# Patient Record
Sex: FEMALE | Race: WHITE | Employment: OTHER | URBAN - NONMETROPOLITAN AREA
[De-identification: names, ages, dates, MRNs, and addresses within clinical notes are randomized per-mention and may not be internally consistent; named-entity substitution may affect disease eponyms.]

---

## 2017-01-03 ENCOUNTER — OFFICE VISIT (OUTPATIENT)
Dept: PRIMARY CARE CLINIC | Age: 67
End: 2017-01-03
Payer: COMMERCIAL

## 2017-01-03 VITALS
TEMPERATURE: 97 F | WEIGHT: 153 LBS | RESPIRATION RATE: 16 BRPM | SYSTOLIC BLOOD PRESSURE: 162 MMHG | BODY MASS INDEX: 27.11 KG/M2 | HEART RATE: 68 BPM | HEIGHT: 63 IN | DIASTOLIC BLOOD PRESSURE: 88 MMHG

## 2017-01-03 DIAGNOSIS — L30.9 DERMATITIS: Primary | ICD-10-CM

## 2017-01-03 PROCEDURE — 96372 THER/PROPH/DIAG INJ SC/IM: CPT | Performed by: FAMILY MEDICINE

## 2017-01-03 PROCEDURE — 99213 OFFICE O/P EST LOW 20 MIN: CPT | Performed by: FAMILY MEDICINE

## 2017-01-03 RX ORDER — TRIAMCINOLONE ACETONIDE 40 MG/ML
60 INJECTION, SUSPENSION INTRA-ARTICULAR; INTRAMUSCULAR ONCE
Status: COMPLETED | OUTPATIENT
Start: 2017-01-03 | End: 2017-01-03

## 2017-01-03 RX ADMIN — TRIAMCINOLONE ACETONIDE 60 MG: 40 INJECTION, SUSPENSION INTRA-ARTICULAR; INTRAMUSCULAR at 17:53

## 2017-01-04 ASSESSMENT — ENCOUNTER SYMPTOMS
VOMITING: 0
DIARRHEA: 0
NAUSEA: 0
WHEEZING: 0
EYE DISCHARGE: 0
BACK PAIN: 0
COLOR CHANGE: 0
ABDOMINAL PAIN: 0
COUGH: 0

## 2017-01-30 ENCOUNTER — OFFICE VISIT (OUTPATIENT)
Dept: PRIMARY CARE CLINIC | Age: 67
End: 2017-01-30
Payer: COMMERCIAL

## 2017-01-30 VITALS
DIASTOLIC BLOOD PRESSURE: 70 MMHG | HEART RATE: 68 BPM | OXYGEN SATURATION: 96 % | WEIGHT: 151 LBS | TEMPERATURE: 97.7 F | RESPIRATION RATE: 16 BRPM | HEIGHT: 63 IN | SYSTOLIC BLOOD PRESSURE: 123 MMHG | BODY MASS INDEX: 26.75 KG/M2

## 2017-01-30 DIAGNOSIS — Z23 NEED FOR VACCINATION WITH 13-POLYVALENT PNEUMOCOCCAL CONJUGATE VACCINE: ICD-10-CM

## 2017-01-30 DIAGNOSIS — Z79.899 MEDICATION MANAGEMENT: ICD-10-CM

## 2017-01-30 DIAGNOSIS — F32.A DEPRESSION, UNSPECIFIED DEPRESSION TYPE: Primary | ICD-10-CM

## 2017-01-30 DIAGNOSIS — E78.00 HYPERCHOLESTEREMIA: ICD-10-CM

## 2017-01-30 DIAGNOSIS — I10 ESSENTIAL HYPERTENSION: ICD-10-CM

## 2017-01-30 PROCEDURE — 90471 IMMUNIZATION ADMIN: CPT | Performed by: NURSE PRACTITIONER

## 2017-01-30 PROCEDURE — 99213 OFFICE O/P EST LOW 20 MIN: CPT | Performed by: NURSE PRACTITIONER

## 2017-01-30 PROCEDURE — 90670 PCV13 VACCINE IM: CPT | Performed by: NURSE PRACTITIONER

## 2017-01-30 RX ORDER — ESOMEPRAZOLE MAGNESIUM 40 MG/1
40 CAPSULE, DELAYED RELEASE ORAL DAILY
Qty: 30 CAPSULE | Refills: 11 | Status: SHIPPED | OUTPATIENT
Start: 2017-01-30 | End: 2018-02-23

## 2017-01-30 ASSESSMENT — ENCOUNTER SYMPTOMS: RESPIRATORY NEGATIVE: 1

## 2017-02-02 ENCOUNTER — TELEPHONE (OUTPATIENT)
Dept: PRIMARY CARE CLINIC | Age: 67
End: 2017-02-02

## 2017-04-17 RX ORDER — ATENOLOL 50 MG/1
TABLET ORAL
Qty: 180 TABLET | Refills: 0 | Status: SHIPPED | OUTPATIENT
Start: 2017-04-17 | End: 2017-08-02 | Stop reason: SDUPTHER

## 2017-04-18 ENCOUNTER — HOSPITAL ENCOUNTER (OUTPATIENT)
Dept: VASCULAR LAB | Age: 67
Discharge: HOME | End: 2017-04-18
Payer: COMMERCIAL

## 2017-04-18 ENCOUNTER — HOSPITAL ENCOUNTER (OUTPATIENT)
Dept: ULTRASOUND IMAGING | Age: 67
Discharge: HOME | End: 2017-04-18
Payer: COMMERCIAL

## 2017-04-18 ENCOUNTER — OFFICE VISIT (OUTPATIENT)
Dept: VASCULAR SURGERY | Age: 67
End: 2017-04-18
Payer: COMMERCIAL

## 2017-04-18 VITALS
TEMPERATURE: 96 F | DIASTOLIC BLOOD PRESSURE: 71 MMHG | HEART RATE: 44 BPM | RESPIRATION RATE: 18 BRPM | SYSTOLIC BLOOD PRESSURE: 136 MMHG

## 2017-04-18 DIAGNOSIS — I71.40 ABDOMINAL AORTIC ANEURYSM WITHOUT RUPTURE: ICD-10-CM

## 2017-04-18 DIAGNOSIS — I70.1 RENAL ARTERY STENOSIS (HCC): ICD-10-CM

## 2017-04-18 DIAGNOSIS — I77.1 CELIAC ARTERY STENOSIS (HCC): ICD-10-CM

## 2017-04-18 DIAGNOSIS — I65.23 BILATERAL CAROTID ARTERY STENOSIS: ICD-10-CM

## 2017-04-18 DIAGNOSIS — I71.40 AAA (ABDOMINAL AORTIC ANEURYSM) WITHOUT RUPTURE: Primary | ICD-10-CM

## 2017-04-18 PROCEDURE — 99213 OFFICE O/P EST LOW 20 MIN: CPT | Performed by: NURSE PRACTITIONER

## 2017-04-18 PROCEDURE — 93880 EXTRACRANIAL BILAT STUDY: CPT

## 2017-04-18 PROCEDURE — 93978 VASCULAR STUDY: CPT

## 2017-04-27 ENCOUNTER — HOSPITAL ENCOUNTER (OUTPATIENT)
Dept: CT IMAGING | Age: 67
Discharge: HOME | End: 2017-04-27
Payer: COMMERCIAL

## 2017-04-27 DIAGNOSIS — I71.40 AAA (ABDOMINAL AORTIC ANEURYSM) WITHOUT RUPTURE: ICD-10-CM

## 2017-04-27 LAB
GFR NON-AFRICAN AMERICAN: 56
PERFORMED ON: ABNORMAL
POC CREATININE: 1 MG/DL (ref 0.3–1.3)
POC SAMPLE TYPE: ABNORMAL

## 2017-04-27 PROCEDURE — 6360000004 HC RX CONTRAST MEDICATION: Performed by: NURSE PRACTITIONER

## 2017-04-27 PROCEDURE — 74174 CTA ABD&PLVS W/CONTRAST: CPT

## 2017-04-27 PROCEDURE — 82565 ASSAY OF CREATININE: CPT

## 2017-04-27 RX ADMIN — IOVERSOL 90 ML: 741 INJECTION INTRA-ARTERIAL; INTRAVENOUS at 09:18

## 2017-04-28 PROBLEM — I77.4 CELIAC ARTERY STENOSIS (HCC): Status: ACTIVE | Noted: 2017-04-28

## 2017-04-28 PROBLEM — I77.1 CELIAC ARTERY STENOSIS (HCC): Status: ACTIVE | Noted: 2017-04-28

## 2017-04-28 PROBLEM — I70.1 RENAL ARTERY STENOSIS (HCC): Status: ACTIVE | Noted: 2017-04-28

## 2017-05-03 RX ORDER — SIMVASTATIN 40 MG
TABLET ORAL
Qty: 90 TABLET | Refills: 0 | Status: SHIPPED | OUTPATIENT
Start: 2017-05-03 | End: 2017-08-02 | Stop reason: SDUPTHER

## 2017-05-03 RX ORDER — CLOPIDOGREL BISULFATE 75 MG/1
TABLET ORAL
Qty: 90 TABLET | Refills: 0 | Status: SHIPPED | OUTPATIENT
Start: 2017-05-03 | End: 2017-08-02 | Stop reason: SDUPTHER

## 2017-05-03 RX ORDER — CITALOPRAM 20 MG/1
TABLET ORAL
Qty: 90 TABLET | Refills: 0 | Status: SHIPPED | OUTPATIENT
Start: 2017-05-03 | End: 2017-08-02 | Stop reason: SDUPTHER

## 2017-08-02 ENCOUNTER — OFFICE VISIT (OUTPATIENT)
Dept: PRIMARY CARE CLINIC | Age: 67
End: 2017-08-02
Payer: COMMERCIAL

## 2017-08-02 VITALS
BODY MASS INDEX: 26.33 KG/M2 | RESPIRATION RATE: 16 BRPM | WEIGHT: 148.6 LBS | HEIGHT: 63 IN | TEMPERATURE: 97.2 F | HEART RATE: 69 BPM | SYSTOLIC BLOOD PRESSURE: 144 MMHG | DIASTOLIC BLOOD PRESSURE: 98 MMHG

## 2017-08-02 DIAGNOSIS — Z12.31 ENCOUNTER FOR SCREENING MAMMOGRAM FOR BREAST CANCER: ICD-10-CM

## 2017-08-02 DIAGNOSIS — I10 ESSENTIAL HYPERTENSION: ICD-10-CM

## 2017-08-02 DIAGNOSIS — E78.00 HYPERCHOLESTEREMIA: ICD-10-CM

## 2017-08-02 DIAGNOSIS — Z01.419 WELL FEMALE EXAM WITH ROUTINE GYNECOLOGICAL EXAM: Primary | ICD-10-CM

## 2017-08-02 PROCEDURE — 99397 PER PM REEVAL EST PAT 65+ YR: CPT | Performed by: FAMILY MEDICINE

## 2017-08-02 RX ORDER — CLOPIDOGREL BISULFATE 75 MG/1
TABLET ORAL
Qty: 90 TABLET | Refills: 3 | Status: SHIPPED | OUTPATIENT
Start: 2017-08-02 | End: 2019-03-07 | Stop reason: SDUPTHER

## 2017-08-02 RX ORDER — ATENOLOL 50 MG/1
TABLET ORAL
Qty: 180 TABLET | Refills: 3 | Status: SHIPPED | OUTPATIENT
Start: 2017-08-02 | End: 2018-09-27 | Stop reason: SDUPTHER

## 2017-08-02 RX ORDER — SIMVASTATIN 40 MG
TABLET ORAL
Qty: 90 TABLET | Refills: 3 | Status: SHIPPED | OUTPATIENT
Start: 2017-08-02 | End: 2019-05-07 | Stop reason: SDUPTHER

## 2017-08-02 RX ORDER — CITALOPRAM 20 MG/1
TABLET ORAL
Qty: 90 TABLET | Refills: 3 | Status: ON HOLD | OUTPATIENT
Start: 2017-08-02 | End: 2018-09-21 | Stop reason: HOSPADM

## 2017-08-02 ASSESSMENT — PATIENT HEALTH QUESTIONNAIRE - PHQ9
2. FEELING DOWN, DEPRESSED OR HOPELESS: 1
SUM OF ALL RESPONSES TO PHQ QUESTIONS 1-9: 1
1. LITTLE INTEREST OR PLEASURE IN DOING THINGS: 0
SUM OF ALL RESPONSES TO PHQ9 QUESTIONS 1 & 2: 1

## 2017-08-09 ENCOUNTER — NURSE ONLY (OUTPATIENT)
Dept: PRIMARY CARE CLINIC | Age: 67
End: 2017-08-09
Payer: COMMERCIAL

## 2017-08-09 VITALS — DIASTOLIC BLOOD PRESSURE: 86 MMHG | SYSTOLIC BLOOD PRESSURE: 134 MMHG

## 2017-08-09 DIAGNOSIS — Z01.419 WELL FEMALE EXAM WITH ROUTINE GYNECOLOGICAL EXAM: ICD-10-CM

## 2017-08-09 LAB
ALBUMIN SERPL-MCNC: 3.9 G/DL (ref 3.5–5.2)
ALP BLD-CCNC: 105 U/L (ref 35–104)
ALT SERPL-CCNC: 11 U/L (ref 5–33)
ANION GAP SERPL CALCULATED.3IONS-SCNC: 14 MMOL/L (ref 7–19)
AST SERPL-CCNC: 19 U/L (ref 5–32)
BILIRUB SERPL-MCNC: 0.3 MG/DL (ref 0.2–1.2)
BUN BLDV-MCNC: 20 MG/DL (ref 8–23)
CALCIUM SERPL-MCNC: 9.7 MG/DL (ref 8.8–10.2)
CHLORIDE BLD-SCNC: 102 MMOL/L (ref 98–111)
CHOLESTEROL, TOTAL: 163 MG/DL (ref 160–199)
CO2: 25 MMOL/L (ref 22–29)
CREAT SERPL-MCNC: 0.8 MG/DL (ref 0.5–0.9)
GFR NON-AFRICAN AMERICAN: >60
GLUCOSE BLD-MCNC: 92 MG/DL (ref 74–109)
HCT VFR BLD CALC: 38.9 % (ref 37–47)
HDLC SERPL-MCNC: 51 MG/DL (ref 65–121)
HEMOGLOBIN: 12.7 G/DL (ref 12–16)
LDL CHOLESTEROL CALCULATED: 98 MG/DL
MCH RBC QN AUTO: 29.7 PG (ref 27–31)
MCHC RBC AUTO-ENTMCNC: 32.6 G/DL (ref 33–37)
MCV RBC AUTO: 90.9 FL (ref 81–99)
PDW BLD-RTO: 14.2 % (ref 11.5–14.5)
PLATELET # BLD: 278 K/UL (ref 130–400)
PMV BLD AUTO: 10.8 FL (ref 9.4–12.3)
POTASSIUM SERPL-SCNC: 4.3 MMOL/L (ref 3.5–5)
RBC # BLD: 4.28 M/UL (ref 4.2–5.4)
SODIUM BLD-SCNC: 141 MMOL/L (ref 136–145)
TOTAL PROTEIN: 6.6 G/DL (ref 6.6–8.7)
TRIGL SERPL-MCNC: 72 MG/DL (ref 150–199)
WBC # BLD: 8.5 K/UL (ref 4.8–10.8)

## 2017-08-09 PROCEDURE — 36415 COLL VENOUS BLD VENIPUNCTURE: CPT | Performed by: FAMILY MEDICINE

## 2017-08-23 ENCOUNTER — NURSE ONLY (OUTPATIENT)
Dept: PRIMARY CARE CLINIC | Age: 67
End: 2017-08-23

## 2017-08-23 VITALS — SYSTOLIC BLOOD PRESSURE: 130 MMHG | DIASTOLIC BLOOD PRESSURE: 80 MMHG

## 2017-08-23 DIAGNOSIS — I10 ESSENTIAL HYPERTENSION: Primary | ICD-10-CM

## 2017-08-29 ENCOUNTER — HOSPITAL ENCOUNTER (OUTPATIENT)
Dept: WOMENS IMAGING | Age: 67
Discharge: HOME | End: 2017-08-29
Payer: COMMERCIAL

## 2017-08-29 DIAGNOSIS — Z12.31 ENCOUNTER FOR SCREENING MAMMOGRAM FOR BREAST CANCER: ICD-10-CM

## 2017-08-29 PROCEDURE — 77063 BREAST TOMOSYNTHESIS BI: CPT

## 2017-11-01 ENCOUNTER — HOSPITAL ENCOUNTER (OUTPATIENT)
Dept: CT IMAGING | Age: 67
Discharge: HOME | End: 2017-11-01
Payer: COMMERCIAL

## 2017-11-01 ENCOUNTER — OFFICE VISIT (OUTPATIENT)
Dept: VASCULAR SURGERY | Age: 67
End: 2017-11-01
Payer: COMMERCIAL

## 2017-11-01 VITALS
TEMPERATURE: 97 F | HEART RATE: 70 BPM | OXYGEN SATURATION: 97 % | SYSTOLIC BLOOD PRESSURE: 126 MMHG | DIASTOLIC BLOOD PRESSURE: 80 MMHG | RESPIRATION RATE: 18 BRPM

## 2017-11-01 DIAGNOSIS — I77.1 CELIAC ARTERY STENOSIS (HCC): ICD-10-CM

## 2017-11-01 DIAGNOSIS — I70.1 RENAL ARTERY STENOSIS (HCC): ICD-10-CM

## 2017-11-01 DIAGNOSIS — I71.40 AAA (ABDOMINAL AORTIC ANEURYSM) WITHOUT RUPTURE: ICD-10-CM

## 2017-11-01 DIAGNOSIS — I71.40 ABDOMINAL AORTIC ANEURYSM (AAA) WITHOUT RUPTURE: Primary | ICD-10-CM

## 2017-11-01 LAB
GFR NON-AFRICAN AMERICAN: 41
PERFORMED ON: ABNORMAL
POC CREATININE: 1.3 MG/DL (ref 0.3–1.3)
POC SAMPLE TYPE: ABNORMAL

## 2017-11-01 PROCEDURE — 74174 CTA ABD&PLVS W/CONTRAST: CPT

## 2017-11-01 PROCEDURE — 99213 OFFICE O/P EST LOW 20 MIN: CPT | Performed by: PHYSICIAN ASSISTANT

## 2017-11-01 PROCEDURE — 82565 ASSAY OF CREATININE: CPT

## 2017-11-01 PROCEDURE — 6360000004 HC RX CONTRAST MEDICATION: Performed by: NURSE PRACTITIONER

## 2017-11-01 RX ADMIN — IOPAMIDOL 90 ML: 755 INJECTION, SOLUTION INTRAVENOUS at 11:08
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
## 2018-02-22 ENCOUNTER — TELEPHONE (OUTPATIENT)
Dept: VASCULAR SURGERY | Age: 68
End: 2018-02-22

## 2018-02-22 NOTE — TELEPHONE ENCOUNTER
The patient called and left a message through our answering services that she is having pain between her shoulder blades and shortness of breath. I returned the patient's call and I spoke with MERRITT [facility]. I instructed the patient to go to the ED, or call 911 if she does not have someone that could transport her to the ED in a timely manor. The patient voiced understanding.

## 2018-02-23 ENCOUNTER — OFFICE VISIT (OUTPATIENT)
Dept: PRIMARY CARE CLINIC | Age: 68
End: 2018-02-23
Payer: COMMERCIAL

## 2018-02-23 VITALS
SYSTOLIC BLOOD PRESSURE: 110 MMHG | DIASTOLIC BLOOD PRESSURE: 63 MMHG | OXYGEN SATURATION: 97 % | WEIGHT: 138 LBS | RESPIRATION RATE: 16 BRPM | BODY MASS INDEX: 24.45 KG/M2 | HEART RATE: 63 BPM | TEMPERATURE: 96.6 F | HEIGHT: 63 IN

## 2018-02-23 DIAGNOSIS — R42 VERTIGO: Primary | ICD-10-CM

## 2018-02-23 PROCEDURE — 99213 OFFICE O/P EST LOW 20 MIN: CPT | Performed by: NURSE PRACTITIONER

## 2018-02-23 RX ORDER — METHYLPREDNISOLONE 4 MG/1
TABLET ORAL
Qty: 1 KIT | Refills: 0 | Status: SHIPPED | OUTPATIENT
Start: 2018-02-23 | End: 2018-03-01

## 2018-02-23 RX ORDER — AZELASTINE 1 MG/ML
1 SPRAY, METERED NASAL 2 TIMES DAILY
Qty: 1 BOTTLE | Refills: 3 | Status: SHIPPED | OUTPATIENT
Start: 2018-02-23 | End: 2018-08-27

## 2018-02-23 RX ORDER — TIZANIDINE 2 MG/1
2 TABLET ORAL EVERY 6 HOURS PRN
Qty: 30 TABLET | Refills: 0 | Status: SHIPPED | OUTPATIENT
Start: 2018-02-23 | End: 2018-09-27

## 2018-02-23 RX ORDER — MECLIZINE HCL 12.5 MG/1
12.5 TABLET ORAL 3 TIMES DAILY PRN
Qty: 30 TABLET | Refills: 0 | Status: SHIPPED | OUTPATIENT
Start: 2018-02-23 | End: 2018-03-05

## 2018-02-23 NOTE — PATIENT INSTRUCTIONS
Look up half summer sault maneuver for vertigo  May use meclizine for dizziness  Use the astelin to open up the sinus and eustation tubes  Steroids will help the back and the vertigo  Muscle relaxer while at home  If not better by Monday appt with vascular or if worse go to ER  Patient Education        Cawthorne Exercises for Vertigo: Care Instructions  Your Care Instructions  Simple exercises can help you regain your balance when you have vertigo. If you have Ménière's disease, benign paroxysmal positional vertigo (BPPV), or another inner ear problem, you may have vertigo off and on. Do these exercises first thing in the morning and before you go to bed. You might get dizzy when you first start them. If this happens, try to do them for at least 5 minutes. Do a group of exercises at a time, starting at the top of the list. It may take several weeks before you can do all the exercises without feeling dizzy. Follow-up care is a key part of your treatment and safety. Be sure to make and go to all appointments, and call your doctor if you are having problems. It's also a good idea to know your test results and keep a list of the medicines you take. How can you care for yourself at home? Exercise 1  While sitting on the side of the bed and holding your head still:  · Look up as far as you can. · Look down as far as you can. · Look from side to side as far as you can. · Stretch your arm straight out in front of you. Focus on your index finger. Continue to focus on your finger while you bring it to your nose. Exercise 2  While sitting on the side of the bed:  · Bring your head as far back as you can. · Bring your head forward to touch your chin to your chest.  · Turn your head from side to side. · Do these exercises first with your eyes open. Then try with your eyes closed. Exercise 3  While sitting on the side of the bed:  · Shrug your shoulders straight upward, then relax them.   · Bend over and try to touch nauseated. You may have trouble standing or walking and may lose your balance. Vertigo is often related to an inner ear problem, but it can have other more serious causes. If vertigo continues, you may need more tests to find its cause. Follow-up care is a key part of your treatment and safety. Be sure to make and go to all appointments, and call your doctor if you are having problems. It's also a good idea to know your test results and keep a list of the medicines you take. How can you care for yourself at home? · Do not lie flat on your back. Prop yourself up slightly. This may reduce the spinning feeling. Keep your eyes open. · Move slowly so that you do not fall. · If your doctor recommends medicine, take it exactly as directed. · Do not drive while you are having vertigo. Certain exercises, called Vu-Daroff exercises, can help decrease vertigo. To do Vu-Daroff exercises:  · Sit on the edge of a bed or sofa and quickly lie down on the side that causes the worst vertigo. Lie on your side with your ear down. · Stay in this position for at least 30 seconds or until the vertigo goes away. · Sit up. If this causes vertigo, wait for it to stop. · Repeat the procedure on the other side. · Repeat this 10 times. Do these exercises 2 times a day until the vertigo is gone. When should you call for help? Call 911 anytime you think you may need emergency care. For example, call if:  ? · You passed out (lost consciousness). ? · You have symptoms of a stroke. These may include:  ¨ Sudden numbness, tingling, weakness, or loss of movement in your face, arm, or leg, especially on only one side of your body. ¨ Sudden vision changes. ¨ Sudden trouble speaking. ¨ Sudden confusion or trouble understanding simple statements. ¨ Sudden problems with walking or balance. ¨ A sudden, severe headache that is different from past headaches. ?Call your doctor now or seek immediate medical care if:  ?  · Vertigo

## 2018-02-23 NOTE — PROGRESS NOTES
Sitting Sitting Sitting Sitting Sitting Sitting   Cuff Size Medium Adult Medium Adult Medium Adult - Medium Adult Medium Adult   Pulse 63 70 70 - - -   Temp 96.6 - 97 - - -   Resp 16 - 18 - - -   Weight 138 lb - - - - -   Height 5' 3\" - - - - -   BMI (wt*703/ht~2) 24.44 kg/m2 - - - - -   Pain Level - - - - - -   Some recent data might be hidden       Family History   Problem Relation Age of Onset    High Blood Pressure Father     Ulcerative Colitis Father     Substance Abuse Father     Cancer Maternal Grandmother      colon    Lung Cancer Brother     Colon Cancer Mother     Other Sister      Aneurysm    Colon Polyps Neg Hx     Esophageal Cancer Neg Hx     Liver Cancer Neg Hx     Liver Disease Neg Hx     Rectal Cancer Neg Hx     Stomach Cancer Neg Hx        Social History   Substance Use Topics    Smoking status: Former Smoker     Packs/day: 0.25     Quit date: 10/18/2014    Smokeless tobacco: Never Used    Alcohol use No      Current Outpatient Prescriptions   Medication Sig Dispense Refill    azelastine (ASTELIN) 0.1 % nasal spray 1 spray by Nasal route 2 times daily Use in each nostril as directed 1 Bottle 3    methylPREDNISolone (MEDROL, CORY,) 4 MG tablet Take by mouth.  1 kit 0    meclizine (ANTIVERT) 12.5 MG tablet Take 1 tablet by mouth 3 times daily as needed for Dizziness 30 tablet 0    tiZANidine (ZANAFLEX) 2 MG tablet Take 1 tablet by mouth every 6 hours as needed (muscle spasms) 30 tablet 0    benazepril-hydrochlorthiazide (LOTENSIN HCT) 10-12.5 MG per tablet Take 1 tablet by mouth daily 90 tablet 3    dicyclomine (BENTYL) 20 MG tablet TAKE ONE TABLET BY MOUTH FOUR TIMES DAILY BEFORE MEALS AND NIGHTLY 120 tablet 1    clopidogrel (PLAVIX) 75 MG tablet TAKE ONE TABLET BY MOUTH DAILY 90 tablet 3    citalopram (CELEXA) 20 MG tablet TAKE ONE TABLET BY MOUTH DAILY 90 tablet 3    simvastatin (ZOCOR) 40 MG tablet TAKE ONE TABLET BY MOUTH EVERY EVENING 90 tablet 3    atenolol (TENORMIN) 50 MG tablet TAKE ONE TABLET BY MOUTH TWICE A  tablet 3    cloNIDine (CATAPRES) 0.1 MG tablet Take 1 tablet by mouth daily as needed (if blood pressure is 160/90 or greater) 30 tablet 3    pantoprazole (PROTONIX) 40 MG tablet Take 1 tablet by mouth daily 90 tablet 3     No current facility-administered medications for this visit. Allergies   Allergen Reactions    Colestipol Shortness Of Breath and Other (See Comments)     Heart races    Codeine      Blocks her kidneys     Prednisone Other (See Comments)     Increased heart rate and insomnia    Aspirin Nausea And Vomiting     Makes her stomach bleed       Health Maintenance   Topic Date Due    Shingles Vaccine (1 of 2 - 2 Dose Series) 12/20/2001    A1C test (Diabetic or Prediabetic)  11/29/2015    DEXA (modify frequency per FRAX score)  08/16/2018 (Originally 12/20/2016)    Hepatitis C screen  01/29/2020 (Originally 1950)    Potassium monitoring  08/09/2018    Creatinine monitoring  08/09/2018    Pneumococcal low/med risk (2 of 2 - PPSV23) 11/15/2018    Colon cancer screen colonoscopy  07/26/2019    Breast cancer screen  08/29/2019    DTaP/Tdap/Td vaccine (2 - Td) 09/28/2021    Lipid screen  08/09/2022    Flu vaccine  Completed       Subjective:      Review of Systems   Neurological: Positive for dizziness. Objective:     Physical Exam   Constitutional: She is oriented to person, place, and time. She appears well-developed and well-nourished. HENT:   Head: Normocephalic. Right Ear: External ear normal. Tympanic membrane is scarred. Left Ear: External ear normal. Tympanic membrane is scarred. Nose: Nose normal.   Mouth/Throat: Uvula is midline and oropharynx is clear and moist.   Eyes: Pupils are equal, round, and reactive to light. Neck: Normal range of motion. Normal carotid pulses present. Carotid bruit is not present. Cardiovascular: Normal rate, regular rhythm, normal heart sounds and intact distal pulses. INSTRUCTIONS:  Patient Instructions     Look up half summer sault maneuver for vertigo  May use meclizine for dizziness  Use the astelin to open up the sinus and eustation tubes  Steroids will help the back and the vertigo  Muscle relaxer while at home  If not better by Monday appt with vascular or if worse go to ER  Patient Education        Cawthorne Exercises for Vertigo: Care Instructions  Your Care Instructions  Simple exercises can help you regain your balance when you have vertigo. If you have Ménière's disease, benign paroxysmal positional vertigo (BPPV), or another inner ear problem, you may have vertigo off and on. Do these exercises first thing in the morning and before you go to bed. You might get dizzy when you first start them. If this happens, try to do them for at least 5 minutes. Do a group of exercises at a time, starting at the top of the list. It may take several weeks before you can do all the exercises without feeling dizzy. Follow-up care is a key part of your treatment and safety. Be sure to make and go to all appointments, and call your doctor if you are having problems. It's also a good idea to know your test results and keep a list of the medicines you take. How can you care for yourself at home? Exercise 1  While sitting on the side of the bed and holding your head still:  · Look up as far as you can. · Look down as far as you can. · Look from side to side as far as you can. · Stretch your arm straight out in front of you. Focus on your index finger. Continue to focus on your finger while you bring it to your nose. Exercise 2  While sitting on the side of the bed:  · Bring your head as far back as you can. · Bring your head forward to touch your chin to your chest.  · Turn your head from side to side. · Do these exercises first with your eyes open. Then try with your eyes closed.   Exercise 3  While sitting on the side of the bed:  · Shrug your shoulders straight upward, then relax them. · Bend over and try to touch the ground with your fingers. Then go back to a sitting position. · Toss a small ball from one hand to the other. Throw the ball higher than your eyes so you have to look up. Exercise 4  While standing (with someone close by if you feel uncomfortable):  · Repeat Exercise 1.  · Repeat Exercise 2.  · Pass a ball between your legs and above your head. · Sit down and then stand up. Repeat. Turn around in a [place] a different way each time you stand. · With someone close by to help you, try the above exercises with your eyes closed. Exercise 5  In a room that is cleared of obstacles:  · Walk to a corner of the room, turn to your right, and walk back to the starting point. Now, repeat and turn left. · Walk up and down a slope. Now try stairs. · While holding on to someone's arm, try these exercises with your eyes closed. When should you call for help? Watch closely for changes in your health, and be sure to contact your doctor if:  ? · You do not get better as expected. Where can you learn more? Go to https://DEXMApepiceweb.[employer]andTR[employer]. org and sign in to your [employer] account. Enter F748 in the [employer] box to learn more about \"Cawthorne Exercises for Vertigo: Care Instructions. \"     If you do not have an account, please click on the \"Sign Up Now\" link. Current as of: May 12, 2017  Content Version: 11.5  © 3673-5969 [employer]. Care instructions adapted under license by [facility] ([facility]). If you have questions about a medical condition or this instruction, always ask your healthcare professional. [place] [address] any warranty or liability for your use of this information. Patient Education        Vertigo: Care Instructions  Your Care Instructions    Vertigo is the feeling that you or your surroundings are moving when there is no actual movement.  It is often described as a feeling of spinning, whirling, falling, or tilting. Vertigo may make you vomit or feel nauseated. You may have trouble standing or walking and may lose your balance. Vertigo is often related to an inner ear problem, but it can have other more serious causes. If vertigo continues, you may need more tests to find its cause. Follow-up care is a key part of your treatment and safety. Be sure to make and go to all appointments, and call your doctor if you are having problems. It's also a good idea to know your test results and keep a list of the medicines you take. How can you care for yourself at home? · Do not lie flat on your back. Prop yourself up slightly. This may reduce the spinning feeling. Keep your eyes open. · Move slowly so that you do not fall. · If your doctor recommends medicine, take it exactly as directed. · Do not drive while you are having vertigo. Certain exercises, called Vu-Daroff exercises, can help decrease vertigo. To do Vu-Daroff exercises:  · Sit on the edge of a bed or sofa and quickly lie down on the side that causes the worst vertigo. Lie on your side with your ear down. · Stay in this position for at least 30 seconds or until the vertigo goes away. · Sit up. If this causes vertigo, wait for it to stop. · Repeat the procedure on the other side. · Repeat this 10 times. Do these exercises 2 times a day until the vertigo is gone. When should you call for help? Call 911 anytime you think you may need emergency care. For example, call if:  ? · You passed out (lost consciousness). ? · You have symptoms of a stroke. These may include:  ¨ Sudden numbness, tingling, weakness, or loss of movement in your face, arm, or leg, especially on only one side of your body. ¨ Sudden vision changes. ¨ Sudden trouble speaking. ¨ Sudden confusion or trouble understanding simple statements. ¨ Sudden problems with walking or balance. ¨ A sudden, severe headache that is different from past headaches.    ?Call your doctor now or

## 2018-08-22 ENCOUNTER — OFFICE VISIT (OUTPATIENT)
Dept: PRIMARY CARE CLINIC | Age: 68
End: 2018-08-22
Payer: MEDICARE

## 2018-08-22 VITALS
WEIGHT: 140 LBS | HEART RATE: 62 BPM | HEIGHT: 63 IN | DIASTOLIC BLOOD PRESSURE: 68 MMHG | SYSTOLIC BLOOD PRESSURE: 112 MMHG | OXYGEN SATURATION: 97 % | BODY MASS INDEX: 24.8 KG/M2 | TEMPERATURE: 96.8 F

## 2018-08-22 DIAGNOSIS — Z00.00 ROUTINE GENERAL MEDICAL EXAMINATION AT A HEALTH CARE FACILITY: Primary | ICD-10-CM

## 2018-08-22 DIAGNOSIS — R10.13 EPIGASTRIC PAIN: ICD-10-CM

## 2018-08-22 DIAGNOSIS — F41.8 DEPRESSION WITH ANXIETY: ICD-10-CM

## 2018-08-22 DIAGNOSIS — Z12.31 SCREENING MAMMOGRAM, ENCOUNTER FOR: ICD-10-CM

## 2018-08-22 DIAGNOSIS — M19.042 PRIMARY OSTEOARTHRITIS OF BOTH HANDS: ICD-10-CM

## 2018-08-22 DIAGNOSIS — I10 ESSENTIAL HYPERTENSION: ICD-10-CM

## 2018-08-22 DIAGNOSIS — M19.041 PRIMARY OSTEOARTHRITIS OF BOTH HANDS: ICD-10-CM

## 2018-08-22 DIAGNOSIS — E78.00 HYPERCHOLESTEREMIA: ICD-10-CM

## 2018-08-22 LAB
ALBUMIN SERPL-MCNC: 4.2 G/DL (ref 3.5–5.2)
ALP BLD-CCNC: 95 U/L (ref 35–104)
ALT SERPL-CCNC: 14 U/L (ref 5–33)
ANION GAP SERPL CALCULATED.3IONS-SCNC: 17 MMOL/L (ref 7–19)
AST SERPL-CCNC: 18 U/L (ref 5–32)
BILIRUB SERPL-MCNC: 0.3 MG/DL (ref 0.2–1.2)
BUN BLDV-MCNC: 21 MG/DL (ref 8–23)
CALCIUM SERPL-MCNC: 9.7 MG/DL (ref 8.8–10.2)
CHLORIDE BLD-SCNC: 100 MMOL/L (ref 98–111)
CHOLESTEROL, TOTAL: 167 MG/DL (ref 160–199)
CO2: 24 MMOL/L (ref 22–29)
CREAT SERPL-MCNC: 1.1 MG/DL (ref 0.5–0.9)
GFR NON-AFRICAN AMERICAN: 50
GLUCOSE BLD-MCNC: 90 MG/DL (ref 74–109)
HCT VFR BLD CALC: 39.8 % (ref 37–47)
HDLC SERPL-MCNC: 51 MG/DL (ref 65–121)
HEMOGLOBIN: 12.9 G/DL (ref 12–16)
LDL CHOLESTEROL CALCULATED: 97 MG/DL
MCH RBC QN AUTO: 29.7 PG (ref 27–31)
MCHC RBC AUTO-ENTMCNC: 32.4 G/DL (ref 33–37)
MCV RBC AUTO: 91.7 FL (ref 81–99)
PDW BLD-RTO: 14.6 % (ref 11.5–14.5)
PLATELET # BLD: 301 K/UL (ref 130–400)
PMV BLD AUTO: 10.8 FL (ref 9.4–12.3)
POTASSIUM SERPL-SCNC: 4.2 MMOL/L (ref 3.5–5)
RBC # BLD: 4.34 M/UL (ref 4.2–5.4)
SODIUM BLD-SCNC: 141 MMOL/L (ref 136–145)
TOTAL PROTEIN: 7 G/DL (ref 6.6–8.7)
TRIGL SERPL-MCNC: 93 MG/DL (ref 0–149)
WBC # BLD: 9 K/UL (ref 4.8–10.8)

## 2018-08-22 PROCEDURE — G8427 DOCREV CUR MEDS BY ELIG CLIN: HCPCS | Performed by: FAMILY MEDICINE

## 2018-08-22 PROCEDURE — G8420 CALC BMI NORM PARAMETERS: HCPCS | Performed by: FAMILY MEDICINE

## 2018-08-22 PROCEDURE — G8598 ASA/ANTIPLAT THER USED: HCPCS | Performed by: FAMILY MEDICINE

## 2018-08-22 PROCEDURE — 1101F PT FALLS ASSESS-DOCD LE1/YR: CPT | Performed by: FAMILY MEDICINE

## 2018-08-22 PROCEDURE — G0402 INITIAL PREVENTIVE EXAM: HCPCS | Performed by: FAMILY MEDICINE

## 2018-08-22 PROCEDURE — 36415 COLL VENOUS BLD VENIPUNCTURE: CPT | Performed by: FAMILY MEDICINE

## 2018-08-22 PROCEDURE — 1036F TOBACCO NON-USER: CPT | Performed by: FAMILY MEDICINE

## 2018-08-22 PROCEDURE — G8400 PT W/DXA NO RESULTS DOC: HCPCS | Performed by: FAMILY MEDICINE

## 2018-08-22 PROCEDURE — 1123F ACP DISCUSS/DSCN MKR DOCD: CPT | Performed by: FAMILY MEDICINE

## 2018-08-22 PROCEDURE — 99214 OFFICE O/P EST MOD 30 MIN: CPT | Performed by: FAMILY MEDICINE

## 2018-08-22 PROCEDURE — 3017F COLORECTAL CA SCREEN DOC REV: CPT | Performed by: FAMILY MEDICINE

## 2018-08-22 PROCEDURE — 1090F PRES/ABSN URINE INCON ASSESS: CPT | Performed by: FAMILY MEDICINE

## 2018-08-22 PROCEDURE — 4040F PNEUMOC VAC/ADMIN/RCVD: CPT | Performed by: FAMILY MEDICINE

## 2018-08-22 ASSESSMENT — PATIENT HEALTH QUESTIONNAIRE - PHQ9
SUM OF ALL RESPONSES TO PHQ QUESTIONS 1-9: 0
SUM OF ALL RESPONSES TO PHQ QUESTIONS 1-9: 0

## 2018-08-22 ASSESSMENT — LIFESTYLE VARIABLES: HOW OFTEN DO YOU HAVE A DRINK CONTAINING ALCOHOL: 0

## 2018-08-22 ASSESSMENT — ANXIETY QUESTIONNAIRES: GAD7 TOTAL SCORE: 0

## 2018-08-22 NOTE — PATIENT INSTRUCTIONS
diet is one that limits sodium , certain types of fat , and cholesterol . This type of diet is recommended for:   People with any form of cardiovascular disease (eg, coronary heart disease , peripheral vascular disease , previous heart attack , previous stroke )   People with risk factors for cardiovascular disease, such as high blood pressure , high cholesterol , or diabetes   Anyone who wants to lower their risk of developing cardiovascular disease   Sodium    Sodium is a mineral found in many foods. In general, most people consume much more sodium than they need. Diets high in sodium can increase blood pressure and lead to edema (water retention). On a heart-healthy diet, you should consume no more than 2,300 mg (milligrams) of sodium per dayabout the amount in one teaspoon of table salt. The foods highest in sodium include table salt (about 50% sodium), processed foods, convenience foods, and preserved foods. Cholesterol    Cholesterol is a fat-like, waxy substance in your blood. Our bodies make some cholesterol. It is also found in animal products, with the highest amounts in fatty meat, egg yolks, whole milk, cheese, shellfish, and organ meats. On a heart-healthy diet, you should limit your cholesterol intake to less than 200 mg per day. It is normal and important to have some cholesterol in your bloodstream. But too much cholesterol can cause plaque to build up within your arteries, which can eventually lead to a heart attack or stroke. The two types of cholesterol that are most commonly referred to are:   Low-density lipoprotein (LDL) cholesterol  Also known as bad cholesterol, this is the cholesterol that tends to build up along your arteries. Bad cholesterol levels are increased by eating fats that are saturated or hydrogenated. Optimal level of this cholesterol is less than 100. Over 130 starts to get risky for heart disease.    High-density lipoprotein (HDL) cholesterol  Also known as good cholesterol, this type of cholesterol actually carries cholesterol away from your arteries and may, therefore, help lower your risk of having a heart attack. You want this level to be high (ideally greater than 60). It is a risk to have a level less than 40. You can raise this good cholesterol by eating olive oil, canola oil, avocados, or nuts. Exercise raises this level, too. Fat    Fat is calorie dense and packs a lot of calories into a small amount of food. Even though fats should be limited due to their high calorie content, not all fats are bad. In fact, some fats are quite healthful. Fat can be broken down into four main types. The good-for-you fats are:   Monounsaturated fat  found in oils such as olive and canola, avocados, and nuts and natural nut butters; can decrease cholesterol levels, while keeping levels of HDL cholesterol high   Polyunsaturated fat  found in oils such as safflower, sunflower, soybean, corn, and sesame; can decrease total cholesterol and LDL cholesterol   Omega-3 fatty acids  particularly those found in fatty fish (such as salmon, trout, tuna, mackerel, herring, and sardines); can decrease risk of arrhythmias, decrease triglyceride levels, and slightly lower blood pressure   The fats that you want to limit are:   Saturated fat  found in animal products, many fast foods, and a few vegetables; increases total blood cholesterol, including LDL levels   Animal fats that are saturated include: butter, lard, whole-milk dairy products, meat fat, and poultry skin   Vegetable fats that are saturated include: hydrogenated shortening, palm oil, coconut oil, cocoa butter   Hydrogenated or trans fat  found in margarine and vegetable shortening, most shelf stable snack foods, and fried foods; increases LDL and decreases HDL     It is generally recommended that you limit your total fat for the day to less than 30% of your total calories.  If you follow an 1800-calorie heart healthy diet, for honestly with your doctor. This is the best way to understand the decisions you will need to make as your health changes. Know that you can always change your mind. Ask your doctor about commonly used life-support measures. These include tube feedings, breathing machines, and fluids given through a vein (IV). Understanding these treatments will help you decide whether you want them. You may choose to have these life-supporting treatments for a limited time. This allows a trial period to see whether they will help you. You may also decide that you want your doctor to take only certain measures to keep you alive. It is important to spell out these conditions so that your doctor and family understand them. Talk to your doctor about how long you are likely to live. He or she may be able to give you an idea of what usually happens with your specific illness. Think about preparing papers that state your wishes. This way there will not be any confusion about what you want. You can change your instructions at any time. Which papers should you prepare? Advance directives are legal papers that tell doctors how you want to be cared for at the end of your life. You do not need a  to write these papers. Ask your doctor or your state [facility] department for information on how to write your advance directives. They may have the forms for each of these types of papers. Make sure your doctor has a copy of these on file, and give a copy to a family member or close friend. Consider a do-not-resuscitate order (DNR). This order asks that no extra treatments be done if your heart stops or you stop breathing. Extra treatments may include cardiopulmonary resuscitation (CPR), electrical shock to restart your heart, or a machine to breathe for you. If you decide to have a DNR order, ask your doctor to explain and write it. Place the order in your home where everyone can easily see it. Consider a living will.  A living will

## 2018-08-22 NOTE — PROGRESS NOTES
SUBJECTIVE:   78 y.o. female for annual routine Pap and checkup. She is actually here for a Medicare annual wellness but she has several problems we follow including hypercholesterolemia and essential hypertension. She also has a new problem. She has a history of extensive vascular disease and is followed by the vascular group at [facility]. She has an appointment with them in October that she's starting to become concerned. She is having an occasional sharp shooting pain in her epigastric area. It is a burning sensation. She may notice it during the day. Not related to food. It is not heartburn. She no longer smokes. She has lost about 10 pounds. Her appetite is not as good as it was. She has no nausea vomiting or diarrhea. Her irritable bowel syndrome is under good control. She does not want to stop her citalopram. She is helping to raise her 25year-old grandson an 15year-old granddaughter and that is very stressful. She is status post SOFÍA with BSO. She denies any symptoms. She is complaining of worsening osteoarthritis in her right hand. That is her dominant hand and she is starting to be unable to open jars or even getting worse. It is painful and swollen.     Patient Active Problem List    Diagnosis Date Noted    Essential hypertension 08/22/2018    Renal artery stenosis (HCC) 04/28/2017    Celiac artery stenosis ([address] 04/28/2017    Colon polyps     Diarrhea 05/18/2016    History of colon polyps 05/18/2016    Chronic heartburn 05/18/2016    Antiplatelet or antithrombotic long-term use 05/18/2016    Fibromuscular dysplasia ([address] 06/24/2014    TIA (transient ischemic attack) 04/25/2014    Hypercholesteremia 03/28/2014    AAA (abdominal aortic aneurysm) ([address] 03/19/2013    Carotid artery stenosis 03/19/2013    Irritable bowel syndrome     Osteoarthritis     Anxiety     Depression     Labyrinthitis      Current Outpatient Prescriptions   Medication Sig Dispense Refill    azelastine  Lung Cancer Brother     Colon Cancer Mother     Other Sister         Aneurysm    Colon Polyps Neg Hx     Esophageal Cancer Neg Hx     Liver Cancer Neg Hx     Liver Disease Neg Hx     Rectal Cancer Neg Hx     Stomach Cancer Neg Hx      Social History   Substance Use Topics    Smoking status: Former Smoker     Packs/day: 0.25     Quit date: 10/18/2014    Smokeless tobacco: Never Used    Alcohol use No       Allergies: Colestipol; Codeine; Prednisone; and Aspirin  No LMP recorded. Patient has had a hysterectomy. ROS:  Feeling well. No dyspnea or chest pain on exertion. No abdominal pain, change in bowel habits, black or bloody stools. No urinary tract symptoms. GYN ROS: none   No neurological complaints. OBJECTIVE:   The patient appears well, alert, oriented x 3, in no distress. /68 (Site: Left Arm, Position: Sitting, Cuff Size: Large Adult)   Pulse 62   Temp 96.8 °F (36 °C) (Temporal)   Ht 5' 3\" (1.6 m)   Wt 140 lb (63.5 kg)   SpO2 97%   BMI 24.80 kg/m²   Skin normal, no suspicious skin lesions. She is very tanned. ENT normal.  Neck supple. No adenopathy or thyromegaly. AVANI. Lungs are clear, good air entry, no wheezes, rhonchi or rales. S1 and S2 normal, no murmurs, regular rate and rhythm. Abdomen soft without tenderness, guarding, mass or organomegaly. No significant tenderness in the epigastric area and I do not hear any bruits at this time. Extremities show no edema, normal peripheral pulses. She does have marked deformities of both hands, right greater than left at the proximal joints. No increased warmth or redness. Neurological is normal, no focal findings. Psychiatric exam, no signs of depression. BREAST EXAM: Breasts symmetrical. No skin lesions. Nipples appear normal without discharge. No masses or axillary lymphadenopathy. No tenderness. PELVIC EXAM: External genitalia appear normal. Vaginal vault is atrophic.  Cervix uterus and ovaries are and/or referrals for you. A list of these orders (if applicable) as well as your Preventive Care list are included within your After Visit Summary for your review. Other Preventive Recommendations:    · A preventive eye exam performed by an eye specialist is recommended every 1-2 years to screen for glaucoma; cataracts, macular degeneration, and other eye disorders. · A preventive dental visit is recommended every 6 months. · Try to get at least 150 minutes of exercise per week or 10,000 steps per day on a pedometer . · Order or download the FREE \"Exercise & Physical Activity: Your Everyday Guide\" from [employer]. Call 6-400.759.7942 or search [employer] online. · You need 6432-2504 mg of calcium and 8484-9483 IU of vitamin D per day. It is possible to meet your calcium requirement with diet alone, but a vitamin D supplement is usually necessary to meet this goal.  · When exposed to the sun, use a sunscreen that protects against both UVA and UVB radiation with an SPF of 30 or greater. Reapply every 2 to 3 hours or after sweating, drying off with a towel, or swimming. · Always wear a seat belt when traveling in a car. Always wear a helmet when riding a bicycle or motorcycle. Heart-Healthy Diet   Sodium, Fat, and Cholesterol Controlled Diet       What Is a Heart Healthy Diet? A heart-healthy diet is one that limits sodium , certain types of fat , and cholesterol . This type of diet is recommended for:   People with any form of cardiovascular disease (eg, coronary heart disease , peripheral vascular disease , previous heart attack , previous stroke )   People with risk factors for cardiovascular disease, such as high blood pressure , high cholesterol , or diabetes   Anyone who wants to lower their risk of developing cardiovascular disease   Sodium    Sodium is a mineral found in many foods. In general, most people consume much more sodium than they need.  Diets high in sodium can increase blood pressure and lead to edema (water retention). On a heart-healthy diet, you should consume no more than 2,300 mg (milligrams) of sodium per dayabout the amount in one teaspoon of table salt. The foods highest in sodium include table salt (about 50% sodium), processed foods, convenience foods, and preserved foods. Cholesterol    Cholesterol is a fat-like, waxy substance in your blood. Our bodies make some cholesterol. It is also found in animal products, with the highest amounts in fatty meat, egg yolks, whole milk, cheese, shellfish, and organ meats. On a heart-healthy diet, you should limit your cholesterol intake to less than 200 mg per day. It is normal and important to have some cholesterol in your bloodstream. But too much cholesterol can cause plaque to build up within your arteries, which can eventually lead to a heart attack or stroke. The two types of cholesterol that are most commonly referred to are:   Low-density lipoprotein (LDL) cholesterol  Also known as bad cholesterol, this is the cholesterol that tends to build up along your arteries. Bad cholesterol levels are increased by eating fats that are saturated or hydrogenated. Optimal level of this cholesterol is less than 100. Over 130 starts to get risky for heart disease. High-density lipoprotein (HDL) cholesterol  Also known as good cholesterol, this type of cholesterol actually carries cholesterol away from your arteries and may, therefore, help lower your risk of having a heart attack. You want this level to be high (ideally greater than 60). It is a risk to have a level less than 40. You can raise this good cholesterol by eating olive oil, canola oil, avocados, or nuts. Exercise raises this level, too. Fat    Fat is calorie dense and packs a lot of calories into a small amount of food. Even though fats should be limited due to their high calorie content, not all fats are bad.  In fact, some fats are quite person understands your wishes. These legal papers are simple to change. Tell your doctor what you want to change, and ask him or her to make a note in your medical file. Give your family updated copies of the papers. Where can you learn more? Go to [email or web address] and sign in to your [employer]t account. Enter P184 in the [employer]SA box to learn more about \"Advance Care Planning: Care Instructions. \"     If you do not have an account, please click on the \"Sign Up Now\" link. Current as of: October 6, 2017  Content Version: 11.7  © 9307-3765 [employer]. Care instructions adapted under license by [facility] ([facility]). If you have questions about a medical condition or this instruction, always ask your healthcare professional. [address] any warranty or liability for your use of this information. ·         EMR Dragon/transcription disclaimer:  Much of this encounter note is electronic transcription/translation of spoken language to printed texts. The electronic translation of spoken language may be erroneous, or at times, nonsensical words or phrases may be inadvertently transcribed.   Although I have reviewed the note for such errors, some may still exist.

## 2018-08-23 ENCOUNTER — TELEPHONE (OUTPATIENT)
Dept: VASCULAR SURGERY | Age: 68
End: 2018-08-23

## 2018-08-23 ENCOUNTER — OFFICE VISIT (OUTPATIENT)
Dept: VASCULAR SURGERY | Age: 68
End: 2018-08-23
Payer: MEDICARE

## 2018-08-23 ENCOUNTER — HOSPITAL ENCOUNTER (OUTPATIENT)
Dept: CT IMAGING | Age: 68
Discharge: HOME | End: 2018-08-23
Payer: MEDICARE

## 2018-08-23 VITALS
RESPIRATION RATE: 18 BRPM | TEMPERATURE: 96 F | DIASTOLIC BLOOD PRESSURE: 68 MMHG | HEART RATE: 68 BPM | SYSTOLIC BLOOD PRESSURE: 119 MMHG

## 2018-08-23 DIAGNOSIS — I71.40 ABDOMINAL AORTIC ANEURYSM (AAA) WITHOUT RUPTURE: Primary | ICD-10-CM

## 2018-08-23 DIAGNOSIS — I71.40 ABDOMINAL AORTIC ANEURYSM (AAA) WITHOUT RUPTURE: ICD-10-CM

## 2018-08-23 LAB
GFR NON-AFRICAN AMERICAN: 38
PERFORMED ON: ABNORMAL
POC CREATININE: 1.4 MG/DL (ref 0.3–1.3)
POC SAMPLE TYPE: ABNORMAL

## 2018-08-23 PROCEDURE — G8420 CALC BMI NORM PARAMETERS: HCPCS | Performed by: NURSE PRACTITIONER

## 2018-08-23 PROCEDURE — 1123F ACP DISCUSS/DSCN MKR DOCD: CPT | Performed by: NURSE PRACTITIONER

## 2018-08-23 PROCEDURE — 1101F PT FALLS ASSESS-DOCD LE1/YR: CPT | Performed by: NURSE PRACTITIONER

## 2018-08-23 PROCEDURE — G8598 ASA/ANTIPLAT THER USED: HCPCS | Performed by: NURSE PRACTITIONER

## 2018-08-23 PROCEDURE — 1090F PRES/ABSN URINE INCON ASSESS: CPT | Performed by: NURSE PRACTITIONER

## 2018-08-23 PROCEDURE — 99213 OFFICE O/P EST LOW 20 MIN: CPT | Performed by: NURSE PRACTITIONER

## 2018-08-23 PROCEDURE — 82565 ASSAY OF CREATININE: CPT

## 2018-08-23 PROCEDURE — G8400 PT W/DXA NO RESULTS DOC: HCPCS | Performed by: NURSE PRACTITIONER

## 2018-08-23 PROCEDURE — 74176 CT ABD & PELVIS W/O CONTRAST: CPT

## 2018-08-23 PROCEDURE — G8427 DOCREV CUR MEDS BY ELIG CLIN: HCPCS | Performed by: NURSE PRACTITIONER

## 2018-08-23 PROCEDURE — 6360000004 HC RX CONTRAST MEDICATION: Performed by: NURSE PRACTITIONER

## 2018-08-23 PROCEDURE — 3017F COLORECTAL CA SCREEN DOC REV: CPT | Performed by: NURSE PRACTITIONER

## 2018-08-23 PROCEDURE — 1036F TOBACCO NON-USER: CPT | Performed by: NURSE PRACTITIONER

## 2018-08-23 PROCEDURE — 4040F PNEUMOC VAC/ADMIN/RCVD: CPT | Performed by: NURSE PRACTITIONER

## 2018-08-23 NOTE — TELEPHONE ENCOUNTER
I left a message on the pt's vm letting her know Milton Mandel would like her to get a CTA abd/pelvis prior to her ov today. The pt needs to arrive at [facility] at 11:10 am and needs to be NPO 4 hours prior to the procedure. Pt aware via vm.

## 2018-08-24 ENCOUNTER — PREP FOR PROCEDURE (OUTPATIENT)
Dept: VASCULAR SURGERY | Age: 68
End: 2018-08-24

## 2018-08-24 NOTE — PROGRESS NOTES
Patient Care Team:  Brayden Macias MD as PCP - General (Family Medicine)  BRAYDON [facility]ace [facility]      Subjective    She has a known history of abdominal aortic aneurysm for 1 - 5 years. She has had some generalized vague abdominal tenderness. She has not had back pain. The tenderness has been present for several weeks. She saw Dr. Franki Brantley and she sent her over to us to have a repeat CT to look at her AAA.     Cosme Valentino is a 78 y.o. female with the following history reviewed and recorded in "[employer]:  Patient Active Problem List    Diagnosis Date Noted    Essential hypertension 08/22/2018    Renal artery stenosis (HCC) 04/28/2017    Celiac artery stenosis (HCC) 04/28/2017    Colon polyps     Diarrhea 05/18/2016    History of colon polyps 05/18/2016    Chronic heartburn 05/18/2016    Antiplatelet or antithrombotic long-term use 05/18/2016    Fibromuscular dysplasia ([address] 06/24/2014    TIA (transient ischemic attack) 04/25/2014    Hypercholesteremia 03/28/2014    AAA (abdominal aortic aneurysm) (HCC) 03/19/2013    Carotid artery stenosis 03/19/2013    Irritable bowel syndrome     Osteoarthritis     Anxiety     Depression     Labyrinthitis      Current Outpatient Prescriptions   Medication Sig Dispense Refill    azelastine (ASTELIN) 0.1 % nasal spray 1 spray by Nasal route 2 times daily Use in each nostril as directed 1 Bottle 3    tiZANidine (ZANAFLEX) 2 MG tablet Take 1 tablet by mouth every 6 hours as needed (muscle spasms) 30 tablet 0    benazepril-hydrochlorthiazide (LOTENSIN HCT) 10-12.5 MG per tablet Take 1 tablet by mouth daily 90 tablet 3    dicyclomine (BENTYL) 20 MG tablet TAKE ONE TABLET BY MOUTH FOUR TIMES DAILY BEFORE MEALS AND NIGHTLY 120 tablet 1    clopidogrel (PLAVIX) 75 MG tablet TAKE ONE TABLET BY MOUTH DAILY 90 tablet 3    citalopram (CELEXA) 20 MG tablet TAKE ONE TABLET BY MOUTH DAILY 90 tablet 3    simvastatin (ZOCOR) 40 MG tablet TAKE ONE Smokeless tobacco: Never Used    Alcohol use No         Review of Systems    Constitutional - no significant activity change, appetite change, or unexpected weight change. No fever or chills. No diaphoresis or significant fatigue. HENT - no significant rhinorrhea or epistaxis. No tinnitus or significant hearing loss. Eyes - no sudden vision change or amaurosis. Respiratory - no significant shortness of breath, wheezing, or stridor. No apnea, cough, or chest tightness associated with shortness of breath. Cardiovascular - no chest pain, syncope, or significant dizziness. No palpitations or significant leg swelling. No claudication. Gastrointestinal - has had abdominal swelling or pain. No blood in stool. No severe constipation, diarrhea, nausea, or vomiting. Genitourinary - No difficulty urinating, dysuria, frequency, or urgency. No flank pain or hematuria. Musculoskeletal - has not had back pain, gait disturbance, or myalgia. Skin - no color change, rash, pallor, or new wound. Neurologic - no dizziness, facial asymmetry, or light headedness. No seizures. No speech difficulty or lateralizing weakness. Hematologic - no easy bruising or excessive bleeding. Psychiatric - no severe anxiety or nervousness. No confusion. All other review of systems are negative. Physical Exam    /68 Comment: left  Pulse 68   Temp 96 °F (35.6 °C)   Resp 18     Constitutional - well developed, well nourished. No diaphoresis or acute distress. HENT - head normocephalic. Right external ear canal appears normal.  Left external ear canal appears normal.  Septum appears midline. Eyes - conjunctiva normal.  EOMS normal.  No exudate. No icterus. Neck- ROM appears normal, no tracheal deviation. Cardiovascular - Regular rate and rhythm. Heart sounds are normal.  No murmur, rub, or gallop. Carotid pulses are 2+ to palpation bilaterally without bruit.     Extremities - Radial and brachial pulses are 2+ to palpation bilaterally. Right femoral pulse: present 2+; Right popliteal pulse: absent Right DP: absent; Right PT absent; Left femoral pulse: present 2+; Left popliteal pulse: absent; Left DP: absent; Left PT: absent    No cyanosis, clubbing, or significant edema. No signs atheroembolic event. Pulmonary - effort appears normal.    No respiratory distress. Lungs - Breath sounds normal. No wheezes or rales. GI - Abdomen - soft, non tender, bowel sounds X 4 quadrants. No guarding or rebound tenderness. No distension or palpable mass. Genitourinary - deferred. Musculoskeletal - ROM appears normal.  No significant edema. Neurologic - alert and oriented X 3. Physiologic. Skin - warm, dry, and intact. No rash, erythema, or pallor. Psychiatric - mood, affect, and behavior appear normal.  Judgment and thought processes appear normal.    Risk factors for aneurysmal disease including tobacco abuse, hyperlipidemia, male gender, age >57, emphysema, obesity, HTN, atherosclerosis, family history, and diabetes mellitus were discussed with the patient. CTA  Radiology results:   Infrarenal abdominal aortic aneurysm. This is now 5 cm from outer   wall to outer wall. This is increased from 43 mm November 1, 2017       This aneurysm has increased since last visit. Individual films reviewed: Yes. These results were reviewed with the patient. Options have been discussed with the patient including continued medical management. Patient has opted to proceed with continued medical management. Assessment    1. Abdominal aortic aneurysm (AAA) without rupture ([address]          Plan    Strongly encouraged start/continue statin therapy  Recommended no smoking  Symptoms of rupture reviewed with the patient including sudden onset severe back pain or abdominal pain. This pain can sometimes radiate into the groin or leg. The patient may experience a feeling of impending doom or death.   If this occurs they have been insturcted to call 911 and get to the emergency room telling them you have an aneurysm. Patient has voiced understanding.   Dr. Lulu Jimenez will review films and we will make recommendations

## 2018-08-27 ENCOUNTER — HOSPITAL ENCOUNTER (OUTPATIENT)
Dept: GENERAL RADIOLOGY | Age: 68
Discharge: HOME | DRG: 268 | End: 2018-08-27
Payer: MEDICARE

## 2018-08-27 ENCOUNTER — HOSPITAL ENCOUNTER (OUTPATIENT)
Dept: VASCULAR LAB | Age: 68
Discharge: HOME | DRG: 268 | End: 2018-08-27
Payer: MEDICARE

## 2018-08-27 ENCOUNTER — OFFICE VISIT (OUTPATIENT)
Dept: VASCULAR SURGERY | Age: 68
End: 2018-08-27
Payer: MEDICARE

## 2018-08-27 ENCOUNTER — HOSPITAL ENCOUNTER (OUTPATIENT)
Dept: PREADMISSION TESTING | Age: 68
Setting detail: SURGERY ADMIT
Discharge: HOME | DRG: 268 | End: 2018-08-31
Payer: MEDICARE

## 2018-08-27 VITALS
RESPIRATION RATE: 18 BRPM | SYSTOLIC BLOOD PRESSURE: 110 MMHG | HEART RATE: 71 BPM | DIASTOLIC BLOOD PRESSURE: 64 MMHG | TEMPERATURE: 97.4 F

## 2018-08-27 VITALS — HEIGHT: 63 IN | WEIGHT: 141 LBS | BODY MASS INDEX: 24.98 KG/M2

## 2018-08-27 DIAGNOSIS — I65.23 BILATERAL CAROTID ARTERY STENOSIS: Primary | ICD-10-CM

## 2018-08-27 DIAGNOSIS — I65.23 BILATERAL CAROTID ARTERY STENOSIS: ICD-10-CM

## 2018-08-27 DIAGNOSIS — Z01.818 PRE-OP TESTING: ICD-10-CM

## 2018-08-27 DIAGNOSIS — I71.40 ABDOMINAL AORTIC ANEURYSM (AAA) WITHOUT RUPTURE: Primary | ICD-10-CM

## 2018-08-27 DIAGNOSIS — I71.40 ABDOMINAL AORTIC ANEURYSM (AAA) WITHOUT RUPTURE: ICD-10-CM

## 2018-08-27 DIAGNOSIS — Z01.818 PRE-OP TESTING: Primary | ICD-10-CM

## 2018-08-27 LAB
ALBUMIN SERPL-MCNC: 4.2 G/DL (ref 3.5–5.2)
ALP BLD-CCNC: 95 U/L (ref 35–104)
ALT SERPL-CCNC: 13 U/L (ref 5–33)
ANION GAP SERPL CALCULATED.3IONS-SCNC: 12 MMOL/L (ref 7–19)
APTT: 30.6 SEC (ref 26–36.2)
AST SERPL-CCNC: 18 U/L (ref 5–32)
BILIRUB SERPL-MCNC: 0.3 MG/DL (ref 0.2–1.2)
BUN BLDV-MCNC: 19 MG/DL (ref 8–23)
CALCIUM SERPL-MCNC: 9.3 MG/DL (ref 8.8–10.2)
CHLORIDE BLD-SCNC: 103 MMOL/L (ref 98–111)
CO2: 26 MMOL/L (ref 22–29)
CREAT SERPL-MCNC: 1.1 MG/DL (ref 0.5–0.9)
GFR NON-AFRICAN AMERICAN: 50
GLUCOSE BLD-MCNC: 87 MG/DL (ref 74–109)
HCT VFR BLD CALC: 38.1 % (ref 37–47)
HEMOGLOBIN: 12.3 G/DL (ref 12–16)
INR BLD: 0.97 (ref 0.88–1.18)
MCH RBC QN AUTO: 29.9 PG (ref 27–31)
MCHC RBC AUTO-ENTMCNC: 32.3 G/DL (ref 33–37)
MCV RBC AUTO: 92.5 FL (ref 81–99)
PDW BLD-RTO: 14.6 % (ref 11.5–14.5)
PLATELET # BLD: 305 K/UL (ref 130–400)
PMV BLD AUTO: 10.4 FL (ref 9.4–12.3)
POTASSIUM SERPL-SCNC: 4.4 MMOL/L (ref 3.5–5)
PROTHROMBIN TIME: 12.8 SEC (ref 12–14.6)
RBC # BLD: 4.12 M/UL (ref 4.2–5.4)
SODIUM BLD-SCNC: 141 MMOL/L (ref 136–145)
TOTAL PROTEIN: 6.8 G/DL (ref 6.6–8.7)
WBC # BLD: 8.6 K/UL (ref 4.8–10.8)

## 2018-08-27 PROCEDURE — G8400 PT W/DXA NO RESULTS DOC: HCPCS | Performed by: NURSE PRACTITIONER

## 2018-08-27 PROCEDURE — 1101F PT FALLS ASSESS-DOCD LE1/YR: CPT | Performed by: NURSE PRACTITIONER

## 2018-08-27 PROCEDURE — 1090F PRES/ABSN URINE INCON ASSESS: CPT | Performed by: NURSE PRACTITIONER

## 2018-08-27 PROCEDURE — G8598 ASA/ANTIPLAT THER USED: HCPCS | Performed by: NURSE PRACTITIONER

## 2018-08-27 PROCEDURE — 80053 COMPREHEN METABOLIC PANEL: CPT

## 2018-08-27 PROCEDURE — G8427 DOCREV CUR MEDS BY ELIG CLIN: HCPCS | Performed by: NURSE PRACTITIONER

## 2018-08-27 PROCEDURE — 85730 THROMBOPLASTIN TIME PARTIAL: CPT

## 2018-08-27 PROCEDURE — 1123F ACP DISCUSS/DSCN MKR DOCD: CPT | Performed by: NURSE PRACTITIONER

## 2018-08-27 PROCEDURE — 85610 PROTHROMBIN TIME: CPT

## 2018-08-27 PROCEDURE — 4040F PNEUMOC VAC/ADMIN/RCVD: CPT | Performed by: NURSE PRACTITIONER

## 2018-08-27 PROCEDURE — 71046 X-RAY EXAM CHEST 2 VIEWS: CPT

## 2018-08-27 PROCEDURE — G8420 CALC BMI NORM PARAMETERS: HCPCS | Performed by: NURSE PRACTITIONER

## 2018-08-27 PROCEDURE — 99214 OFFICE O/P EST MOD 30 MIN: CPT | Performed by: NURSE PRACTITIONER

## 2018-08-27 PROCEDURE — 87081 CULTURE SCREEN ONLY: CPT

## 2018-08-27 PROCEDURE — 85027 COMPLETE CBC AUTOMATED: CPT

## 2018-08-27 PROCEDURE — 3017F COLORECTAL CA SCREEN DOC REV: CPT | Performed by: NURSE PRACTITIONER

## 2018-08-27 PROCEDURE — 93880 EXTRACRANIAL BILAT STUDY: CPT

## 2018-08-27 PROCEDURE — 1036F TOBACCO NON-USER: CPT | Performed by: NURSE PRACTITIONER

## 2018-08-27 RX ORDER — ACETYLCYSTEINE 100 MG/ML
SOLUTION ORAL; RESPIRATORY (INHALATION)
Qty: 1 ML | Refills: 0 | OUTPATIENT
Start: 2018-08-27 | End: 2018-08-27

## 2018-08-27 RX ORDER — ACETYLCYSTEINE 100 MG/ML
SOLUTION ORAL; RESPIRATORY (INHALATION)
Qty: 1 ML | Refills: 0 | Status: ON HOLD | OUTPATIENT
Start: 2018-08-27 | End: 2018-09-21 | Stop reason: HOSPADM

## 2018-08-28 ENCOUNTER — TELEPHONE (OUTPATIENT)
Dept: VASCULAR SURGERY | Age: 68
End: 2018-08-28

## 2018-08-28 LAB — MRSA CULTURE ONLY: NORMAL

## 2018-08-28 RX ORDER — SODIUM CHLORIDE 0.9 % (FLUSH) 0.9 %
10 SYRINGE (ML) INJECTION EVERY 12 HOURS SCHEDULED
Status: CANCELLED | OUTPATIENT
Start: 2018-08-28 | End: 2019-08-28

## 2018-08-28 RX ORDER — SODIUM CHLORIDE 0.9 % (FLUSH) 0.9 %
10 SYRINGE (ML) INJECTION PRN
Status: CANCELLED | OUTPATIENT
Start: 2018-08-28 | End: 2019-08-28
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
## 2018-08-28 NOTE — TELEPHONE ENCOUNTER
----- Message from BRAYDON Lepe sent at 8/27/2018  3:39 PM CDT -----  Please let her know this was less than 50%
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
## 2018-08-29 ENCOUNTER — ANESTHESIA EVENT (OUTPATIENT)
Dept: OPERATING ROOM | Age: 68
DRG: 268 | End: 2018-08-29
Payer: MEDICARE

## 2018-08-29 LAB
EKG P AXIS: 51 DEGREES
EKG P-R INTERVAL: 168 MS
EKG Q-T INTERVAL: 466 MS
EKG QRS DURATION: 84 MS
EKG QTC CALCULATION (BAZETT): 444 MS
EKG T AXIS: 46 DEGREES

## 2018-08-30 ENCOUNTER — HOSPITAL ENCOUNTER (INPATIENT)
Age: 68
LOS: 22 days | Discharge: HOME | DRG: 268 | End: 2018-09-21
Attending: SURGERY | Admitting: SURGERY
Payer: MEDICARE

## 2018-08-30 ENCOUNTER — APPOINTMENT (OUTPATIENT)
Dept: GENERAL RADIOLOGY | Age: 68
DRG: 268 | End: 2018-08-30
Attending: SURGERY
Payer: MEDICARE

## 2018-08-30 ENCOUNTER — ANESTHESIA (OUTPATIENT)
Dept: OPERATING ROOM | Age: 68
DRG: 268 | End: 2018-08-30
Payer: MEDICARE

## 2018-08-30 VITALS — OXYGEN SATURATION: 100 % | TEMPERATURE: 95.8 F | RESPIRATION RATE: 17 BRPM

## 2018-08-30 DIAGNOSIS — I48.91 ATRIAL FIBRILLATION WITH RAPID VENTRICULAR RESPONSE (HCC): ICD-10-CM

## 2018-08-30 DIAGNOSIS — I71.40 ABDOMINAL AORTIC ANEURYSM (AAA) WITHOUT RUPTURE: Primary | ICD-10-CM

## 2018-08-30 LAB
ABO/RH: NORMAL
ANTIBODY SCREEN: NORMAL
BASE EXCESS ARTERIAL: -3 (ref -3–3)
BASE EXCESS ARTERIAL: -7 (ref -3–3)
BASE EXCESS ARTERIAL: -7 (ref -3–3)
BASE EXCESS ARTERIAL: 0 (ref -3–3)
CALCIUM IONIZED: 0.98 MMOL/L (ref 1.1–1.3)
CALCIUM IONIZED: 1.09 MMOL/L (ref 1.1–1.3)
CALCIUM IONIZED: 1.16 MMOL/L (ref 1.1–1.3)
CALCIUM IONIZED: 1.3 MMOL/L (ref 1.1–1.3)
CO2: 19 MEQ/L (ref 21–32)
CO2: 21 MEQ/L (ref 21–32)
CO2: 23 MEQ/L (ref 21–32)
CO2: 26 MEQ/L (ref 21–32)
GFR NON-AFRICAN AMERICAN: 50
GFR NON-AFRICAN AMERICAN: 50
GFR NON-AFRICAN AMERICAN: >60
GLUCOSE BLD-MCNC: 221 MG/DL (ref 70–99)
GLUCOSE BLD-MCNC: 226 MG/DL (ref 70–99)
GLUCOSE BLD-MCNC: 234 MG/DL (ref 70–99)
GLUCOSE BLD-MCNC: 91 MG/DL (ref 70–99)
HCT VFR BLD CALC: 35.4 % (ref 37–47)
HCT VFR BLD CALC: 42.5 % (ref 37–47)
HEMOGLOBIN: 11.2 G/DL (ref 12–16)
HEMOGLOBIN: 13.8 G/DL (ref 12–16)
HEMOGLOBIN: 7 GM/DL (ref 12–18)
HEMOGLOBIN: 7.2 GM/DL (ref 12–18)
HEMOGLOBIN: 8 GM/DL (ref 12–18)
HEMOGLOBIN: 9.4 GM/DL (ref 12–18)
MCH RBC QN AUTO: 30 PG (ref 27–31)
MCHC RBC AUTO-ENTMCNC: 31.6 G/DL (ref 33–37)
MCV RBC AUTO: 94.9 FL (ref 81–99)
O2 SAT, ARTERIAL: 100 % (ref 93–100)
O2 SAT, ARTERIAL: 97 % (ref 93–100)
O2 SAT, ARTERIAL: 99 % (ref 93–100)
O2 SAT, ARTERIAL: 99 % (ref 93–100)
PCO2 ARTERIAL: 43 MM HG (ref 35–48)
PCO2 ARTERIAL: 47 MM HG (ref 35–48)
PCO2 ARTERIAL: 47 MM HG (ref 35–48)
PCO2 ARTERIAL: 60 MM HG (ref 35–48)
PDW BLD-RTO: 14.4 % (ref 11.5–14.5)
PERFORMED ON: ABNORMAL
PH ARTERIAL: 7.15 (ref 7.3–7.5)
PH ARTERIAL: 7.26 (ref 7.3–7.5)
PH ARTERIAL: 7.3 (ref 7.3–7.5)
PH ARTERIAL: 7.34 (ref 7.3–7.5)
PLATELET # BLD: 184 K/UL (ref 130–400)
PMV BLD AUTO: 9.8 FL (ref 9.4–12.3)
PO2 ARTERIAL: 124 MM HG (ref 83–108)
PO2 ARTERIAL: 139 MM HG (ref 83–108)
PO2 ARTERIAL: 163 MM HG (ref 83–108)
PO2 ARTERIAL: 307 MM HG (ref 83–108)
POC ACT LR: 148 SEC
POC ACT LR: 313 SEC
POC ANION GAP: 10
POC ANION GAP: 11
POC ANION GAP: 12
POC ANION GAP: 14
POC CHLORIDE: 105 MEQ/L (ref 99–110)
POC CHLORIDE: 109 MEQ/L (ref 99–110)
POC CHLORIDE: 109 MEQ/L (ref 99–110)
POC CHLORIDE: 110 MEQ/L (ref 99–110)
POC CREATININE: 0.8 MG/DL (ref 0.3–1.3)
POC CREATININE: 1.1 MG/DL (ref 0.3–1.3)
POC CREATININE: 1.1 MG/DL (ref 0.3–1.3)
POC HEMATOCRIT: 20 % (ref 37–52)
POC HEMATOCRIT: 21 % (ref 37–52)
POC HEMATOCRIT: 23 % (ref 37–52)
POC HEMATOCRIT: 28 % (ref 37–52)
POC POTASSIUM: 3.5 MEQ/L (ref 3.5–5.1)
POC POTASSIUM: 4 MEQ/L (ref 3.5–5.1)
POC POTASSIUM: 4.3 MEQ/L (ref 3.5–5.1)
POC POTASSIUM: 4.4 MEQ/L (ref 3.5–5.1)
POC SAMPLE TYPE: ABNORMAL
POC SODIUM: 141 MEQ/L (ref 136–145)
POC SODIUM: 141 MEQ/L (ref 136–145)
POC SODIUM: 143 MEQ/L (ref 136–145)
POC SODIUM: 144 MEQ/L (ref 136–145)
RBC # BLD: 3.73 M/UL (ref 4.2–5.4)
TCO2 ARTERIAL: 21 MMOL/L
TCO2 ARTERIAL: 23 MMOL/L
TCO2 ARTERIAL: 25 MMOL/L
TCO2 ARTERIAL: 27 MMOL/L
WBC # BLD: 23.4 K/UL (ref 4.8–10.8)

## 2018-08-30 PROCEDURE — 04100J8 BYPASS ABDOMINAL AORTA TO BILATERAL COMMON ILIAC ARTERIES WITH SYNTHETIC SUBSTITUTE, OPEN APPROACH: ICD-10-PCS | Performed by: SURGERY

## 2018-08-30 PROCEDURE — 6360000002 HC RX W HCPCS: Performed by: SURGERY

## 2018-08-30 PROCEDURE — 2580000003 HC RX 258: Performed by: NURSE ANESTHETIST, CERTIFIED REGISTERED

## 2018-08-30 PROCEDURE — 82565 ASSAY OF CREATININE: CPT

## 2018-08-30 PROCEDURE — 82374 ASSAY BLOOD CARBON DIOXIDE: CPT

## 2018-08-30 PROCEDURE — 76937 US GUIDE VASCULAR ACCESS: CPT

## 2018-08-30 PROCEDURE — 6370000000 HC RX 637 (ALT 250 FOR IP): Performed by: SURGERY

## 2018-08-30 PROCEDURE — 2709999900 HC NON-CHARGEABLE SUPPLY: Performed by: SURGERY

## 2018-08-30 PROCEDURE — 2780000010 HC IMPLANT OTHER: Performed by: SURGERY

## 2018-08-30 PROCEDURE — 35081 REPAIR DEFECT OF ARTERY: CPT | Performed by: SURGERY

## 2018-08-30 PROCEDURE — 3600000005 HC SURGERY LEVEL 5 BASE: Performed by: SURGERY

## 2018-08-30 PROCEDURE — 84132 ASSAY OF SERUM POTASSIUM: CPT

## 2018-08-30 PROCEDURE — 2580000003 HC RX 258: Performed by: SURGERY

## 2018-08-30 PROCEDURE — 3700000001 HC ADD 15 MINUTES (ANESTHESIA): Performed by: SURGERY

## 2018-08-30 PROCEDURE — 6360000002 HC RX W HCPCS: Performed by: NURSE ANESTHETIST, CERTIFIED REGISTERED

## 2018-08-30 PROCEDURE — 2500000003 HC RX 250 WO HCPCS: Performed by: NURSE ANESTHETIST, CERTIFIED REGISTERED

## 2018-08-30 PROCEDURE — 84295 ASSAY OF SERUM SODIUM: CPT

## 2018-08-30 PROCEDURE — 3700000000 HC ANESTHESIA ATTENDED CARE: Performed by: SURGERY

## 2018-08-30 PROCEDURE — 82810 BLOOD GASES O2 SAT ONLY: CPT

## 2018-08-30 PROCEDURE — 04R00JZ REPLACEMENT OF ABDOMINAL AORTA WITH SYNTHETIC SUBSTITUTE, OPEN APPROACH: ICD-10-PCS | Performed by: SURGERY

## 2018-08-30 PROCEDURE — P9016 RBC LEUKOCYTES REDUCED: HCPCS

## 2018-08-30 PROCEDURE — 2500000003 HC RX 250 WO HCPCS: Performed by: SURGERY

## 2018-08-30 PROCEDURE — 86850 RBC ANTIBODY SCREEN: CPT

## 2018-08-30 PROCEDURE — 35631 BPG AOR-CELIAC-MSN-RENAL: CPT | Performed by: SURGERY

## 2018-08-30 PROCEDURE — 7100000001 HC PACU RECOVERY - ADDTL 15 MIN: Performed by: SURGERY

## 2018-08-30 PROCEDURE — 82435 ASSAY OF BLOOD CHLORIDE: CPT

## 2018-08-30 PROCEDURE — 6360000002 HC RX W HCPCS: Performed by: NURSE PRACTITIONER

## 2018-08-30 PROCEDURE — S0028 INJECTION, FAMOTIDINE, 20 MG: HCPCS | Performed by: SURGERY

## 2018-08-30 PROCEDURE — 85027 COMPLETE CBC AUTOMATED: CPT

## 2018-08-30 PROCEDURE — 6360000002 HC RX W HCPCS: Performed by: ANESTHESIOLOGY

## 2018-08-30 PROCEDURE — 04100J4 BYPASS ABDOMINAL AORTA TO LEFT RENAL ARTERY WITH SYNTHETIC SUBSTITUTE, OPEN APPROACH: ICD-10-PCS | Performed by: SURGERY

## 2018-08-30 PROCEDURE — 82800 BLOOD PH: CPT

## 2018-08-30 PROCEDURE — P9045 ALBUMIN (HUMAN), 5%, 250 ML: HCPCS | Performed by: NURSE ANESTHETIST, CERTIFIED REGISTERED

## 2018-08-30 PROCEDURE — 6370000000 HC RX 637 (ALT 250 FOR IP): Performed by: ANESTHESIOLOGY

## 2018-08-30 PROCEDURE — 82948 REAGENT STRIP/BLOOD GLUCOSE: CPT

## 2018-08-30 PROCEDURE — 85018 HEMOGLOBIN: CPT

## 2018-08-30 PROCEDURE — C1768 GRAFT, VASCULAR: HCPCS | Performed by: SURGERY

## 2018-08-30 PROCEDURE — 7100000000 HC PACU RECOVERY - FIRST 15 MIN: Performed by: SURGERY

## 2018-08-30 PROCEDURE — 85014 HEMATOCRIT: CPT

## 2018-08-30 PROCEDURE — 36415 COLL VENOUS BLD VENIPUNCTURE: CPT

## 2018-08-30 PROCEDURE — 85347 COAGULATION TIME ACTIVATED: CPT

## 2018-08-30 PROCEDURE — 3600000015 HC SURGERY LEVEL 5 ADDTL 15MIN: Performed by: SURGERY

## 2018-08-30 PROCEDURE — 86901 BLOOD TYPING SEROLOGIC RH(D): CPT

## 2018-08-30 PROCEDURE — 2700000000 HC OXYGEN THERAPY PER DAY

## 2018-08-30 PROCEDURE — 2000000000 HC ICU R&B

## 2018-08-30 PROCEDURE — 86900 BLOOD TYPING SEROLOGIC ABO: CPT

## 2018-08-30 PROCEDURE — 71045 X-RAY EXAM CHEST 1 VIEW: CPT

## 2018-08-30 PROCEDURE — 82330 ASSAY OF CALCIUM: CPT

## 2018-08-30 DEVICE — AGENT HEMSTAT 8ML FLX TIP MTRX + DISP SURGIFLO: Type: IMPLANTABLE DEVICE | Site: AORTA | Status: FUNCTIONAL

## 2018-08-30 DEVICE — IMPLANTABLE DEVICE: Type: IMPLANTABLE DEVICE | Site: AORTA | Status: FUNCTIONAL

## 2018-08-30 RX ORDER — HYDROMORPHONE HCL IN 0.9% NACL 0.5 MG/ML
0.25 SYRINGE (ML) INTRAVENOUS
Status: DISCONTINUED | OUTPATIENT
Start: 2018-08-30 | End: 2018-09-13

## 2018-08-30 RX ORDER — SCOLOPAMINE TRANSDERMAL SYSTEM 1 MG/1
1 PATCH, EXTENDED RELEASE TRANSDERMAL ONCE
Status: DISCONTINUED | OUTPATIENT
Start: 2018-08-30 | End: 2018-09-02

## 2018-08-30 RX ORDER — 0.9 % SODIUM CHLORIDE 0.9 %
250 INTRAVENOUS SOLUTION INTRAVENOUS ONCE
Status: COMPLETED | OUTPATIENT
Start: 2018-08-30 | End: 2018-09-04

## 2018-08-30 RX ORDER — HYDRALAZINE HYDROCHLORIDE 20 MG/ML
5 INJECTION INTRAMUSCULAR; INTRAVENOUS EVERY 10 MIN PRN
Status: DISCONTINUED | OUTPATIENT
Start: 2018-08-30 | End: 2018-08-30 | Stop reason: HOSPADM

## 2018-08-30 RX ORDER — LORAZEPAM 2 MG/ML
1 INJECTION INTRAMUSCULAR EVERY 8 HOURS PRN
Status: DISPENSED | OUTPATIENT
Start: 2018-08-30 | End: 2018-09-03

## 2018-08-30 RX ORDER — DIAZEPAM 5 MG/ML
5 INJECTION, SOLUTION INTRAMUSCULAR; INTRAVENOUS EVERY 6 HOURS PRN
Status: DISCONTINUED | OUTPATIENT
Start: 2018-08-30 | End: 2018-08-30

## 2018-08-30 RX ORDER — SODIUM CHLORIDE 0.9 % (FLUSH) 0.9 %
10 SYRINGE (ML) INJECTION EVERY 12 HOURS SCHEDULED
Status: DISCONTINUED | OUTPATIENT
Start: 2018-08-30 | End: 2018-08-30 | Stop reason: HOSPADM

## 2018-08-30 RX ORDER — CLONIDINE HYDROCHLORIDE 0.1 MG/1
0.1 TABLET ORAL
Status: DISCONTINUED | OUTPATIENT
Start: 2018-08-30 | End: 2018-09-21 | Stop reason: HOSPADM

## 2018-08-30 RX ORDER — SODIUM CHLORIDE 0.9 % (FLUSH) 0.9 %
10 SYRINGE (ML) INJECTION PRN
Status: DISCONTINUED | OUTPATIENT
Start: 2018-08-30 | End: 2018-09-21 | Stop reason: HOSPADM

## 2018-08-30 RX ORDER — FENTANYL CITRATE 50 UG/ML
INJECTION, SOLUTION INTRAMUSCULAR; INTRAVENOUS PRN
Status: DISCONTINUED | OUTPATIENT
Start: 2018-08-30 | End: 2018-08-30 | Stop reason: SDUPTHER

## 2018-08-30 RX ORDER — HYDROMORPHONE HCL IN 0.9% NACL 0.5 MG/ML
0.5 SYRINGE (ML) INTRAVENOUS EVERY 5 MIN PRN
Status: DISCONTINUED | OUTPATIENT
Start: 2018-08-30 | End: 2018-08-30 | Stop reason: HOSPADM

## 2018-08-30 RX ORDER — MEPERIDINE HYDROCHLORIDE 50 MG/ML
12.5 INJECTION INTRAMUSCULAR; INTRAVENOUS; SUBCUTANEOUS EVERY 5 MIN PRN
Status: DISCONTINUED | OUTPATIENT
Start: 2018-08-30 | End: 2018-08-30 | Stop reason: HOSPADM

## 2018-08-30 RX ORDER — HYDROMORPHONE HCL IN 0.9% NACL 0.5 MG/ML
0.25 SYRINGE (ML) INTRAVENOUS
Status: DISCONTINUED | OUTPATIENT
Start: 2018-08-30 | End: 2018-08-30

## 2018-08-30 RX ORDER — HYDROMORPHONE HCL IN 0.9% NACL 0.5 MG/ML
0.25 SYRINGE (ML) INTRAVENOUS EVERY 5 MIN PRN
Status: DISCONTINUED | OUTPATIENT
Start: 2018-08-30 | End: 2018-08-30 | Stop reason: HOSPADM

## 2018-08-30 RX ORDER — HYDROMORPHONE HCL IN 0.9% NACL 0.5 MG/ML
0.5 SYRINGE (ML) INTRAVENOUS
Status: DISCONTINUED | OUTPATIENT
Start: 2018-08-30 | End: 2018-08-30

## 2018-08-30 RX ORDER — PROTAMINE SULFATE 10 MG/ML
INJECTION, SOLUTION INTRAVENOUS PRN
Status: DISCONTINUED | OUTPATIENT
Start: 2018-08-30 | End: 2018-08-30 | Stop reason: SDUPTHER

## 2018-08-30 RX ORDER — FENTANYL CITRATE 50 UG/ML
50 INJECTION, SOLUTION INTRAMUSCULAR; INTRAVENOUS
Status: DISCONTINUED | OUTPATIENT
Start: 2018-08-30 | End: 2018-08-30 | Stop reason: HOSPADM

## 2018-08-30 RX ORDER — EPHEDRINE SULFATE 50 MG/ML
INJECTION, SOLUTION INTRAVENOUS PRN
Status: DISCONTINUED | OUTPATIENT
Start: 2018-08-30 | End: 2018-08-30 | Stop reason: SDUPTHER

## 2018-08-30 RX ORDER — HYDROMORPHONE HCL IN 0.9% NACL 0.5 MG/ML
0.5 SYRINGE (ML) INTRAVENOUS
Status: DISCONTINUED | OUTPATIENT
Start: 2018-08-30 | End: 2018-09-13

## 2018-08-30 RX ORDER — LABETALOL HYDROCHLORIDE 5 MG/ML
5 INJECTION, SOLUTION INTRAVENOUS EVERY 10 MIN PRN
Status: DISCONTINUED | OUTPATIENT
Start: 2018-08-30 | End: 2018-08-30 | Stop reason: HOSPADM

## 2018-08-30 RX ORDER — SODIUM CHLORIDE 0.9 % (FLUSH) 0.9 %
10 SYRINGE (ML) INJECTION PRN
Status: DISCONTINUED | OUTPATIENT
Start: 2018-08-30 | End: 2018-08-30 | Stop reason: HOSPADM

## 2018-08-30 RX ORDER — MIDAZOLAM HYDROCHLORIDE 1 MG/ML
2 INJECTION INTRAMUSCULAR; INTRAVENOUS
Status: COMPLETED | OUTPATIENT
Start: 2018-08-30 | End: 2018-08-30

## 2018-08-30 RX ORDER — PROMETHAZINE HYDROCHLORIDE 25 MG/ML
6.25 INJECTION, SOLUTION INTRAMUSCULAR; INTRAVENOUS
Status: DISCONTINUED | OUTPATIENT
Start: 2018-08-30 | End: 2018-08-30 | Stop reason: HOSPADM

## 2018-08-30 RX ORDER — LIDOCAINE HYDROCHLORIDE 10 MG/ML
1 INJECTION, SOLUTION EPIDURAL; INFILTRATION; INTRACAUDAL; PERINEURAL
Status: DISCONTINUED | OUTPATIENT
Start: 2018-08-30 | End: 2018-08-30 | Stop reason: HOSPADM

## 2018-08-30 RX ORDER — ONDANSETRON 2 MG/ML
4 INJECTION INTRAMUSCULAR; INTRAVENOUS EVERY 8 HOURS PRN
Status: DISCONTINUED | OUTPATIENT
Start: 2018-08-30 | End: 2018-09-19

## 2018-08-30 RX ORDER — SODIUM CHLORIDE 9 MG/ML
INJECTION, SOLUTION INTRAVENOUS CONTINUOUS PRN
Status: DISCONTINUED | OUTPATIENT
Start: 2018-08-30 | End: 2018-08-30 | Stop reason: SDUPTHER

## 2018-08-30 RX ORDER — SODIUM CHLORIDE, SODIUM LACTATE, POTASSIUM CHLORIDE, CALCIUM CHLORIDE 600; 310; 30; 20 MG/100ML; MG/100ML; MG/100ML; MG/100ML
INJECTION, SOLUTION INTRAVENOUS CONTINUOUS
Status: DISCONTINUED | OUTPATIENT
Start: 2018-08-30 | End: 2018-08-30

## 2018-08-30 RX ORDER — ALBUMIN, HUMAN INJ 5% 5 %
SOLUTION INTRAVENOUS PRN
Status: DISCONTINUED | OUTPATIENT
Start: 2018-08-30 | End: 2018-08-30 | Stop reason: SDUPTHER

## 2018-08-30 RX ORDER — ONDANSETRON 2 MG/ML
INJECTION INTRAMUSCULAR; INTRAVENOUS PRN
Status: DISCONTINUED | OUTPATIENT
Start: 2018-08-30 | End: 2018-08-30 | Stop reason: SDUPTHER

## 2018-08-30 RX ORDER — MORPHINE SULFATE/0.9% NACL/PF 1 MG/ML
2 SYRINGE (ML) INJECTION EVERY 5 MIN PRN
Status: DISCONTINUED | OUTPATIENT
Start: 2018-08-30 | End: 2018-08-30 | Stop reason: HOSPADM

## 2018-08-30 RX ORDER — SODIUM CHLORIDE, SODIUM LACTATE, POTASSIUM CHLORIDE, CALCIUM CHLORIDE 600; 310; 30; 20 MG/100ML; MG/100ML; MG/100ML; MG/100ML
INJECTION, SOLUTION INTRAVENOUS CONTINUOUS PRN
Status: DISCONTINUED | OUTPATIENT
Start: 2018-08-30 | End: 2018-08-30 | Stop reason: SDUPTHER

## 2018-08-30 RX ORDER — ROCURONIUM BROMIDE 10 MG/ML
INJECTION, SOLUTION INTRAVENOUS PRN
Status: DISCONTINUED | OUTPATIENT
Start: 2018-08-30 | End: 2018-08-30 | Stop reason: SDUPTHER

## 2018-08-30 RX ORDER — CALCIUM CHLORIDE 100 MG/ML
INJECTION INTRAVENOUS; INTRAVENTRICULAR PRN
Status: DISCONTINUED | OUTPATIENT
Start: 2018-08-30 | End: 2018-08-30 | Stop reason: SDUPTHER

## 2018-08-30 RX ORDER — HEPARIN SODIUM 1000 [USP'U]/ML
INJECTION, SOLUTION INTRAVENOUS; SUBCUTANEOUS PRN
Status: DISCONTINUED | OUTPATIENT
Start: 2018-08-30 | End: 2018-08-30 | Stop reason: SDUPTHER

## 2018-08-30 RX ORDER — METOCLOPRAMIDE HYDROCHLORIDE 5 MG/ML
10 INJECTION INTRAMUSCULAR; INTRAVENOUS
Status: DISCONTINUED | OUTPATIENT
Start: 2018-08-30 | End: 2018-08-30 | Stop reason: HOSPADM

## 2018-08-30 RX ORDER — MORPHINE SULFATE/0.9% NACL/PF 1 MG/ML
4 SYRINGE (ML) INJECTION EVERY 5 MIN PRN
Status: DISCONTINUED | OUTPATIENT
Start: 2018-08-30 | End: 2018-08-30 | Stop reason: HOSPADM

## 2018-08-30 RX ORDER — MORPHINE SULFATE 4 MG/ML
4 INJECTION, SOLUTION INTRAMUSCULAR; INTRAVENOUS
Status: DISCONTINUED | OUTPATIENT
Start: 2018-08-30 | End: 2018-08-30 | Stop reason: HOSPADM

## 2018-08-30 RX ORDER — DIPHENHYDRAMINE HYDROCHLORIDE 50 MG/ML
12.5 INJECTION INTRAMUSCULAR; INTRAVENOUS
Status: DISCONTINUED | OUTPATIENT
Start: 2018-08-30 | End: 2018-08-30 | Stop reason: HOSPADM

## 2018-08-30 RX ORDER — POTASSIUM CHLORIDE 7.45 MG/ML
10 INJECTION INTRAVENOUS PRN
Status: DISCONTINUED | OUTPATIENT
Start: 2018-08-30 | End: 2018-09-21 | Stop reason: HOSPADM

## 2018-08-30 RX ORDER — NITROGLYCERIN 20 MG/100ML
INJECTION INTRAVENOUS PRN
Status: DISCONTINUED | OUTPATIENT
Start: 2018-08-30 | End: 2018-08-30 | Stop reason: SDUPTHER

## 2018-08-30 RX ORDER — LIDOCAINE HYDROCHLORIDE 10 MG/ML
INJECTION, SOLUTION INFILTRATION; PERINEURAL PRN
Status: DISCONTINUED | OUTPATIENT
Start: 2018-08-30 | End: 2018-08-30 | Stop reason: SDUPTHER

## 2018-08-30 RX ORDER — PROPOFOL 10 MG/ML
INJECTION, EMULSION INTRAVENOUS PRN
Status: DISCONTINUED | OUTPATIENT
Start: 2018-08-30 | End: 2018-08-30 | Stop reason: SDUPTHER

## 2018-08-30 RX ORDER — SODIUM CHLORIDE, SODIUM LACTATE, POTASSIUM CHLORIDE, CALCIUM CHLORIDE 600; 310; 30; 20 MG/100ML; MG/100ML; MG/100ML; MG/100ML
INJECTION, SOLUTION INTRAVENOUS CONTINUOUS
Status: DISCONTINUED | OUTPATIENT
Start: 2018-08-30 | End: 2018-08-31

## 2018-08-30 RX ORDER — SODIUM CHLORIDE 0.9 % (FLUSH) 0.9 %
10 SYRINGE (ML) INJECTION EVERY 12 HOURS SCHEDULED
Status: DISCONTINUED | OUTPATIENT
Start: 2018-08-30 | End: 2018-09-21 | Stop reason: HOSPADM

## 2018-08-30 RX ADMIN — SUGAMMADEX 150 MG: 100 INJECTION, SOLUTION INTRAVENOUS at 11:09

## 2018-08-30 RX ADMIN — Medication 2 G: at 07:29

## 2018-08-30 RX ADMIN — ROCURONIUM BROMIDE 50 MG: 10 INJECTION INTRAVENOUS at 08:28

## 2018-08-30 RX ADMIN — PHENYLEPHRINE HYDROCHLORIDE 160 MCG: 10 INJECTION INTRAVENOUS at 10:35

## 2018-08-30 RX ADMIN — FAMOTIDINE 20 MG: 10 INJECTION, SOLUTION INTRAVENOUS at 20:45

## 2018-08-30 RX ADMIN — Medication 0.5 MG: at 23:34

## 2018-08-30 RX ADMIN — EPHEDRINE SULFATE 5 MG: 50 INJECTION, SOLUTION INTRAMUSCULAR; INTRAVENOUS; SUBCUTANEOUS at 07:31

## 2018-08-30 RX ADMIN — NITROGLYCERIN 40 MCG: 20 INJECTION INTRAVENOUS at 10:20

## 2018-08-30 RX ADMIN — NITROGLYCERIN 40 MCG: 20 INJECTION INTRAVENOUS at 11:19

## 2018-08-30 RX ADMIN — PHENYLEPHRINE HYDROCHLORIDE 80 MCG: 10 INJECTION INTRAVENOUS at 10:32

## 2018-08-30 RX ADMIN — NITROGLYCERIN 40 MCG: 20 INJECTION INTRAVENOUS at 08:19

## 2018-08-30 RX ADMIN — PROTAMINE SULFATE 30 MG: 10 INJECTION, SOLUTION INTRAVENOUS at 10:27

## 2018-08-30 RX ADMIN — SODIUM CHLORIDE, POTASSIUM CHLORIDE, SODIUM LACTATE AND CALCIUM CHLORIDE: 600; 310; 30; 20 INJECTION, SOLUTION INTRAVENOUS at 13:28

## 2018-08-30 RX ADMIN — SUGAMMADEX 150 MG: 100 INJECTION, SOLUTION INTRAVENOUS at 11:32

## 2018-08-30 RX ADMIN — Medication 10 ML: at 20:46

## 2018-08-30 RX ADMIN — NITROGLYCERIN 40 MCG: 20 INJECTION INTRAVENOUS at 10:05

## 2018-08-30 RX ADMIN — NITROGLYCERIN 40 MCG: 20 INJECTION INTRAVENOUS at 08:56

## 2018-08-30 RX ADMIN — EPHEDRINE SULFATE 15 MG: 50 INJECTION, SOLUTION INTRAMUSCULAR; INTRAVENOUS; SUBCUTANEOUS at 10:35

## 2018-08-30 RX ADMIN — SODIUM CHLORIDE 2 G: 9 INJECTION, SOLUTION INTRAVENOUS at 15:14

## 2018-08-30 RX ADMIN — SODIUM CHLORIDE: 9 INJECTION, SOLUTION INTRAVENOUS at 07:13

## 2018-08-30 RX ADMIN — SODIUM BICARBONATE 50 MEQ: 84 INJECTION, SOLUTION INTRAVENOUS at 10:00

## 2018-08-30 RX ADMIN — NITROGLYCERIN 80 MCG: 20 INJECTION INTRAVENOUS at 08:02

## 2018-08-30 RX ADMIN — NITROGLYCERIN 80 MCG: 20 INJECTION INTRAVENOUS at 08:05

## 2018-08-30 RX ADMIN — ROCURONIUM BROMIDE 50 MG: 10 INJECTION INTRAVENOUS at 07:20

## 2018-08-30 RX ADMIN — NITROGLYCERIN 40 MCG: 20 INJECTION INTRAVENOUS at 10:45

## 2018-08-30 RX ADMIN — FAMOTIDINE 20 MG: 10 INJECTION, SOLUTION INTRAVENOUS at 14:38

## 2018-08-30 RX ADMIN — Medication 0.5 MG: at 20:45

## 2018-08-30 RX ADMIN — CALCIUM CHLORIDE 1 G: 100 INJECTION, SOLUTION INTRAVENOUS; INTRAVENTRICULAR at 10:21

## 2018-08-30 RX ADMIN — NITROGLYCERIN 40 MCG: 20 INJECTION INTRAVENOUS at 11:15

## 2018-08-30 RX ADMIN — FENTANYL CITRATE 50 MCG: 50 INJECTION INTRAMUSCULAR; INTRAVENOUS at 10:15

## 2018-08-30 RX ADMIN — PROPOFOL 120 MG: 10 INJECTION, EMULSION INTRAVENOUS at 07:20

## 2018-08-30 RX ADMIN — NITROGLYCERIN 40 MCG: 20 INJECTION INTRAVENOUS at 11:45

## 2018-08-30 RX ADMIN — NITROGLYCERIN 40 MCG: 20 INJECTION INTRAVENOUS at 08:55

## 2018-08-30 RX ADMIN — NITROGLYCERIN 40 MCG: 20 INJECTION INTRAVENOUS at 08:24

## 2018-08-30 RX ADMIN — NITROGLYCERIN 40 MCG: 20 INJECTION INTRAVENOUS at 08:12

## 2018-08-30 RX ADMIN — SODIUM CHLORIDE, POTASSIUM CHLORIDE, SODIUM LACTATE AND CALCIUM CHLORIDE 150 ML/HR: 600; 310; 30; 20 INJECTION, SOLUTION INTRAVENOUS at 20:24

## 2018-08-30 RX ADMIN — EPHEDRINE SULFATE 5 MG: 50 INJECTION, SOLUTION INTRAMUSCULAR; INTRAVENOUS; SUBCUTANEOUS at 07:25

## 2018-08-30 RX ADMIN — NITROGLYCERIN 40 MCG: 20 INJECTION INTRAVENOUS at 09:10

## 2018-08-30 RX ADMIN — ONDANSETRON HYDROCHLORIDE 4 MG: 2 INJECTION, SOLUTION INTRAMUSCULAR; INTRAVENOUS at 11:29

## 2018-08-30 RX ADMIN — LIDOCAINE HYDROCHLORIDE 50 MG: 10 INJECTION, SOLUTION INFILTRATION; PERINEURAL at 07:20

## 2018-08-30 RX ADMIN — HYDROMORPHONE HYDROCHLORIDE 0.5 MG: 1 INJECTION, SOLUTION INTRAMUSCULAR; INTRAVENOUS; SUBCUTANEOUS at 11:46

## 2018-08-30 RX ADMIN — FENTANYL CITRATE 100 MCG: 50 INJECTION INTRAMUSCULAR; INTRAVENOUS at 07:18

## 2018-08-30 RX ADMIN — ALBUMIN (HUMAN) 250 ML: 2.5 SOLUTION INTRAVENOUS at 09:38

## 2018-08-30 RX ADMIN — FENTANYL CITRATE 50 MCG: 50 INJECTION INTRAMUSCULAR; INTRAVENOUS at 08:00

## 2018-08-30 RX ADMIN — NITROGLYCERIN 40 MCG: 20 INJECTION INTRAVENOUS at 08:25

## 2018-08-30 RX ADMIN — PROTAMINE SULFATE 20 MG: 10 INJECTION, SOLUTION INTRAVENOUS at 10:31

## 2018-08-30 RX ADMIN — HEPARIN SODIUM 7000 UNITS: 1000 INJECTION, SOLUTION INTRAVENOUS; SUBCUTANEOUS at 09:02

## 2018-08-30 RX ADMIN — NITROGLYCERIN 40 MCG: 20 INJECTION INTRAVENOUS at 11:38

## 2018-08-30 RX ADMIN — Medication 0.5 MG: at 16:59

## 2018-08-30 RX ADMIN — SODIUM CHLORIDE 2 G: 9 INJECTION, SOLUTION INTRAVENOUS at 23:30

## 2018-08-30 RX ADMIN — SODIUM CHLORIDE: 9 INJECTION, SOLUTION INTRAVENOUS at 11:42

## 2018-08-30 RX ADMIN — Medication 0.5 MG: at 18:43

## 2018-08-30 RX ADMIN — SODIUM CHLORIDE, SODIUM LACTATE, POTASSIUM CHLORIDE, AND CALCIUM CHLORIDE: 600; 310; 30; 20 INJECTION, SOLUTION INTRAVENOUS at 07:23

## 2018-08-30 RX ADMIN — FENTANYL CITRATE 100 MCG: 50 INJECTION INTRAMUSCULAR; INTRAVENOUS at 08:28

## 2018-08-30 RX ADMIN — HYDROMORPHONE HYDROCHLORIDE 0.5 MG: 1 INJECTION, SOLUTION INTRAMUSCULAR; INTRAVENOUS; SUBCUTANEOUS at 11:42

## 2018-08-30 RX ADMIN — NITROGLYCERIN 40 MCG: 20 INJECTION INTRAVENOUS at 08:30

## 2018-08-30 RX ADMIN — ROCURONIUM BROMIDE 50 MG: 10 INJECTION INTRAVENOUS at 09:20

## 2018-08-30 RX ADMIN — MIDAZOLAM HYDROCHLORIDE 2 MG: 2 INJECTION, SOLUTION INTRAMUSCULAR; INTRAVENOUS at 06:58

## 2018-08-30 RX ADMIN — ALBUMIN (HUMAN) 250 ML: 2.5 SOLUTION INTRAVENOUS at 09:49

## 2018-08-30 RX ADMIN — Medication 0.5 MG: at 12:20

## 2018-08-30 RX ADMIN — NITROGLYCERIN 80 MCG: 20 INJECTION INTRAVENOUS at 08:08

## 2018-08-30 RX ADMIN — NITROGLYCERIN 40 MCG: 20 INJECTION INTRAVENOUS at 11:40

## 2018-08-30 RX ADMIN — PROPOFOL 80 MG: 10 INJECTION, EMULSION INTRAVENOUS at 07:23

## 2018-08-30 RX ADMIN — FENTANYL CITRATE 100 MCG: 50 INJECTION INTRAMUSCULAR; INTRAVENOUS at 07:42

## 2018-08-30 ASSESSMENT — PAIN SCALES - GENERAL
PAINLEVEL_OUTOF10: 7
PAINLEVEL_OUTOF10: 4
PAINLEVEL_OUTOF10: 3
PAINLEVEL_OUTOF10: 8
PAINLEVEL_OUTOF10: 4
PAINLEVEL_OUTOF10: 7
PAINLEVEL_OUTOF10: 10
PAINLEVEL_OUTOF10: 0
PAINLEVEL_OUTOF10: 10

## 2018-08-30 ASSESSMENT — PAIN DESCRIPTION - LOCATION
LOCATION: ABDOMEN

## 2018-08-30 ASSESSMENT — PAIN DESCRIPTION - PAIN TYPE
TYPE: SURGICAL PAIN

## 2018-08-30 ASSESSMENT — ENCOUNTER SYMPTOMS: SHORTNESS OF BREATH: 0

## 2018-08-30 ASSESSMENT — PAIN DESCRIPTION - ORIENTATION: ORIENTATION: ANTERIOR

## 2018-08-30 ASSESSMENT — PAIN - FUNCTIONAL ASSESSMENT: PAIN_FUNCTIONAL_ASSESSMENT: 0-10

## 2018-08-30 ASSESSMENT — LIFESTYLE VARIABLES: SMOKING_STATUS: 0

## 2018-08-30 NOTE — ANESTHESIA PROCEDURE NOTES
Arterial Line:    An arterial line was placed using ultrasound guidance and surface landmarks, in the pre-op for the following indication(s): continuous blood pressure monitoring and blood sampling needed. A 22 gauge (size), 1 and 3/4 inch (length), Arrow (type) catheter was placed, Seldinger technique used, into the left radial artery and a [employer] Arterial Cannulation Support device was used for positioning, secured by tape and Tegaderm. Anesthesia type: Local    Events:  patient tolerated procedure well with no complications.   8/30/2018 7:05 AM8/30/2018 7:05 AM  Anesthesiologist: Riley Pineda  Performed: Anesthesiologist   Preanesthetic Checklist  Completed: patient identified, site marked, surgical consent, pre-op evaluation, timeout performed, IV checked, risks and benefits discussed, monitors and equipment checked, anesthesia consent given, oxygen available and patient being monitored
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
## 2018-08-30 NOTE — ANESTHESIA PRE PROCEDURE
results found for: PREGTESTUR, PREGSERUM, HCG, HCGQUANT     ABGs: No results found for: PHART, PO2ART, CBQ8RPG, ZTG9KRB, BEART, C1IXKDPH     Type & Screen (If Applicable):  No results found for: LABABO, [address]    Anesthesia Evaluation  Patient summary reviewed and Nursing notes reviewed no history of anesthetic complications:   Airway: Mallampati: II  TM distance: >3 FB   Neck ROM: full  Mouth opening: > = 3 FB Dental: normal exam   (+) upper dentures and lower dentures      Pulmonary:Negative Pulmonary ROS and normal exam  breath sounds clear to auscultation  (+) COPD:      (-) shortness of breath and not a current smoker          Patient did not smoke on day of surgery. Cardiovascular:    (+) hypertension:, hyperlipidemia    (-) CAD,  angina and  CHF    NYHA Classification: I  ECG reviewed  Rhythm: regular  Rate: normal           Beta Blocker:  Dose within 24 Hrs      ROS comment: Echo 2014 normal      Neuro/Psych:   Negative Neuro/Psych ROS  (+) CVA: no interval change, TIA, headaches: migraine headaches, depression/anxiety    (-) seizures           GI/Hepatic/Renal: Neg GI/Hepatic/Renal ROS       (-) hiatal hernia and GERD       Endo/Other: Negative Endo/Other ROS   (+) blood dyscrasia: anticoagulation therapy, arthritis: OA., . Pt had PAT visit. Abdominal:       Abdomen: soft. Vascular:                                        Anesthesia Plan      general     ASA 4     (Iv zofran within 30 min of closing )  Induction: intravenous. arterial line and central line  MIPS: Postoperative opioids intended and Prophylactic antiemetics administered. Anesthetic plan and risks discussed with patient. Use of blood products discussed with patient whom. Plan discussed with CRNA.     Attending anesthesiologist reviewed and agrees with Pre Eval content              Trent Ridley MD   8/30/2018

## 2018-08-30 NOTE — BRIEF OP NOTE
Brief Postoperative Note    Mario Craig  YOB: 1950  [id number]    Pre-operative Diagnosis:   1. AAA  2. Left renal artery stenosis    Post-operative Diagnosis: Same    Procedure:   1. Adhesiolysis  2.   Open repair of AAA with Aortobiiliac reconstruction (18x9 PTFE)  3.  Left renal artery bypass with 7 mm PTFE    Anesthesia: General    Surgeons/Assistants: Valentin/Mathieu    Complications: None    Specimens: Was Not Obtained    Findings: No neck AAA, Left renal artery high grade stenosis    Electronically signed by Emma Porter MD on 8/30/2018 at 11:06 AM

## 2018-08-31 ENCOUNTER — APPOINTMENT (OUTPATIENT)
Dept: GENERAL RADIOLOGY | Age: 68
DRG: 268 | End: 2018-08-31
Attending: SURGERY
Payer: MEDICARE

## 2018-08-31 LAB
AMYLASE: 96 U/L (ref 28–100)
ANION GAP SERPL CALCULATED.3IONS-SCNC: 12 MMOL/L (ref 7–19)
BASE EXCESS ARTERIAL: -4.7 MMOL/L (ref -2–2)
BASE EXCESS ARTERIAL: -4.9 MMOL/L (ref -2–2)
BUN BLDV-MCNC: 23 MG/DL (ref 8–23)
CALCIUM SERPL-MCNC: 7.6 MG/DL (ref 8.8–10.2)
CARBOXYHEMOGLOBIN ARTERIAL: 1.9 % (ref 0–5)
CARBOXYHEMOGLOBIN ARTERIAL: 2.6 % (ref 0–5)
CHLORIDE BLD-SCNC: 111 MMOL/L (ref 98–111)
CO2: 20 MMOL/L (ref 22–29)
CREAT SERPL-MCNC: 1.6 MG/DL (ref 0.5–0.9)
GFR NON-AFRICAN AMERICAN: 32
GLUCOSE BLD-MCNC: 134 MG/DL (ref 74–109)
HCO3 ARTERIAL: 19.5 MMOL/L (ref 22–26)
HCO3 ARTERIAL: 20.4 MMOL/L (ref 22–26)
HCT VFR BLD CALC: 30.2 % (ref 37–47)
HCT VFR BLD CALC: 31.7 % (ref 37–47)
HCT VFR BLD CALC: 36 % (ref 37–47)
HCT VFR BLD CALC: 39 % (ref 37–47)
HEMOGLOBIN, ART, EXTENDED: 10.2 G/DL (ref 12–16)
HEMOGLOBIN, ART, EXTENDED: 10.4 G/DL (ref 12–16)
HEMOGLOBIN: 10.5 G/DL (ref 12–16)
HEMOGLOBIN: 11.7 G/DL (ref 12–16)
HEMOGLOBIN: 12.5 G/DL (ref 12–16)
HEMOGLOBIN: 9.9 G/DL (ref 12–16)
MAGNESIUM: 1.6 MG/DL (ref 1.6–2.4)
MCH RBC QN AUTO: 29.6 PG (ref 27–31)
MCHC RBC AUTO-ENTMCNC: 32.5 G/DL (ref 33–37)
MCV RBC AUTO: 91.1 FL (ref 81–99)
METHEMOGLOBIN ARTERIAL: 1.2 %
METHEMOGLOBIN ARTERIAL: 1.4 %
O2 CONTENT ARTERIAL: 13.1 ML/DL
O2 CONTENT ARTERIAL: 13.5 ML/DL
O2 SAT, ARTERIAL: 90.8 %
O2 SAT, ARTERIAL: 91.9 %
O2 THERAPY: ABNORMAL
O2 THERAPY: ABNORMAL
PCO2 ARTERIAL: 33 MMHG (ref 35–45)
PCO2 ARTERIAL: 37 MMHG (ref 35–45)
PDW BLD-RTO: 15.4 % (ref 11.5–14.5)
PH ARTERIAL: 7.35 (ref 7.35–7.45)
PH ARTERIAL: 7.38 (ref 7.35–7.45)
PHOSPHORUS: 4.2 MG/DL (ref 2.5–4.5)
PLATELET # BLD: 150 K/UL (ref 130–400)
PMV BLD AUTO: 10 FL (ref 9.4–12.3)
PO2 ARTERIAL: 57 MMHG (ref 80–100)
PO2 ARTERIAL: 60 MMHG (ref 80–100)
POTASSIUM SERPL-SCNC: 4.7 MMOL/L (ref 3.5–5)
POTASSIUM, WHOLE BLOOD: 3.7
POTASSIUM, WHOLE BLOOD: 3.8
RBC # BLD: 3.95 M/UL (ref 4.2–5.4)
SODIUM BLD-SCNC: 143 MMOL/L (ref 136–145)
WBC # BLD: 17.1 K/UL (ref 4.8–10.8)

## 2018-08-31 PROCEDURE — 82803 BLOOD GASES ANY COMBINATION: CPT

## 2018-08-31 PROCEDURE — 85027 COMPLETE CBC AUTOMATED: CPT

## 2018-08-31 PROCEDURE — 6370000000 HC RX 637 (ALT 250 FOR IP): Performed by: SURGERY

## 2018-08-31 PROCEDURE — 36600 WITHDRAWAL OF ARTERIAL BLOOD: CPT

## 2018-08-31 PROCEDURE — 6360000002 HC RX W HCPCS: Performed by: SURGERY

## 2018-08-31 PROCEDURE — 71045 X-RAY EXAM CHEST 1 VIEW: CPT

## 2018-08-31 PROCEDURE — 99024 POSTOP FOLLOW-UP VISIT: CPT | Performed by: SURGERY

## 2018-08-31 PROCEDURE — 2700000000 HC OXYGEN THERAPY PER DAY

## 2018-08-31 PROCEDURE — 85014 HEMATOCRIT: CPT

## 2018-08-31 PROCEDURE — 6360000002 HC RX W HCPCS: Performed by: HOSPITALIST

## 2018-08-31 PROCEDURE — 2000000000 HC ICU R&B

## 2018-08-31 PROCEDURE — 6360000002 HC RX W HCPCS

## 2018-08-31 PROCEDURE — 2500000003 HC RX 250 WO HCPCS: Performed by: SURGERY

## 2018-08-31 PROCEDURE — 85018 HEMOGLOBIN: CPT

## 2018-08-31 PROCEDURE — 84100 ASSAY OF PHOSPHORUS: CPT

## 2018-08-31 PROCEDURE — S0028 INJECTION, FAMOTIDINE, 20 MG: HCPCS | Performed by: SURGERY

## 2018-08-31 PROCEDURE — 94640 AIRWAY INHALATION TREATMENT: CPT

## 2018-08-31 PROCEDURE — 80048 BASIC METABOLIC PNL TOTAL CA: CPT

## 2018-08-31 PROCEDURE — 82150 ASSAY OF AMYLASE: CPT

## 2018-08-31 PROCEDURE — 2580000003 HC RX 258: Performed by: SURGERY

## 2018-08-31 PROCEDURE — 83735 ASSAY OF MAGNESIUM: CPT

## 2018-08-31 PROCEDURE — 84132 ASSAY OF SERUM POTASSIUM: CPT

## 2018-08-31 RX ORDER — METOPROLOL TARTRATE 5 MG/5ML
INJECTION INTRAVENOUS
Status: DISPENSED
Start: 2018-08-31 | End: 2018-08-31

## 2018-08-31 RX ORDER — HALOPERIDOL 5 MG/ML
1 INJECTION INTRAMUSCULAR ONCE
Status: COMPLETED | OUTPATIENT
Start: 2018-08-31 | End: 2018-08-31

## 2018-08-31 RX ORDER — IPRATROPIUM BROMIDE AND ALBUTEROL SULFATE 2.5; .5 MG/3ML; MG/3ML
1 SOLUTION RESPIRATORY (INHALATION) EVERY 4 HOURS
Status: DISCONTINUED | OUTPATIENT
Start: 2018-08-31 | End: 2018-09-02

## 2018-08-31 RX ORDER — HALOPERIDOL 5 MG/ML
INJECTION INTRAMUSCULAR
Status: COMPLETED
Start: 2018-08-31 | End: 2018-09-01

## 2018-08-31 RX ORDER — DEXTROSE AND SODIUM CHLORIDE 5; .45 G/100ML; G/100ML
INJECTION, SOLUTION INTRAVENOUS CONTINUOUS
Status: DISCONTINUED | OUTPATIENT
Start: 2018-08-31 | End: 2018-09-01

## 2018-08-31 RX ORDER — HALOPERIDOL 5 MG/ML
INJECTION INTRAMUSCULAR
Status: COMPLETED
Start: 2018-08-31 | End: 2018-08-31

## 2018-08-31 RX ORDER — DEXTROSE AND SODIUM CHLORIDE 5; .45 G/100ML; G/100ML
INJECTION, SOLUTION INTRAVENOUS CONTINUOUS
Status: DISCONTINUED | OUTPATIENT
Start: 2018-08-31 | End: 2018-08-31

## 2018-08-31 RX ORDER — HALOPERIDOL 5 MG/ML
2 INJECTION INTRAMUSCULAR ONCE
Status: COMPLETED | OUTPATIENT
Start: 2018-09-01 | End: 2018-08-31

## 2018-08-31 RX ADMIN — CLONIDINE HYDROCHLORIDE 0.1 MG: 0.1 TABLET ORAL at 18:30

## 2018-08-31 RX ADMIN — Medication 10 ML: at 22:00

## 2018-08-31 RX ADMIN — HALOPERIDOL 2 MG: 5 INJECTION INTRAMUSCULAR at 23:50

## 2018-08-31 RX ADMIN — HALOPERIDOL LACTATE 1 MG: 5 INJECTION INTRAMUSCULAR at 21:35

## 2018-08-31 RX ADMIN — Medication 0.5 MG: at 06:08

## 2018-08-31 RX ADMIN — Medication 0.5 MG: at 15:48

## 2018-08-31 RX ADMIN — DEXTROSE AND SODIUM CHLORIDE: 5; 450 INJECTION, SOLUTION INTRAVENOUS at 11:06

## 2018-08-31 RX ADMIN — HALOPERIDOL LACTATE 2 MG: 5 INJECTION INTRAMUSCULAR at 23:39

## 2018-08-31 RX ADMIN — IPRATROPIUM BROMIDE AND ALBUTEROL SULFATE 1 AMPULE: .5; 3 SOLUTION RESPIRATORY (INHALATION) at 22:21

## 2018-08-31 RX ADMIN — IPRATROPIUM BROMIDE AND ALBUTEROL SULFATE 1 AMPULE: .5; 3 SOLUTION RESPIRATORY (INHALATION) at 18:07

## 2018-08-31 RX ADMIN — FAMOTIDINE 20 MG: 10 INJECTION, SOLUTION INTRAVENOUS at 07:36

## 2018-08-31 RX ADMIN — METOPROLOL TARTRATE 25 MG: 25 TABLET ORAL at 19:32

## 2018-08-31 RX ADMIN — SODIUM CHLORIDE, POTASSIUM CHLORIDE, SODIUM LACTATE AND CALCIUM CHLORIDE: 600; 310; 30; 20 INJECTION, SOLUTION INTRAVENOUS at 07:49

## 2018-08-31 RX ADMIN — SODIUM CHLORIDE 250 ML: 4.5 INJECTION, SOLUTION INTRAVENOUS at 10:32

## 2018-08-31 RX ADMIN — Medication 0.5 MG: at 07:37

## 2018-08-31 RX ADMIN — LORAZEPAM 1 MG: 2 INJECTION INTRAMUSCULAR; INTRAVENOUS at 17:19

## 2018-08-31 RX ADMIN — FAMOTIDINE 20 MG: 10 INJECTION, SOLUTION INTRAVENOUS at 22:00

## 2018-08-31 RX ADMIN — DEXTROSE AND SODIUM CHLORIDE: 5; 450 INJECTION, SOLUTION INTRAVENOUS at 17:40

## 2018-08-31 RX ADMIN — Medication 0.5 MG: at 11:07

## 2018-08-31 RX ADMIN — Medication 10 ML: at 07:49

## 2018-08-31 RX ADMIN — HALOPERIDOL 1 MG: 5 INJECTION INTRAMUSCULAR at 21:35

## 2018-08-31 RX ADMIN — HALOPERIDOL LACTATE 2 MG: 5 INJECTION, SOLUTION INTRAMUSCULAR at 23:50

## 2018-08-31 RX ADMIN — Medication 0.5 MG: at 03:27

## 2018-08-31 RX ADMIN — Medication 0.5 MG: at 13:34

## 2018-08-31 RX ADMIN — IPRATROPIUM BROMIDE AND ALBUTEROL SULFATE 1 AMPULE: .5; 3 SOLUTION RESPIRATORY (INHALATION) at 15:47

## 2018-08-31 ASSESSMENT — PAIN DESCRIPTION - DESCRIPTORS
DESCRIPTORS: ACHING;SORE

## 2018-08-31 ASSESSMENT — PAIN DESCRIPTION - PAIN TYPE
TYPE: SURGICAL PAIN

## 2018-08-31 ASSESSMENT — PAIN DESCRIPTION - LOCATION
LOCATION: ABDOMEN

## 2018-08-31 ASSESSMENT — PAIN DESCRIPTION - ORIENTATION
ORIENTATION: MID

## 2018-08-31 ASSESSMENT — PAIN SCALES - GENERAL
PAINLEVEL_OUTOF10: 7
PAINLEVEL_OUTOF10: 10
PAINLEVEL_OUTOF10: 9
PAINLEVEL_OUTOF10: 0
PAINLEVEL_OUTOF10: 7
PAINLEVEL_OUTOF10: 8
PAINLEVEL_OUTOF10: 10

## 2018-08-31 NOTE — PROGRESS NOTES
Nutrition Assessment    Type and Reason for Visit: Initial    Nutrition Recommendations: RD to follow and make recommendations for meeting increased protein needs dependent upon clinical course. Malnutrition Assessment:  · Malnutrition Status: At risk for malnutrition  · Context: Acute illness or injury    Nutrition Diagnosis:   · Problem: Increased nutrient needs  · Etiology: related to Increased demand for energy/nutrients due to     Signs and symptoms:  as evidenced by Presence of wounds    Nutrition Assessment:  · Subjective Assessment: Pt NPO s/p AAA repair on 8/30. Increased protein needs r/t wound healing. If unable to progress diet in course of treatment and such measures are desired, suggest peripheral PN 4.25/10 with goal rate of 60ml/hr to provide 60g pro, 144g CHO, 735kcal.   · Wound Type: Surgical Wound  · Current Nutrition Therapies:  · Oral Diet Orders: NPO   · Oral Diet intake: NPO  · Oral Nutrition Supplement (ONS) Orders: None  · ONS intake: NPO  · Anthropometric Measures:  · Ht: 5' 3\" (160 cm)   · Current Body Wt: 160 lb (72.6 kg)  · Admission Body Wt: 141 lb (64 kg)  · Ideal Body Wt: 115 lb (52.2 kg),   · BMI Classification: BMI 25.0 - 29.9 Overweight  · Comparative Standards (Estimated Nutrition Needs):  · Estimated Daily Total Kcal: 2786-6015  · Estimated Daily Protein (g):     Estimated Intake vs Estimated Needs: Intake Less Than Needs    Nutrition Risk Level: High    Nutrition Interventions:   Continue NPO  Continued Inpatient Monitoring    Nutrition Evaluation:   · Evaluation: Goals set   · Goals: Pt will tolerate diet advancement within course of treatment. Pt's increased nutritional needs will be met. · Monitoring: NPO Status, Diet Progression, Weight, Pertinent Labs, Wound Healing    See Adult Nutrition Doc Flowsheet for more detail.      Electronically signed by Yazmin Rosales, MS, RD, LD on 8/31/18 at 9:44 AM

## 2018-08-31 NOTE — PROGRESS NOTES
Vascular Surgery  Dr.Scott Emmanuel Rivera   Daily Progress Note    Pt Name: Filemon Jung Record Number: [id number]  Date of Birth 1950   Today's Date: 8/31/2018    SUBJECTIVE:     Patient was seen and examined in ICU.   Pain is  Controlled-just got dose of dilaudid IV and is very drowsy  has not had nausea/vomiting, NG to low intermittent suction    OBJECTIVE:     Patient Vitals for the past 24 hrs:   BP Temp Temp src Pulse Resp SpO2 Height Weight   08/31/18 1000 - - - 81 23 95 % - -   08/31/18 0921 - - - - - - 5' 3\" (1.6 m) -   08/31/18 0900 - - - 84 19 92 % - -   08/31/18 0800 (!) 101/52 99 °F (37.2 °C) Temporal 83 17 92 % - -   08/31/18 0700 - - - 79 15 94 % - -   08/31/18 0600 - - - 83 18 96 % - -   08/31/18 0500 - - - 81 14 95 % - -   08/31/18 0400 - 96.9 °F (36.1 °C) Temporal 77 13 96 % - -   08/31/18 0300 - - - 78 21 96 % - -   08/31/18 0200 - - - 81 15 96 % - -   08/31/18 0100 - - - 79 18 96 % - -   08/31/18 0000 - 97 °F (36.1 °C) Temporal 82 23 96 % - 160 lb (72.6 kg)   08/30/18 2300 - - - 78 17 96 % - -   08/30/18 2200 - - - 75 14 97 % - -   08/30/18 2100 - - - 75 12 97 % - -   08/30/18 2000 - 96.6 °F (35.9 °C) Temporal 73 13 97 % - -   08/30/18 1900 - - - 74 15 97 % - -   08/30/18 1855 - - - 72 - - - -   08/30/18 1830 - - - 69 13 98 % - -   08/30/18 1800 - - - 70 12 98 % - -   08/30/18 1730 - - - 74 15 98 % - -   08/30/18 1700 - - - 71 19 98 % - -   08/30/18 1659 - - - 72 15 98 % - -   08/30/18 1638 - - - 70 - - - -   08/30/18 1630 - - - 71 24 98 % - -   08/30/18 1615 - - - 74 17 97 % - -   08/30/18 1600 - 97 °F (36.1 °C) Temporal 73 15 98 % - -   08/30/18 1540 - - - 74 - - - -   08/30/18 1530 - - - 72 15 94 % - -   08/30/18 1515 - - - 75 17 93 % - -   08/30/18 1500 - - - 74 15 95 % - -   08/30/18 1445 - - - 72 15 95 % - -   08/30/18 1430 - - - 71 14 94 % - -   08/30/18 1415 - - - 74 16 94 % - -   08/30/18 1411 - - - - 17 93 % - -   08/30/18 1409 - - - - 15 93 % - -   08/30/18 1400 - - - 72 14 95 % -

## 2018-09-01 ENCOUNTER — APPOINTMENT (OUTPATIENT)
Dept: GENERAL RADIOLOGY | Age: 68
DRG: 268 | End: 2018-09-01
Attending: SURGERY
Payer: MEDICARE

## 2018-09-01 LAB
ALBUMIN SERPL-MCNC: 2.4 G/DL (ref 3.5–5.2)
ALP BLD-CCNC: 61 U/L (ref 35–104)
ALT SERPL-CCNC: 775 U/L (ref 5–33)
AMYLASE: 45 U/L (ref 28–100)
ANION GAP SERPL CALCULATED.3IONS-SCNC: 13 MMOL/L (ref 7–19)
ANION GAP SERPL CALCULATED.3IONS-SCNC: 16 MMOL/L (ref 7–19)
AST SERPL-CCNC: 982 U/L (ref 5–32)
BASE EXCESS ARTERIAL: -5.3 MMOL/L (ref -2–2)
BASE EXCESS ARTERIAL: -6.4 MMOL/L (ref -2–2)
BILIRUB SERPL-MCNC: 0.5 MG/DL (ref 0.2–1.2)
BUN BLDV-MCNC: 27 MG/DL (ref 8–23)
BUN BLDV-MCNC: 28 MG/DL (ref 8–23)
CALCIUM SERPL-MCNC: 7.3 MG/DL (ref 8.8–10.2)
CALCIUM SERPL-MCNC: 7.6 MG/DL (ref 8.8–10.2)
CARBOXYHEMOGLOBIN ARTERIAL: 1.7 % (ref 0–5)
CARBOXYHEMOGLOBIN ARTERIAL: 1.8 % (ref 0–5)
CHLORIDE BLD-SCNC: 105 MMOL/L (ref 98–111)
CHLORIDE BLD-SCNC: 106 MMOL/L (ref 98–111)
CO2: 20 MMOL/L (ref 22–29)
CO2: 20 MMOL/L (ref 22–29)
CREAT SERPL-MCNC: 1.7 MG/DL (ref 0.5–0.9)
CREAT SERPL-MCNC: 1.8 MG/DL (ref 0.5–0.9)
GFR NON-AFRICAN AMERICAN: 28
GFR NON-AFRICAN AMERICAN: 30
GLUCOSE BLD-MCNC: 113 MG/DL (ref 74–109)
GLUCOSE BLD-MCNC: 162 MG/DL (ref 74–109)
HCO3 ARTERIAL: 20 MMOL/L (ref 22–26)
HCO3 ARTERIAL: 20.2 MMOL/L (ref 22–26)
HCT VFR BLD CALC: 26.3 % (ref 37–47)
HCT VFR BLD CALC: 26.4 % (ref 37–47)
HCT VFR BLD CALC: 27 % (ref 37–47)
HCT VFR BLD CALC: 30.2 % (ref 37–47)
HEMOGLOBIN, ART, EXTENDED: 10.6 G/DL (ref 12–16)
HEMOGLOBIN, ART, EXTENDED: 9.2 G/DL (ref 12–16)
HEMOGLOBIN: 8.5 G/DL (ref 12–16)
HEMOGLOBIN: 8.7 G/DL (ref 12–16)
HEMOGLOBIN: 8.9 G/DL (ref 12–16)
HEMOGLOBIN: 9.8 G/DL (ref 12–16)
LV EF: 58 %
LVEF MODALITY: NORMAL
MAGNESIUM: 1.6 MG/DL (ref 1.6–2.4)
MAGNESIUM: 1.8 MG/DL (ref 1.6–2.4)
MCH RBC QN AUTO: 29.5 PG (ref 27–31)
MCHC RBC AUTO-ENTMCNC: 32.2 G/DL (ref 33–37)
MCV RBC AUTO: 91.7 FL (ref 81–99)
METHEMOGLOBIN ARTERIAL: 0.9 %
METHEMOGLOBIN ARTERIAL: 1.5 %
O2 CONTENT ARTERIAL: 12.3 ML/DL
O2 CONTENT ARTERIAL: 12.5 ML/DL
O2 SAT, ARTERIAL: 82.7 %
O2 SAT, ARTERIAL: 96 %
O2 THERAPY: ABNORMAL
O2 THERAPY: ABNORMAL
PCO2 ARTERIAL: 37 MMHG (ref 35–45)
PCO2 ARTERIAL: 44 MMHG (ref 35–45)
PDW BLD-RTO: 15.1 % (ref 11.5–14.5)
PH ARTERIAL: 7.27 (ref 7.35–7.45)
PH ARTERIAL: 7.34 (ref 7.35–7.45)
PHOSPHORUS: 2.3 MG/DL (ref 2.5–4.5)
PLATELET # BLD: 111 K/UL (ref 130–400)
PMV BLD AUTO: 10.1 FL (ref 9.4–12.3)
PO2 ARTERIAL: 47 MMHG (ref 80–100)
PO2 ARTERIAL: 82 MMHG (ref 80–100)
POTASSIUM SERPL-SCNC: 3.7 MMOL/L (ref 3.5–5)
POTASSIUM SERPL-SCNC: 3.7 MMOL/L (ref 3.5–5)
POTASSIUM, WHOLE BLOOD: 3.9
POTASSIUM, WHOLE BLOOD: 4.4
PRO-BNP: 2490 PG/ML (ref 0–900)
RBC # BLD: 2.88 M/UL (ref 4.2–5.4)
SODIUM BLD-SCNC: 139 MMOL/L (ref 136–145)
SODIUM BLD-SCNC: 141 MMOL/L (ref 136–145)
TOTAL PROTEIN: 4.6 G/DL (ref 6.6–8.7)
TROPONIN: <0.01 NG/ML (ref 0–0.03)
WBC # BLD: 13.3 K/UL (ref 4.8–10.8)

## 2018-09-01 PROCEDURE — 84484 ASSAY OF TROPONIN QUANT: CPT

## 2018-09-01 PROCEDURE — 2580000003 HC RX 258: Performed by: SURGERY

## 2018-09-01 PROCEDURE — 84132 ASSAY OF SERUM POTASSIUM: CPT

## 2018-09-01 PROCEDURE — 71045 X-RAY EXAM CHEST 1 VIEW: CPT

## 2018-09-01 PROCEDURE — 36600 WITHDRAWAL OF ARTERIAL BLOOD: CPT

## 2018-09-01 PROCEDURE — 36592 COLLECT BLOOD FROM PICC: CPT

## 2018-09-01 PROCEDURE — 87070 CULTURE OTHR SPECIMN AEROBIC: CPT

## 2018-09-01 PROCEDURE — 84100 ASSAY OF PHOSPHORUS: CPT

## 2018-09-01 PROCEDURE — 2500000003 HC RX 250 WO HCPCS: Performed by: SURGERY

## 2018-09-01 PROCEDURE — 5A1955Z RESPIRATORY VENTILATION, GREATER THAN 96 CONSECUTIVE HOURS: ICD-10-PCS | Performed by: INTERNAL MEDICINE

## 2018-09-01 PROCEDURE — 31500 INSERT EMERGENCY AIRWAY: CPT

## 2018-09-01 PROCEDURE — 85027 COMPLETE CBC AUTOMATED: CPT

## 2018-09-01 PROCEDURE — 2000000000 HC ICU R&B

## 2018-09-01 PROCEDURE — 2500000003 HC RX 250 WO HCPCS: Performed by: HOSPITALIST

## 2018-09-01 PROCEDURE — 80053 COMPREHEN METABOLIC PANEL: CPT

## 2018-09-01 PROCEDURE — 6360000002 HC RX W HCPCS: Performed by: HOSPITALIST

## 2018-09-01 PROCEDURE — 83880 ASSAY OF NATRIURETIC PEPTIDE: CPT

## 2018-09-01 PROCEDURE — 2580000003 HC RX 258: Performed by: HOSPITALIST

## 2018-09-01 PROCEDURE — 83735 ASSAY OF MAGNESIUM: CPT

## 2018-09-01 PROCEDURE — 87186 SC STD MICRODIL/AGAR DIL: CPT

## 2018-09-01 PROCEDURE — 94640 AIRWAY INHALATION TREATMENT: CPT

## 2018-09-01 PROCEDURE — S0028 INJECTION, FAMOTIDINE, 20 MG: HCPCS | Performed by: SURGERY

## 2018-09-01 PROCEDURE — 87077 CULTURE AEROBIC IDENTIFY: CPT

## 2018-09-01 PROCEDURE — 99223 1ST HOSP IP/OBS HIGH 75: CPT | Performed by: HOSPITALIST

## 2018-09-01 PROCEDURE — 2500000003 HC RX 250 WO HCPCS: Performed by: NURSE PRACTITIONER

## 2018-09-01 PROCEDURE — 6360000002 HC RX W HCPCS: Performed by: NURSE PRACTITIONER

## 2018-09-01 PROCEDURE — 87205 SMEAR GRAM STAIN: CPT

## 2018-09-01 PROCEDURE — 85014 HEMATOCRIT: CPT

## 2018-09-01 PROCEDURE — 99291 CRITICAL CARE FIRST HOUR: CPT | Performed by: HOSPITALIST

## 2018-09-01 PROCEDURE — 99024 POSTOP FOLLOW-UP VISIT: CPT | Performed by: SURGERY

## 2018-09-01 PROCEDURE — 2500000003 HC RX 250 WO HCPCS

## 2018-09-01 PROCEDURE — 80048 BASIC METABOLIC PNL TOTAL CA: CPT

## 2018-09-01 PROCEDURE — 2700000000 HC OXYGEN THERAPY PER DAY

## 2018-09-01 PROCEDURE — 0BH17EZ INSERTION OF ENDOTRACHEAL AIRWAY INTO TRACHEA, VIA NATURAL OR ARTIFICIAL OPENING: ICD-10-PCS | Performed by: SURGERY

## 2018-09-01 PROCEDURE — 94002 VENT MGMT INPAT INIT DAY: CPT

## 2018-09-01 PROCEDURE — 82803 BLOOD GASES ANY COMBINATION: CPT

## 2018-09-01 PROCEDURE — 6360000002 HC RX W HCPCS

## 2018-09-01 PROCEDURE — 6370000000 HC RX 637 (ALT 250 FOR IP): Performed by: HOSPITALIST

## 2018-09-01 PROCEDURE — 85018 HEMOGLOBIN: CPT

## 2018-09-01 PROCEDURE — 93306 TTE W/DOPPLER COMPLETE: CPT

## 2018-09-01 PROCEDURE — 82150 ASSAY OF AMYLASE: CPT

## 2018-09-01 PROCEDURE — 6370000000 HC RX 637 (ALT 250 FOR IP): Performed by: SURGERY

## 2018-09-01 PROCEDURE — 36415 COLL VENOUS BLD VENIPUNCTURE: CPT

## 2018-09-01 PROCEDURE — C9113 INJ PANTOPRAZOLE SODIUM, VIA: HCPCS | Performed by: HOSPITALIST

## 2018-09-01 PROCEDURE — 94660 CPAP INITIATION&MGMT: CPT

## 2018-09-01 RX ORDER — PROPOFOL 10 MG/ML
INJECTION, EMULSION INTRAVENOUS
Status: COMPLETED
Start: 2018-09-01 | End: 2018-09-01

## 2018-09-01 RX ORDER — POTASSIUM CHLORIDE 7.45 MG/ML
10 INJECTION INTRAVENOUS
Status: COMPLETED | OUTPATIENT
Start: 2018-09-01 | End: 2018-09-01

## 2018-09-01 RX ORDER — PROPOFOL 10 MG/ML
10 INJECTION, EMULSION INTRAVENOUS
Status: DISCONTINUED | OUTPATIENT
Start: 2018-09-01 | End: 2018-09-01

## 2018-09-01 RX ORDER — CHLORHEXIDINE GLUCONATE 0.12 MG/ML
15 RINSE ORAL 2 TIMES DAILY
Status: DISCONTINUED | OUTPATIENT
Start: 2018-09-01 | End: 2018-09-11

## 2018-09-01 RX ORDER — FUROSEMIDE 10 MG/ML
INJECTION INTRAMUSCULAR; INTRAVENOUS
Status: COMPLETED
Start: 2018-09-01 | End: 2018-09-01

## 2018-09-01 RX ORDER — MAGNESIUM SULFATE 1 G/100ML
1 INJECTION INTRAVENOUS ONCE
Status: COMPLETED | OUTPATIENT
Start: 2018-09-01 | End: 2018-09-01

## 2018-09-01 RX ORDER — ACETYLCYSTEINE 200 MG/ML
600 SOLUTION ORAL; RESPIRATORY (INHALATION) EVERY 8 HOURS
Status: DISCONTINUED | OUTPATIENT
Start: 2018-09-01 | End: 2018-09-03

## 2018-09-01 RX ORDER — FUROSEMIDE 10 MG/ML
40 INJECTION INTRAMUSCULAR; INTRAVENOUS ONCE
Status: COMPLETED | OUTPATIENT
Start: 2018-09-01 | End: 2018-09-01

## 2018-09-01 RX ORDER — PANTOPRAZOLE SODIUM 40 MG/10ML
40 INJECTION, POWDER, LYOPHILIZED, FOR SOLUTION INTRAVENOUS DAILY
Status: DISCONTINUED | OUTPATIENT
Start: 2018-09-01 | End: 2018-09-21 | Stop reason: HOSPADM

## 2018-09-01 RX ORDER — NOREPINEPHRINE BITARTRATE 1 MG/ML
INJECTION, SOLUTION INTRAVENOUS
Status: COMPLETED
Start: 2018-09-01 | End: 2018-09-01

## 2018-09-01 RX ADMIN — FENTANYL CITRATE 25 MCG/HR: 50 INJECTION, SOLUTION INTRAMUSCULAR; INTRAVENOUS at 05:28

## 2018-09-01 RX ADMIN — CHLORHEXIDINE GLUCONATE 15 ML: 1.2 LIQUID BUCCAL at 20:55

## 2018-09-01 RX ADMIN — IPRATROPIUM BROMIDE AND ALBUTEROL SULFATE 1 AMPULE: .5; 3 SOLUTION RESPIRATORY (INHALATION) at 22:11

## 2018-09-01 RX ADMIN — Medication 10 ML: at 18:34

## 2018-09-01 RX ADMIN — HALOPERIDOL LACTATE 5 MG: 5 INJECTION, SOLUTION INTRAMUSCULAR at 00:12

## 2018-09-01 RX ADMIN — GUAIFENESIN 400 MG: 100 SOLUTION ORAL at 18:20

## 2018-09-01 RX ADMIN — POTASSIUM CHLORIDE 10 MEQ: 7.46 INJECTION, SOLUTION INTRAVENOUS at 23:02

## 2018-09-01 RX ADMIN — CHLORHEXIDINE GLUCONATE 15 ML: 1.2 LIQUID BUCCAL at 10:24

## 2018-09-01 RX ADMIN — IPRATROPIUM BROMIDE AND ALBUTEROL SULFATE 1 AMPULE: .5; 3 SOLUTION RESPIRATORY (INHALATION) at 14:18

## 2018-09-01 RX ADMIN — IPRATROPIUM BROMIDE AND ALBUTEROL SULFATE 1 AMPULE: .5; 3 SOLUTION RESPIRATORY (INHALATION) at 06:26

## 2018-09-01 RX ADMIN — MIDAZOLAM HYDROCHLORIDE 5 MG/HR: 5 INJECTION, SOLUTION INTRAMUSCULAR; INTRAVENOUS at 20:55

## 2018-09-01 RX ADMIN — PANTOPRAZOLE SODIUM 40 MG: 40 INJECTION, POWDER, FOR SOLUTION INTRAVENOUS at 18:33

## 2018-09-01 RX ADMIN — GUAIFENESIN 400 MG: 100 SOLUTION ORAL at 20:54

## 2018-09-01 RX ADMIN — DEXMEDETOMIDINE HYDROCHLORIDE 0.2 MCG/KG/HR: 100 INJECTION, SOLUTION INTRAVENOUS at 00:20

## 2018-09-01 RX ADMIN — POTASSIUM CHLORIDE 10 MEQ: 7.46 INJECTION, SOLUTION INTRAVENOUS at 22:02

## 2018-09-01 RX ADMIN — FUROSEMIDE 40 MG: 10 INJECTION, SOLUTION INTRAMUSCULAR; INTRAVENOUS at 00:55

## 2018-09-01 RX ADMIN — IPRATROPIUM BROMIDE AND ALBUTEROL SULFATE 1 AMPULE: .5; 3 SOLUTION RESPIRATORY (INHALATION) at 09:59

## 2018-09-01 RX ADMIN — IPRATROPIUM BROMIDE AND ALBUTEROL SULFATE 1 AMPULE: .5; 3 SOLUTION RESPIRATORY (INHALATION) at 18:18

## 2018-09-01 RX ADMIN — FENTANYL CITRATE 75 MCG/HR: 50 INJECTION, SOLUTION INTRAMUSCULAR; INTRAVENOUS at 20:55

## 2018-09-01 RX ADMIN — ACETYLCYSTEINE 600 MG: 200 SOLUTION ORAL; RESPIRATORY (INHALATION) at 18:18

## 2018-09-01 RX ADMIN — NOREPINEPHRINE BITARTRATE 16 MG: 1 INJECTION INTRAVENOUS at 06:55

## 2018-09-01 RX ADMIN — FUROSEMIDE 40 MG: 10 INJECTION INTRAMUSCULAR; INTRAVENOUS at 00:55

## 2018-09-01 RX ADMIN — MIDAZOLAM HYDROCHLORIDE 1 MG/HR: 5 INJECTION, SOLUTION INTRAMUSCULAR; INTRAVENOUS at 05:27

## 2018-09-01 RX ADMIN — Medication 1 G: at 22:01

## 2018-09-01 RX ADMIN — GUAIFENESIN 400 MG: 100 SOLUTION ORAL at 14:07

## 2018-09-01 RX ADMIN — FAMOTIDINE 20 MG: 10 INJECTION, SOLUTION INTRAVENOUS at 10:25

## 2018-09-01 RX ADMIN — PROPOFOL 5 MCG/KG/MIN: 10 INJECTION, EMULSION INTRAVENOUS at 04:45

## 2018-09-01 RX ADMIN — Medication 10 ML: at 20:55

## 2018-09-01 RX ADMIN — MAGNESIUM SULFATE HEPTAHYDRATE 1 G: 1 INJECTION, SOLUTION INTRAVENOUS at 22:01

## 2018-09-01 RX ADMIN — ACETYLCYSTEINE 600 MG: 200 SOLUTION ORAL; RESPIRATORY (INHALATION) at 10:00

## 2018-09-01 ASSESSMENT — PAIN SCALES - GENERAL
PAINLEVEL_OUTOF10: 0

## 2018-09-01 ASSESSMENT — PULMONARY FUNCTION TESTS
PIF_VALUE: 17.7
PIF_VALUE: 21.8
PIF_VALUE: 35.6
PIF_VALUE: 18.5

## 2018-09-01 NOTE — PROGRESS NOTES
ICU Hospitalist Progress Note                    Patient Name: Ling Tabares  [id number]  Admit Date: 8/30/2018  ICU Day 3  LOS 3     PCP: Abigail Herndon MD     Brief Overview: 78 y.o. female     Chief Complaint: Remains on ventilator    Subjective / History of present illness:  Remains intubated but went from 100% to 40% FiO2. Preliminary sputum with heavy growth pseudomonas. Fever last night up to 101.4, 100.7 today. Pan cultures ordered. She continues to require levophed. Urine output remains good. A few bowel sounds and hopefully start nutrition today. Cumulative hospital history:   Mrs. Ling Tabares is a pleasant 78year old  american lady from home. She presented for elective surgical repair of her infrarenal abdominal aortic aneurysm and a left renal artery stenosis on the 30th of August 2018. She appears to have undergone an uneventful procedure and to have been recovering well in the intensive care unit following surgery. Her pain had been well controlled with iv dilaudid. She has been pleasantly speaking with the RN team earlier on the evening of the 31st. Upon review of notes she has been producing urine, although she has also been third spacing. She required reintubation the morning of 9/1/2018. Initial oxygen requirement was 100% and has been weaned to 40%. This was likely a combination of atelectasis and pulmonary edema. 9/2/2018 She developed fever and atrial fibrillation with RVR. Amiodarone and digoxin prescribed with good rate control. Merrem initiated for pseudomonas in the sputum.       REVIEW OF SYSTEMS:  Unable to obtain sedated on ventilator    OBJECTIVE:     Ventilator Settings:   Vent Information  Ventilator Started: Yes  Ventilation Day(s): 1  Vent Type: Servo i  Vent Mode: PRVC  Vt Ordered: 500 mL  Rate Set: 16 bmp  FiO2 : 40 %  Sensitivity: 5  PEEP/CPAP: 5  I Time/ I Time %: 0.9 s  Humidification Source: HME  Additional Respiratory  Assessments  Pulse: Once Rell Frederick MD        sodium chloride flush 0.9 % injection 10 mL  10 mL Intravenous 2 times per day Rell Frederick MD   10 mL at 09/02/18 0754    sodium chloride flush 0.9 % injection 10 mL  10 mL Intravenous PRN Rell Frederick MD   10 mL at 09/01/18 1834    potassium chloride 10 mEq/100 mL IVPB (Peripheral Line)  10 mEq Intravenous PRN Rell Frederick MD        ondansetron [facility]) injection 4 mg  4 mg Intravenous Q8H PRN Rell Frederick MD        metoprolol tartrate (LOPRESSOR) tablet 25 mg  25 mg Oral Q12H PRN Rell Frederick MD   25 mg at 08/31/18 1932    cloNIDine (CATAPRES) tablet 0.1 mg  0.1 mg Oral Q2H PRN Rell Frederick MD   0.1 mg at 08/31/18 1830    HYDROmorphone (DILAUDID) 0.5-0.9 MG/ML-% injection 0.5 mg  0.5 mg Intravenous Q1H PRN Rell Frederick MD   0.5 mg at 08/31/18 1548    Or    HYDROmorphone (DILAUDID) 0.5-0.9 MG/ML-% injection 0.25 mg  0.25 mg Intravenous Q1H PRN Rell Frederick MD        LORazepam (ATIVAN) injection 1 mg  1 mg Intravenous Q8H PRN Joel Patel MD   1 mg at 08/31/18 1719       Physical Exam:  BP (!) 103/59   Pulse 145   Temp 100.7 °F (38.2 °C) (Axillary)   Resp 17   Ht 5' 3\" (1.6 m)   Wt 152 lb (68.9 kg)   SpO2 94%   Breastfeeding? No   BMI 26.93 kg/m²   24hr I & O:      Intake/Output Summary (Last 24 hours) at 09/02/18 1240  Last data filed at 09/02/18 1200   Gross per 24 hour   Intake          1767.06 ml   Output             1970 ml   Net          -202.94 ml     General appearance: sedated on ventilator, appears stated age, cooperative and no distress  Head: Normocephalic, without obvious abnormality, atraumatic  Eyes: conjunctivae/corneas clear. PERRL, EOM's intact.    Ears: normal external ears  Neck: no adenopathy, no carotid bruit, no JVD, supple, symmetrical, trachea midline and thyroid not enlarged, symmetric, no tenderness/mass/nodules  Lungs: clear to auscultation bilaterally,no rales or wheezes   Heart: regular rate and rhythm, S1, S2 normal, no murmur,  No thrill palpable   Abdomen:soft, non-tender; non-distended Some slow bowel sounds no masses, no organomegaly  Extremities:Pedal edema none  Homans sign is negative, no sign of DVT  Good doppler PT/DP pulses  Skin: Skin color, texture, turgor normal. No rashes or lesions  Lymphatic: No palpable lymph node enlargment  Neurologic: When sedation turned down able to move her extremities  Psychiatric:  Cannot assess    Labs:   Recent Labs      08/31/18   0530   09/01/18   0610   09/01/18   1740  09/01/18   2345  09/02/18   0554   WBC  17.1*   --   13.3*   --    --    --   14.0*   RBC  3.95*   --   2.88*   --    --    --   2.73*   HGB  11.7*   < >  8.5*   < >  8.7*  8.3*  8.2*   HCT  36.0*   < >  26.4*   < >  26.3*  24.8*  24.9*   PLT  150   --   111*   --    --    --   133    < > = values in this interval not displayed. Recent Labs      09/01/18   0610   09/01/18   2000  09/02/18   0425 09/02/18   0554   NA  139   --   141   --   140   K  3.7   < >  3.7  4.0  3.8   ANIONGAP  13   --   16   --   15   CL  106   --   105   --   106   CO2  20*   --   20*   --   19*   BUN  27*   --   28*   --   27*   CREATININE  1.7*   --   1.8*   --   1.7*   GLUCOSE  162*   --   113*   --   108   CALCIUM  7.3*   --   7.6*   --   7.7*    < > = values in this interval not displayed. Recent Labs      08/31/18   0530 09/01/18   0610  09/01/18   2000  09/02/18   0554   MG  1.6  1.6  1.8  2.1   PHOS  4.2  2.3*   --   1.7*     Recent Labs      09/01/18 2000 09/02/18   0554   AST  982*  487*   ALT  775*  562*   BILITOT  0.5  1.0   ALKPHOS  61  68     HgBA1c:  No components found for: HGBA1C  Troponin T:   Recent Labs      09/01/18   0800   TROPONINI  <0.01     Pro-BNP: No results for input(s): BNP in the last 72 hours. INR: No results for input(s): INR in the last 72 hours.   ABGs:   Lab Results   Component Value Date    PHART 7.400 09/02/2018    PO2ART 64.0 09/02/2018    UTB8EHJ 33.0 09/02/2018 UA:  Recent Labs      09/02/18   0900   COLORU  YELLOW   PHUR  5.5   WBCUA  3-5*   RBCUA  2-4   MUCUS  Rare*   BACTERIA  trace   CLARITYU  CLOUDY*   SPECGRAV  1.014   LEUKOCYTESUR  TRACE*   UROBILINOGEN  0.2   BILIRUBINUR  Negative   BLOODU  LARGE*   GLUCOSEU  Negative   AMORPHOUS  Rare*       RAD:   CHEST PORTABLE  Impression:        .Impression:  1. Life support lines described above. 2.. Bilateral pulmonary opacities with small left pleural effusion and  left basilar consolidation. Findings favorable for pulmonary edema  and/or multifocal pneumonia.   Signed by Dr David Montoya on 9/2/2018 8:11 AM     EKG/Telemetry: new atrial fibrillation with RVR    Echo:   Normal left ventricular size with preserved LV function and an estimated   ejection fraction of approximately 55-60%.   No evidence of left ventricular mass or thrombus noted.   The right ventricle was not clearly visualized .   Normal size left atrium.   Mitral valve leaflets are mildly thickened with preserved leaflet   mobility.   Trace mitral regurgitation.   Individual aortic valve leaflets are not clearly visualized.   Trace tricuspid regurgitation .  Pietro Skeens is a small circumferential pericardial effusion noted    Micro:   CULTURE, RESPIRATORY  (Abnormal) 09/01/2018  8:55 AM [facility]      Isolation in progress of Light growth 2nd Gram negative josh and   Moderate growth suspicious Alpha Strep species     Gram Stain Result 09/01/2018  8:55 AM [facility]   Few Epithelial Cells   Many WBC's (Polymorphonuclear)   Mixed bacterial morphotypes suggestive of   Normal respiratory ruthy     Organism  (Abnormal) 09/01/2018  8:55 AM [facility]   Pseudomonas aeruginosa       Blood and urine cultures pending    IMPRESSION / PLAN:  Principal Problem:    AAA--POD#3 Adhesiolysis with open repair of AAA with Aortobiiliac reconstruction (18x9 PTFE) and left renal artery bypass with 7 mm PTFE  Active Problems:    Renal

## 2018-09-01 NOTE — PROGRESS NOTES
1300 - - - 79 23 91 % -   08/31/18 1200 (!) 105/54 98.7 °F (37.1 °C) Temporal 86 23 91 % -   08/31/18 1100 - - - 86 20 92 % -       In: 1532.2 [I.V.:1532.2]  Out: 1675 [Urine:1475]      Date 09/01/18 0000 - 09/01/18 2359   Shift 3751-56231 4688-5871 4794-5076 24 Hour Total   I  N  T  A  K  E   P.O.  (mL/kg/hr)  0  0    I.V.  (mL/kg) 345  (5) 117.2  (1.7)  462.2  (6.6)    Shift Total  (mL/kg) 345  (5) 117.2  (1.7)  462.2  (6.6)   O  U  T  P  U  T   Urine  (mL/kg/hr) 1040  (1.9) 175  1215    Emesis/NG output  (mL/kg) 100  (1.4)   100  (1.4)    Shift Total  (mL/kg) 1140  (16.4) 175  (2.5)  1315  (18.9)   Weight (kg) 69.6 69.6 69.6 69.6         MEDS:     Scheduled Meds:   chlorhexidine  15 mL Mouth/Throat BID    acetylcysteine  600 mg Inhalation Q8H    guaiFENesin  400 mg Per NG tube 4x Daily    ipratropium-albuterol  1 ampule Inhalation Q4H    sodium chloride  250 mL Intravenous Once    sodium chloride flush  10 mL Intravenous 2 times per day    famotidine (PEPCID) injection  20 mg Intravenous BID     Continuous Infusions:   midazolam 5 mg/hr (09/01/18 0658)    fentaNYL (SUBLIMAZE) 1250 mcg in sodium chloride 0.9 % 250 mL 100 mcg/hr (09/01/18 0931)    norepinephrine 18 mcg/min (09/01/18 0931)     PRN Meds:  sodium chloride flush 10 mL PRN   potassium chloride 10 mEq PRN   ondansetron 4 mg Q8H PRN   metoprolol tartrate 25 mg Q12H PRN   cloNIDine 0.1 mg Q2H PRN   HYDROmorphone 0.5 mg Q1H PRN   Or     HYDROmorphone 0.25 mg Q1H PRN   LORazepam 1 mg Q8H PRN         PHYSICAL EXAM:     CONSTITUTIONAL:  Sedated, intubated  LUNGS:  Diminished breath sounds left lower chest  CARDIOVASCULAR:  Heart sounds are normal.  Regular rate and rhythm without murmur  ABDOMEN: soft, nontender, nondistended, no masses or organomegaly  WOUND/INCISION:  Abdominal wound C/D/I  EXTREMITIES:   Feet warm without signs of distal embolization      LABS:        Recent Labs      08/30/18   1201   08/31/18   0530   08/31/18   1745  09/01/18 pulmonology for left lower lung collapse and ventilator management  Appreciate hospitalist help  Follow H/H  Decreased IVF rate
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
## 2018-09-01 NOTE — PROGRESS NOTES
Nutrition Assessment    Type and Reason for Visit: Reassess, Consult    Nutrition Recommendations: If unable to extubate and advance diet, suggest nutrition support. If TF, suggest Oxepa @ goal rate of 45 ml/hr with 1 bottle of Proteinex to meet nutritional needs with 1720 total kcal, 94 gm pro. If unable to TF, PPN 4.25/10 with goal rate of 60ml/hr to provide 60g pro, 144g CHO, 735 kcal would provide supplemental nutrition, will not meet needs. Malnutrition Assessment:  · Malnutrition Status: At risk for malnutrition  · Context: Acute illness or injury  · Findings of the 6 clinical characteristics of malnutrition (Minimum of 2 out of 6 clinical characteristics is required to make the diagnosis of moderate or severe Protein Calorie Malnutrition based on AND/ASPEN Guidelines):  1. Energy Intake- ,      2. Weight Loss- ,    3. Fat Loss- ,    4. Muscle Loss- ,    5. Fluid Accumulation- ,    6.  Strength-     Nutrition Diagnosis:   · Problem: Increased nutrient needs  · Etiology: related to Increased demand for energy/nutrients due to     Signs and symptoms:  as evidenced by Presence of wounds    Nutrition Assessment:  · Subjective Assessment: Consult for nutrition support - vent order set. Pt re-intubated this am. NG in place to suction.    · Nutrition-Focused Physical Findings: vent, NG  · Wound Type: Surgical Wound  · Current Nutrition Therapies:  · Oral Diet Orders: NPO   · Oral Diet intake: NPO  · Oral Nutrition Supplement (ONS) Orders: None  · ONS intake: NPO  · Anthropometric Measures:  · Ht: 5' 3\" (160 cm)   · Current Body Wt: 153 lb (69.4 kg)  · Admission Body Wt: 141 lb (64 kg)  · Usual Body Wt:    · % Weight Change:  ,     · Ideal Body Wt: 115 lb (52.2 kg), % Ideal Body    · BMI Classification: BMI 25.0 - 29.9 Overweight  · Comparative Standards (Estimated Nutrition Needs):  · Estimated Daily Total Kcal: 4690-1156  · Estimated Daily Protein (g):     Estimated Intake vs Estimated Needs: Intake Less Than Needs    Nutrition Risk Level: High    Nutrition Interventions:   Continue NPO  Continued Inpatient Monitoring    Nutrition Evaluation:   · Evaluation: Progress towards goals declining   · Goals: Meet needs via nutrition support or po     · Monitoring: Diet Progression, NPO Status, Weight, Pertinent Labs, Wound Healing    See Adult Nutrition Doc Flowsheet for more detail.      Electronically signed by Rajani Jewell MS, RD, LD on 9/1/18 at 11:28 AM    Contact Number: [phone]

## 2018-09-01 NOTE — PROGRESS NOTES
8/31/2018 11:04 PM - Marti Harris Incoming Lab Results From [employer] Results     Component Value Ref Range & Units Status Collected Lab   pH, Arterial 7.380  7.350 - 7.450 Final 08/31/2018 11:01 PM [address] [place] [address] Lab   pCO2, Arterial 33.0   35.0 - 45.0 mmHg Final 08/31/2018 11:01 PM [facility] Lab   pO2, Arterial 60.0   80.0 - 100.0 mmHg Final 08/31/2018 11:01 PM [address] [place] [address] Lab   HCO3, Arterial 19.5   22.0 - 26.0 mmol/L Final 08/31/2018 11:01 PM [facility] Excess, Arterial -4.9   -2.0 - 2.0 mmol/L Final 08/31/2018 11:01 PM [address] [place] [address] Lab   Hemoglobin, Art, Extended 10.4   12.0 - 16.0 g/dL Final 08/31/2018 11:01 PM [address] [place] [address] Lab   O2 Sat, Arterial 91.9  >92 % Final 08/31/2018 11:01 PM [facility] Lab   Carboxyhgb, Arterial 1.9  0.0 - 5.0 % Final 08/31/2018 11:01 PM [facility] Lab        0.0-1.5   (Smokers 1.5-5.0)    Methemoglobin, Arterial 1.4  <1.5 % Final 08/31/2018 11:01 PM [address] [place] [address] Lab   O2 Content, Arterial 13.5  Not Established mL/dL Final 08/31/2018 11:01 PM [address] [place] [address] Lab   O2 Therapy Unknown   Final 08/31/2018 11:01 PM [address] Performed By     242Fadi Murphy Name Director Address Valid Date Range   [address] LAB Haja Clinton  [facility] [place],[address]  [address] [facility] [place] [address] 08/30/17 1233-Present       Left radial, + MAT, 96% on HFNC of 40 LPM with 100% O2

## 2018-09-01 NOTE — CONSULTS
loss of muscle stores, has increased fat stores  PSY: appropriate affect, answers questions appropriately    Labs:  Results for Eligio Negrete (MRN [id number]) as of 9/1/2018 07:37   Ref.  Range 9/1/2018 00:00 9/1/2018 04:01 9/1/2018 04:13 9/1/2018 05:30 9/1/2018 06:10 9/1/2018 06:15 9/1/2018 06:20   Sodium Latest Ref Range: 136 - 145 mmol/L     139     Potassium Latest Ref Range: 3.5 - 5.0 mmol/L     3.7     Chloride Latest Ref Range: 98 - 111 mmol/L     106     CO2 Latest Ref Range: 22 - 29 mmol/L     20 (L)     BUN Latest Ref Range: 8 - 23 mg/dL     27 (H)     Creatinine Latest Ref Range: 0.5 - 0.9 mg/dL     1.7 (H)     Anion Gap Latest Ref Range: 7 - 19 mmol/L     13     GFR Non- Latest Ref Range: >60      30 (A)     Magnesium Latest Ref Range: 1.6 - 2.4 mg/dL     1.6     Glucose Latest Ref Range: 74 - 109 mg/dL     162 (H)     Calcium Latest Ref Range: 8.8 - 10.2 mg/dL     7.3 (L)     Phosphorus Latest Ref Range: 2.5 - 4.5 mg/dL     2.3 (L)     Potassium, Whole Blood Unknown   4.4    3.9   Amylase Latest Ref Range: 28 - 100 U/L     45     WBC Latest Ref Range: 4.8 - 10.8 K/uL     13.3 (H)     RBC Latest Ref Range: 4.20 - 5.40 M/uL     2.88 (L)     Hemoglobin Quant Latest Ref Range: 12.0 - 16.0 g/dL 9.8 (L)    8.5 (L)     Hematocrit Latest Ref Range: 37.0 - 47.0 % 30.2 (L)    26.4 (L)     MCV Latest Ref Range: 81.0 - 99.0 fL     91.7     MCH Latest Ref Range: 27.0 - 31.0 pg     29.5     MCHC Latest Ref Range: 33.0 - 37.0 g/dL     32.2 (L)     MPV Latest Ref Range: 9.4 - 12.3 fL     10.1     RDW Latest Ref Range: 11.5 - 14.5 %     15.1 (H)     Platelet Count Latest Ref Range: 130 - 400 K/uL     111 (L)     RESPIRATORY CULTURE Unknown      Rpt    O2 Therapy Unknown   Unknown    Unknown   Hemoglobin, Art, Extended Latest Ref Range: 12.0 - 16.0 g/dL   10.6 (L)    9.2 (L)   pH, Arterial Latest Ref Range: 7.350 - 7.450    7.270 (LL)    7.340 (L)   pCO2, Arterial Latest Ref Range: 35.0 - 45.0 mmHg   44.0

## 2018-09-01 NOTE — PROGRESS NOTES
8/31/2018  7:30 PM - Marti Harris Incoming Lab Results From [facility]     Component Results     Component Value Ref Range & Units Status Collected Lab   pH, Arterial 7.350  7.350 - 7.450 Final 08/31/2018  7:27 PM [address] [place] [address] Lab   pCO2, Arterial 37.0  35.0 - 45.0 mmHg Final 08/31/2018  7:27 PM [address] [place] [address] Lab   pO2, Arterial 57.0   80.0 - 100.0 mmHg Final 08/31/2018  7:27 PM [facility] Lab   HCO3, Arterial 20.4   22.0 - 26.0 mmol/L Final 08/31/2018  7:27 PM [facility] Excess, Arterial -4.7   -2.0 - 2.0 mmol/L Final 08/31/2018  7:27 PM [address] [place] [address] Lab   Hemoglobin, Art, Extended 10.2   12.0 - 16.0 g/dL Final 08/31/2018  7:27 PM [address] [place] [address] Lab   O2 Sat, Arterial 90.8  >92 % Final 08/31/2018  7:27 PM [address] [place] [address] Lab   Carboxyhgb, Arterial 2.6  0.0 - 5.0 % Final 08/31/2018  7:27 PM [facility] Lab        0.0-1.5   (Smokers 1.5-5.0)    Methemoglobin, Arterial 1.2  <1.5 % Final 08/31/2018  7:27 PM [address] [place] [address] Lab   O2 Content, Arterial 13.1  Not Established mL/dL Final 08/31/2018  7:27 PM [address] [place] [address] Lab   O2 Therapy Unknown   Final 08/31/2018  7:27  [facility] Performed By     Freda Murphy Name Director Address Valid Date Range   [address] - [address] LAB          Left Radial, 93% on 100% NRB

## 2018-09-01 NOTE — PROGRESS NOTES
9/1/2018  6:20 AM - Marti Harris Incoming Lab Results From [facility]     Component Results     Component Value Ref Range & Units Status Collected Lab   pH, Arterial 7.340   7.350 - 7.450 Final 09/01/2018  6:20 AM [facility] Lab   pCO2, Arterial 37.0  35.0 - 45.0 mmHg Final 09/01/2018  6:20 AM [facility] Lab   pO2, Arterial 82.0  80.0 - 100.0 mmHg Final 09/01/2018  6:20 AM [facility] Lab   HCO3, Arterial 20.0   22.0 - 26.0 mmol/L Final 09/01/2018  6:20 AM [facility] Excess, Arterial -5.3   -2.0 - 2.0 mmol/L Final 09/01/2018  6:20 AM [facility] Lab   Hemoglobin, Art, Extended 9.2   12.0 - 16.0 g/dL Final 09/01/2018  6:20 AM [facility] Lab   O2 Sat, Arterial 96.0  >92 % Final 09/01/2018  6:20 AM [facility] Lab   Carboxyhgb, Arterial 1.7  0.0 - 5.0 % Final 09/01/2018  6:20 AM [facility] Lab        0.0-1.5   (Smokers 1.5-5.0)    Methemoglobin, Arterial 0.9  <1.5 % Final 09/01/2018  6:20 AM [facility] Lab   O2 Content, Arterial 12.5  Not Established mL/dL Final 09/01/2018  6:20 AM [facility] Lab   O2 Therapy Unknown   Final 09/01/2018  6:20 AM [facility] [address] Performed By     [address] Ryan Murphy Name Director Address Valid Date Range   [address] - [address] LAB Teagan Roche  [facility],[address]  [address] [facility] [address] 08/30/17 1233-Present   Lab and Collection     Blood Gas, Arterial on 9/1/2018   Result History     Blood Gas, Arterial on 9/1/2018     PRVC 16 500 + 5 100%  Site RR  AT +

## 2018-09-02 ENCOUNTER — APPOINTMENT (OUTPATIENT)
Dept: GENERAL RADIOLOGY | Age: 68
DRG: 268 | End: 2018-09-02
Attending: SURGERY
Payer: MEDICARE

## 2018-09-02 PROBLEM — I48.91 ATRIAL FIBRILLATION WITH RAPID VENTRICULAR RESPONSE (HCC): Status: ACTIVE | Noted: 2018-09-02

## 2018-09-02 PROBLEM — R50.9 FEVER IN ADULT: Status: ACTIVE | Noted: 2018-09-02

## 2018-09-02 PROBLEM — N18.30 CKD (CHRONIC KIDNEY DISEASE), STAGE III (HCC): Status: ACTIVE | Noted: 2018-09-02

## 2018-09-02 PROBLEM — R84.5 POSITIVE SPUTUM CULTURE FOR PSEUDOMONAS: Status: ACTIVE | Noted: 2018-09-02

## 2018-09-02 PROBLEM — J96.01 ACUTE RESPIRATORY FAILURE WITH HYPOXIA (HCC): Status: ACTIVE | Noted: 2018-09-02

## 2018-09-02 PROBLEM — R57.9 SHOCK CIRCULATORY (HCC): Status: ACTIVE | Noted: 2018-09-02

## 2018-09-02 PROBLEM — D62 ACUTE BLOOD LOSS AS CAUSE OF POSTOPERATIVE ANEMIA: Status: ACTIVE | Noted: 2018-09-02

## 2018-09-02 PROBLEM — E87.20 METABOLIC ACIDOSIS: Status: ACTIVE | Noted: 2018-09-02

## 2018-09-02 PROBLEM — K72.00 ISCHEMIC HEPATITIS: Status: ACTIVE | Noted: 2018-09-02

## 2018-09-02 LAB
ALBUMIN SERPL-MCNC: 2.4 G/DL (ref 3.5–5.2)
ALP BLD-CCNC: 68 U/L (ref 35–104)
ALT SERPL-CCNC: 562 U/L (ref 5–33)
AMORPHOUS: ABNORMAL /HPF
AMYLASE: 24 U/L (ref 28–100)
ANION GAP SERPL CALCULATED.3IONS-SCNC: 15 MMOL/L (ref 7–19)
ANISOCYTOSIS: ABNORMAL
AST SERPL-CCNC: 487 U/L (ref 5–32)
BACTERIA: ABNORMAL /HPF
BASE EXCESS ARTERIAL: -3.8 MMOL/L (ref -2–2)
BASOPHILS ABSOLUTE: 0 K/UL (ref 0–0.2)
BASOPHILS MANUAL: 0 %
BASOPHILS RELATIVE PERCENT: 0 % (ref 0–1)
BILIRUB SERPL-MCNC: 1 MG/DL (ref 0.2–1.2)
BILIRUBIN URINE: NEGATIVE
BLOOD BANK DISPENSE STATUS: NORMAL
BLOOD BANK PRODUCT CODE: NORMAL
BLOOD, URINE: ABNORMAL
BPU ID: NORMAL
BUN BLDV-MCNC: 27 MG/DL (ref 8–23)
CALCIUM SERPL-MCNC: 7.7 MG/DL (ref 8.8–10.2)
CARBOXYHEMOGLOBIN ARTERIAL: 2 % (ref 0–5)
CHLORIDE BLD-SCNC: 106 MMOL/L (ref 98–111)
CLARITY: ABNORMAL
CO2: 19 MMOL/L (ref 22–29)
COLOR: YELLOW
CREAT SERPL-MCNC: 1.7 MG/DL (ref 0.5–0.9)
DESCRIPTION BLOOD BANK: NORMAL
EOSINOPHILS ABSOLUTE: 0 K/UL (ref 0–0.6)
EOSINOPHILS RELATIVE PERCENT: 0 % (ref 0–5)
EPITHELIAL CELLS, UA: ABNORMAL /HPF
GFR NON-AFRICAN AMERICAN: 30
GLUCOSE BLD-MCNC: 108 MG/DL (ref 74–109)
GLUCOSE URINE: NEGATIVE MG/DL
HCO3 ARTERIAL: 20.4 MMOL/L (ref 22–26)
HCT VFR BLD CALC: 23.5 % (ref 37–47)
HCT VFR BLD CALC: 24.8 % (ref 37–47)
HCT VFR BLD CALC: 24.9 % (ref 37–47)
HCT VFR BLD CALC: 27.1 % (ref 37–47)
HEMOGLOBIN, ART, EXTENDED: 9.3 G/DL (ref 12–16)
HEMOGLOBIN: 7.8 G/DL (ref 12–16)
HEMOGLOBIN: 8.2 G/DL (ref 12–16)
HEMOGLOBIN: 8.3 G/DL (ref 12–16)
HEMOGLOBIN: 9 G/DL (ref 12–16)
KETONES, URINE: ABNORMAL MG/DL
LEUKOCYTE ESTERASE, URINE: ABNORMAL
LYMPHOCYTES ABSOLUTE: 1.5 K/UL (ref 1.1–4.5)
LYMPHOCYTES RELATIVE PERCENT: 11 % (ref 20–40)
MAGNESIUM: 2.1 MG/DL (ref 1.6–2.4)
MCH RBC QN AUTO: 30 PG (ref 27–31)
MCHC RBC AUTO-ENTMCNC: 32.9 G/DL (ref 33–37)
MCV RBC AUTO: 91.2 FL (ref 81–99)
METHEMOGLOBIN ARTERIAL: 1.5 %
MONOCYTES ABSOLUTE: 0.7 K/UL (ref 0–0.9)
MONOCYTES RELATIVE PERCENT: 5 % (ref 0–10)
MUCUS: ABNORMAL /LPF
NEUTROPHILS ABSOLUTE: 11.8 K/UL (ref 1.5–7.5)
NEUTROPHILS MANUAL: 84 %
NEUTROPHILS RELATIVE PERCENT: 84 % (ref 50–65)
NITRITE, URINE: NEGATIVE
O2 CONTENT ARTERIAL: 12.1 ML/DL
O2 SAT, ARTERIAL: 91.8 %
O2 THERAPY: ABNORMAL
PCO2 ARTERIAL: 33 MMHG (ref 35–45)
PDW BLD-RTO: 15.5 % (ref 11.5–14.5)
PH ARTERIAL: 7.4 (ref 7.35–7.45)
PH UA: 5.5
PHOSPHORUS: 1.7 MG/DL (ref 2.5–4.5)
PLATELET # BLD: 133 K/UL (ref 130–400)
PLATELET SLIDE REVIEW: ADEQUATE
PMV BLD AUTO: 10 FL (ref 9.4–12.3)
PO2 ARTERIAL: 64 MMHG (ref 80–100)
POTASSIUM SERPL-SCNC: 3.8 MMOL/L (ref 3.5–5)
POTASSIUM, WHOLE BLOOD: 4
PROTEIN UA: 30 MG/DL
RBC # BLD: 2.73 M/UL (ref 4.2–5.4)
RBC UA: ABNORMAL /HPF (ref 0–2)
RENAL EPITHELIAL, UA: ABNORMAL /HPF
SODIUM BLD-SCNC: 140 MMOL/L (ref 136–145)
SPECIFIC GRAVITY UA: 1.01
TOTAL PROTEIN: 4.6 G/DL (ref 6.6–8.7)
UROBILINOGEN, URINE: 0.2 E.U./DL
WBC # BLD: 14 K/UL (ref 4.8–10.8)
WBC UA: ABNORMAL /HPF (ref 0–5)

## 2018-09-02 PROCEDURE — 87070 CULTURE OTHR SPECIMN AEROBIC: CPT

## 2018-09-02 PROCEDURE — 94003 VENT MGMT INPAT SUBQ DAY: CPT

## 2018-09-02 PROCEDURE — C9113 INJ PANTOPRAZOLE SODIUM, VIA: HCPCS | Performed by: HOSPITALIST

## 2018-09-02 PROCEDURE — 85025 COMPLETE CBC W/AUTO DIFF WBC: CPT

## 2018-09-02 PROCEDURE — 2500000003 HC RX 250 WO HCPCS: Performed by: HOSPITALIST

## 2018-09-02 PROCEDURE — 85018 HEMOGLOBIN: CPT

## 2018-09-02 PROCEDURE — 2580000003 HC RX 258: Performed by: INTERNAL MEDICINE

## 2018-09-02 PROCEDURE — 2580000003 HC RX 258: Performed by: HOSPITALIST

## 2018-09-02 PROCEDURE — 36430 TRANSFUSION BLD/BLD COMPNT: CPT

## 2018-09-02 PROCEDURE — 87077 CULTURE AEROBIC IDENTIFY: CPT

## 2018-09-02 PROCEDURE — 83735 ASSAY OF MAGNESIUM: CPT

## 2018-09-02 PROCEDURE — 80053 COMPREHEN METABOLIC PANEL: CPT

## 2018-09-02 PROCEDURE — 82803 BLOOD GASES ANY COMBINATION: CPT

## 2018-09-02 PROCEDURE — 2500000003 HC RX 250 WO HCPCS: Performed by: NURSE PRACTITIONER

## 2018-09-02 PROCEDURE — 36592 COLLECT BLOOD FROM PICC: CPT

## 2018-09-02 PROCEDURE — 87086 URINE CULTURE/COLONY COUNT: CPT

## 2018-09-02 PROCEDURE — 87186 SC STD MICRODIL/AGAR DIL: CPT

## 2018-09-02 PROCEDURE — 93005 ELECTROCARDIOGRAM TRACING: CPT

## 2018-09-02 PROCEDURE — 99291 CRITICAL CARE FIRST HOUR: CPT | Performed by: HOSPITALIST

## 2018-09-02 PROCEDURE — 6360000002 HC RX W HCPCS: Performed by: INTERNAL MEDICINE

## 2018-09-02 PROCEDURE — 6360000002 HC RX W HCPCS: Performed by: HOSPITALIST

## 2018-09-02 PROCEDURE — 82150 ASSAY OF AMYLASE: CPT

## 2018-09-02 PROCEDURE — 81001 URINALYSIS AUTO W/SCOPE: CPT

## 2018-09-02 PROCEDURE — 2580000003 HC RX 258: Performed by: SURGERY

## 2018-09-02 PROCEDURE — 84132 ASSAY OF SERUM POTASSIUM: CPT

## 2018-09-02 PROCEDURE — 87205 SMEAR GRAM STAIN: CPT

## 2018-09-02 PROCEDURE — 71045 X-RAY EXAM CHEST 1 VIEW: CPT

## 2018-09-02 PROCEDURE — 6370000000 HC RX 637 (ALT 250 FOR IP): Performed by: HOSPITALIST

## 2018-09-02 PROCEDURE — 6370000000 HC RX 637 (ALT 250 FOR IP): Performed by: SURGERY

## 2018-09-02 PROCEDURE — 87040 BLOOD CULTURE FOR BACTERIA: CPT

## 2018-09-02 PROCEDURE — 84100 ASSAY OF PHOSPHORUS: CPT

## 2018-09-02 PROCEDURE — 2700000000 HC OXYGEN THERAPY PER DAY

## 2018-09-02 PROCEDURE — 6360000002 HC RX W HCPCS: Performed by: NURSE PRACTITIONER

## 2018-09-02 PROCEDURE — P9016 RBC LEUKOCYTES REDUCED: HCPCS

## 2018-09-02 PROCEDURE — 94640 AIRWAY INHALATION TREATMENT: CPT

## 2018-09-02 PROCEDURE — 2000000000 HC ICU R&B

## 2018-09-02 PROCEDURE — 85014 HEMATOCRIT: CPT

## 2018-09-02 PROCEDURE — 36600 WITHDRAWAL OF ARTERIAL BLOOD: CPT

## 2018-09-02 RX ORDER — DIGOXIN 0.25 MG/ML
250 INJECTION INTRAMUSCULAR; INTRAVENOUS ONCE
Status: COMPLETED | OUTPATIENT
Start: 2018-09-02 | End: 2018-09-03

## 2018-09-02 RX ORDER — FUROSEMIDE 10 MG/ML
20 INJECTION INTRAMUSCULAR; INTRAVENOUS ONCE
Status: COMPLETED | OUTPATIENT
Start: 2018-09-02 | End: 2018-09-02

## 2018-09-02 RX ORDER — ACETAMINOPHEN 325 MG/1
650 TABLET ORAL ONCE
Status: COMPLETED | OUTPATIENT
Start: 2018-09-02 | End: 2018-09-02

## 2018-09-02 RX ORDER — DIGOXIN 0.25 MG/ML
500 INJECTION INTRAMUSCULAR; INTRAVENOUS ONCE
Status: COMPLETED | OUTPATIENT
Start: 2018-09-02 | End: 2018-09-02

## 2018-09-02 RX ORDER — 0.9 % SODIUM CHLORIDE 0.9 %
250 INTRAVENOUS SOLUTION INTRAVENOUS ONCE
Status: COMPLETED | OUTPATIENT
Start: 2018-09-02 | End: 2018-09-03

## 2018-09-02 RX ADMIN — DEXTROSE MONOHYDRATE 150 MG: 50 INJECTION, SOLUTION INTRAVENOUS at 11:06

## 2018-09-02 RX ADMIN — IPRATROPIUM BROMIDE 0.5 MG: 0.5 SOLUTION RESPIRATORY (INHALATION) at 14:06

## 2018-09-02 RX ADMIN — IPRATROPIUM BROMIDE AND ALBUTEROL SULFATE 1 AMPULE: .5; 3 SOLUTION RESPIRATORY (INHALATION) at 02:25

## 2018-09-02 RX ADMIN — DIGOXIN 500 MCG: 250 INJECTION, SOLUTION INTRAMUSCULAR; INTRAVENOUS; PARENTERAL at 11:55

## 2018-09-02 RX ADMIN — IPRATROPIUM BROMIDE AND ALBUTEROL SULFATE 1 AMPULE: .5; 3 SOLUTION RESPIRATORY (INHALATION) at 06:38

## 2018-09-02 RX ADMIN — AMIODARONE HYDROCHLORIDE 1 MG/MIN: 50 INJECTION, SOLUTION INTRAVENOUS at 11:17

## 2018-09-02 RX ADMIN — IPRATROPIUM BROMIDE AND ALBUTEROL SULFATE 1 AMPULE: .5; 3 SOLUTION RESPIRATORY (INHALATION) at 10:06

## 2018-09-02 RX ADMIN — Medication 10 ML: at 21:32

## 2018-09-02 RX ADMIN — FUROSEMIDE 20 MG: 10 INJECTION, SOLUTION INTRAMUSCULAR; INTRAVENOUS at 18:55

## 2018-09-02 RX ADMIN — GUAIFENESIN 400 MG: 100 SOLUTION ORAL at 07:54

## 2018-09-02 RX ADMIN — IPRATROPIUM BROMIDE 0.5 MG: 0.5 SOLUTION RESPIRATORY (INHALATION) at 18:32

## 2018-09-02 RX ADMIN — PANTOPRAZOLE SODIUM 40 MG: 40 INJECTION, POWDER, FOR SOLUTION INTRAVENOUS at 07:54

## 2018-09-02 RX ADMIN — GUAIFENESIN 400 MG: 100 SOLUTION ORAL at 12:00

## 2018-09-02 RX ADMIN — NOREPINEPHRINE BITARTRATE 20 MCG/MIN: 1 INJECTION INTRAVENOUS at 05:56

## 2018-09-02 RX ADMIN — Medication 10 ML: at 07:54

## 2018-09-02 RX ADMIN — AMIODARONE HYDROCHLORIDE 0.5 MG/MIN: 50 INJECTION, SOLUTION INTRAVENOUS at 21:26

## 2018-09-02 RX ADMIN — MEROPENEM 1 G: 1 INJECTION, POWDER, FOR SOLUTION INTRAVENOUS at 10:44

## 2018-09-02 RX ADMIN — FENTANYL CITRATE 75 MCG/HR: 50 INJECTION, SOLUTION INTRAMUSCULAR; INTRAVENOUS at 12:42

## 2018-09-02 RX ADMIN — ACETYLCYSTEINE 600 MG: 200 SOLUTION ORAL; RESPIRATORY (INHALATION) at 02:26

## 2018-09-02 RX ADMIN — GUAIFENESIN 400 MG: 100 SOLUTION ORAL at 18:06

## 2018-09-02 RX ADMIN — ACETYLCYSTEINE 600 MG: 200 SOLUTION ORAL; RESPIRATORY (INHALATION) at 10:06

## 2018-09-02 RX ADMIN — FUROSEMIDE 20 MG: 10 INJECTION, SOLUTION INTRAMUSCULAR; INTRAVENOUS at 07:54

## 2018-09-02 RX ADMIN — POTASSIUM PHOSPHATE, MONOBASIC AND POTASSIUM PHOSPHATE, DIBASIC 20 MMOL: 224; 236 INJECTION, SOLUTION INTRAVENOUS at 08:13

## 2018-09-02 RX ADMIN — ACETAMINOPHEN 650 MG: 325 TABLET ORAL at 12:28

## 2018-09-02 RX ADMIN — CHLORHEXIDINE GLUCONATE 15 ML: 1.2 LIQUID BUCCAL at 21:09

## 2018-09-02 RX ADMIN — CHLORHEXIDINE GLUCONATE 15 ML: 1.2 LIQUID BUCCAL at 07:57

## 2018-09-02 RX ADMIN — SODIUM CHLORIDE 250 ML: 9 INJECTION, SOLUTION INTRAVENOUS at 16:21

## 2018-09-02 RX ADMIN — GUAIFENESIN 400 MG: 100 SOLUTION ORAL at 21:02

## 2018-09-02 RX ADMIN — ACETYLCYSTEINE 600 MG: 200 SOLUTION ORAL; RESPIRATORY (INHALATION) at 18:32

## 2018-09-02 RX ADMIN — MEROPENEM 1 G: 1 INJECTION, POWDER, FOR SOLUTION INTRAVENOUS at 23:15

## 2018-09-02 RX ADMIN — MIDAZOLAM HYDROCHLORIDE 5 MG/HR: 5 INJECTION, SOLUTION INTRAMUSCULAR; INTRAVENOUS at 14:58

## 2018-09-02 ASSESSMENT — PAIN SCALES - GENERAL
PAINLEVEL_OUTOF10: 0

## 2018-09-02 ASSESSMENT — PULMONARY FUNCTION TESTS
PIF_VALUE: 20.1
PIF_VALUE: 23.7
PIF_VALUE: 26.1
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
## 2018-09-02 NOTE — PROGRESS NOTES
24hr order review complete.     Electronically signed by Tristin Danielson RN on 9/2/2018 at 2:22 AM

## 2018-09-02 NOTE — PROGRESS NOTES
Pulmonary and Critical Care Progress Note [address] [facility]    Patient: Deysi Lizarraga    1950    MR# [id number]    Acct# [de-identified]   09/02/18   10:13 AM  Referring Provider: Vick Ross MD    Chief Complaint: Mechanically ventilated    Interval history: The patient remains intubated on mechanical ventilatory support. She is oxygenating adequately on 40% FiO2 and 5 of PEEP. Her 2-D echo did show good LV function. Unfortunately her chest x-ray now shows progressive pulmonary edema. The left heart border is well seen but she clearly has bilateral pleural effusions and I think her major issues at present are related to volume overload and not primarily atelectasis. She also has developed atrial fibrillation with a pulse of 160-180 and I would recommend cardiology consultation for this. She does have Pseudomonas growing her sputum which may be a colonizer but she is on Merrem. potassium phosphate IVPB 20 mmol Intravenous Once   meropenem 1 g Intravenous Q12H   chlorhexidine 15 mL Mouth/Throat BID   acetylcysteine 600 mg Inhalation Q8H   guaiFENesin 400 mg Per NG tube 4x Daily   pantoprazole 40 mg Intravenous Daily   ipratropium-albuterol 1 ampule Inhalation Q4H   sodium chloride 250 mL Intravenous Once   sodium chloride flush 10 mL Intravenous 2 times per day      midazolam 5 mg/hr (09/01/18 2055)    fentaNYL (SUBLIMAZE) 1250 mcg in sodium chloride 0.9 % 250 mL 75 mcg/hr (09/01/18 2055)    norepinephrine 18 mcg/min (09/02/18 0744)     Review of Systems:   Cannot obtain due to mechanical ventilation    Physical Exam:  Blood pressure (!) 139/47, pulse 104, temperature 98.4 °F (36.9 °C), temperature source Temporal, resp. rate 16, height 5' 3\" (1.6 m), weight 152 lb (68.9 kg), SpO2 98 %, not currently breastfeeding.     Intake/Output Summary (Last 24 hours) at 09/02/18 1013  Last data filed at 09/02/18 0800   Gross per 24 hour   Intake          1433.59 ml   Output             1015 ml   Net 418.59 ml     Ventilation Day(s): 1  Vent Mode: PRVC/Vt Ordered: 500 mL/Rate Set: 16 bmp/ /PEEP/CPAP: 5/FiO2 : 40 %/   Peak Inspiratory Pressure: 23.7 cmH2O  Mean Airway Pressure: 10 cmH20  I:E Ratio: 1:3.07  Rate Measured: 16 br/min  Minute Volume: 15.6 Liters  Plateau Pressure: 25 cmH20        Physical Exam Constitutional: She appears well-developed and well-nourished. No distress. She is sedated and intubated. HENT:   Head: Normocephalic and atraumatic. Right Ear: External ear normal.   Left Ear: External ear normal.   ETT and NG in place   Eyes: Conjunctivae are normal. Right eye exhibits no discharge. Left eye exhibits no discharge. Neck: Normal range of motion. Neck supple. No JVD present. Cardiovascular: Tachycardia with an irregularly irregular rhythm. No murmur heard. Pulmonary/Chest: She is intubated. She has bilateral rales rales. Abdominal: Soft. She exhibits distension. Midline dressing intact   Musculoskeletal: She exhibits edema. She exhibits no deformity. Skin: Skin is warm and dry. She is not diaphoretic. No erythema. No pallor  Recent Labs      08/31/18   0530   09/01/18   0610   09/01/18   1740  09/01/18   2345  09/02/18   0554   WBC  17.1*   --   13.3*   --    --    --   14.0*   RBC  3.95*   --   2.88*   --    --    --   2.73*   HGB  11.7*   < >  8.5*   < >  8.7*  8.3*  8.2*   HCT  36.0*   < >  26.4*   < >  26.3*  24.8*  24.9*   PLT  150   --   111*   --    --    --   133   MCV  91.1   --   91.7   --    --    --   91.2   MCH  29.6   --   29.5   --    --    --   30.0   MCHC  32.5*   --   32.2*   --    --    --   32.9*   RDW  15.4*   --   15.1*   --    --    --   15.5*    < > = values in this interval not displayed.       Recent Labs      09/01/18   0610   09/01/18 2000 09/02/18   0425  09/02/18   0554   NA  139   --   141   --   140   K  3.7   < >  3.7  4.0  3.8   CL  106   --   105   --   106   CO2  20*   --   20*   --   19*   BUN  27*   --   28*   --   27*   CREATININE  1.7* for pulmonary edema   and/or multifocal pneumonia. Signed by Dr Isadora Galindo on 9/2/2018 8:11 AM       My radiograph interpretation: Pulmonary edema with bilateral pleural effusions. She probably still has some component of left basilar atelectasis. Pulmonary Assessment:  1. Postoperative respiratory failure related predominantly to volume overload. 2. Atrial fibrillation with rapid ventricular response which is a new finding this morning. Recommend:   · I again would recommend cardiology consultation regarding her atrial fibrillation. We will discontinue the albuterol in her neb treatments in view of her elevated heart rate. Otherwise we will continue ventilatory support and she is not a candidate for weaning trial at this time. I did discuss this with a family member at the bedside this morning.     Electronically signed by Kale Ramos on 9/2/2018 at 10:13 AM

## 2018-09-02 NOTE — PROGRESS NOTES
Vascular Surgery  Dr.Scott Momin Holding   Daily Progress Note    Pt Name: Filemon Jung Record Number: [id number]  Date of Birth 1950   Today's Date: 9/2/2018    SUBJECTIVE:     Patient was seen and examined in ICU.   Pain is  Controlled- on vent  has not had nausea/vomiting, NG to low intermittent suction    OBJECTIVE:     Patient Vitals for the past 24 hrs:   BP Temp Temp src Pulse Resp SpO2 Weight   09/02/18 1004 - - - 104 16 98 % -   09/02/18 1000 (!) 105/41 - - 104 16 96 % -   09/02/18 0900 (!) 139/47 - - 105 19 94 % -   09/02/18 0800 (!) 114/41 - - 113 22 93 % -   09/02/18 0700 (!) 125/52 98.4 °F (36.9 °C) Temporal 108 24 93 % -   09/02/18 0636 - - - 105 18 98 % -   09/02/18 0600 (!) 114/48 - - 102 25 94 % -   09/02/18 0530 (!) 119/45 - - 104 21 96 % -   09/02/18 0500 (!) 122/41 - - 104 23 96 % -   09/02/18 0400 (!) 122/47 99.9 °F (37.7 °C) Temporal 103 22 94 % 152 lb (68.9 kg)   09/02/18 0330 (!) 117/38 - - 107 23 95 % -   09/02/18 0300 (!) 114/38 - - 106 23 95 % -   09/02/18 0230 (!) 112/39 - - 98 16 99 % -   09/02/18 0201 - 99.6 °F (37.6 °C) Axillary - - - -   09/02/18 0200 (!) 122/43 99.6 °F (37.6 °C) Temporal 93 21 94 % -   09/02/18 0130 (!) 118/45 - - 95 24 94 % -   09/02/18 0100 (!) 119/50 - - 98 23 94 % -   09/02/18 0030 (!) 117/44 - - 95 22 94 % -   09/02/18 0001 - 99.3 °F (37.4 °C) Temporal - - - -   09/02/18 0000 120/71 99.6 °F (37.6 °C) Axillary 101 27 93 % -   09/01/18 2330 (!) 107/43 - - 107 21 94 % -   09/01/18 2300 (!) 120/47 - - 106 22 94 % -   09/01/18 2230 (!) 129/40 - - 104 24 95 % -   09/01/18 2200 (!) 119/45 100.6 °F (38.1 °C) Temporal 107 26 94 % -   09/01/18 2146 (!) 111/46 - - 107 28 94 % -   09/01/18 2100 (!) 128/52 - - 107 23 94 % -   09/01/18 2030 (!) 128/50 - - 111 26 94 % -   09/01/18 2000 (!) 122/47 101.4 °F (38.6 °C) Axillary 113 27 93 % -   09/01/18 1930 (!) 118/45 - - 113 25 96 % -   09/01/18 1900 (!) 127/43 - - 112 26 96 % -   09/01/18 1800 (!) 123/45 - - 109 26 96 % - 09/01/18 1700 (!) 122/43 - - 103 25 96 % -   09/01/18 1600 (!) 119/52 98.8 °F (37.1 °C) Temporal 104 25 96 % -   09/01/18 1500 (!) 112/46 - - 108 27 97 % -   09/01/18 1413 - - - 97 24 92 % -   09/01/18 1400 (!) 112/42 - - 96 23 95 % -   09/01/18 1300 (!) 115/40 - - 97 22 97 % -   09/01/18 1200 (!) 101/37 98.6 °F (37 °C) Temporal 95 23 98 % -   09/01/18 1100 (!) 110/43 - - 97 24 100 % -         Intake/Output Summary (Last 24 hours) at 09/02/18 1059  Last data filed at 09/02/18 1000   Gross per 24 hour   Intake          1497.39 ml   Output             1640 ml   Net          -142.61 ml       In: 1373.6 [I.V.:1323.6; NG/GT:50]  Out: 1565 [Urine:1465]    I/O last 3 completed shifts: In: 1473.7 [I.V.:1423.7; NG/GT:50]  Out: 1110 [Urine:1010; Emesis/NG output:100]       Date 09/02/18 0000 - 09/02/18 2359   Shift 0962-3636 3080-4852 6155-2684 24 Hour Total   I  N  T  A  K  E   P.O.  (mL/kg/hr)  0  0    I.V.  (mL/kg) 770.4  (11.2) 140.9  (2)  911.3  (13.2)    Shift Total  (mL/kg) 770.4  (11.2) 140.9  (2)  911.3  (13.2)   O  U  T  P  U  T   Urine  (mL/kg/hr) 360  (0.7) 705  1065    Emesis/NG output  (mL/kg) 50  (0.7) 0  (0)  50  (0.7)    Shift Total  (mL/kg) 410  (5.9) 705  (10.2)  1115  (16.2)   Weight (kg) 68.9 68.9 68.9 68.9       Wt Readings from Last 3 Encounters:   09/02/18 152 lb (68.9 kg)   08/27/18 141 lb (64 kg)   08/22/18 140 lb (63.5 kg)        Body mass index is 26.93 kg/m².      Diet: Diet NPO Effective Now        MEDS:     Scheduled Meds:   potassium phosphate IVPB  20 mmol Intravenous Once    meropenem  1 g Intravenous Q12H    ipratropium  0.5 mg Nebulization 4x daily    amiodarone bolus  150 mg Intravenous Once    chlorhexidine  15 mL Mouth/Throat BID    acetylcysteine  600 mg Inhalation Q8H    guaiFENesin  400 mg Per NG tube 4x Daily    pantoprazole  40 mg Intravenous Daily    sodium chloride  250 mL Intravenous Once    sodium chloride flush  10 mL Intravenous 2 times per day     Continuous 19*   BUN  27*   --   28*   --   27*   CREATININE  1.7*   --   1.8*   --   1.7*   GLUCOSE  162*   --   113*   --   108   CALCIUM  7.3*   --   7.6*   --   7.7*   PROT   --    --   4.6*   --   4.6*   LABALBU   --    --   2.4*   --   2.4*   BILITOT   --    --   0.5   --   1.0   ALKPHOS   --    --   61   --   68   AST   --    --   982*   --   487*   ALT   --    --   775*   --   562*    < > = values in this interval not displayed. Troponin:   Recent Labs      09/01/18   0800   TROPONINI  <0.01     Calcium:   Lab Results   Component Value Date    CALCIUM 7.7 09/02/2018    CALCIUM 7.6 09/01/2018    CALCIUM 7.3 09/01/2018      DVT prophylaxis:                                  [x] SCDs                                 ASSESSMENT:     POD #2Procedure:   1. Adhesiolysis  2. Open repair of AAA with Aortobiiliac reconstruction (18x9 PTFE)  3.  Left renal artery bypass with 7 mm PTFE #2   1. HD # 4  Patient Active Problem List   Diagnosis    Irritable bowel syndrome    Osteoarthritis    Anxiety    Depression    Labyrinthitis    AAA (abdominal aortic aneurysm) (HCC)    Carotid artery stenosis    Hyperlipemia    TIA (transient ischemic attack)    Fibromuscular dysplasia (HCC)    Diarrhea    History of colon polyps    Chronic heartburn    Antiplatelet or antithrombotic long-term use    Colon polyps    Renal artery stenosis (HCC)    Celiac artery stenosis (HCC)    Essential hypertension    Bilateral carotid artery stenosis    Ischemic hepatitis    Acute respiratory failure with hypoxia (HCC)    CKD (chronic kidney disease), stage III    Metabolic acidosis    Acute blood loss as cause of postoperative anemia     CC- AAA, left renal stenosis    PLAN:   1. Support and work towards extubation  2. A-fib-control rate  3. Support.

## 2018-09-02 NOTE — PROGRESS NOTES
9/2/2018  4:26 AM - Marti Harris Incoming Lab Results From [facility]     Component Results     Component Value Ref Range & Units Status Collected Lab   pH, Arterial 7.400  7.350 - 7.450 Final 09/02/2018  4:25 AM [facility] Lab   pCO2, Arterial 33.0   35.0 - 45.0 mmHg Final 09/02/2018  4:25 AM [facility] Lab   pO2, Arterial 64.0   80.0 - 100.0 mmHg Final 09/02/2018  4:25 AM [facility] Lab   HCO3, Arterial 20.4   22.0 - 26.0 mmol/L Final 09/02/2018  4:25 AM [facility] Excess, Arterial -3.8   -2.0 - 2.0 mmol/L Final 09/02/2018  4:25 AM [facility]   Hemoglobin, Art, Extended 9.3   12.0 - 16.0 g/dL Final 09/02/2018  4:25 AM [facility] Lab   O2 Sat, Arterial 91.8  >92 % Final 09/02/2018  4:25 AM [facility]   Carboxyhgb, Arterial 2.0  0.0 - 5.0 % Final 09/02/2018  4:25 AM [facility]        0.0-1.5   (Smokers 1.5-5.0)    Methemoglobin, Arterial 1.5  <1.5 % Final 09/02/2018  4:25 AM [facility]   O2 Content, Arterial 12.1  Not Established mL/dL Final 09/02/2018  4:25 AM [facility]   O2 Therapy Unknown   Final 09/02/2018  4:25 AM [facility] [address] Performed By     Freda Murphy Name Director Address Valid Date Range   5[address] - [address] LAB [place] [address] [facility] [address] 08/30/17 1233-Present     Left brachial, 95%   PRVC 16, 500, +5, 45%

## 2018-09-03 ENCOUNTER — APPOINTMENT (OUTPATIENT)
Dept: GENERAL RADIOLOGY | Age: 68
DRG: 268 | End: 2018-09-03
Attending: SURGERY
Payer: MEDICARE

## 2018-09-03 LAB
ALBUMIN SERPL-MCNC: 2.2 G/DL (ref 3.5–5.2)
ALP BLD-CCNC: 79 U/L (ref 35–104)
ALT SERPL-CCNC: 296 U/L (ref 5–33)
AMYLASE: 21 U/L (ref 28–100)
ANION GAP SERPL CALCULATED.3IONS-SCNC: 12 MMOL/L (ref 7–19)
AST SERPL-CCNC: 121 U/L (ref 5–32)
BASE EXCESS ARTERIAL: -1.2 MMOL/L (ref -2–2)
BILIRUB SERPL-MCNC: 3.6 MG/DL (ref 0.2–1.2)
BILIRUBIN DIRECT: 3.3 MG/DL (ref 0–0.3)
BILIRUBIN, INDIRECT: 0.3 MG/DL (ref 0.1–1)
BLOOD BANK DISPENSE STATUS: NORMAL
BLOOD BANK PRODUCT CODE: NORMAL
BPU ID: NORMAL
BUN BLDV-MCNC: 27 MG/DL (ref 8–23)
CALCIUM SERPL-MCNC: 7.8 MG/DL (ref 8.8–10.2)
CARBOXYHEMOGLOBIN ARTERIAL: 2.6 % (ref 0–5)
CHLORIDE BLD-SCNC: 102 MMOL/L (ref 98–111)
CO2: 23 MMOL/L (ref 22–29)
CREAT SERPL-MCNC: 1.6 MG/DL (ref 0.5–0.9)
DESCRIPTION BLOOD BANK: NORMAL
GFR NON-AFRICAN AMERICAN: 32
GLUCOSE BLD-MCNC: 102 MG/DL (ref 74–109)
HCO3 ARTERIAL: 23.6 MMOL/L (ref 22–26)
HCT VFR BLD CALC: 23.9 % (ref 37–47)
HCT VFR BLD CALC: 24.4 % (ref 37–47)
HCT VFR BLD CALC: 27.6 % (ref 37–47)
HCT VFR BLD CALC: 27.7 % (ref 37–47)
HEMOGLOBIN, ART, EXTENDED: 9.9 G/DL (ref 12–16)
HEMOGLOBIN: 7.9 G/DL (ref 12–16)
HEMOGLOBIN: 8.1 G/DL (ref 12–16)
HEMOGLOBIN: 9.2 G/DL (ref 12–16)
HEMOGLOBIN: 9.3 G/DL (ref 12–16)
MAGNESIUM: 1.9 MG/DL (ref 1.6–2.4)
MCH RBC QN AUTO: 29.7 PG (ref 27–31)
MCHC RBC AUTO-ENTMCNC: 33.3 G/DL (ref 33–37)
MCV RBC AUTO: 89 FL (ref 81–99)
METHEMOGLOBIN ARTERIAL: 1.4 %
O2 CONTENT ARTERIAL: 13 ML/DL
O2 SAT, ARTERIAL: 92.7 %
O2 THERAPY: ABNORMAL
PCO2 ARTERIAL: 39 MMHG (ref 35–45)
PDW BLD-RTO: 15.9 % (ref 11.5–14.5)
PH ARTERIAL: 7.39 (ref 7.35–7.45)
PHOSPHORUS: 2 MG/DL (ref 2.5–4.5)
PLATELET # BLD: 149 K/UL (ref 130–400)
PMV BLD AUTO: 10 FL (ref 9.4–12.3)
PO2 ARTERIAL: 67 MMHG (ref 80–100)
POTASSIUM SERPL-SCNC: 4.2 MMOL/L (ref 3.5–5)
POTASSIUM, WHOLE BLOOD: 4.4
RBC # BLD: 3.1 M/UL (ref 4.2–5.4)
SODIUM BLD-SCNC: 137 MMOL/L (ref 136–145)
TOTAL PROTEIN: 4.9 G/DL (ref 6.6–8.7)
TRIGL SERPL-MCNC: 168 MG/DL (ref 0–149)
WBC # BLD: 16.1 K/UL (ref 4.8–10.8)

## 2018-09-03 PROCEDURE — 99291 CRITICAL CARE FIRST HOUR: CPT | Performed by: FAMILY MEDICINE

## 2018-09-03 PROCEDURE — 80076 HEPATIC FUNCTION PANEL: CPT

## 2018-09-03 PROCEDURE — 94640 AIRWAY INHALATION TREATMENT: CPT

## 2018-09-03 PROCEDURE — 85014 HEMATOCRIT: CPT

## 2018-09-03 PROCEDURE — 2500000003 HC RX 250 WO HCPCS: Performed by: NURSE PRACTITIONER

## 2018-09-03 PROCEDURE — 6360000002 HC RX W HCPCS: Performed by: NURSE PRACTITIONER

## 2018-09-03 PROCEDURE — 6360000002 HC RX W HCPCS: Performed by: INTERNAL MEDICINE

## 2018-09-03 PROCEDURE — 71045 X-RAY EXAM CHEST 1 VIEW: CPT

## 2018-09-03 PROCEDURE — 82150 ASSAY OF AMYLASE: CPT

## 2018-09-03 PROCEDURE — 36600 WITHDRAWAL OF ARTERIAL BLOOD: CPT

## 2018-09-03 PROCEDURE — 2580000003 HC RX 258: Performed by: HOSPITALIST

## 2018-09-03 PROCEDURE — 80048 BASIC METABOLIC PNL TOTAL CA: CPT

## 2018-09-03 PROCEDURE — 6360000002 HC RX W HCPCS: Performed by: HOSPITALIST

## 2018-09-03 PROCEDURE — 99233 SBSQ HOSP IP/OBS HIGH 50: CPT | Performed by: INTERNAL MEDICINE

## 2018-09-03 PROCEDURE — 2580000003 HC RX 258: Performed by: INTERNAL MEDICINE

## 2018-09-03 PROCEDURE — 83735 ASSAY OF MAGNESIUM: CPT

## 2018-09-03 PROCEDURE — 2700000000 HC OXYGEN THERAPY PER DAY

## 2018-09-03 PROCEDURE — 2580000003 HC RX 258: Performed by: SURGERY

## 2018-09-03 PROCEDURE — 85018 HEMOGLOBIN: CPT

## 2018-09-03 PROCEDURE — C9113 INJ PANTOPRAZOLE SODIUM, VIA: HCPCS | Performed by: HOSPITALIST

## 2018-09-03 PROCEDURE — 84478 ASSAY OF TRIGLYCERIDES: CPT

## 2018-09-03 PROCEDURE — 84132 ASSAY OF SERUM POTASSIUM: CPT

## 2018-09-03 PROCEDURE — 2000000000 HC ICU R&B

## 2018-09-03 PROCEDURE — 94003 VENT MGMT INPAT SUBQ DAY: CPT

## 2018-09-03 PROCEDURE — 85027 COMPLETE CBC AUTOMATED: CPT

## 2018-09-03 PROCEDURE — 99024 POSTOP FOLLOW-UP VISIT: CPT | Performed by: SURGERY

## 2018-09-03 PROCEDURE — 84100 ASSAY OF PHOSPHORUS: CPT

## 2018-09-03 PROCEDURE — 2500000003 HC RX 250 WO HCPCS: Performed by: FAMILY MEDICINE

## 2018-09-03 PROCEDURE — 82803 BLOOD GASES ANY COMBINATION: CPT

## 2018-09-03 PROCEDURE — 2500000003 HC RX 250 WO HCPCS: Performed by: HOSPITALIST

## 2018-09-03 PROCEDURE — P9045 ALBUMIN (HUMAN), 5%, 250 ML: HCPCS | Performed by: INTERNAL MEDICINE

## 2018-09-03 RX ORDER — AMIODARONE HYDROCHLORIDE 200 MG/1
400 TABLET ORAL 2 TIMES DAILY
Status: DISCONTINUED | OUTPATIENT
Start: 2018-09-03 | End: 2018-09-03

## 2018-09-03 RX ORDER — ACETYLCYSTEINE 200 MG/ML
600 SOLUTION ORAL; RESPIRATORY (INHALATION) 2 TIMES DAILY
Status: DISCONTINUED | OUTPATIENT
Start: 2018-09-03 | End: 2018-09-09

## 2018-09-03 RX ORDER — ALBUMIN, HUMAN INJ 5% 5 %
25 SOLUTION INTRAVENOUS ONCE
Status: COMPLETED | OUTPATIENT
Start: 2018-09-03 | End: 2018-09-03

## 2018-09-03 RX ADMIN — Medication 10 ML: at 21:34

## 2018-09-03 RX ADMIN — DIGOXIN 250 MCG: 250 INJECTION, SOLUTION INTRAMUSCULAR; INTRAVENOUS; PARENTERAL at 01:25

## 2018-09-03 RX ADMIN — GUAIFENESIN 400 MG: 100 SOLUTION ORAL at 21:34

## 2018-09-03 RX ADMIN — IPRATROPIUM BROMIDE 0.5 MG: 0.5 SOLUTION RESPIRATORY (INHALATION) at 18:27

## 2018-09-03 RX ADMIN — CHLORHEXIDINE GLUCONATE 15 ML: 1.2 LIQUID BUCCAL at 08:00

## 2018-09-03 RX ADMIN — IPRATROPIUM BROMIDE 0.5 MG: 0.5 SOLUTION RESPIRATORY (INHALATION) at 06:12

## 2018-09-03 RX ADMIN — ALBUMIN (HUMAN) 25 G: 12.5 INJECTION, SOLUTION INTRAVENOUS at 09:49

## 2018-09-03 RX ADMIN — ACETYLCYSTEINE 600 MG: 200 SOLUTION ORAL; RESPIRATORY (INHALATION) at 06:12

## 2018-09-03 RX ADMIN — IPRATROPIUM BROMIDE 0.5 MG: 0.5 SOLUTION RESPIRATORY (INHALATION) at 14:04

## 2018-09-03 RX ADMIN — MEROPENEM 1 G: 1 INJECTION, POWDER, FOR SOLUTION INTRAVENOUS at 22:27

## 2018-09-03 RX ADMIN — ASCORBIC ACID, VITAMIN A PALMITATE, CHOLECALCIFEROL, THIAMINE HYDROCHLORIDE, RIBOFLAVIN-5 PHOSPHATE SODIUM, PYRIDOXINE HYDROCHLORIDE, NIACINAMIDE, DEXPANTHENOL, ALPHA-TOCOPHEROL ACETATE, VITAMIN K1, FOLIC ACID, BIOTIN, CYANOCOBALAMIN: 200; 3300; 200; 6; 3.6; 6; 40; 15; 10; 150; 600; 60; 5 INJECTION, SOLUTION INTRAVENOUS at 20:04

## 2018-09-03 RX ADMIN — GUAIFENESIN 400 MG: 100 SOLUTION ORAL at 13:00

## 2018-09-03 RX ADMIN — PANTOPRAZOLE SODIUM 40 MG: 40 INJECTION, POWDER, FOR SOLUTION INTRAVENOUS at 08:59

## 2018-09-03 RX ADMIN — ACETYLCYSTEINE 600 MG: 200 SOLUTION ORAL; RESPIRATORY (INHALATION) at 18:28

## 2018-09-03 RX ADMIN — FENTANYL CITRATE 75 MCG/HR: 50 INJECTION, SOLUTION INTRAMUSCULAR; INTRAVENOUS at 07:09

## 2018-09-03 RX ADMIN — GUAIFENESIN 400 MG: 100 SOLUTION ORAL at 08:59

## 2018-09-03 RX ADMIN — NOREPINEPHRINE BITARTRATE 8 MCG/MIN: 1 INJECTION INTRAVENOUS at 04:31

## 2018-09-03 RX ADMIN — CHLORHEXIDINE GLUCONATE 15 ML: 1.2 LIQUID BUCCAL at 21:34

## 2018-09-03 RX ADMIN — AMIODARONE HYDROCHLORIDE 0.5 MG/MIN: 50 INJECTION, SOLUTION INTRAVENOUS at 19:53

## 2018-09-03 RX ADMIN — MEROPENEM 1 G: 1 INJECTION, POWDER, FOR SOLUTION INTRAVENOUS at 10:00

## 2018-09-03 RX ADMIN — AMIODARONE HYDROCHLORIDE 0.5 MG/MIN: 50 INJECTION, SOLUTION INTRAVENOUS at 02:00

## 2018-09-03 RX ADMIN — GUAIFENESIN 400 MG: 100 SOLUTION ORAL at 17:15

## 2018-09-03 RX ADMIN — AMIODARONE HYDROCHLORIDE 1 MG/MIN: 50 INJECTION, SOLUTION INTRAVENOUS at 02:00

## 2018-09-03 RX ADMIN — I.V. FAT EMULSION 250 ML: 20 EMULSION INTRAVENOUS at 20:04

## 2018-09-03 RX ADMIN — Medication 10 ML: at 08:59

## 2018-09-03 RX ADMIN — IPRATROPIUM BROMIDE 0.5 MG: 0.5 SOLUTION RESPIRATORY (INHALATION) at 10:10

## 2018-09-03 ASSESSMENT — PULMONARY FUNCTION TESTS
PIF_VALUE: 21.2
PIF_VALUE: 24.7
PIF_VALUE: 19.6

## 2018-09-03 ASSESSMENT — PAIN SCALES - GENERAL
PAINLEVEL_OUTOF10: 0

## 2018-09-03 NOTE — PROGRESS NOTES
[address] [facility]  Daily Progress Note      Chief Complaint: afrvr    Interval Hx: now in nsr    ROS:  The rest of the systems are negative except Interval Hx    Current Inpatient Medications:   amiodarone  400 mg Oral BID    metoprolol tartrate  25 mg Oral BID    meropenem  1 g Intravenous Q12H    ipratropium  0.5 mg Nebulization 4x daily    chlorhexidine  15 mL Mouth/Throat BID    acetylcysteine  600 mg Inhalation Q8H    guaiFENesin  400 mg Per NG tube 4x Daily    pantoprazole  40 mg Intravenous Daily    sodium chloride  250 mL Intravenous Once    sodium chloride flush  10 mL Intravenous 2 times per day       IV Infusions (if any):   amiodarone 450mg/250ml D5W infusion 0.5 mg/min (09/03/18 0200)    midazolam 4 mg/hr (09/02/18 2000)    fentaNYL (SUBLIMAZE) 1250 mcg in sodium chloride 0.9 % 250 mL 50 mcg/hr (09/03/18 0722)    norepinephrine 7 mcg/min (09/03/18 0721)       Physical Exam:   BP (!) 126/49   Pulse 63   Temp 99.3 °F (37.4 °C)   Resp 16   Ht 5' 3\" (1.6 m)   Wt 158 lb 11.2 oz (72 kg)   SpO2 94%   Breastfeeding? No   BMI 28.11 kg/m²       Intake/Output Summary (Last 24 hours) at 09/03/18 0825  Last data filed at 09/03/18 0600   Gross per 24 hour   Intake          2138.04 ml   Output             3135 ml   Net          -996.96 ml       Gen - NAD  Neck - Supple  ENMT - MMM, OP Clear  Cardio - No JVD     Clear s1 s2, no gallop, rub, murmur                No edema, 2+ radials  Resp - Normal effort, CTA Bilat  GI - soft, non-tender, no HSM  Psych - A+O x 3, normal affect     Diagnostics:      Recent Labs      09/01/18   0610   09/02/18   0554   09/02/18   1818  09/02/18   2355  09/03/18   0545   WBC  13.3*   --   14.0*   --    --    --   16.1*   HGB  8.5*   < >  8.2*   < >  9.0*  9.3*  9.2*   PLT  111*   --   133   --    --    --   149    < > = values in this interval not displayed.       Recent Labs      09/01/18   0610   09/01/18 2000 09/02/18   0554  09/03/18   0422

## 2018-09-03 NOTE — PROGRESS NOTES
well-developed and well-nourished. No distress. She is sedated and intubated. HENT:   Head: Normocephalic and atraumatic. Right Ear: External ear normal.   Left Ear: External ear normal.   ETT and NG in place   Eyes: Conjunctivae are normal. Right eye exhibits no discharge. Left eye exhibits no discharge. Neck: Normal range of motion. Neck supple. No JVD present. Cardiovascular: Her pulse is in the 70s and is regular. No murmur heard. Pulmonary/Chest: She is intubated. She has bilateral rales rales. Abdominal: Soft. She exhibits distension. Midline dressing intact   Musculoskeletal: She exhibits edema. She exhibits no deformity. Skin: Skin is warm and dry. She is not diaphoretic. No erythema. No pallor  Recent Labs      09/01/18   0610   09/02/18   0554   09/02/18   1818  09/02/18   2355  09/03/18   0545   WBC  13.3*   --   14.0*   --    --    --   16.1*   RBC  2.88*   --   2.73*   --    --    --   3.10*   HGB  8.5*   < >  8.2*   < >  9.0*  9.3*  9.2*   HCT  26.4*   < >  24.9*   < >  27.1*  27.7*  27.6*   PLT  111*   --   133   --    --    --   149   MCV  91.7   --   91.2   --    --    --   89.0   MCH  29.5   --   30.0   --    --    --   29.7   MCHC  32.2*   --   32.9*   --    --    --   33.3   RDW  15.1*   --   15.5*   --    --    --   15.9*    < > = values in this interval not displayed. Recent Labs      09/01/18 2000 09/02/18   0554  09/03/18   0422  09/03/18   0545   NA  141   --   140   --   137   K  3.7   < >  3.8  4.4  4.2   CL  105   --   106   --   102   CO2  20*   --   19*   --   23   BUN  28*   --   27*   --   27*   CREATININE  1.8*   --   1.7*   --   1.6*   CALCIUM  7.6*   --   7.7*   --   7.8*   GLUCOSE  113*   --   108   --   102    < > = values in this interval not displayed.       Recent Labs      09/01/18   0620  09/02/18   0425  09/03/18   0422   PHART  7.340*  7.400  7.390   LDW2CGW  37.0  33.0*  39.0   PO2ART  82.0  64.0*  67.0*   [id number]  20.0*  20.4*  23.6   [id number] still persists. Pulmonary Assessment:  1. Postoperative respiratory failure related predominantly to volume overload. 2. Atrial fibrillation with rapid ventricular response, resolved. Recommend:   · I will change the schedule of her Mucomyst treatments to correlate with her Atrovent treatments.  If her pressors can be weaned then we can consider a spontaneous breathing trial.    Electronically signed by Masha Ray on 9/3/2018 at 10:47 AM

## 2018-09-03 NOTE — PROGRESS NOTES
ICU Hospitalist Progress Note                    Patient Name: Ya Swanson  [id number]  Admit Date: 8/30/2018  ICU Day 5  LOS 4     PCP: Rupa Prasad MD     Brief Overview: 78 y.o. female     Chief Complaint: Intubated    Subjective / History of present illness:  78year old female status post surgical repair infrarenal abdominal aortic aneurysm repair and left renal artery stenosis on 08/30/18. Patient remains intubated since 9/1. Currently FIO2 40% with good oxygen saturation. CVP 4-5. Chest is clear to auscultation. Requiring pressors. Sedated with Midazolam. Sputum culture pseudomonas. REVIEW OF SYSTEMS:  Unable to obtain, patient on ventilator. OBJECTIVE:     Ventilator Settings:   Vent Information  Ventilator Started: Yes  Ventilation Day(s): 1  Vent Type: Servo i  Vent Mode: PRVC  Vt Ordered: 500 mL  Rate Set: 16 bmp  FiO2 : 40 %  Sensitivity: 5  PEEP/CPAP: 5  I Time/ I Time %: 0.9 s  Cuff Pressure (cm H2O): 26 cm H2O  Humidification Source: HME  Additional Respiratory  Assessments  Pulse: 63  Resp: 16  SpO2: 94 %  Humidification Source: E  Oral Care: Mouthwash, Mouth swabbed, Mouth suctioned  Cuff Pressure (cm H2O): 26 cm H2O    IV Access: IJ central line right 8/30/18   Diet:.  NPO  Antibiotics:  Merrem 9/2/18  Prophylaxis:  DVT - SCD, no anticoagulants due to recent vascular surgery  GI - Protonix    Current Medications:  Current Facility-Administered Medications   Medication Dose Route Frequency Provider Last Rate Last Dose    amiodarone (CORDARONE) tablet 400 mg  400 mg Oral BID Darek May MD        metoprolol tartrate (LOPRESSOR) tablet 25 mg  25 mg Oral BID Darek May MD        meropenem [facility]) 1 g in sodium chloride 0.9 % 100 mL IVPB (mini-bag)  1 g Intravenous Q12H Mandi Hunter MD   Stopped at 09/02/18 2480    ipratropium (ATROVENT) 0.02 % nebulizer solution 0.5 mg  0.5 mg Nebulization 4x daily Noé Salinas MD   0.5 mg at 09/03/18 9338    MG/ML-% injection 0.5 mg  0.5 mg Intravenous Q1H PRN Liberty Mayes MD   0.5 mg at 08/31/18 1548    Or    HYDROmorphone (DILAUDID) 0.5-0.9 MG/ML-% injection 0.25 mg  0.25 mg Intravenous Q1H PRN Liberty Mayes MD        LORazepam (ATIVAN) injection 1 mg  1 mg Intravenous Q8H PRN Jaclyn Molina MD   1 mg at 08/31/18 1719       Physical Exam:  BP (!) 126/49   Pulse 63   Temp 99.3 °F (37.4 °C)   Resp 16   Ht 5' 3\" (1.6 m)   Wt 158 lb 11.2 oz (72 kg)   SpO2 94%   Breastfeeding? No   BMI 28.11 kg/m²   24hr I & O:    Intake/Output Summary (Last 24 hours) at 09/03/18 0827  Last data filed at 09/03/18 0600   Gross per 24 hour   Intake          2138.04 ml   Output             3135 ml   Net          -996.96 ml     Physical Exam   Constitutional: She appears well-nourished. No distress. HENT:   Head: Normocephalic and atraumatic. Nose: Nose normal.   Eyes: Pupils are equal, round, and reactive to light. Right eye exhibits no discharge. Left eye exhibits no discharge. Neck: Neck supple. No tracheal deviation present. Cardiovascular: Normal rate and normal heart sounds. Pulmonary/Chest: No stridor. No respiratory distress. She has no wheezes. She has no rales. Abdominal: Soft. She exhibits no distension and no mass. There is no guarding. Musculoskeletal: She exhibits no edema or deformity. Neurological:   Sedated and intubated. Non focal.   Skin: Skin is warm. She is not diaphoretic. No erythema. Psychiatric:   Unable to obtain. Labs:   Recent Labs      09/01/18   0610   09/02/18   0554   09/02/18   1818  09/02/18   2355  09/03/18   0545   WBC  13.3*   --   14.0*   --    --    --   16.1*   RBC  2.88*   --   2.73*   --    --    --   3.10*   HGB  8.5*   < >  8.2*   < >  9.0*  9.3*  9.2*   HCT  26.4*   < >  24.9*   < >  27.1*  27.7*  27.6*   PLT  111*   --   133   --    --    --   149    < > = values in this interval not displayed.      Recent Labs      09/01/18 2000 09/02/18   0554 09/03/18   0422  09/03/18   0545   NA  141   --   140   --   137   K  3.7   < >  3.8  4.4  4.2   ANIONGAP  16   --   15   --   12   CL  105   --   106   --   102   CO2  20*   --   19*   --   23   BUN  28*   --   27*   --   27*   CREATININE  1.8*   --   1.7*   --   1.6*   GLUCOSE  113*   --   108   --   102   CALCIUM  7.6*   --   7.7*   --   7.8*    < > = values in this interval not displayed. Recent Labs      09/01/18   0610  09/01/18 2000 09/02/18   0554  09/03/18   0545   MG  1.6  1.8  2.1  1.9   PHOS  2.3*   --   1.7*  2.0*     Recent Labs      09/01/18 2000 09/02/18 0554  09/03/18   0545   AST  982*  487*  121*   ALT  775*  562*  296*   BILITOT  0.5  1.0  3.6*   ALKPHOS  61  68  79     HgBA1c:  No components found for: HGBA1C  Troponin T:   Recent Labs      09/01/18   0800   TROPONINI  <0.01     Pro-BNP: No results for input(s): BNP in the last 72 hours. INR: No results for input(s): INR in the last 72 hours. ABGs: Lab Results   Component Value Date    PHART 7.390 09/03/2018    PO2ART 67.0 09/03/2018    UUN9SDT 39.0 09/03/2018     UA:  Recent Labs      09/02/18   0900   COLORU  YELLOW   PHUR  5.5   WBCUA  3-5*   RBCUA  2-4   MUCUS  Rare*   BACTERIA  trace   CLARITYU  CLOUDY*   SPECGRAV  1.014   LEUKOCYTESUR  TRACE*   UROBILINOGEN  0.2   BILIRUBINUR  Negative   BLOODU  LARGE*   GLUCOSEU  Negative   AMORPHOUS  Rare*       RAD:  CHEST PORTABLE 9/2/18   1. Life support lines described above. 2.. Bilateral pulmonary opacities with small left pleural effusion and  left basilar consolidation. Findings favorable for pulmonary edema  and/or multifocal pneumonia.     EKG/Telemetry: Afib    Echo:   Normal left ventricular size with preserved LV function and an estimated   ejection fraction of approximately 55-60%.   No evidence of left ventricular mass or thrombus noted.   The right ventricle was not clearly visualized .   Normal size left atrium.   Mitral valve leaflets are mildly thickened with preserved

## 2018-09-04 ENCOUNTER — APPOINTMENT (OUTPATIENT)
Dept: GENERAL RADIOLOGY | Age: 68
DRG: 268 | End: 2018-09-04
Attending: SURGERY
Payer: MEDICARE

## 2018-09-04 LAB
AMYLASE: 24 U/L (ref 28–100)
ANION GAP SERPL CALCULATED.3IONS-SCNC: 8 MMOL/L (ref 7–19)
BASE EXCESS ARTERIAL: 0.7 MMOL/L (ref -2–2)
BUN BLDV-MCNC: 28 MG/DL (ref 8–23)
CALCIUM SERPL-MCNC: 7.9 MG/DL (ref 8.8–10.2)
CARBOXYHEMOGLOBIN ARTERIAL: 2.4 % (ref 0–5)
CHLORIDE BLD-SCNC: 105 MMOL/L (ref 98–111)
CO2: 25 MMOL/L (ref 22–29)
CREAT SERPL-MCNC: 1.4 MG/DL (ref 0.5–0.9)
CULTURE, RESPIRATORY: ABNORMAL
GFR NON-AFRICAN AMERICAN: 38
GLUCOSE BLD-MCNC: 122 MG/DL (ref 74–109)
GRAM STAIN RESULT: ABNORMAL
HCO3 ARTERIAL: 25.4 MMOL/L (ref 22–26)
HCT VFR BLD CALC: 24.1 % (ref 37–47)
HCT VFR BLD CALC: 24.7 % (ref 37–47)
HCT VFR BLD CALC: 24.7 % (ref 37–47)
HCT VFR BLD CALC: 25 % (ref 37–47)
HEMOGLOBIN, ART, EXTENDED: 8.8 G/DL (ref 12–16)
HEMOGLOBIN: 8 G/DL (ref 12–16)
HEMOGLOBIN: 8 G/DL (ref 12–16)
HEMOGLOBIN: 8.1 G/DL (ref 12–16)
HEMOGLOBIN: 8.3 G/DL (ref 12–16)
MAGNESIUM: 2 MG/DL (ref 1.6–2.4)
MCH RBC QN AUTO: 30 PG (ref 27–31)
MCHC RBC AUTO-ENTMCNC: 33.2 G/DL (ref 33–37)
MCV RBC AUTO: 90.3 FL (ref 81–99)
METHEMOGLOBIN ARTERIAL: 1.4 %
O2 CONTENT ARTERIAL: 11.7 ML/DL
O2 SAT, ARTERIAL: 93.8 %
O2 THERAPY: ABNORMAL
ORGANISM: ABNORMAL
ORGANISM: ABNORMAL
PCO2 ARTERIAL: 40 MMHG (ref 35–45)
PDW BLD-RTO: 15.9 % (ref 11.5–14.5)
PH ARTERIAL: 7.41 (ref 7.35–7.45)
PHOSPHORUS: 1.6 MG/DL (ref 2.5–4.5)
PLATELET # BLD: 146 K/UL (ref 130–400)
PMV BLD AUTO: 10.4 FL (ref 9.4–12.3)
PO2 ARTERIAL: 68 MMHG (ref 80–100)
POTASSIUM SERPL-SCNC: 4 MMOL/L (ref 3.5–5)
POTASSIUM, WHOLE BLOOD: 4.2
RBC # BLD: 2.67 M/UL (ref 4.2–5.4)
SODIUM BLD-SCNC: 138 MMOL/L (ref 136–145)
URINE CULTURE, ROUTINE: NORMAL
WBC # BLD: 13.4 K/UL (ref 4.8–10.8)

## 2018-09-04 PROCEDURE — 2700000000 HC OXYGEN THERAPY PER DAY

## 2018-09-04 PROCEDURE — 2580000003 HC RX 258: Performed by: HOSPITALIST

## 2018-09-04 PROCEDURE — 84100 ASSAY OF PHOSPHORUS: CPT

## 2018-09-04 PROCEDURE — 85014 HEMATOCRIT: CPT

## 2018-09-04 PROCEDURE — 94003 VENT MGMT INPAT SUBQ DAY: CPT

## 2018-09-04 PROCEDURE — 6360000002 HC RX W HCPCS: Performed by: INTERNAL MEDICINE

## 2018-09-04 PROCEDURE — 36592 COLLECT BLOOD FROM PICC: CPT

## 2018-09-04 PROCEDURE — 6360000002 HC RX W HCPCS: Performed by: HOSPITALIST

## 2018-09-04 PROCEDURE — 82803 BLOOD GASES ANY COMBINATION: CPT

## 2018-09-04 PROCEDURE — 2000000000 HC ICU R&B

## 2018-09-04 PROCEDURE — 2580000003 HC RX 258: Performed by: FAMILY MEDICINE

## 2018-09-04 PROCEDURE — 85018 HEMOGLOBIN: CPT

## 2018-09-04 PROCEDURE — 82150 ASSAY OF AMYLASE: CPT

## 2018-09-04 PROCEDURE — 99232 SBSQ HOSP IP/OBS MODERATE 35: CPT | Performed by: INTERNAL MEDICINE

## 2018-09-04 PROCEDURE — 94640 AIRWAY INHALATION TREATMENT: CPT

## 2018-09-04 PROCEDURE — 85027 COMPLETE CBC AUTOMATED: CPT

## 2018-09-04 PROCEDURE — 80048 BASIC METABOLIC PNL TOTAL CA: CPT

## 2018-09-04 PROCEDURE — 83735 ASSAY OF MAGNESIUM: CPT

## 2018-09-04 PROCEDURE — 2580000003 HC RX 258: Performed by: INTERNAL MEDICINE

## 2018-09-04 PROCEDURE — 2500000003 HC RX 250 WO HCPCS: Performed by: FAMILY MEDICINE

## 2018-09-04 PROCEDURE — 84132 ASSAY OF SERUM POTASSIUM: CPT

## 2018-09-04 PROCEDURE — 2500000003 HC RX 250 WO HCPCS: Performed by: NURSE PRACTITIONER

## 2018-09-04 PROCEDURE — 36600 WITHDRAWAL OF ARTERIAL BLOOD: CPT

## 2018-09-04 PROCEDURE — 71045 X-RAY EXAM CHEST 1 VIEW: CPT

## 2018-09-04 PROCEDURE — 99291 CRITICAL CARE FIRST HOUR: CPT | Performed by: FAMILY MEDICINE

## 2018-09-04 PROCEDURE — 6360000002 HC RX W HCPCS: Performed by: SURGERY

## 2018-09-04 PROCEDURE — C9113 INJ PANTOPRAZOLE SODIUM, VIA: HCPCS | Performed by: HOSPITALIST

## 2018-09-04 PROCEDURE — 2580000003 HC RX 258: Performed by: SURGERY

## 2018-09-04 RX ORDER — 0.9 % SODIUM CHLORIDE 0.9 %
250 INTRAVENOUS SOLUTION INTRAVENOUS ONCE
Status: COMPLETED | OUTPATIENT
Start: 2018-09-04 | End: 2018-09-04

## 2018-09-04 RX ADMIN — ACETYLCYSTEINE 600 MG: 200 SOLUTION ORAL; RESPIRATORY (INHALATION) at 06:12

## 2018-09-04 RX ADMIN — SODIUM CHLORIDE 250 ML: 9 INJECTION, SOLUTION INTRAVENOUS at 10:00

## 2018-09-04 RX ADMIN — Medication 10 ML: at 09:50

## 2018-09-04 RX ADMIN — IPRATROPIUM BROMIDE 0.5 MG: 0.5 SOLUTION RESPIRATORY (INHALATION) at 10:15

## 2018-09-04 RX ADMIN — Medication 10 ML: at 21:07

## 2018-09-04 RX ADMIN — MEROPENEM 1 G: 1 INJECTION, POWDER, FOR SOLUTION INTRAVENOUS at 10:00

## 2018-09-04 RX ADMIN — GUAIFENESIN 400 MG: 100 SOLUTION ORAL at 21:07

## 2018-09-04 RX ADMIN — ASCORBIC ACID, VITAMIN A PALMITATE, CHOLECALCIFEROL, THIAMINE HYDROCHLORIDE, RIBOFLAVIN-5 PHOSPHATE SODIUM, PYRIDOXINE HYDROCHLORIDE, NIACINAMIDE, DEXPANTHENOL, ALPHA-TOCOPHEROL ACETATE, VITAMIN K1, FOLIC ACID, BIOTIN, CYANOCOBALAMIN: 200; 3300; 200; 6; 3.6; 6; 40; 15; 10; 150; 600; 60; 5 INJECTION, SOLUTION INTRAVENOUS at 21:07

## 2018-09-04 RX ADMIN — GUAIFENESIN 400 MG: 100 SOLUTION ORAL at 17:50

## 2018-09-04 RX ADMIN — ACETYLCYSTEINE 600 MG: 200 SOLUTION ORAL; RESPIRATORY (INHALATION) at 19:01

## 2018-09-04 RX ADMIN — CHLORHEXIDINE GLUCONATE 15 ML: 1.2 LIQUID BUCCAL at 21:07

## 2018-09-04 RX ADMIN — Medication 0.25 MG: at 23:22

## 2018-09-04 RX ADMIN — IPRATROPIUM BROMIDE 0.5 MG: 0.5 SOLUTION RESPIRATORY (INHALATION) at 19:01

## 2018-09-04 RX ADMIN — FENTANYL CITRATE 50 MCG/HR: 50 INJECTION, SOLUTION INTRAMUSCULAR; INTRAVENOUS at 03:13

## 2018-09-04 RX ADMIN — IPRATROPIUM BROMIDE 0.5 MG: 0.5 SOLUTION RESPIRATORY (INHALATION) at 13:58

## 2018-09-04 RX ADMIN — GUAIFENESIN 400 MG: 100 SOLUTION ORAL at 13:34

## 2018-09-04 RX ADMIN — GUAIFENESIN 400 MG: 100 SOLUTION ORAL at 09:49

## 2018-09-04 RX ADMIN — CHLORHEXIDINE GLUCONATE 15 ML: 1.2 LIQUID BUCCAL at 09:49

## 2018-09-04 RX ADMIN — PANTOPRAZOLE SODIUM 40 MG: 40 INJECTION, POWDER, FOR SOLUTION INTRAVENOUS at 09:49

## 2018-09-04 RX ADMIN — AMIODARONE HYDROCHLORIDE 0.5 MG/MIN: 50 INJECTION, SOLUTION INTRAVENOUS at 11:03

## 2018-09-04 RX ADMIN — IPRATROPIUM BROMIDE 0.5 MG: 0.5 SOLUTION RESPIRATORY (INHALATION) at 06:12

## 2018-09-04 RX ADMIN — MEROPENEM 1 G: 1 INJECTION, POWDER, FOR SOLUTION INTRAVENOUS at 22:27

## 2018-09-04 RX ADMIN — Medication 0.25 MG: at 22:32

## 2018-09-04 RX ADMIN — Medication 0.25 MG: at 20:48

## 2018-09-04 RX ADMIN — SODIUM CHLORIDE 250 ML: 9 INJECTION, SOLUTION INTRAVENOUS at 09:53

## 2018-09-04 ASSESSMENT — PULMONARY FUNCTION TESTS
PIF_VALUE: 20
PIF_VALUE: 22.1
PIF_VALUE: 24.7
PIF_VALUE: 19.5
PIF_VALUE: 21

## 2018-09-04 ASSESSMENT — PAIN SCALES - GENERAL
PAINLEVEL_OUTOF10: 9
PAINLEVEL_OUTOF10: 8
PAINLEVEL_OUTOF10: 6
PAINLEVEL_OUTOF10: 0

## 2018-09-04 NOTE — PROGRESS NOTES
cloNIDine (CATAPRES) tablet 0.1 mg  0.1 mg Oral Q2H PRN Monica Sanabria MD   0.1 mg at 08/31/18 1830    HYDROmorphone (DILAUDID) 0.5-0.9 MG/ML-% injection 0.5 mg  0.5 mg Intravenous Q1H PRN Monica Sanabria MD   0.5 mg at 08/31/18 1548    Or    HYDROmorphone (DILAUDID) 0.5-0.9 MG/ML-% injection 0.25 mg  0.25 mg Intravenous Q1H PRN Monica Sanabria MD           Physical Exam:  BP (!) 106/47   Pulse 68   Temp 98.9 °F (37.2 °C) (Tympanic)   Resp 16   Ht 5' 3\" (1.6 m)   Wt 150 lb 4.8 oz (68.2 kg)   SpO2 96%   Breastfeeding? No   BMI 26.62 kg/m²   24hr I & O:      Intake/Output Summary (Last 24 hours) at 09/04/18 0911  Last data filed at 09/04/18 0600   Gross per 24 hour   Intake          1524.44 ml   Output             1445 ml   Net            79.44 ml     Physical Exam   Constitutional: She appears well-nourished. No distress. She is not intubated. HENT:   Head: Normocephalic and atraumatic. Nose: Nose normal.   Eyes: Pupils are equal, round, and reactive to light. Right eye exhibits no discharge. Left eye exhibits no discharge. Neck: Neck supple. No tracheal deviation present. Cardiovascular: Normal rate and normal heart sounds. Pulmonary/Chest: No stridor. She is not intubated. No respiratory distress. She has no wheezes. She has rales. Abdominal: Soft. She exhibits no distension and no mass. There is no guarding. Musculoskeletal: She exhibits no edema or deformity. Neurological:   Sedated and intubated. Non focal.   Skin: Skin is warm. She is not diaphoretic. No erythema. Psychiatric:   Unable to obtain.      Labs:   Recent Labs      09/02/18   0554   09/03/18   0545   09/03/18   1800  09/04/18   0130  09/04/18   0600   WBC  14.0*   --   16.1*   --    --    --   13.4*   RBC  2.73*   --   3.10*   --    --    --   2.67*   HGB  8.2*   < >  9.2*   < >  8.1*  8.0*  8.0*   HCT  24.9*   < >  27.6*   < >  24.4*  25.0*  24.1*   PLT  133   --   149   --    --    --   146    < > = values in this

## 2018-09-04 NOTE — PROGRESS NOTES
NPO  Continued Inpatient Monitoring    Nutrition Evaluation:   · Evaluation: Progressing toward goals   · Goals: Meet needs via nutrition support or po    · Monitoring: NPO Status, PN Intake, PN Tolerance, Fluid Balance, Wound Healing, Pertinent Labs, Weight    See Adult Nutrition Doc Flowsheet for more detail.      Electronically signed by Siria Suero MS, RD, LD on 9/4/18 at 11:06 AM

## 2018-09-04 NOTE — PROGRESS NOTES
Pulmonary and Critical Care Progress Note [address] [facility]    Patient: Mario Craig    1950    MR# [id number]    Acct# [de-identified]   09/04/18   9:34 AM  Referring Provider: Jadon Bhatt MD    Chief Complaint: Mechanically ventilated    Interval history: Patient is intubated on mechanical ventilator. Levophed remains at low dose. Amiodarone gtt infusing, NS 68.  Versed gtt has been off since yesterday and Fentanyl was turned off this AM.  Patient not awake. O2 sat 97% on 40% FiO2. CVP 2. No other aggravating or alleviating factors or associated symptoms. acetylcysteine 600 mg Inhalation BID   meropenem 1 g Intravenous Q12H   ipratropium 0.5 mg Nebulization 4x daily   chlorhexidine 15 mL Mouth/Throat BID   guaiFENesin 400 mg Per NG tube 4x Daily   pantoprazole 40 mg Intravenous Daily   sodium chloride 250 mL Intravenous Once   sodium chloride flush 10 mL Intravenous 2 times per day      PN-Adult Premix 5/20 - Standard Electrolytes - Central Line 42 mL/hr at 09/03/18 2004    fat emulsion Stopped (09/04/18 0618)    amiodarone 450mg/250ml D5W infusion 0.5 mg/min (09/03/18 1953)    midazolam Stopped (09/03/18 1154)    fentaNYL (SUBLIMAZE) 1250 mcg in sodium chloride 0.9 % 250 mL Stopped (09/04/18 0800)    norepinephrine 1 mcg/min (09/04/18 0530)     Review of Systems:   Cannot obtain due to mechanical ventilation    Physical Exam:  Blood pressure (!) 106/47, pulse 68, temperature 98.9 °F (37.2 °C), temperature source Tympanic, resp. rate 16, height 5' 3\" (1.6 m), weight 150 lb 4.8 oz (68.2 kg), SpO2 96 %, not currently breastfeeding.     Intake/Output Summary (Last 24 hours) at 09/04/18 0934  Last data filed at 09/04/18 0600   Gross per 24 hour   Intake          1524.44 ml   Output             1445 ml   Net            79.44 ml     Ventilation Day(s): 1  Vent Mode: PRVC/Vt Ordered: 500 mL/Rate Set: 16 bmp/ /PEEP/CPAP: 5/FiO2 : 40 %/   Peak Inspiratory Pressure: 22.1 cmH2O  Mean Airway

## 2018-09-05 ENCOUNTER — APPOINTMENT (OUTPATIENT)
Dept: ULTRASOUND IMAGING | Age: 68
DRG: 268 | End: 2018-09-05
Attending: SURGERY
Payer: MEDICARE

## 2018-09-05 ENCOUNTER — APPOINTMENT (OUTPATIENT)
Dept: GENERAL RADIOLOGY | Age: 68
DRG: 268 | End: 2018-09-05
Attending: SURGERY
Payer: MEDICARE

## 2018-09-05 LAB
ALBUMIN SERPL-MCNC: 2.6 G/DL (ref 3.5–5.2)
ALP BLD-CCNC: 87 U/L (ref 35–104)
ALT SERPL-CCNC: 110 U/L (ref 5–33)
AMYLASE: 31 U/L (ref 28–100)
ANION GAP SERPL CALCULATED.3IONS-SCNC: 11 MMOL/L (ref 7–19)
AST SERPL-CCNC: 48 U/L (ref 5–32)
BASE EXCESS ARTERIAL: 0.5 MMOL/L (ref -2–2)
BILIRUB SERPL-MCNC: 2.9 MG/DL (ref 0.2–1.2)
BILIRUBIN DIRECT: 2.2 MG/DL (ref 0–0.3)
BILIRUBIN, INDIRECT: 0.7 MG/DL (ref 0.1–1)
BUN BLDV-MCNC: 29 MG/DL (ref 8–23)
CALCIUM SERPL-MCNC: 8.2 MG/DL (ref 8.8–10.2)
CARBOXYHEMOGLOBIN ARTERIAL: 2.5 % (ref 0–5)
CHLORIDE BLD-SCNC: 106 MMOL/L (ref 98–111)
CO2: 24 MMOL/L (ref 22–29)
CREAT SERPL-MCNC: 1.1 MG/DL (ref 0.5–0.9)
GFR NON-AFRICAN AMERICAN: 50
GLUCOSE BLD-MCNC: 112 MG/DL (ref 74–109)
HCO3 ARTERIAL: 25.4 MMOL/L (ref 22–26)
HCT VFR BLD CALC: 26.3 % (ref 37–47)
HCT VFR BLD CALC: 26.7 % (ref 37–47)
HCT VFR BLD CALC: 27.8 % (ref 37–47)
HCT VFR BLD CALC: 28.5 % (ref 37–47)
HEMOGLOBIN, ART, EXTENDED: 9.4 G/DL (ref 12–16)
HEMOGLOBIN: 8.7 G/DL (ref 12–16)
HEMOGLOBIN: 8.8 G/DL (ref 12–16)
HEMOGLOBIN: 9.1 G/DL (ref 12–16)
HEMOGLOBIN: 9.4 G/DL (ref 12–16)
MAGNESIUM: 2.1 MG/DL (ref 1.6–2.4)
MCH RBC QN AUTO: 29.4 PG (ref 27–31)
MCHC RBC AUTO-ENTMCNC: 33 G/DL (ref 33–37)
MCV RBC AUTO: 89.1 FL (ref 81–99)
METHEMOGLOBIN ARTERIAL: 0.9 %
O2 CONTENT ARTERIAL: 12.7 ML/DL
O2 SAT, ARTERIAL: 95.4 %
O2 THERAPY: ABNORMAL
PCO2 ARTERIAL: 41 MMHG (ref 35–45)
PDW BLD-RTO: 15.8 % (ref 11.5–14.5)
PH ARTERIAL: 7.4 (ref 7.35–7.45)
PHOSPHORUS: 1.8 MG/DL (ref 2.5–4.5)
PLATELET # BLD: 181 K/UL (ref 130–400)
PMV BLD AUTO: 10.6 FL (ref 9.4–12.3)
PO2 ARTERIAL: 76 MMHG (ref 80–100)
POTASSIUM SERPL-SCNC: 4 MMOL/L (ref 3.5–5)
POTASSIUM, WHOLE BLOOD: 4.1
RBC # BLD: 3.2 M/UL (ref 4.2–5.4)
SODIUM BLD-SCNC: 141 MMOL/L (ref 136–145)
TOTAL PROTEIN: 4.9 G/DL (ref 6.6–8.7)
WBC # BLD: 16.5 K/UL (ref 4.8–10.8)

## 2018-09-05 PROCEDURE — 2000000000 HC ICU R&B

## 2018-09-05 PROCEDURE — 80048 BASIC METABOLIC PNL TOTAL CA: CPT

## 2018-09-05 PROCEDURE — 2500000003 HC RX 250 WO HCPCS: Performed by: NURSE PRACTITIONER

## 2018-09-05 PROCEDURE — 82803 BLOOD GASES ANY COMBINATION: CPT

## 2018-09-05 PROCEDURE — C9113 INJ PANTOPRAZOLE SODIUM, VIA: HCPCS | Performed by: HOSPITALIST

## 2018-09-05 PROCEDURE — 85018 HEMOGLOBIN: CPT

## 2018-09-05 PROCEDURE — 85014 HEMATOCRIT: CPT

## 2018-09-05 PROCEDURE — 83735 ASSAY OF MAGNESIUM: CPT

## 2018-09-05 PROCEDURE — 84132 ASSAY OF SERUM POTASSIUM: CPT

## 2018-09-05 PROCEDURE — 94003 VENT MGMT INPAT SUBQ DAY: CPT

## 2018-09-05 PROCEDURE — 2580000003 HC RX 258: Performed by: HOSPITALIST

## 2018-09-05 PROCEDURE — 2580000003 HC RX 258: Performed by: INTERNAL MEDICINE

## 2018-09-05 PROCEDURE — 82150 ASSAY OF AMYLASE: CPT

## 2018-09-05 PROCEDURE — 2700000000 HC OXYGEN THERAPY PER DAY

## 2018-09-05 PROCEDURE — 80076 HEPATIC FUNCTION PANEL: CPT

## 2018-09-05 PROCEDURE — 2580000003 HC RX 258: Performed by: SURGERY

## 2018-09-05 PROCEDURE — 99291 CRITICAL CARE FIRST HOUR: CPT | Performed by: FAMILY MEDICINE

## 2018-09-05 PROCEDURE — 6360000002 HC RX W HCPCS: Performed by: INTERNAL MEDICINE

## 2018-09-05 PROCEDURE — 94640 AIRWAY INHALATION TREATMENT: CPT

## 2018-09-05 PROCEDURE — 6360000002 HC RX W HCPCS: Performed by: SURGERY

## 2018-09-05 PROCEDURE — 36600 WITHDRAWAL OF ARTERIAL BLOOD: CPT

## 2018-09-05 PROCEDURE — 2500000003 HC RX 250 WO HCPCS: Performed by: FAMILY MEDICINE

## 2018-09-05 PROCEDURE — 99233 SBSQ HOSP IP/OBS HIGH 50: CPT | Performed by: INTERNAL MEDICINE

## 2018-09-05 PROCEDURE — 76705 ECHO EXAM OF ABDOMEN: CPT

## 2018-09-05 PROCEDURE — 2580000003 HC RX 258: Performed by: FAMILY MEDICINE

## 2018-09-05 PROCEDURE — 84100 ASSAY OF PHOSPHORUS: CPT

## 2018-09-05 PROCEDURE — 85027 COMPLETE CBC AUTOMATED: CPT

## 2018-09-05 PROCEDURE — 71045 X-RAY EXAM CHEST 1 VIEW: CPT

## 2018-09-05 PROCEDURE — 6360000002 HC RX W HCPCS: Performed by: HOSPITALIST

## 2018-09-05 RX ADMIN — CHLORHEXIDINE GLUCONATE 15 ML: 1.2 LIQUID BUCCAL at 21:51

## 2018-09-05 RX ADMIN — Medication 10 ML: at 08:00

## 2018-09-05 RX ADMIN — IPRATROPIUM BROMIDE 0.5 MG: 0.5 SOLUTION RESPIRATORY (INHALATION) at 07:03

## 2018-09-05 RX ADMIN — CHLORHEXIDINE GLUCONATE 15 ML: 1.2 LIQUID BUCCAL at 08:00

## 2018-09-05 RX ADMIN — Medication 0.5 MG: at 14:49

## 2018-09-05 RX ADMIN — Medication 0.5 MG: at 10:56

## 2018-09-05 RX ADMIN — GUAIFENESIN 400 MG: 100 SOLUTION ORAL at 21:38

## 2018-09-05 RX ADMIN — Medication 0.5 MG: at 16:56

## 2018-09-05 RX ADMIN — AMIODARONE HYDROCHLORIDE 0.5 MG/MIN: 50 INJECTION, SOLUTION INTRAVENOUS at 18:35

## 2018-09-05 RX ADMIN — ACETYLCYSTEINE 600 MG: 200 SOLUTION ORAL; RESPIRATORY (INHALATION) at 18:13

## 2018-09-05 RX ADMIN — IPRATROPIUM BROMIDE 0.5 MG: 0.5 SOLUTION RESPIRATORY (INHALATION) at 14:19

## 2018-09-05 RX ADMIN — Medication 0.5 MG: at 18:07

## 2018-09-05 RX ADMIN — Medication 0.5 MG: at 01:16

## 2018-09-05 RX ADMIN — GUAIFENESIN 400 MG: 100 SOLUTION ORAL at 17:00

## 2018-09-05 RX ADMIN — Medication 0.5 MG: at 23:25

## 2018-09-05 RX ADMIN — IPRATROPIUM BROMIDE 0.5 MG: 0.5 SOLUTION RESPIRATORY (INHALATION) at 18:12

## 2018-09-05 RX ADMIN — DEXMEDETOMIDINE HYDROCHLORIDE 0.2 MCG/KG/HR: 100 INJECTION, SOLUTION INTRAVENOUS at 12:29

## 2018-09-05 RX ADMIN — Medication 10 ML: at 21:16

## 2018-09-05 RX ADMIN — ASCORBIC ACID, VITAMIN A PALMITATE, CHOLECALCIFEROL, THIAMINE HYDROCHLORIDE, RIBOFLAVIN-5 PHOSPHATE SODIUM, PYRIDOXINE HYDROCHLORIDE, NIACINAMIDE, DEXPANTHENOL, ALPHA-TOCOPHEROL ACETATE, VITAMIN K1, FOLIC ACID, BIOTIN, CYANOCOBALAMIN: 200; 3300; 200; 6; 3.6; 6; 40; 15; 10; 150; 600; 60; 5 INJECTION, SOLUTION INTRAVENOUS at 22:11

## 2018-09-05 RX ADMIN — GUAIFENESIN 400 MG: 100 SOLUTION ORAL at 12:00

## 2018-09-05 RX ADMIN — Medication 0.5 MG: at 05:47

## 2018-09-05 RX ADMIN — IPRATROPIUM BROMIDE 0.5 MG: 0.5 SOLUTION RESPIRATORY (INHALATION) at 10:56

## 2018-09-05 RX ADMIN — AMIODARONE HYDROCHLORIDE 0.5 MG/MIN: 50 INJECTION, SOLUTION INTRAVENOUS at 02:14

## 2018-09-05 RX ADMIN — MEROPENEM 1 G: 1 INJECTION, POWDER, FOR SOLUTION INTRAVENOUS at 22:05

## 2018-09-05 RX ADMIN — ACETYLCYSTEINE 600 MG: 200 SOLUTION ORAL; RESPIRATORY (INHALATION) at 07:03

## 2018-09-05 RX ADMIN — MEROPENEM 1 G: 1 INJECTION, POWDER, FOR SOLUTION INTRAVENOUS at 10:36

## 2018-09-05 RX ADMIN — FENTANYL CITRATE 120 MCG/HR: 50 INJECTION, SOLUTION INTRAMUSCULAR; INTRAVENOUS at 22:09

## 2018-09-05 RX ADMIN — GUAIFENESIN 400 MG: 100 SOLUTION ORAL at 08:00

## 2018-09-05 RX ADMIN — POTASSIUM PHOSPHATE, MONOBASIC AND POTASSIUM PHOSPHATE, DIBASIC 15 MMOL: 224; 236 INJECTION, SOLUTION INTRAVENOUS at 10:36

## 2018-09-05 RX ADMIN — PANTOPRAZOLE SODIUM 40 MG: 40 INJECTION, POWDER, FOR SOLUTION INTRAVENOUS at 07:55

## 2018-09-05 ASSESSMENT — PULMONARY FUNCTION TESTS
PIF_VALUE: 3.7
PIF_VALUE: 25.2
PEFR_L/MIN: 3
PIF_VALUE: 10.9
PIF_VALUE: 16.4
PIF_VALUE: 7.6
PIF_VALUE: 23.1
PIF_VALUE: 26.8

## 2018-09-05 ASSESSMENT — PAIN SCALES - GENERAL
PAINLEVEL_OUTOF10: 10
PAINLEVEL_OUTOF10: 8
PAINLEVEL_OUTOF10: 6
PAINLEVEL_OUTOF10: 8
PAINLEVEL_OUTOF10: 5
PAINLEVEL_OUTOF10: 7
PAINLEVEL_OUTOF10: 10

## 2018-09-05 NOTE — PROGRESS NOTES
Family (daughter) at bedside. Pt responding by nodding appropriately to daughter's questions. Pt does not follow all nursing commands, but does intermittently. Fentanyl has been off since 0800 this am. Pt is drowsy and does not consistenly wake to voice but responds to pain stimulus at 1900. Levophed is off.     Electronically signed by Trish English RN on 9/4/2018 at 7:25 PM

## 2018-09-05 NOTE — PROGRESS NOTES
There is persistent moderate pulmonary vascular congestion. There is no pneumothorax. The endotracheal tube, the nasogastric tube and the right jugular   venous access line are seen in place.       Impression   No change. Signed by Dr Raegan Guzman on 9/5/2018 7:44 AM       PLAN:     1. Was more awake this am, fentanyl drip restarted  2. Off levophed since yesterday  3. Normal sinus rhythm- on amiodarone gtt  4. Weaning trial failed this am, per nursing they will try again later  5. Agree with above. Appreciate hospitalist and respiratory therapy. I examined patient.

## 2018-09-05 NOTE — PROGRESS NOTES
[address]    Daily HOSPITAL Progress Note            I personally saw the patient and rounded with:  [address] [facility]  9/4/18      The observations documented in this note, including the assessment and plan are mine                                  Date:  9/4/18  Patient: Yara Pinto  Admission:  8/30/2018  5:59 AM  Admit DX: AAA (abdominal aortic aneurysm) ([facility] [address] [I71.4]  Age:  78 y.o., 1950     LOS: 5 days       Reason for initial evaluation or the patient's initial complaint    Atrial fibrillation      SUBJECTIVE:      The patient was seen and examined. All daily progress notes were reviewed. Pertinent laboratory and imaging studies were critiqued. Family present and in room during examination:  No     There were no new complications over night. History of the Present Illness / Chief Complaint / current status today:       According to the patient:  \"0, intubated\"    Subjective:  Ms. Yara Pinto is:  unchanged. Intubated and sedated and not particularly responsive . Complaint / Symptom Yes / No and Description if Yes       Chest Pain N/A   Shortness of Air N/A   Presyncope / Syncope N/A   Palpitations N/A       The patient has the following cardiac specific problems listed in the problem list:      Specialty Problems        Cardiology Problems    * (Principal)AAA (abdominal aortic aneurysm) (HCC)        Carotid artery stenosis        TIA (transient ischemic attack)        Fibromuscular dysplasia (HCC)        Celiac artery stenosis (HCC)        Renal artery stenosis (HCC)        Essential hypertension        Bilateral carotid artery stenosis        Atrial fibrillation with rapid ventricular response (HCC)                PFSH:  New information:  no        Any Change: no          ROS:    System Any new information? Any change?         Cardiovascular N/A N/A   Pulmonary / Respiratory N/A N/A         OBJECTIVE:      BP (!) 142/57   Pulse 71   Temp 98.7 °F (37.1 °C) (Tympanic)   Resp 17   Ht 5' 3\" (1.6 m)   Wt 150 lb 4.8 oz (68.2 kg)   SpO2 98%   Breastfeeding? No   BMI 26.62 kg/m²     Intake/Output Summary (Last 24 hours) at 09/04/18 2000  Last data filed at 09/04/18 1825   Gross per 24 hour   Intake          2194.36 ml   Output             1267 ml   Net           927.36 ml       Physical Examination:    BP (!) 142/57   Pulse 71   Temp 98.7 °F (37.1 °C) (Tympanic)   Resp 17   Ht 5' 3\" (1.6 m)   Wt 150 lb 4.8 oz (68.2 kg)   SpO2 98%   Breastfeeding? No   BMI 26.62 kg/m²     GENERAL - well developed and well nourished; is not an active participant in this examination  HEENT   PERRLA, Hearing not tested, conjunctiva and lids are normal, ears and nose appear normal  NECK - no thyromegaly, no JVD, trachea is in the midline  CARDIOVASCULAR  PMI is in the left mid line clavicular position, Normal S1 and S2 with a grade 1/6 systolic murmur. No S3 or S4    PULMONARY  Yes: Intubated and sedated and not particularly responsive  respiratory distress. scattered wheezes and rales. Breath sounds in both  lung fields are Decreased  ABDOMEN   soft, non tender, no rebound, no hepatomegaly or splenomegaly  MUSCULOSKELETAL   Prone/Supine, digitals and nails are without clubbing or cyanosis  EXTREMITIES - trace edema  NEUROLOGIC - cranial nerves, II-XII, are normal  SKIN - turgor is normal, no rash  PSYCHIATRIC - normal mood and affect, alert and orientated x 3, judgement and insight appear appropriate      LABORATORY EVALUATION & TESTING:    I have personally reviewed and interpreted the results of the following diagnostic endeavors     CBC:   Recent Labs      09/03/18   0545   09/04/18   0600  09/04/18   1200  09/04/18   1730   WBC  16.1*   --   13.4*   --    --    HGB  9.2*   < >  8.0*  8.1*  8.3*   HCT  27.6*   < >  24.1*  24.7*  24.7*   PLT  149   --   146   --    --     < > = values in this interval not displayed.      BMP: Recent Labs      09/03/18   0545 09/04/18   0439  09/04/18   0600   NA  137   --   138   K  4.2  4.2  4.0   CO2  23   --   25   BUN  27*   --   28*   CREATININE  1.6*   --   1.4*   LABGLOM  32*   --   38*   GLUCOSE  102   --   122*     BNP: No results for input(s): BNP in the last 72 hours. PT/INR: No results for input(s): PROTIME, INR in the last 72 hours. APTT:No results for input(s): APTT in the last 72 hours. CARDIAC ENZYMES:No results for input(s): CKTOTAL, CKMB, CKMBINDEX, TROPONINI in the last 72 hours.   FASTING LIPID PANEL:  Lab Results   Component Value Date    HDL 51 08/22/2018    LDLDIRECT 81 11/29/2014    LDLCALC 97 08/22/2018    TRIG 168 09/03/2018     LIVER PROFILE:  Recent Labs      09/02/18   0554  09/03/18   0545   AST  487*  121*   ALT  562*  296*   LABALBU  2.4*  2.2*           Current Inpatient Medications:     acetylcysteine  600 mg Inhalation BID    meropenem  1 g Intravenous Q12H    ipratropium  0.5 mg Nebulization 4x daily    chlorhexidine  15 mL Mouth/Throat BID    guaiFENesin  400 mg Per NG tube 4x Daily    pantoprazole  40 mg Intravenous Daily    sodium chloride  250 mL Intravenous Once    sodium chloride flush  10 mL Intravenous 2 times per day       IV Infusions (if any):     PN-Adult Premix 5/20 - Standard Electrolytes - Central Line      fat emulsion Stopped (09/04/18 7380)    amiodarone 450mg/250ml D5W infusion 0.5 mg/min (09/04/18 1103)    midazolam Stopped (09/03/18 1154)    fentaNYL (SUBLIMAZE) 1250 mcg in sodium chloride 0.9 % 250 mL Stopped (09/04/18 0800)    norepinephrine Stopped (09/04/18 1048)       Diagnostics:      EKG and or Telemetry:  which was personally reviewed me:  Sinus rhythm,   Pulse Readings from Last 3 Encounters:   09/04/18 71   08/27/18 71   08/23/18 68    bpm      ASSESSMENT:    CARDIOLOGY PROBLEMS ARE LISTED ABOVE; HOWEVER, THE FOLLOWING SPECIFIC CARDIAC PROBLEMS EITHER LISTED ABOVE OR NOT,  WERE ADDRESSED AND TREATED DURING THE HOSPITAL VISIT TODAY: MEDICAL DECISION MAKING             Cardiac Specific Problem / Diagnosis  Discussion and Data Reviewed Diagnostic / Therapeutic  Procedures Ordered Management Options Selected           1. Paroxymal atrial fibrillation Initial presentation during this evaluation    Still on amiodarone No Continue current medications             2. hypertension show no change   The blood pressure for the lastr 36 hours has been:  Systolic (10IZR), OQP:386 , Min:100 , NBL:159    Diastolic (11UUZ), NKZ:68, Min:40, Max:71    Off levophed Continue current medications:                      3. hypercholesteremia show no change Patient has a history of hypercholesteremia, which is managed and is on current therapy. The lipid profile is:    Lab Results   Component Value Date    HDL 51 08/22/2018    LDLDIRECT 81 11/29/2014    LDLCALC 97 08/22/2018    TRIG 168 09/03/2018     No Continue current medications:                 PLAN:    1. Continue present medications except for changes as noted above  2. Continue to monitor rhythm  3. Further orders per clinical course. 4. Continue amiodarone           Discussed with nursing.     Electronically signed by Jazmine Grey MD on 9/4/18    [facility]

## 2018-09-05 NOTE — PROGRESS NOTES
Nutrition Assessment    Type and Reason for Visit: Reassess    Nutrition Recommendations: advance rate to 55ml/hr to meet needs    Malnutrition Assessment:  · Malnutrition Status: At risk for malnutrition  · Context: Acute illness or injury    Nutrition Diagnosis:   · Problem: Increased nutrient needs  · Etiology: related to Increased demand for energy/nutrients due to     Signs and symptoms:  as evidenced by Presence of wounds    Nutrition Assessment:  · Subjective Assessment: Pt remains on vent with NG-LIS with output. Is receiving PN 5/20 at 42ml/hr and lipids 20% 2x/week. · Nutrition-Focused Physical Findings: on vent.   NG-LIS  · Wound Type: Surgical Wound  · Current Nutrition Therapies:  · Oral Diet Orders: NPO   · Oral Diet intake: NPO  · Oral Nutrition Supplement (ONS) Orders: None  · ONS intake: NPO  · Parenteral Nutrition Orders:  · Type and Formula: Premix Central   · Lipids: 250ml, Two times weekly  · Additives:    · Rate/Volume: 42ml/hr,  1080 ml/day  · Duration: Continuous 24 hrs  · Current PN Order Provides: 42ml = 1006  kcals with 50g protein, 202 g CHO --on lipid days will receive 1506  kcals  · Goal PN Orders Provides: 55ml = 1302 total kcals with 66 g protein, 264g CHO-- on lipid days 1802 total kcals    · Anthropometric Measures:  · Ht: 5' 3\" (160 cm)   · Current Body Wt: 152 lb 14 oz (69.3 kg)  · Admission Body Wt: 141 lb (64 kg)  · Ideal Body Wt: 115 lb (52.2 kg),   · BMI Classification: BMI 25.0 - 29.9 Overweight     · Comparative Standards (Estimated Nutrition Needs):  · Estimated Daily Total Kcal: 5112-9796  · Estimated Daily Protein (g):   · Estimated Daily Total Fluid (ml/day): 4888-3584    Estimated Intake vs Estimated Needs: Intake Improving    Nutrition Risk Level: High    Nutrition Interventions:   Continue NPO, Continue Parenteral Nutrition  Continued Inpatient Monitoring    Nutrition Evaluation:   · Evaluation: Progressing toward goals   · Goals: Meet needs via nutrition

## 2018-09-05 NOTE — PROGRESS NOTES
Pulmonary and Critical Care Progress Note [facility]    Patient: Norma Kim    1950    MR# [id number]    Acct# [de-identified]   09/05/18   7:43 AM  Referring Provider: Mahnaz Boyle MD    Chief Complaint: Mechanically ventilated    Interval history: Patient is intubated on mechanical ventilator. Levophed, amiodarone, and versed are off. She remains in fentanyl gtt but she is awake, calm and following commands. No new issues. No family present. No other aggravating or alleviating factors or associated symptoms. acetylcysteine 600 mg Inhalation BID   meropenem 1 g Intravenous Q12H   ipratropium 0.5 mg Nebulization 4x daily   chlorhexidine 15 mL Mouth/Throat BID   guaiFENesin 400 mg Per NG tube 4x Daily   pantoprazole 40 mg Intravenous Daily   sodium chloride 250 mL Intravenous Once   sodium chloride flush 10 mL Intravenous 2 times per day      PN-Adult Premix 5/20 - Standard Electrolytes - Central Line 42 mL/hr at 09/04/18 2107    fat emulsion Stopped (09/04/18 0832)    amiodarone 450mg/250ml D5W infusion 0.5 mg/min (09/05/18 0214)    midazolam Stopped (09/03/18 1154)    fentaNYL (SUBLIMAZE) 1250 mcg in sodium chloride 0.9 % 250 mL 10 mcg/hr (09/05/18 0600)    norepinephrine Stopped (09/04/18 1048)     Review of Systems:     Cannot obtain due to mechanical ventilation    Physical Exam:  Blood pressure (!) 147/73, pulse 75, temperature 97.4 °F (36.3 °C), temperature source Temporal, resp. rate (!) 31, height 5' 3\" (1.6 m), weight 152 lb 14.4 oz (69.4 kg), SpO2 99 %, not currently breastfeeding.     Intake/Output Summary (Last 24 hours) at 09/05/18 0743  Last data filed at 09/05/18 0600   Gross per 24 hour   Intake           2108.7 ml   Output             2037 ml   Net             71.7 ml     Ventilation Day(s): 1  Vent Mode: CPAP/Vt Ordered: 500 mL/Rate Set: 16 bmp/Pressure Support: 5 cmH20/PEEP/CPAP: 5/FiO2 : 40 %/   Peak Inspiratory Pressure: 10.9 cmH2O  Mean Airway Pressure: 7.6 cmH20  I:E Ratio: 1:2.56  Rate Measured: 25.6 br/min  Minute Volume: 8.4 Liters  Plateau Pressure: 22 cmH20        Physical Exam Constitutional: She appears well-developed and well-nourished. No distress. She is sedated and intubated. She does open her eyes and nod appropriately. Following simple commands  HENT:   Head: Normocephalic and atraumatic. Right Ear: External ear normal.   Left Ear: External ear normal.   ETT and NG in place   Eyes: Conjunctivae are normal. Right eye exhibits no discharge. Left eye exhibits no discharge. Neck: Normal range of motion. Neck supple. No JVD present. Cardiovascular: Her pulse is in the 70s and is regular. No murmur heard. Pulmonary/Chest: She is intubated. She has bilateral rales rales. Abdominal: Soft. She exhibits mild distension. Midline dressing intact   Neuro: she is intubated and on fentanyl but she is awake and following commands. She nods appropriately. Musculoskeletal: She exhibits edema improved. She exhibits no deformity. Skin: Skin is warm and dry. She is not diaphoretic. No erythema. No pallor    Recent Labs      09/03/18   0545   09/04/18   0600   09/04/18   1730  09/05/18   0025  09/05/18   0556   WBC  16.1*   --   13.4*   --    --    --   16.5*   RBC  3.10*   --   2.67*   --    --    --   3.20*   HGB  9.2*   < >  8.0*   < >  8.3*  8.7*  9.4*   HCT  27.6*   < >  24.1*   < >  24.7*  26.3*  28.5*   PLT  149   --   146   --    --    --   181   MCV  89.0   --   90.3   --    --    --   89.1   MCH  29.7   --   30.0   --    --    --   29.4   MCHC  33.3   --   33.2   --    --    --   33.0   RDW  15.9*   --   15.9*   --    --    --   15.8*    < > = values in this interval not displayed.       Recent Labs      09/03/18   0545   09/04/18   0600  09/05/18   0452  09/05/18   0556   NA  137   --   138   --   141   K  4.2   < >  4.0  4.1  4.0   CL  102   --   105   --   106   CO2  23   --   25   --   24   BUN  27*   --   28*   --   29*   CREATININE  1.6*   -- exam.  Assessment/recommendation: We can continue daily spontaneous breathing trials as she tolerates. I have seen and examined patient personally, performing a face-to-face diagnostic evaluation with plan of care reviewed and developed with APRN. I have addended and/or modified the above history of present illness, physical examination, and assessment and plan to reflect my findings and impressions. Essential elements of the care plan were discussed with APRN above. Agree with findings and assessment/plan as documented above.     Electronically signed by Merari Stovall MD on 9/5/18 at 10:33 AM

## 2018-09-06 ENCOUNTER — APPOINTMENT (OUTPATIENT)
Dept: GENERAL RADIOLOGY | Age: 68
DRG: 268 | End: 2018-09-06
Attending: SURGERY
Payer: MEDICARE

## 2018-09-06 LAB
ALBUMIN SERPL-MCNC: 2.2 G/DL (ref 3.5–5.2)
ALP BLD-CCNC: 74 U/L (ref 35–104)
ALT SERPL-CCNC: 71 U/L (ref 5–33)
AMYLASE: 30 U/L (ref 28–100)
ANION GAP SERPL CALCULATED.3IONS-SCNC: 8 MMOL/L (ref 7–19)
AST SERPL-CCNC: 41 U/L (ref 5–32)
BASE EXCESS ARTERIAL: -1.7 MMOL/L (ref -2–2)
BILIRUB SERPL-MCNC: 2.1 MG/DL (ref 0.2–1.2)
BUN BLDV-MCNC: 29 MG/DL (ref 8–23)
CALCIUM SERPL-MCNC: 7.9 MG/DL (ref 8.8–10.2)
CARBOXYHEMOGLOBIN ARTERIAL: 2.4 % (ref 0–5)
CHLORIDE BLD-SCNC: 105 MMOL/L (ref 98–111)
CO2: 24 MMOL/L (ref 22–29)
CREAT SERPL-MCNC: 1 MG/DL (ref 0.5–0.9)
CULTURE, RESPIRATORY: ABNORMAL
GFR NON-AFRICAN AMERICAN: 55
GLUCOSE BLD-MCNC: 115 MG/DL (ref 74–109)
GRAM STAIN RESULT: ABNORMAL
HCO3 ARTERIAL: 23.7 MMOL/L (ref 22–26)
HCT VFR BLD CALC: 24.8 % (ref 37–47)
HCT VFR BLD CALC: 25.3 % (ref 37–47)
HCT VFR BLD CALC: 28.1 % (ref 37–47)
HEMOGLOBIN, ART, EXTENDED: 8.8 G/DL (ref 12–16)
HEMOGLOBIN: 8.1 G/DL (ref 12–16)
HEMOGLOBIN: 8.4 G/DL (ref 12–16)
HEMOGLOBIN: 9.1 G/DL (ref 12–16)
MAGNESIUM: 2.1 MG/DL (ref 1.6–2.4)
MCH RBC QN AUTO: 30.2 PG (ref 27–31)
MCHC RBC AUTO-ENTMCNC: 33.2 G/DL (ref 33–37)
MCV RBC AUTO: 91 FL (ref 81–99)
METHEMOGLOBIN ARTERIAL: 1.3 %
O2 CONTENT ARTERIAL: 11.8 ML/DL
O2 SAT, ARTERIAL: 94.4 %
O2 THERAPY: ABNORMAL
ORGANISM: ABNORMAL
PCO2 ARTERIAL: 42 MMHG (ref 35–45)
PDW BLD-RTO: 16.1 % (ref 11.5–14.5)
PH ARTERIAL: 7.36 (ref 7.35–7.45)
PHOSPHORUS: 2 MG/DL (ref 2.5–4.5)
PLATELET # BLD: 198 K/UL (ref 130–400)
PMV BLD AUTO: 10.6 FL (ref 9.4–12.3)
PO2 ARTERIAL: 70 MMHG (ref 80–100)
POTASSIUM SERPL-SCNC: 4.1 MMOL/L (ref 3.5–5)
POTASSIUM, WHOLE BLOOD: 4
RBC # BLD: 2.78 M/UL (ref 4.2–5.4)
SODIUM BLD-SCNC: 137 MMOL/L (ref 136–145)
TOTAL PROTEIN: 4.5 G/DL (ref 6.6–8.7)
TRIGL SERPL-MCNC: 305 MG/DL (ref 0–149)
WBC # BLD: 13.7 K/UL (ref 4.8–10.8)

## 2018-09-06 PROCEDURE — 85018 HEMOGLOBIN: CPT

## 2018-09-06 PROCEDURE — 2500000003 HC RX 250 WO HCPCS: Performed by: NURSE PRACTITIONER

## 2018-09-06 PROCEDURE — 83735 ASSAY OF MAGNESIUM: CPT

## 2018-09-06 PROCEDURE — 6360000002 HC RX W HCPCS: Performed by: HOSPITALIST

## 2018-09-06 PROCEDURE — 6360000002 HC RX W HCPCS

## 2018-09-06 PROCEDURE — 94003 VENT MGMT INPAT SUBQ DAY: CPT

## 2018-09-06 PROCEDURE — 36600 WITHDRAWAL OF ARTERIAL BLOOD: CPT

## 2018-09-06 PROCEDURE — 84100 ASSAY OF PHOSPHORUS: CPT

## 2018-09-06 PROCEDURE — 99233 SBSQ HOSP IP/OBS HIGH 50: CPT | Performed by: FAMILY MEDICINE

## 2018-09-06 PROCEDURE — 2700000000 HC OXYGEN THERAPY PER DAY

## 2018-09-06 PROCEDURE — 2500000003 HC RX 250 WO HCPCS: Performed by: FAMILY MEDICINE

## 2018-09-06 PROCEDURE — 82803 BLOOD GASES ANY COMBINATION: CPT

## 2018-09-06 PROCEDURE — 2580000003 HC RX 258: Performed by: INTERNAL MEDICINE

## 2018-09-06 PROCEDURE — 82150 ASSAY OF AMYLASE: CPT

## 2018-09-06 PROCEDURE — 85014 HEMATOCRIT: CPT

## 2018-09-06 PROCEDURE — 2580000003 HC RX 258: Performed by: SURGERY

## 2018-09-06 PROCEDURE — 71045 X-RAY EXAM CHEST 1 VIEW: CPT

## 2018-09-06 PROCEDURE — 6370000000 HC RX 637 (ALT 250 FOR IP): Performed by: NURSE PRACTITIONER

## 2018-09-06 PROCEDURE — 36592 COLLECT BLOOD FROM PICC: CPT

## 2018-09-06 PROCEDURE — 94640 AIRWAY INHALATION TREATMENT: CPT

## 2018-09-06 PROCEDURE — 80053 COMPREHEN METABOLIC PANEL: CPT

## 2018-09-06 PROCEDURE — 84478 ASSAY OF TRIGLYCERIDES: CPT

## 2018-09-06 PROCEDURE — 84132 ASSAY OF SERUM POTASSIUM: CPT

## 2018-09-06 PROCEDURE — 2000000000 HC ICU R&B

## 2018-09-06 PROCEDURE — 6360000002 HC RX W HCPCS: Performed by: INTERNAL MEDICINE

## 2018-09-06 PROCEDURE — 2580000003 HC RX 258: Performed by: HOSPITALIST

## 2018-09-06 PROCEDURE — 85027 COMPLETE CBC AUTOMATED: CPT

## 2018-09-06 PROCEDURE — 2580000003 HC RX 258: Performed by: FAMILY MEDICINE

## 2018-09-06 PROCEDURE — 99232 SBSQ HOSP IP/OBS MODERATE 35: CPT | Performed by: INTERNAL MEDICINE

## 2018-09-06 PROCEDURE — C9113 INJ PANTOPRAZOLE SODIUM, VIA: HCPCS | Performed by: HOSPITALIST

## 2018-09-06 PROCEDURE — 6360000002 HC RX W HCPCS: Performed by: SURGERY

## 2018-09-06 RX ORDER — LORAZEPAM 2 MG/ML
INJECTION INTRAMUSCULAR
Status: COMPLETED
Start: 2018-09-06 | End: 2018-09-06

## 2018-09-06 RX ORDER — LORAZEPAM 2 MG/ML
0.5 INJECTION INTRAMUSCULAR ONCE
Status: COMPLETED | OUTPATIENT
Start: 2018-09-06 | End: 2018-09-06

## 2018-09-06 RX ADMIN — GUAIFENESIN 400 MG: 100 SOLUTION ORAL at 09:01

## 2018-09-06 RX ADMIN — CHLORHEXIDINE GLUCONATE 15 ML: 1.2 LIQUID BUCCAL at 09:01

## 2018-09-06 RX ADMIN — IPRATROPIUM BROMIDE 0.5 MG: 0.5 SOLUTION RESPIRATORY (INHALATION) at 14:27

## 2018-09-06 RX ADMIN — Medication 0.5 MG: at 16:34

## 2018-09-06 RX ADMIN — Medication 0.5 MG: at 06:45

## 2018-09-06 RX ADMIN — AMIODARONE HYDROCHLORIDE 0.5 MG/MIN: 50 INJECTION, SOLUTION INTRAVENOUS at 09:02

## 2018-09-06 RX ADMIN — DEXMEDETOMIDINE HYDROCHLORIDE 0.6 MCG/KG/HR: 100 INJECTION, SOLUTION INTRAVENOUS at 02:38

## 2018-09-06 RX ADMIN — GUAIFENESIN 400 MG: 100 SOLUTION ORAL at 20:31

## 2018-09-06 RX ADMIN — LORAZEPAM 0.5 MG: 2 INJECTION INTRAMUSCULAR at 07:59

## 2018-09-06 RX ADMIN — GUAIFENESIN 400 MG: 100 SOLUTION ORAL at 16:31

## 2018-09-06 RX ADMIN — ACETYLCYSTEINE 600 MG: 200 SOLUTION ORAL; RESPIRATORY (INHALATION) at 06:47

## 2018-09-06 RX ADMIN — MEROPENEM 1 G: 1 INJECTION, POWDER, FOR SOLUTION INTRAVENOUS at 16:31

## 2018-09-06 RX ADMIN — ACETYLCYSTEINE 600 MG: 200 SOLUTION ORAL; RESPIRATORY (INHALATION) at 18:31

## 2018-09-06 RX ADMIN — MEROPENEM 1 G: 1 INJECTION, POWDER, FOR SOLUTION INTRAVENOUS at 09:02

## 2018-09-06 RX ADMIN — LORAZEPAM 0.5 MG: 2 INJECTION INTRAMUSCULAR; INTRAVENOUS at 07:59

## 2018-09-06 RX ADMIN — IPRATROPIUM BROMIDE 0.5 MG: 0.5 SOLUTION RESPIRATORY (INHALATION) at 06:44

## 2018-09-06 RX ADMIN — GUAIFENESIN 400 MG: 100 SOLUTION ORAL at 14:02

## 2018-09-06 RX ADMIN — Medication 0.5 MG: at 13:55

## 2018-09-06 RX ADMIN — IPRATROPIUM BROMIDE 0.5 MG: 0.5 SOLUTION RESPIRATORY (INHALATION) at 18:30

## 2018-09-06 RX ADMIN — Medication 10 ML: at 20:31

## 2018-09-06 RX ADMIN — DEXMEDETOMIDINE HYDROCHLORIDE 1 MCG/KG/HR: 100 INJECTION, SOLUTION INTRAVENOUS at 23:57

## 2018-09-06 RX ADMIN — PANTOPRAZOLE SODIUM 40 MG: 40 INJECTION, POWDER, FOR SOLUTION INTRAVENOUS at 09:08

## 2018-09-06 RX ADMIN — IPRATROPIUM BROMIDE 0.5 MG: 0.5 SOLUTION RESPIRATORY (INHALATION) at 10:26

## 2018-09-06 ASSESSMENT — PAIN SCALES - GENERAL
PAINLEVEL_OUTOF10: 8
PAINLEVEL_OUTOF10: 6
PAINLEVEL_OUTOF10: 3
PAINLEVEL_OUTOF10: 8
PAINLEVEL_OUTOF10: 3
PAINLEVEL_OUTOF10: 2
PAINLEVEL_OUTOF10: 3

## 2018-09-06 ASSESSMENT — PULMONARY FUNCTION TESTS: PIF_VALUE: 10.8

## 2018-09-06 NOTE — PROGRESS NOTES
Patient extubated to 4 liters nasal cannula. Patient very anxious on the Tpiece with saturation 96%. Respiratory unable to get ABG on Tpiece. She was able to do Tpiece about 35 minutes.

## 2018-09-06 NOTE — PROGRESS NOTES
Speech Language Pathology  Facility/Department: [facility] ICU    NAME: Rosemarie Arboleda  : 1950  MRN: [id number]     Received order to assess. However, patient did not follow directions. Patient also turned head and clenched her mouth whenever mouth care and PO trials were presented. Will re-attempt 18.     Electronically signed by SVEN Preston on 2018 at 2:29 PM

## 2018-09-06 NOTE — PROGRESS NOTES
09/04/18   0600   09/05/18   0556  09/06/18   0421  09/06/18   0546   NA  138   --   141   --   137   K  4.0   < >  4.0  4.0  4.1   CL  105   --   106   --   105   CO2  25   --   24   --   24   BUN  28*   --   29*   --   29*   CREATININE  1.4*   --   1.1*   --   1.0*   CALCIUM  7.9*   --   8.2*   --   7.9*   GLUCOSE  122*   --   112*   --   115*    < > = values in this interval not displayed. Recent Labs      09/04/18   0439  09/05/18   0452  09/06/18   0421   PHART  7.410  7.400  7.360   OLO6TED  40.0  41.0  42.0   PO2ART  68.0*  76.0*  70.0*   QGI8SFO  25.4  25.4  23.7   R5DVCIPC  93.8  95.4  94.4   BEART  0.7  0.5  -1.7     Recent Labs      09/06/18   0546   AST  41*   ALT  71*   ALKPHOS  74   BILITOT  2.1*   MG  2.1   CALCIUM  7.9*   PHOS  2.0*     No results for input(s): BC, LABGRAM, CULTRESP, BFCX in the last 72 hours. Radiograph:   Official report pending    My radiograph interpretation: ETT in good position,  pulmonary edema pattern still persists, LLL opacity persists as well    Pulmonary Assessment:    1. Postoperative respiratory failure related predominantly to volume overload. 2. Atrial fibrillation with rapid ventricular response, resolved. 3. Respiratory culture +psuedomonas and klebsiella    Recommend:     · D#6 ETT. Changed over to CPAP this morning as the pt is restrained and requiring multiple staff members to hold her down and keep her in the bed. If ABG's post trial are adequate we will push for extubation today. · Continue mucomyst, atrovent, robitussin  · Merrem D#5    Electronically signed by Naomi Champion on 9/6/2018 at 7:48 AM     Physician's substantive portion:  Subjective: She currently is doing well on CPAP. Objective: She has fair movement on chest exam.  Assessment/recommendation: We will proceed with a T-piece trial hopes of extubating later today.    I have seen and examined patient personally, performing a face-to-face diagnostic evaluation with plan of care reviewed

## 2018-09-06 NOTE — PROGRESS NOTES
9/6/2018  4:24 AM - Mrati Harris Incoming Lab Results From [facility]     Component Results     Component Value Ref Range & Units Status Collected Lab   pH, Arterial 7.360  7.350 - 7.450 Final 09/06/2018  4:21 AM [facility]   pCO2, Arterial 42.0  35.0 - 45.0 mmHg Final 09/06/2018  4:21 AM [facility]   pO2, Arterial 70.0   80.0 - 100.0 mmHg Final 09/06/2018  4:21 AM [facility]   HCO3, Arterial 23.7  22.0 - 26.0 mmol/L Final 09/06/2018  4:21 AM [facility] Excess, Arterial -1.7  -2.0 - 2.0 mmol/L Final 09/06/2018  4:21 AM [facility]   Hemoglobin, Art, Extended 8.8   12.0 - 16.0 g/dL Final 09/06/2018  4:21 AM [facility]   O2 Sat, Arterial 94.4  >92 % Final 09/06/2018  4:21 AM [facility]   Carboxyhgb, Arterial 2.4  0.0 - 5.0 % Final 09/06/2018  4:21 AM [facility]        0.0-1.5   (Smokers 1.5-5.0)    Methemoglobin, Arterial 1.3  <1.5 % Final 09/06/2018  4:21 AM [facility]   O2 Content, Arterial 11.8  Not Established mL/dL Final 09/06/2018  4:21 AM [facility]   O2 Therapy Unknown   Final 09/06/2018  4:21 AM [facility] [address] Performed By     [address] Murphy Name Director Address Valid Date Range   [id number] - [address] LAB [place] [address] [facility] [id number]        Left Brachial, 96%  PRVC 16, 500, +5, 40%

## 2018-09-06 NOTE — PROGRESS NOTES
ICU Hospitalist Progress Note                    Patient Name: Anju Decker  [id number]  Admit Date: 8/30/2018  ICU Day 5  LOS 7     PCP: Aayush Lozano MD     Brief Overview: 78 y.o. female     Chief Complaint: Intubated    Subjective / History of present illness:  78year old female status post surgical repair infrarenal abdominal aortic aneurysm repair and left renal artery stenosis on 08/30/18. Patient remains intubated since 9/1. Currently FIO2 40% with good oxygen saturation. Weaned off Levophed. Nutrition > TPN. Agitated this AM now requiring Precedex/Fentanyl. REVIEW OF SYSTEMS:  Unable to obtain, patient on ventilator. OBJECTIVE:  Hemodynamic Monitoring  CVP (Mean): 5 mmHg  Ventilator Settings:   Vent Information  Ventilator Started: Yes  Ventilator Stopped: Yes (placed on T-Piece)  Ventilation Day(s): 5  Vent Type: Servo i  Vent Mode: PRVC  Vt Ordered: 500 mL  Rate Set: 16 bmp  Pressure Support: 5 cmH20  FiO2 : 40 %  Sensitivity: 5  PEEP/CPAP: 5  I Time/ I Time %: 0.9 s  Cuff Pressure (cm H2O): 26 cm H2O  Humidification Source: HME  Additional Respiratory  Assessments  Pulse: 53  Resp: 17  SpO2: 94 %  Humidification Source: HME  Oral Care: Mouth suctioned, Mouth swabbed  Cuff Pressure (cm H2O): 26 cm H2O    IV Access: IJ central line right 8/30/18   Diet:.  NPO  Antibiotics:  Merrem 9/2/18  Prophylaxis:  DVT - SCD, no anticoagulants due to recent vascular surgery  GI - Protonix    Current Medications:  Current Facility-Administered Medications   Medication Dose Route Frequency Provider Last Rate Last Dose    dexmedetomidine (PRECEDEX) 400 mcg in sodium chloride 0.9 % 100 mL infusion  0.2 mcg/kg/hr Intravenous Continuous Mckenna Cabrera MD 20.8 mL/hr at 09/06/18 0911 1.2 mcg/kg/hr at 09/06/18 0911    PN-Adult Premix 5/20 - Standard Electrolytes - Central Line   Intravenous Continuous TPN Mckenna Cabrera MD 42 mL/hr at 09/05/18 2211      fat emulsion 20 % infusion 250 mL  250 mL  metoprolol tartrate (LOPRESSOR) tablet 25 mg  25 mg Oral Q12H PRN Emil Harding MD   25 mg at 08/31/18 1932    cloNIDine (CATAPRES) tablet 0.1 mg  0.1 mg Oral Q2H PRN Emil Harding MD   0.1 mg at 08/31/18 1830    HYDROmorphone (DILAUDID) 0.5-0.9 MG/ML-% injection 0.5 mg  0.5 mg Intravenous Q1H PRN Emil Harding MD   0.5 mg at 09/06/18 0645    Or    HYDROmorphone (DILAUDID) 0.5-0.9 MG/ML-% injection 0.25 mg  0.25 mg Intravenous Q1H PRN Emil Harding MD   0.25 mg at 09/04/18 2322       Physical Exam:  BP (!) 82/41   Pulse 53   Temp 97.3 °F (36.3 °C)   Resp 17   Ht 5' 3\" (1.6 m)   Wt 163 lb (73.9 kg)   SpO2 94%   Breastfeeding? No   BMI 28.87 kg/m²   24hr I & O:      Intake/Output Summary (Last 24 hours) at 09/06/18 0940  Last data filed at 09/06/18 0600   Gross per 24 hour   Intake          2413.45 ml   Output             1390 ml   Net          1023.45 ml     Physical Exam   Constitutional: She appears well-nourished. No distress. She is not intubated. HENT:   Head: Normocephalic and atraumatic. Nose: Nose normal.   Eyes: Pupils are equal, round, and reactive to light. Right eye exhibits no discharge. Left eye exhibits no discharge. Neck: Neck supple. No tracheal deviation present. Cardiovascular: Normal rate and normal heart sounds. Pulmonary/Chest: No stridor. She is not intubated. No respiratory distress. She has no wheezes. She has rales. Abdominal: Soft. She exhibits no distension and no mass. There is no guarding. Musculoskeletal: She exhibits no edema or deformity. Neurological:   Sedated and intubated. Non focal.   Skin: Skin is warm. She is not diaphoretic. No erythema. Psychiatric:   Unable to obtain.      Labs:   Recent Labs      09/04/18   0600   09/05/18   0556   09/05/18   1200  09/05/18   2351  09/06/18   0546   WBC  13.4*   --   16.5*   --    --    --   13.7*   RBC  2.67*   --   3.20*   --    --    --   2.78*   HGB  8.0*   < >  9.4*   < >  9.1*  8.1*  8.4* visualized .   Normal size left atrium.   Mitral valve leaflets are mildly thickened with preserved leaflet   mobility.   Trace mitral regurgitation.   Individual aortic valve leaflets are not clearly visualized.   Trace tricuspid regurgitation .  [place] is a small circumferential pericardial effusion noted    Micro:   CULTURE, RESPIRATORY  (Abnormal) 09/01/2018  8:55 AM [facility]      Isolation in progress of Light growth 2nd Gram negative josh and   Moderate growth suspicious Alpha Strep species     Gram Stain Result 09/01/2018  8:55 AM [facility]   Few Epithelial Cells   Many WBC's (Polymorphonuclear)   Mixed bacterial morphotypes suggestive of   Normal respiratory ruthy     Organism  (Abnormal) 09/01/2018  8:55 AM [facility]   Pseudomonas aeruginosa        Blood and urine cultures pending    IMPRESSION / PLAN:  Principal Problem:    AAA (abdominal aortic aneurysm) ([facility])  Active Problems:    Renal artery stenosis (HCC)    Essential hypertension    Ischemic hepatitis    Acute respiratory failure with hypoxia ([facility])    CKD (chronic kidney disease), stage III    Metabolic acidosis    Acute blood loss as cause of postoperative anemia    Atrial fibrillation with rapid ventricular response ([facility])    Positive sputum culture for Pseudomonas    Fever in adult    Shock circulatory ([facility] [address]  Resolved Problems:    * No resolved hospital problems. *      1. Continue invasive ventilatory support until hemodynamically stable. 2. Sedation > Fentanyl/Precedex. Ativan prn.  3. Amiodarone drip per cardiology. Not safe to anticoagulate due to recent surgery. 4. Nutrition > TPN to NGF. 5. Elevated bilirubin, follow. Checked GB US consider GI input if uptrend  6. Merrem for pseudomonal pneumonia. ET secretions noted. 7. Normal LVEF per Echo. 8. See orders and above        Pt discussed with ICU team during rounds. CC time excluding procedures: 0  Code Status: Full Code       Rah BOLTON Janes Glez M.D.   Hospitalist service  9/6/2018  9:40 AM

## 2018-09-06 NOTE — PROGRESS NOTES
Vascular Surgery  Dr.Scott Lurdes Monet   Daily Progress Note    Pt Name: Filemon Jung Record Number: [id number]  Date of Birth 1950   Today's Date: 9/6/2018        SUBJECTIVE:     Patient was seen and examined, is restrained with sitter at bedside.   Pain is  Controlled, fentanyl drip/precedex drip  has not had nausea/vomiting (still with NG)    OBJECTIVE:     Patient Vitals for the past 24 hrs:   BP Temp Temp src Pulse Resp SpO2 Weight   09/06/18 1100 (!) 87/39 - - (!) 47 16 94 % -   09/06/18 1027 - - - (!) 49 17 94 % -   09/06/18 1000 (!) 91/49 - - 50 15 95 % -   09/06/18 0900 (!) 82/41 - - 53 17 94 % -   09/06/18 0800 (!) 125/54 97.3 °F (36.3 °C) - 68 13 92 % -   09/06/18 0715 (!) 164/79 - - 110 (!) 33 90 % -   09/06/18 0700 (!) 141/107 - - 73 16 95 % -   09/06/18 0648 - - - - 18 96 % -   09/06/18 0647 - - - - 21 97 % -   09/06/18 0645 (!) 160/88 - - 91 (!) 32 94 % -   09/06/18 0600 (!) 115/57 - - 71 25 100 % 163 lb (73.9 kg)   09/06/18 0500 (!) 98/52 - - 72 (!) 33 95 % -   09/06/18 0400 (!) 89/44 97.6 °F (36.4 °C) Temporal 59 16 95 % -   09/06/18 0313 (!) 103/51 - - 60 16 98 % -   09/06/18 0300 (!) 76/42 - - 52 14 98 % -   09/06/18 0200 (!) 97/49 - - 64 17 96 % -   09/06/18 0100 (!) 84/45 - - 57 16 98 % -   09/06/18 0000 (!) 74/46 97 °F (36.1 °C) Temporal 63 16 99 % -   09/05/18 2300 (!) 101/51 - - 66 18 98 % -   09/05/18 2200 97/67 - - 72 17 97 % -   09/05/18 2100 (!) 129/52 - - 94 26 97 % -   09/05/18 2000 (!) 108/59 97.8 °F (36.6 °C) Temporal 74 20 96 % -   09/05/18 1900 (!) 107/25 - - 77 21 94 % -   09/05/18 1802 (!) 156/66 - - 87 (!) 37 94 % -   09/05/18 1700 115/65 - - 66 19 97 % -   09/05/18 1618 (!) 106/50 98.5 °F (36.9 °C) Tympanic 68 25 96 % -   09/05/18 1500 109/67 - - 67 20 97 % -   09/05/18 1423 - - - - 16 100 % -   09/05/18 1419 - - - 69 23 100 % -   09/05/18 1404 105/66 - - 67 17 98 % -         Intake/Output Summary (Last 24 hours) at 09/06/18 1308  Last data filed at 09/06/18 2122   Gross mg Q1H PRN   Or     HYDROmorphone 0.25 mg Q1H PRN         PHYSICAL EXAM:     CONSTITUTIONAL: off vent, still intubated and restrained. Weaning fentanyl drip  NECK: Soft, trachea midline and straight  LUNGS: Rales noted bilaterally anterior  CARDIOVASCULAR: Heart regular rate and rhythm  ABDOMEN: soft, nontender, slightly distended , scattered faint bowel sounds noted  NEUROLOGIC: Awake, restrained, moving all extremities  WOUND/INCISION: midline incision with staples, is CDI, healing well, no drainage, no erythema  EXTREMITY: feet warm to touch/pink color; +palp DP/PT pulses noted    LABS:     CBC: Recent Labs      09/04/18   0600   09/05/18   0556   09/05/18   1200  09/05/18   2351  09/06/18   0546   WBC  13.4*   --   16.5*   --    --    --   13.7*   RBC  2.67*   --   3.20*   --    --    --   2.78*   HGB  8.0*   < >  9.4*   < >  9.1*  8.1*  8.4*   HCT  24.1*   < >  28.5*   < >  27.8*  24.8*  25.3*   MCV  90.3   --   89.1   --    --    --   91.0   MCH  30.0   --   29.4   --    --    --   30.2   MCHC  33.2   --   33.0   --    --    --   33.2   RDW  15.9*   --   15.8*   --    --    --   16.1*   PLT  146   --   181   --    --    --   198   MPV  10.4   --   10.6   --    --    --   10.6    < > = values in this interval not displayed. Last 3 CMP:   Recent Labs      09/04/18   0600   09/05/18   0556  09/06/18   0421  09/06/18   0546   NA  138   --   141   --   137   K  4.0   < >  4.0  4.0  4.1   CL  105   --   106   --   105   CO2  25   --   24   --   24   BUN  28*   --   29*   --   29*   CREATININE  1.4*   --   1.1*   --   1.0*   GLUCOSE  122*   --   112*   --   115*   CALCIUM  7.9*   --   8.2*   --   7.9*   PROT   --    --   4.9*   --   4.5*   LABALBU   --    --   2.6*   --   2.2*   BILITOT   --    --   2.9*   --   2.1*   ALKPHOS   --    --   87   --   74   AST   --    --   48*   --   41*   ALT   --    --   110*   --   71*    < > = values in this interval not displayed.       Calcium:   Lab Results   Component Value
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
## 2018-09-06 NOTE — PROGRESS NOTES
Patient placed on Tpiece for 40 minutes. She was getting very anxious and wouldn't hold still for ABG. Saturation was %. Cristian Villars here to see patient also at this time. Patient extubated to nasal cannula 4 liters. She is still anxious but calming down. She knows her name but is confused on where she is, time of day, or year. Sitter is still at bedside.

## 2018-09-07 ENCOUNTER — APPOINTMENT (OUTPATIENT)
Dept: GENERAL RADIOLOGY | Age: 68
DRG: 268 | End: 2018-09-07
Attending: SURGERY
Payer: MEDICARE

## 2018-09-07 LAB
ALBUMIN SERPL-MCNC: 2.7 G/DL (ref 3.5–5.2)
ALP BLD-CCNC: 89 U/L (ref 35–104)
ALT SERPL-CCNC: 66 U/L (ref 5–33)
AMYLASE: 23 U/L (ref 28–100)
ANION GAP SERPL CALCULATED.3IONS-SCNC: 16 MMOL/L (ref 7–19)
AST SERPL-CCNC: 44 U/L (ref 5–32)
BASE EXCESS ARTERIAL: -6 MMOL/L (ref -2–2)
BILIRUB SERPL-MCNC: 2.4 MG/DL (ref 0.2–1.2)
BLOOD CULTURE, ROUTINE: NORMAL
BLOOD CULTURE, ROUTINE: NORMAL
BUN BLDV-MCNC: 28 MG/DL (ref 8–23)
CALCIUM SERPL-MCNC: 8.1 MG/DL (ref 8.8–10.2)
CARBOXYHEMOGLOBIN ARTERIAL: 2.9 % (ref 0–5)
CHLORIDE BLD-SCNC: 105 MMOL/L (ref 98–111)
CO2: 20 MMOL/L (ref 22–29)
CREAT SERPL-MCNC: 0.9 MG/DL (ref 0.5–0.9)
CULTURE, BLOOD 2: NORMAL
GFR NON-AFRICAN AMERICAN: >60
GLUCOSE BLD-MCNC: 96 MG/DL (ref 74–109)
HCO3 ARTERIAL: 18.5 MMOL/L (ref 22–26)
HCT VFR BLD CALC: 24.5 % (ref 37–47)
HCT VFR BLD CALC: 25.7 % (ref 37–47)
HCT VFR BLD CALC: 27.5 % (ref 37–47)
HCT VFR BLD CALC: 27.6 % (ref 37–47)
HEMOGLOBIN, ART, EXTENDED: 9.5 G/DL (ref 12–16)
HEMOGLOBIN: 8 G/DL (ref 12–16)
HEMOGLOBIN: 8.6 G/DL (ref 12–16)
HEMOGLOBIN: 9 G/DL (ref 12–16)
HEMOGLOBIN: 9 G/DL (ref 12–16)
MAGNESIUM: 1.8 MG/DL (ref 1.6–2.4)
MCH RBC QN AUTO: 30.1 PG (ref 27–31)
MCHC RBC AUTO-ENTMCNC: 33.5 G/DL (ref 33–37)
MCV RBC AUTO: 89.9 FL (ref 81–99)
METHEMOGLOBIN ARTERIAL: 1 %
O2 CONTENT ARTERIAL: 10.7 ML/DL
O2 SAT, ARTERIAL: 80.3 %
O2 THERAPY: ABNORMAL
PCO2 ARTERIAL: 32 MMHG (ref 35–45)
PDW BLD-RTO: 16.1 % (ref 11.5–14.5)
PH ARTERIAL: 7.37 (ref 7.35–7.45)
PHOSPHORUS: 2 MG/DL (ref 2.5–4.5)
PLATELET # BLD: 290 K/UL (ref 130–400)
PMV BLD AUTO: 10.9 FL (ref 9.4–12.3)
PO2 ARTERIAL: 43 MMHG (ref 80–100)
POTASSIUM SERPL-SCNC: 4 MMOL/L (ref 3.5–5)
POTASSIUM, WHOLE BLOOD: 4.3
RBC # BLD: 2.86 M/UL (ref 4.2–5.4)
SODIUM BLD-SCNC: 141 MMOL/L (ref 136–145)
TOTAL PROTEIN: 5 G/DL (ref 6.6–8.7)
WBC # BLD: 17.8 K/UL (ref 4.8–10.8)

## 2018-09-07 PROCEDURE — 82803 BLOOD GASES ANY COMBINATION: CPT

## 2018-09-07 PROCEDURE — 94660 CPAP INITIATION&MGMT: CPT

## 2018-09-07 PROCEDURE — 99233 SBSQ HOSP IP/OBS HIGH 50: CPT | Performed by: FAMILY MEDICINE

## 2018-09-07 PROCEDURE — G8997 SWALLOW GOAL STATUS: HCPCS

## 2018-09-07 PROCEDURE — 85027 COMPLETE CBC AUTOMATED: CPT

## 2018-09-07 PROCEDURE — 82150 ASSAY OF AMYLASE: CPT

## 2018-09-07 PROCEDURE — 2580000003 HC RX 258: Performed by: FAMILY MEDICINE

## 2018-09-07 PROCEDURE — 2500000003 HC RX 250 WO HCPCS: Performed by: FAMILY MEDICINE

## 2018-09-07 PROCEDURE — 6360000002 HC RX W HCPCS: Performed by: HOSPITALIST

## 2018-09-07 PROCEDURE — 80053 COMPREHEN METABOLIC PANEL: CPT

## 2018-09-07 PROCEDURE — 99024 POSTOP FOLLOW-UP VISIT: CPT | Performed by: SURGERY

## 2018-09-07 PROCEDURE — 6360000002 HC RX W HCPCS

## 2018-09-07 PROCEDURE — 2580000003 HC RX 258: Performed by: HOSPITALIST

## 2018-09-07 PROCEDURE — 85014 HEMATOCRIT: CPT

## 2018-09-07 PROCEDURE — 71045 X-RAY EXAM CHEST 1 VIEW: CPT

## 2018-09-07 PROCEDURE — 2700000000 HC OXYGEN THERAPY PER DAY

## 2018-09-07 PROCEDURE — 6360000002 HC RX W HCPCS: Performed by: SURGERY

## 2018-09-07 PROCEDURE — 92610 EVALUATE SWALLOWING FUNCTION: CPT

## 2018-09-07 PROCEDURE — 83735 ASSAY OF MAGNESIUM: CPT

## 2018-09-07 PROCEDURE — 84132 ASSAY OF SERUM POTASSIUM: CPT

## 2018-09-07 PROCEDURE — 6360000002 HC RX W HCPCS: Performed by: INTERNAL MEDICINE

## 2018-09-07 PROCEDURE — 84100 ASSAY OF PHOSPHORUS: CPT

## 2018-09-07 PROCEDURE — 2000000000 HC ICU R&B

## 2018-09-07 PROCEDURE — 2580000003 HC RX 258: Performed by: INTERNAL MEDICINE

## 2018-09-07 PROCEDURE — 85018 HEMOGLOBIN: CPT

## 2018-09-07 PROCEDURE — 2580000003 HC RX 258: Performed by: SURGERY

## 2018-09-07 PROCEDURE — 2500000003 HC RX 250 WO HCPCS: Performed by: NURSE PRACTITIONER

## 2018-09-07 PROCEDURE — 36592 COLLECT BLOOD FROM PICC: CPT

## 2018-09-07 PROCEDURE — C9113 INJ PANTOPRAZOLE SODIUM, VIA: HCPCS | Performed by: HOSPITALIST

## 2018-09-07 PROCEDURE — G8996 SWALLOW CURRENT STATUS: HCPCS

## 2018-09-07 PROCEDURE — 94640 AIRWAY INHALATION TREATMENT: CPT

## 2018-09-07 RX ORDER — LORAZEPAM 2 MG/ML
1 INJECTION INTRAMUSCULAR EVERY 4 HOURS PRN
Status: DISCONTINUED | OUTPATIENT
Start: 2018-09-07 | End: 2018-09-21 | Stop reason: HOSPADM

## 2018-09-07 RX ORDER — LORAZEPAM 2 MG/ML
INJECTION INTRAMUSCULAR
Status: COMPLETED
Start: 2018-09-07 | End: 2018-09-07

## 2018-09-07 RX ADMIN — IPRATROPIUM BROMIDE 0.5 MG: 0.5 SOLUTION RESPIRATORY (INHALATION) at 14:19

## 2018-09-07 RX ADMIN — Medication 0.5 MG: at 07:56

## 2018-09-07 RX ADMIN — AMIODARONE HYDROCHLORIDE 0.5 MG/MIN: 50 INJECTION, SOLUTION INTRAVENOUS at 00:08

## 2018-09-07 RX ADMIN — GUAIFENESIN 400 MG: 100 SOLUTION ORAL at 08:26

## 2018-09-07 RX ADMIN — DEXMEDETOMIDINE HYDROCHLORIDE 0.9 MCG/KG/HR: 100 INJECTION, SOLUTION INTRAVENOUS at 17:18

## 2018-09-07 RX ADMIN — DEXMEDETOMIDINE HYDROCHLORIDE 0.9 MCG/KG/HR: 100 INJECTION, SOLUTION INTRAVENOUS at 23:12

## 2018-09-07 RX ADMIN — MEROPENEM 1 G: 1 INJECTION, POWDER, FOR SOLUTION INTRAVENOUS at 00:51

## 2018-09-07 RX ADMIN — MEROPENEM 1 G: 1 INJECTION, POWDER, FOR SOLUTION INTRAVENOUS at 08:27

## 2018-09-07 RX ADMIN — PANTOPRAZOLE SODIUM 40 MG: 40 INJECTION, POWDER, FOR SOLUTION INTRAVENOUS at 08:26

## 2018-09-07 RX ADMIN — DEXMEDETOMIDINE HYDROCHLORIDE 1.2 MCG/KG/HR: 100 INJECTION, SOLUTION INTRAVENOUS at 11:59

## 2018-09-07 RX ADMIN — IPRATROPIUM BROMIDE 0.5 MG: 0.5 SOLUTION RESPIRATORY (INHALATION) at 10:25

## 2018-09-07 RX ADMIN — GUAIFENESIN 400 MG: 100 SOLUTION ORAL at 18:27

## 2018-09-07 RX ADMIN — LORAZEPAM 1 MG: 2 INJECTION INTRAMUSCULAR; INTRAVENOUS at 10:00

## 2018-09-07 RX ADMIN — GUAIFENESIN 400 MG: 100 SOLUTION ORAL at 20:00

## 2018-09-07 RX ADMIN — ACETYLCYSTEINE 600 MG: 200 SOLUTION ORAL; RESPIRATORY (INHALATION) at 18:38

## 2018-09-07 RX ADMIN — MEROPENEM 1 G: 1 INJECTION, POWDER, FOR SOLUTION INTRAVENOUS at 18:27

## 2018-09-07 RX ADMIN — IPRATROPIUM BROMIDE 0.5 MG: 0.5 SOLUTION RESPIRATORY (INHALATION) at 06:24

## 2018-09-07 RX ADMIN — Medication 0.5 MG: at 11:21

## 2018-09-07 RX ADMIN — GUAIFENESIN 400 MG: 100 SOLUTION ORAL at 14:09

## 2018-09-07 RX ADMIN — Medication 10 ML: at 08:26

## 2018-09-07 RX ADMIN — DEXMEDETOMIDINE HYDROCHLORIDE 1 MCG/KG/HR: 100 INJECTION, SOLUTION INTRAVENOUS at 05:48

## 2018-09-07 RX ADMIN — Medication 10 ML: at 20:00

## 2018-09-07 RX ADMIN — ACETYLCYSTEINE 600 MG: 200 SOLUTION ORAL; RESPIRATORY (INHALATION) at 06:27

## 2018-09-07 RX ADMIN — AMIODARONE HYDROCHLORIDE 0.5 MG/MIN: 50 INJECTION, SOLUTION INTRAVENOUS at 15:55

## 2018-09-07 RX ADMIN — IPRATROPIUM BROMIDE 0.5 MG: 0.5 SOLUTION RESPIRATORY (INHALATION) at 18:38

## 2018-09-07 ASSESSMENT — PAIN SCALES - GENERAL
PAINLEVEL_OUTOF10: 0
PAINLEVEL_OUTOF10: 0

## 2018-09-07 NOTE — PROGRESS NOTES
Ref Range & Units Status Collected Lab    pH, Arterial 7.370  7.350 - 7.450 Final 09/07/2018  1:48 PM [address] [facility] Lab   pCO2, Arterial 32.0   35.0 - 45.0 mmHg Final 09/07/2018  1:48 PM [address] [facility] Lab   pO2, Arterial 43.0   80.0 - 100.0 mmHg Final 09/07/2018  1:48 PM [address] [facility] Lab   HCO3, Arterial 18.5   22.0 - 26.0 mmol/L Final 09/07/2018  1:48 PM [facility] Excess, Arterial -6.0   -2.0 - 2.0 mmol/L Final 09/07/2018  1:48 PM [address] [facility] Lab   Hemoglobin, Art, Extended 9.5   12.0 - 16.0 g/dL Final 09/07/2018  1:48 PM [address] [facility] Lab   O2 Sat, Arterial 80.3   >92 % Final 09/07/2018  1:48 PM [address] [facility] Lab   Carboxyhgb, Arterial 2.9  0.0 - 5.0 % Final 09/07/2018  1:48 PM [facility] Lab        0.0-1.5   (Smokers 1.5-5.0)    Methemoglobin, Arterial 1.0  <1.5 % Final 09/07/2018  1:48 PM [address] [facility] Lab   O2 Content, Arterial 10.7  Not Established mL/dL Final 09/07/2018  1:48 PM [address] [facility] Lab   O2 Therapy Unknown   Final 09/07/2018  1:48 PM [address] [place] [address] Lab   Testing Performed By     [facility] Name Director Address Valid Date Range   [address] - [address] LAB Teagan Roche  [facility] [place],[address]  [address] [place] [address] 08/30/17 1233-Present   Narrative     CALL  Su  [facility] , VERBAL   56 [phone] [facility], 09/07/2018 13:56, by [facility]   Lab and Collection     Blood Gas, Arterial on 9/7/2018   Result History     Blood Gas, Arterial on 9/7/2018          Result Information     Flag: Abnormal Status: Final result (Collected: 9/7/2018 13:48) Provider Status: Ordered   Routing History     Priority Sent On From To Message Type    9/6/2018  5:55 AM [facility]i Incoming Lab Results From [facility] Blake MD     8/31/2018 11:04 PM [facility] Incoming Lab Results From [facility] Alvarez MD     8/30/2018  7:02 AM [facility] Incoming Lab Results From [facility] Cher Riley, APRN    Click to ROLO [facility] [employer] Info     Blood Gas, Arterial (Order #[id number]) on 9/7/18   Order Report   PT ON 2 LPM RR   Order Details
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
## 2018-09-07 NOTE — PROGRESS NOTES
ICU Hospitalist Progress Note                    Patient Name: Simba Smith  [id number]  Admit Date: 8/30/2018  ICU Day 9  LOS 8     PCP: Ricardo Nunez MD     Brief Overview: 78 y.o. female     Chief Complaint: Intubated    Subjective / History of present illness:  78year old female status post surgical repair infrarenal abdominal aortic aneurysm repair and left renal artery stenosis on 08/30/18. Patient is extubated, appears anxious and tachypneic. REVIEW OF SYSTEMS:  Unable to obtain, patient on ventilator. OBJECTIVE:  Hemodynamic Monitoring  CVP (Mean): 5 mmHg  Ventilator Settings:   Vent Information  Ventilator Started: Yes  Ventilator Stopped: Yes  Ventilation Day(s): 5  Vent Type: Servo i  Vent Mode: CPAP  Vt Ordered: 500 mL  Rate Set: 16 bmp  Pressure Support: 5 cmH20  FiO2 : 40 %  Sensitivity: 5  PEEP/CPAP: 5  I Time/ I Time %: 0.9 s  Cuff Pressure (cm H2O): 26 cm H2O  Humidification Source: HME  Additional Respiratory  Assessments  Pulse: 76  Resp: 21  SpO2: 93 %  Humidification Source: HME  Oral Care: Mouth swabbed, Mouth moisturizer, Lip moisturizer applied  Cuff Pressure (cm H2O): 26 cm H2O    IV Access: IJ central line right 8/30/18   Diet:.  NPO > SLP to evaluate  Antibiotics:  Merrem 9/2/18  Prophylaxis:  DVT - SCD, no anticoagulants due to recent vascular surgery  GI - Protonix    Current Medications:  Current Facility-Administered Medications   Medication Dose Route Frequency Provider Last Rate Last Dose    LORazepam (ATIVAN) injection 1 mg  1 mg Intravenous Q4H PRN Robert An MD        LORazepam (ATIVAN) 2 MG/ML injection             meropenem (MERREM) 1 g in sodium chloride 0.9 % 100 mL IVPB (mini-bag)  1 g Intravenous Q8H Diaz Corrigan MD   Stopped at 09/07/18 0857    dexmedetomidine (PRECEDEX) 400 mcg in sodium chloride 0.9 % 100 mL infusion  0.2 mcg/kg/hr Intravenous Continuous Robert An MD 20.8 mL/hr at 09/07/18 0733 1.2 mcg/kg/hr at 09/07/18 1322  fat emulsion 20 % infusion 250 mL  250 mL Intravenous Once per day on Mon Thu Fidel Yanez MD   Stopped at 09/04/18 0613    acetylcysteine (MUCOMYST) 20 % solution 600 mg  600 mg Inhalation BID Anjelica Hayes MD   600 mg at 09/07/18 4891    ipratropium (ATROVENT) 0.02 % nebulizer solution 0.5 mg  0.5 mg Nebulization 4x daily Anjelica Hayes MD   0.5 mg at 09/07/18 1025    amiodarone (CORDARONE) 450 mg in dextrose 5 % 250 mL infusion  0.5 mg/min Intravenous Continuous Yennifer Barron MD 16.7 mL/hr at 09/07/18 0008 0.5 mg/min at 09/07/18 0008    midazolam HCl (VERSED) 100 mg in dextrose 5 % 100 mL infusion  1 mg/hr Intravenous Continuous Wayne Mcclure MD   Stopped at 09/03/18 1154    fentaNYL (SUBLIMAZE) 1250 mcg in sodium chloride 0.9 % 250 mL  25 mcg/hr Intravenous Continuous Wayne Mcclure MD   Stopped at 09/06/18 1300    norepinephrine (LEVOPHED) 16 mg in dextrose 5 % 250 mL infusion  2 mcg/min Intravenous Continuous Wayne Mcclure MD   Stopped at 09/06/18 2200    chlorhexidine (PERIDEX) 0.12 % solution 15 mL  15 mL Mouth/Throat BID Wayne Mcclure MD   15 mL at 09/06/18 0901    guaiFENesin (ROBITUSSIN) 100 MG/5ML liquid 400 mg  400 mg Per NG tube 4x Daily BRAYDON Wilkerson   400 mg at 09/07/18 0826    pantoprazole (PROTONIX) injection 40 mg  40 mg Intravenous Daily Wayne Mcclure MD   40 mg at 09/07/18 0826    sodium chloride flush 0.9 % injection 10 mL  10 mL Intravenous 2 times per day Burl Sever, MD   10 mL at 09/07/18 0826    sodium chloride flush 0.9 % injection 10 mL  10 mL Intravenous PRN Burl Sever, MD   10 mL at 09/01/18 1834    potassium chloride 10 mEq/100 mL IVPB (Peripheral Line)  10 mEq Intravenous PRN Burl Sever, MD        ondansetron [facility]) injection 4 mg  4 mg Intravenous Q8H PRN Burl Sever, MD        metoprolol tartrate (LOPRESSOR) tablet 25 mg  25 mg Oral Q12H PRN Burl Sever, MD   25 mg at 08/31/18 1932    cloNIDine (CATAPRES) tablet 09/05/18   0556  09/06/18   0421  09/06/18   0546  09/07/18   0555   NA  141   --   137  141   K  4.0  4.0  4.1  4.0   ANIONGAP  11   --   8  16   CL  106   --   105  105   CO2  24   --   24  20*   BUN  29*   --   29*  28*   CREATININE  1.1*   --   1.0*  0.9   GLUCOSE  112*   --   115*  96   CALCIUM  8.2*   --   7.9*  8.1*     Recent Labs      09/05/18   0556  09/06/18   0546  09/07/18   0555   MG  2.1  2.1  1.8   PHOS  1.8*  2.0*  2.0*     Recent Labs      09/05/18   0556  09/06/18   0546  09/07/18   0555   AST  48*  41*  44*   ALT  110*  71*  66*   BILITOT  2.9*  2.1*  2.4*   ALKPHOS  87  74  89     HgBA1c:  No components found for: HGBA1C  Troponin T:   No results for input(s): TROPONINI in the last 72 hours. Pro-BNP: No results for input(s): BNP in the last 72 hours. INR: No results for input(s): INR in the last 72 hours. ABGs:   Lab Results   Component Value Date    PHART 7.360 09/06/2018    PO2ART 70.0 09/06/2018    BKN5MIL 42.0 09/06/2018     UA:  No results for input(s): NITRITE, COLORU, PHUR, LABCAST, WBCUA, RBCUA, MUCUS, TRICHOMONAS, YEAST, BACTERIA, CLARITYU, SPECGRAV, LEUKOCYTESUR, UROBILINOGEN, BILIRUBINUR, BLOODU, GLUCOSEU, AMORPHOUS in the last 72 hours. Invalid input(s): Marcia Buck    RAD:  CHEST PORTABLE 9/2/18   1. Life support lines described above. 2.. Bilateral pulmonary opacities with small left pleural effusion and  left basilar consolidation. Findings favorable for pulmonary edema  and/or multifocal pneumonia.     EKG/Telemetry: Afib    Echo:   Normal left ventricular size with preserved LV function and an estimated   ejection fraction of approximately 55-60%.   No evidence of left ventricular mass or thrombus noted.   The right ventricle was not clearly visualized .   Normal size left atrium.   Mitral valve leaflets are mildly thickened with preserved leaflet   mobility.   Trace mitral regurgitation.   Individual aortic valve leaflets are not clearly visualized.   Trace tricuspid regurgitation .  Hoa Palencia is a small circumferential pericardial effusion noted    Micro:   CULTURE, RESPIRATORY  (Abnormal) 09/01/2018  8:55 AM [facility] Lab      Isolation in progress of Light growth 2nd Gram negative josh and   Moderate growth suspicious Alpha Strep species     Gram Stain Result 09/01/2018  8:55 AM [facility]   Few Epithelial Cells   Many WBC's (Polymorphonuclear)   Mixed bacterial morphotypes suggestive of   Normal respiratory ruthy     Organism  (Abnormal) 09/01/2018  8:55 AM [facility]   Pseudomonas aeruginosa        Blood and urine cultures pending    IMPRESSION / PLAN:  Principal Problem:    AAA (abdominal aortic aneurysm) ([facility])  Active Problems:    Renal artery stenosis ([facility])    Essential hypertension    Ischemic hepatitis    Acute respiratory failure with hypoxia ([facility])    CKD (chronic kidney disease), stage III    Metabolic acidosis    Acute blood loss as cause of postoperative anemia    Atrial fibrillation with rapid ventricular response ([facility])    Positive sputum culture for Pseudomonas    Fever in adult    Shock circulatory ([facility]  Resolved Problems:    * No resolved hospital problems. *      1. Continue invasive ventilatory support until hemodynamically stable. 2. Anxiety > Ativan prn.  3. Amiodarone drip per cardiology. Not safe to anticoagulate due to recent surgery. 4. Nutrition > TPN to NGF no oral?  5. Elevated bilirubin, follow. Checked GB US consider GI input if uptrend. Consider GI evaluation. 6. Merrem for pseudomonal pneumonia. ET secretions noted. 7. Normal LVEF per Echo. 8. See orders and above        Pt discussed with ICU team during rounds. CC time excluding procedures: 0  Code Status: Full Code       Rah Gary M.D.   Hospitalist service  9/7/2018  10:43 AM

## 2018-09-07 NOTE — PROGRESS NOTES
Speech Language Pathology  Facility/Department: [facility] ICU   BEDSIDE SWALLOW EVALUATION    NAME: Mario Craig  : 1950  MRN: [id number]    ADMISSION DATE: 2018  ADMITTING DIAGNOSIS: has Irritable bowel syndrome; Osteoarthritis; Anxiety; Depression; Labyrinthitis; AAA (abdominal aortic aneurysm) ([address] Carotid artery stenosis; Hyperlipemia; TIA (transient ischemic attack); Fibromuscular dysplasia ([address] Diarrhea; History of colon polyps; Chronic heartburn; Antiplatelet or antithrombotic long-term use; Colon polyps; Renal artery stenosis ([address] Celiac artery stenosis ([address] Essential hypertension; Bilateral carotid artery stenosis; Ischemic hepatitis; Acute respiratory failure with hypoxia ([address] CKD (chronic kidney disease), stage III; Metabolic acidosis; Acute blood loss as cause of postoperative anemia; Atrial fibrillation with rapid ventricular response ([address] Positive sputum culture for Pseudomonas; Fever in adult; and Shock circulatory ([address] on her problem list.    Date of Eval: 2018  Evaluating Therapist: Hodan Kam    Current Diet level:  Current Diet : NPO  Current Liquid Diet : NPO    Reason for Referral  Mario Craig was referred for a bedside swallow evaluation to assess the efficiency of her swallow function, identify signs and symptoms of aspiration and make recommendations regarding safe dietary consistencies, effective compensatory strategies, and safe eating environment. Impression  Assessed patient's swallowing function. Patient exhibits min oral prep and slow oral transit of more solid consistencies (ice) as well as mild-moderately decreased laryngeal elevation for swallow airway protection. Even so, no outward S/S penetration/aspiration was noted with any ice chip trial. Did not assess swallow function with any additional consistency secondary to observed rapid respiratory rate. At this time, recommend NPO status. Alternative means of nutrition.  If patient receives care prior to intake, okay for ice chips VIA STAFF for comfort. G-Code  SLP G-Codes  Functional Limitations: Swallowing  Swallow Current Status (): At least 80 percent but less than 100 percent impaired, limited or restricted  Swallow Goal Status ():  At least 40 percent but less than 60 percent impaired, limited or restricted    Electronically signed by SVEN Mendoza on 9/7/2018 at 9:55 AM

## 2018-09-07 NOTE — PROGRESS NOTES
Vascular Surgery  Dr.Scott Mc Franco   Daily Progress Note    Pt Name: Filemon Jung Record Number: [id number]  Date of Birth 1950   Today's Date: 9/7/2018    SUBJECTIVE:     Patient was seen and examined, wrists restrained with sitter at bedside.   Pain is controlled, on precedex drip, just had morphine IV for restlessness/rapid resp  has not had nausea/vomiting (has NG but this is clamped)    OBJECTIVE:     Patient Vitals for the past 24 hrs:   BP Temp Temp src Pulse Resp SpO2 Height Weight   09/07/18 0800 (!) 148/82 97.5 °F (36.4 °C) - 92 (!) 46 100 % - -   09/07/18 0700 137/69 - - 84 28 (!) 89 % - -   09/07/18 0628 - - - - (!) 39 100 % - -   09/07/18 0600 (!) 164/70 - - 83 30 97 % 5' 3\" (1.6 m) 159 lb 8 oz (72.3 kg)   09/07/18 0500 131/61 - - 86 24 97 % - -   09/07/18 0400 (!) 156/72 97.6 °F (36.4 °C) Temporal 85 28 95 % - -   09/07/18 0300 134/69 - - 90 29 94 % - -   09/07/18 0200 129/66 - - 80 24 100 % - -   09/07/18 0100 (!) 131/93 - - 88 26 98 % - -   09/07/18 0000 (!) 168/73 97.8 °F (36.6 °C) Temporal 90 24 97 % - -   09/06/18 2300 (!) 110/49 - - 74 24 94 % - -   09/06/18 2200 121/61 - - 82 24 97 % - -   09/06/18 2115 127/64 - - 86 26 100 % - -   09/06/18 2000 (!) 150/69 97.4 °F (36.3 °C) Temporal 106 (!) 31 95 % - -   09/06/18 1915 (!) 132/58 - - 90 (!) 33 100 % - -   09/06/18 1832 - - - - - 100 % - -   09/06/18 1800 118/60 - - 72 21 96 % - -   09/06/18 1700 (!) 107/35 - - 77 23 100 % - -   09/06/18 1600 (!) 106/56 96.1 °F (35.6 °C) Temporal 67 25 96 % - -   09/06/18 1500 124/61 - - 80 24 100 % - -   09/06/18 1445 (!) 136/95 - - 75 (!) 31 100 % - -   09/06/18 1428 - - - - 27 91 % - -   09/06/18 1400 (!) 92/54 - - 69 29 100 % - -   09/06/18 1350 (!) 80/40 - - 76 (!) 36 100 % - -   09/06/18 1330 (!) 139/105 - - 83 25 100 % - -   09/06/18 1315 132/88 - - 76 15 97 % - -   09/06/18 1300 (!) 120/51 - - 60 30 100 % - -   09/06/18 1200 (!) 87/44 98.1 °F (36.7 °C) Temporal (!) 46 15 94 % - -   09/06/18 2. 1*  2.4*   ALKPHOS  87   --   74  89   AST  48*   --   41*  44*   ALT  110*   --   71*  66*      Calcium:   Lab Results   Component Value Date    CALCIUM 8.1 09/07/2018    CALCIUM 7.9 09/06/2018    CALCIUM 8.2 09/05/2018        DVT prophylaxis:                                  [x] SCDs                              Narrative   Examination. XR CHEST PORTABLE   History: Pneumonia   Frontal portable semiupright view of the chest is compared with the   previous study dated 9/6/2015. There is pulmonary vascular congestion, left lower lobar consolidation   and left basal pleural effusion which is unchanged in the previous   study. There are discoid atelectatic changes in the right lower lung. There is a persistent small infiltrate in the right upper lobe. The endotracheal tube have been removed since the previous study. The   nasogastric tube is in place. Right internal jugular venous access   catheter is in place. There is no pneumothorax.       Impression   No interval change. Signed by Dr Merry Ceja on 9/7/2018 6:55 AM     ASSESSMENT:     POD # Pre-operative Diagnosis:   1. AAA  1. 2.  Left renal artery stenosis  2.  HD # 8  Patient Active Problem List   Diagnosis    Irritable bowel syndrome    Osteoarthritis    Anxiety    Depression    Labyrinthitis    AAA (abdominal aortic aneurysm) (HCC)    Carotid artery stenosis    Hyperlipemia    TIA (transient ischemic attack)    Fibromuscular dysplasia (HCC)    Diarrhea    History of colon polyps    Chronic heartburn    Antiplatelet or antithrombotic long-term use    Colon polyps    Renal artery stenosis (HCC)    Celiac artery stenosis (HCC)    Essential hypertension    Bilateral carotid artery stenosis    Ischemic hepatitis    Acute respiratory failure with hypoxia (HCC)    CKD (chronic kidney disease), stage III    Metabolic acidosis    Acute blood loss as cause of postoperative anemia    Atrial fibrillation with rapid ventricular response ([facility] [address]    Positive sputum culture for Pseudomonas    Fever in adult    Shock circulatory [facility])       Chief Complaint:  No chief complaint on file. PLAN:     1. OOB today if possible  2. Swallow eval when patient able to participate-progress with dietary recommendations  3. WBC 17.8  4. Per her nurse patient resp was around 50 earlier this am, morphine IV given, not resp in 30 range      Agree with above. I examined pt.

## 2018-09-07 NOTE — PROGRESS NOTES
Pulmonary and Critical Care Progress Note [facility]    Patient: Demarco Marte    1950    MR# [id number]    Acct# [de-identified]   09/07/18   8:24 AM  Referring Provider: Perla Vigil MD    Chief Complaint: s/p extubation 9-6-18    Interval history: Patient is now off the vent, she has precedex and amiodarone still infusing. She is awake and quite confused. Her O2 sats are anywhere from % depending on how much she's moving around. No family at the bedside. No other aggravating or alleviating factors or associated symptoms. meropenem 1 g Intravenous Q8H   acetylcysteine 600 mg Inhalation BID   ipratropium 0.5 mg Nebulization 4x daily   chlorhexidine 15 mL Mouth/Throat BID   guaiFENesin 400 mg Per NG tube 4x Daily   pantoprazole 40 mg Intravenous Daily   sodium chloride flush 10 mL Intravenous 2 times per day      dexmedetomidine (PRECEDEX) IV infusion 1.2 mcg/kg/hr (09/07/18 0733)    fat emulsion Stopped (09/04/18 0832)    amiodarone 450mg/250ml D5W infusion 0.5 mg/min (09/07/18 0008)    midazolam Stopped (09/03/18 1154)    fentaNYL (SUBLIMAZE) 1250 mcg in sodium chloride 0.9 % 250 mL Stopped (09/06/18 1300)    norepinephrine Stopped (09/06/18 2200)     Review of Systems:   Cannot obtain due to mental status, confusion     Physical Exam:  Blood pressure (!) 148/82, pulse 92, temperature 97.5 °F (36.4 °C), resp. rate (!) 46, height 5' 3\" (1.6 m), weight 159 lb 8 oz (72.3 kg), SpO2 100 %, not currently breastfeeding. Intake/Output Summary (Last 24 hours) at 09/07/18 0824  Last data filed at 09/07/18 0800   Gross per 24 hour   Intake          1697.23 ml   Output             1390 ml   Net           307.23 ml   Physical Exam Constitutional: She appears well-developed and well-nourished. She is off the vent. HENT: NC O2 in place. Head: Normocephalic and atraumatic.    Right Ear: External ear normal.   Left Ear: External ear normal.   NG in place, clamped  Eyes: Conjunctivae are normal. Right eye exhibits no discharge. Left eye exhibits no discharge. Neck: Normal range of motion. Neck supple. No JVD present. Cardiovascular: Her pulse is in the 80s and is regular. No murmur heard. Pulmonary/Chest:  She has bilateral rales rales. She has tachypnea. Abdominal: Soft. She exhibits mild distension. Midline dressing intact   Neuro: Precedex infusing. She moves all extremities,  RN at the bedside to keep her from climbing out of the bed. Musculoskeletal: She exhibits edema improved. She exhibits no deformity. Skin: Skin is warm and dry. She is not diaphoretic. No erythema. No pallor  Psych: she is not oriented to time, self or place    Recent Labs      09/05/18   0556   09/06/18   0546  09/06/18   1515  09/07/18   0002  09/07/18   0555   WBC  16.5*   --   13.7*   --    --   17.8*   RBC  3.20*   --   2.78*   --    --   2.86*   HGB  9.4*   < >  8.4*  9.1*  9.0*  8.6*   HCT  28.5*   < >  25.3*  28.1*  27.6*  25.7*   PLT  181   --   198   --    --   290   MCV  89.1   --   91.0   --    --   89.9   MCH  29.4   --   30.2   --    --   30.1   MCHC  33.0   --   33.2   --    --   33.5   RDW  15.8*   --   16.1*   --    --   16.1*    < > = values in this interval not displayed. Recent Labs      09/05/18   0556  09/06/18   0421  09/06/18   0546  09/07/18   0555   NA  141   --   137  141   K  4.0  4.0  4.1  4.0   CL  106   --   105  105   CO2  24   --   24  20*   BUN  29*   --   29*  28*   CREATININE  1.1*   --   1.0*  0.9   CALCIUM  8.2*   --   7.9*  8.1*   GLUCOSE  112*   --   115*  96      Recent Labs      09/05/18   0452  09/06/18   0421   PHART  7.400  7.360   PQC8IUP  41.0  42.0   PO2ART  76.0*  70.0*   VIQ0WKQ  25.4  23.7   A5MIAURH  95.4  94.4   BEART  0.5  -1.7     Recent Labs      09/07/18   0555   AST  44*   ALT  66*   ALKPHOS  89   BILITOT  2.4*   MG  1.8   CALCIUM  8.1*   PHOS  2.0*     No results for input(s): BC, LABGRAM, CULTRESP, BFCX in the last 72 hours.   Radiograph: 09/07/18 0657    Narrative:     Examination. XR CHEST PORTABLE  History: Pneumonia  Frontal portable semiupright view of the chest is compared with the  previous study dated 9/6/2015. There is pulmonary vascular congestion, left lower lobar consolidation  and left basal pleural effusion which is unchanged in the previous  study. There are discoid atelectatic changes in the right lower lung. There is a persistent small infiltrate in the right upper lobe. The endotracheal tube have been removed since the previous study. The  nasogastric tube is in place. Right internal jugular venous access  catheter is in place. There is no pneumothorax. Impression:     No interval change. My radiograph interpretation: ET tube has been removed, pulmonary edema pattern still persists, LLL opacity persists as well    Pulmonary Assessment:    1. Postoperative respiratory failure related predominantly to volume overload. 2. Atrial fibrillation with rapid ventricular response, resolved. 3. Respiratory culture +psuedomonas and klebsiella  4. Enchephalopathy    Recommend:     · Extubated yesterday, would not recommend reintubation if she declines. Use CPAP as needed. · Continue mucomyst, atrovent, robitussin  · [address]  · Will defer issues with confusion to attending  · Up out of bed when confusion improves    Electronically signed by BRAYDON Reza on 9/7/18 at 1:06 PM  Physician's substantive portion:  Subjective: The patient's had some problems with decreased O2 sats earlier and is now on BiPAP. She is adequate O2 saturations but is still somewhat tachypneic. Objective: She has some rales on chest exam. Also her blood gases and additionally showing hypoxemia really metabolic acidosis.   Assessment/recommendation: If she deteriorates despite BiPAP, we can certainly reintubate the patient and place her back on mechanical ventilatory support and at that point I would recommend strongly considering tracheostomy. I did discuss this with the patient's daughter at the bedside this afternoon. I have seen and examined patient personally, performing a face-to-face diagnostic evaluation with plan of care reviewed and developed with APRN. I have addended and/or modified the above history of present illness, physical examination, and assessment and plan to reflect my findings and impressions. Essential elements of the care plan were discussed with APRN above. Agree with findings and assessment/plan as documented above.     Electronically signed by Gina Ryan MD on 9/7/18 at 2:29 PM

## 2018-09-08 LAB
AMYLASE: 19 U/L (ref 28–100)
BASE EXCESS ARTERIAL: -2.1 MMOL/L (ref -2–2)
BILIRUBIN DIRECT: 1.3 MG/DL (ref 0–0.3)
CARBOXYHEMOGLOBIN ARTERIAL: 2.1 % (ref 0–5)
FIBRINOGEN: 364 MG/DL (ref 238–505)
HAPTOGLOBIN: 264 MG/DL (ref 30–200)
HCO3 ARTERIAL: 21.3 MMOL/L (ref 22–26)
HCT VFR BLD CALC: 22.3 % (ref 37–47)
HCT VFR BLD CALC: 23.7 % (ref 37–47)
HCT VFR BLD CALC: 24.1 % (ref 37–47)
HCT VFR BLD CALC: 25.1 % (ref 37–47)
HEMOGLOBIN, ART, EXTENDED: 8.1 G/DL (ref 12–16)
HEMOGLOBIN: 7.4 G/DL (ref 12–16)
HEMOGLOBIN: 7.8 G/DL (ref 12–16)
HEMOGLOBIN: 7.9 G/DL (ref 12–16)
HEMOGLOBIN: 8.3 G/DL (ref 12–16)
LACTATE DEHYDROGENASE: 360 U/L (ref 91–215)
MAGNESIUM: 1.9 MG/DL (ref 1.6–2.4)
MCH RBC QN AUTO: 30 PG (ref 27–31)
MCHC RBC AUTO-ENTMCNC: 32.9 G/DL (ref 33–37)
MCV RBC AUTO: 91.2 FL (ref 81–99)
METHEMOGLOBIN ARTERIAL: 0.9 %
O2 CONTENT ARTERIAL: 10.9 ML/DL
O2 SAT, ARTERIAL: 94.8 %
O2 THERAPY: ABNORMAL
PCO2 ARTERIAL: 30 MMHG (ref 35–45)
PDW BLD-RTO: 16.1 % (ref 11.5–14.5)
PH ARTERIAL: 7.46 (ref 7.35–7.45)
PHOSPHORUS: 2.2 MG/DL (ref 2.5–4.5)
PLATELET # BLD: 336 K/UL (ref 130–400)
PMV BLD AUTO: 10.4 FL (ref 9.4–12.3)
PO2 ARTERIAL: 69 MMHG (ref 80–100)
POTASSIUM, WHOLE BLOOD: 3.9
RBC # BLD: 2.6 M/UL (ref 4.2–5.4)
RETICULOCYTE ABSOLUTE COUNT: 0.08 M/UL (ref 0.03–0.12)
RETICULOCYTE COUNT PCT: 3.2 % (ref 0.5–1.5)
WBC # BLD: 17 K/UL (ref 4.8–10.8)

## 2018-09-08 PROCEDURE — 83010 ASSAY OF HAPTOGLOBIN QUANT: CPT

## 2018-09-08 PROCEDURE — 85045 AUTOMATED RETICULOCYTE COUNT: CPT

## 2018-09-08 PROCEDURE — 6360000002 HC RX W HCPCS: Performed by: HOSPITALIST

## 2018-09-08 PROCEDURE — 99232 SBSQ HOSP IP/OBS MODERATE 35: CPT | Performed by: FAMILY MEDICINE

## 2018-09-08 PROCEDURE — 6360000002 HC RX W HCPCS: Performed by: FAMILY MEDICINE

## 2018-09-08 PROCEDURE — 6360000002 HC RX W HCPCS: Performed by: INTERNAL MEDICINE

## 2018-09-08 PROCEDURE — 2580000003 HC RX 258: Performed by: SURGERY

## 2018-09-08 PROCEDURE — 83735 ASSAY OF MAGNESIUM: CPT

## 2018-09-08 PROCEDURE — 85384 FIBRINOGEN ACTIVITY: CPT

## 2018-09-08 PROCEDURE — 2580000003 HC RX 258: Performed by: INTERNAL MEDICINE

## 2018-09-08 PROCEDURE — 2000000000 HC ICU R&B

## 2018-09-08 PROCEDURE — 94660 CPAP INITIATION&MGMT: CPT

## 2018-09-08 PROCEDURE — 85027 COMPLETE CBC AUTOMATED: CPT

## 2018-09-08 PROCEDURE — 36600 WITHDRAWAL OF ARTERIAL BLOOD: CPT

## 2018-09-08 PROCEDURE — 84132 ASSAY OF SERUM POTASSIUM: CPT

## 2018-09-08 PROCEDURE — 85014 HEMATOCRIT: CPT

## 2018-09-08 PROCEDURE — 84100 ASSAY OF PHOSPHORUS: CPT

## 2018-09-08 PROCEDURE — 2580000003 HC RX 258: Performed by: FAMILY MEDICINE

## 2018-09-08 PROCEDURE — 82248 BILIRUBIN DIRECT: CPT

## 2018-09-08 PROCEDURE — 2500000003 HC RX 250 WO HCPCS: Performed by: NURSE PRACTITIONER

## 2018-09-08 PROCEDURE — 94640 AIRWAY INHALATION TREATMENT: CPT

## 2018-09-08 PROCEDURE — 85018 HEMOGLOBIN: CPT

## 2018-09-08 PROCEDURE — 2580000003 HC RX 258: Performed by: HOSPITALIST

## 2018-09-08 PROCEDURE — 6360000002 HC RX W HCPCS: Performed by: SURGERY

## 2018-09-08 PROCEDURE — 2500000003 HC RX 250 WO HCPCS: Performed by: FAMILY MEDICINE

## 2018-09-08 PROCEDURE — 83615 LACTATE (LD) (LDH) ENZYME: CPT

## 2018-09-08 PROCEDURE — 36592 COLLECT BLOOD FROM PICC: CPT

## 2018-09-08 PROCEDURE — 82803 BLOOD GASES ANY COMBINATION: CPT

## 2018-09-08 PROCEDURE — 6370000000 HC RX 637 (ALT 250 FOR IP): Performed by: INTERNAL MEDICINE

## 2018-09-08 PROCEDURE — 82150 ASSAY OF AMYLASE: CPT

## 2018-09-08 PROCEDURE — C9113 INJ PANTOPRAZOLE SODIUM, VIA: HCPCS | Performed by: HOSPITALIST

## 2018-09-08 PROCEDURE — 2700000000 HC OXYGEN THERAPY PER DAY

## 2018-09-08 RX ORDER — AMIODARONE HYDROCHLORIDE 200 MG/1
200 TABLET ORAL 2 TIMES DAILY
Status: DISCONTINUED | OUTPATIENT
Start: 2018-09-08 | End: 2018-09-12

## 2018-09-08 RX ADMIN — MEROPENEM 1 G: 1 INJECTION, POWDER, FOR SOLUTION INTRAVENOUS at 00:32

## 2018-09-08 RX ADMIN — GUAIFENESIN 400 MG: 100 SOLUTION ORAL at 20:30

## 2018-09-08 RX ADMIN — MEROPENEM 1 G: 1 INJECTION, POWDER, FOR SOLUTION INTRAVENOUS at 07:57

## 2018-09-08 RX ADMIN — Medication 0.25 MG: at 23:46

## 2018-09-08 RX ADMIN — CHLORHEXIDINE GLUCONATE 15 ML: 1.2 LIQUID BUCCAL at 21:04

## 2018-09-08 RX ADMIN — GUAIFENESIN 400 MG: 100 SOLUTION ORAL at 07:57

## 2018-09-08 RX ADMIN — ACETYLCYSTEINE 600 MG: 200 SOLUTION ORAL; RESPIRATORY (INHALATION) at 19:38

## 2018-09-08 RX ADMIN — AMIODARONE HYDROCHLORIDE 200 MG: 200 TABLET ORAL at 10:53

## 2018-09-08 RX ADMIN — LORAZEPAM 1 MG: 2 INJECTION INTRAMUSCULAR; INTRAVENOUS at 05:35

## 2018-09-08 RX ADMIN — DEXMEDETOMIDINE HYDROCHLORIDE 0.6 MCG/KG/HR: 100 INJECTION, SOLUTION INTRAVENOUS at 14:24

## 2018-09-08 RX ADMIN — Medication 10 ML: at 21:13

## 2018-09-08 RX ADMIN — MEROPENEM 1 G: 1 INJECTION, POWDER, FOR SOLUTION INTRAVENOUS at 17:00

## 2018-09-08 RX ADMIN — IPRATROPIUM BROMIDE 0.5 MG: 0.5 SOLUTION RESPIRATORY (INHALATION) at 10:36

## 2018-09-08 RX ADMIN — LORAZEPAM 1 MG: 2 INJECTION INTRAMUSCULAR; INTRAVENOUS at 00:56

## 2018-09-08 RX ADMIN — GUAIFENESIN 400 MG: 100 SOLUTION ORAL at 17:00

## 2018-09-08 RX ADMIN — Medication 10 ML: at 07:57

## 2018-09-08 RX ADMIN — IPRATROPIUM BROMIDE 0.5 MG: 0.5 SOLUTION RESPIRATORY (INHALATION) at 06:16

## 2018-09-08 RX ADMIN — IPRATROPIUM BROMIDE 0.5 MG: 0.5 SOLUTION RESPIRATORY (INHALATION) at 19:38

## 2018-09-08 RX ADMIN — IPRATROPIUM BROMIDE 0.5 MG: 0.5 SOLUTION RESPIRATORY (INHALATION) at 14:36

## 2018-09-08 RX ADMIN — PANTOPRAZOLE SODIUM 40 MG: 40 INJECTION, POWDER, FOR SOLUTION INTRAVENOUS at 07:57

## 2018-09-08 RX ADMIN — AMIODARONE HYDROCHLORIDE 0.5 MG/MIN: 50 INJECTION, SOLUTION INTRAVENOUS at 07:37

## 2018-09-08 RX ADMIN — GUAIFENESIN 400 MG: 100 SOLUTION ORAL at 13:26

## 2018-09-08 RX ADMIN — DEXMEDETOMIDINE HYDROCHLORIDE 0.8 MCG/KG/HR: 100 INJECTION, SOLUTION INTRAVENOUS at 05:27

## 2018-09-08 RX ADMIN — ACETYLCYSTEINE 800 MG: 200 SOLUTION ORAL; RESPIRATORY (INHALATION) at 06:17

## 2018-09-08 RX ADMIN — AMIODARONE HYDROCHLORIDE 200 MG: 200 TABLET ORAL at 20:30

## 2018-09-08 ASSESSMENT — PAIN DESCRIPTION - LOCATION: LOCATION: ABDOMEN

## 2018-09-08 ASSESSMENT — PAIN DESCRIPTION - PAIN TYPE: TYPE: SURGICAL PAIN

## 2018-09-08 ASSESSMENT — PAIN DESCRIPTION - DESCRIPTORS: DESCRIPTORS: ACHING

## 2018-09-08 ASSESSMENT — PAIN SCALES - GENERAL
PAINLEVEL_OUTOF10: 7
PAINLEVEL_OUTOF10: 0

## 2018-09-08 NOTE — PROGRESS NOTES
Nutrition Assessment    Type and Reason for Visit: Reassess    Nutrition Recommendations: Current TF order is ordered with an oral diet. Recommend to order TF with NPO per SLP recommendations. Recommend Standard formula with Fiber Jevity 1.5 with goal rate of 42 mL/hr. Recommend to start rate at 10 mL/hr and increase rate by 10 mL every 8 hrs until goal rate is reached. Once goal rate is reached, recommend to flush tube with 1 bottle of Proteinex daily. TF at goal & Proteinex flush would provide 1616 kcals, 90 g/PRO, & 841 mL/fluid daily. Nutrition Diagnosis:   · Problem: Inadequate oral intake  · Etiology: related to Acute injury/trauma     Signs and symptoms:  as evidenced by NPO status due to medical condition    Nutrition Assessment:  · Subjective Assessment: NGT clamped. Recommendations for TF have been placed in MD & tx team sticky notes. Will continue to monitor clinical course.   · Nutrition-Focused Physical Findings: extubated  · Wound Type: Skin Tears, Surgical Wound  · Current Nutrition Therapies:  · Oral Diet Orders: NPO   · Oral Diet intake: NPO  · Oral Nutrition Supplement (ONS) Orders: None  · ONS intake: NPO  · Anthropometric Measures:  · Ht: 5' 3\" (160 cm)   · Current Body Wt: 159 lb 8 oz (72.3 kg)  · Admission Body Wt: 141 lb (64 kg)  · Ideal Body Wt: 115 lb (52.2 kg), % Ideal Body 138%  · BMI Classification: BMI 25.0 - 29.9 Overweight  · Comparative Standards (Estimated Nutrition Needs):  · Estimated Daily Total Kcal: 8139-0606  · Estimated Daily Protein (g):     Estimated Intake vs Estimated Needs: Intake Less Than Needs    Nutrition Risk Level: High    Nutrition Interventions:   Continue NPO, Start Tube Feeding  Continued Inpatient Monitoring    Nutrition Evaluation:   · Evaluation: No progress toward goals   · Goals: Meet needs via nutrition support or PO intake    · Monitoring: NPO Status, TF Intake, TF Tolerance, Gastric Residuals, Skin Integrity, Wound Healing, Weight, Pertinent Labs    See Adult Nutrition Doc Flowsheet for more detail.      Electronically signed by Ever Rosenthal RD, LD on 9/8/18 at 7:41 AM    Contact Number: [phone]

## 2018-09-08 NOTE — PROGRESS NOTES
[facility]  Daily Progress Note      Chief Complaint: AFib    Interval Hx: NSR    ROS:  The rest of the systems are negative except Interval Hx    Current Inpatient Medications:   amiodarone  200 mg Oral BID    meropenem  1 g Intravenous Q8H    acetylcysteine  600 mg Inhalation BID    ipratropium  0.5 mg Nebulization 4x daily    chlorhexidine  15 mL Mouth/Throat BID    guaiFENesin  400 mg Per NG tube 4x Daily    pantoprazole  40 mg Intravenous Daily    sodium chloride flush  10 mL Intravenous 2 times per day       IV Infusions (if any):   dexmedetomidine (PRECEDEX) IV infusion 0.8 mcg/kg/hr (09/08/18 0527)    fat emulsion Stopped (09/04/18 0832)    midazolam Stopped (09/03/18 1154)    norepinephrine Stopped (09/06/18 2200)       Physical Exam:   BP (!) 96/42   Pulse (!) 49   Temp 98.1 °F (36.7 °C) (Temporal)   Resp 24   Ht 5' 3\" (1.6 m)   Wt 159 lb 8 oz (72.3 kg)   SpO2 96%   Breastfeeding? No   BMI 28.25 kg/m²       Intake/Output Summary (Last 24 hours) at 09/08/18 1138  Last data filed at 09/08/18 1000   Gross per 24 hour   Intake           983.03 ml   Output             1005 ml   Net           -21.97 ml       Gen - NAD  Neck - Supple  ENMT - MMM, OP Clear  Cardio - No JVD     Clear s1 s2, no gallop, rub, murmur                No edema, 2+ radials  Resp - Normal effort, CTA Bilat  GI - soft, non-tender, no HSM  Psych - A+O x 3, normal affect     Diagnostics:      Recent Labs      09/06/18   0546   09/07/18   0555   09/07/18   2359  09/08/18   0556  09/08/18   1100   WBC  13.7*   --   17.8*   --    --   17.0*   --    HGB  8.4*   < >  8.6*   < >  7.9*  7.8*  7.4*   PLT  198   --   290   --    --   336   --     < > = values in this interval not displayed.       Recent Labs      09/06/18   0546  09/07/18   0555  09/07/18   1348  09/08/18   0556  09/08/18   0741   NA  137  141   --    --    --    K  4.1  4.0  4.3   --   3.9   CL  105  105   --    --    --    CO2  24  20*   --    --

## 2018-09-08 NOTE — PROGRESS NOTES
[facility]  Speech Language/Pathology  Speech Daily Note    Anju Decker  9/8/2018      Diagnosis:  dysphagia    Pt being seen for : [] Speech/Language Treatment  [x] Dysphagia Treatment              [] Cognitive Treatment    [] Oral Motor Exercises   [] Therapeutic meal monitor/feeding  []Instruction in compensatory strategies    Subjective:  Behavior: [] Alert  [] Cooperative  []  Pleasant  [] Confused  [] Agitated                              [] Uncooperative  [] Distractible [] Motivated  [] Self-Limiting [] Anxious          [] Other:    Endurance:  [] Adequate for participation in SLP sessions  [] Reduced overall              [x] Lethargic  [x] Other: patient not responsive with nursing reporting minimal changes in status. Safety: [] No concerns at this time  [] Reduced insight into deficits               []  Reduced safety awareness [x] Other:  Severity of medical status    Objective/Assessment:   Patient progressing towards goals:    Unable to complete updates assessment secondary to non-responsive status. Nursing reported no new changes in status,  Tube feedings have been initiated with patient appearing to tolerate well. Speech will continue to monitor. She will remain in our prayers. Treatment/Goals  Timeframe for Short-term Goals: 1x/day for 3 days  Goal 1: Patient NPO   Goal 2: Patient will receive daily oral care, via staff, to decrease bacteria from the oral cavity. Goal 3: Re-assessment of swallow function for potential PO intake. [] unable to assess pain at present    Plan:  [x] Continue as per plan of care  [] Prepare for Discharge   [] Discharge      Safety Devices:  [x] Call light within reach [] Chair alarm activated  [] Bed alarm activated    Patient/Caregiver Education:  Treatment goals discussed with []  Patient  []  family. The []  Patient  []  family understand the diagnosis, prognosis and plan of care. Signature:      [address], CCC-SLP  ELECTRONICALLY SIGNED BY:  Eileen Jessica [address], [address] [facility], [place] 9/8/2018 2:48 PM

## 2018-09-08 NOTE — PROGRESS NOTES
Component Value Ref Range & Units Status Collected Lab   pH, Arterial 7.460   7.350 - 7.450 Final 09/08/2018  7:41 AM [facility]   pCO2, Arterial 30.0   35.0 - 45.0 mmHg Final 09/08/2018  7:41 AM [facility]   pO2, Arterial 69.0   80.0 - 100.0 mmHg Final 09/08/2018  7:41 AM [facility]   HCO3, Arterial 21.3   22.0 - 26.0 mmol/L Final 09/08/2018  7:41 AM [facility] Excess, Arterial -2.1   -2.0 - 2.0 mmol/L Final 09/08/2018  7:41 AM [facility]   Hemoglobin, Art, Extended 8.1   12.0 - 16.0 g/dL Final 09/08/2018  7:41 AM [facility]   O2 Sat, Arterial 94.8  >92 % Final 09/08/2018  7:41 AM [facility]   Carboxyhgb, Arterial 2.1  0.0 - 5.0 % Final 09/08/2018  7:41 AM [facility]        0.0-1.5   (Smokers 1.5-5.0)    Methemoglobin, Arterial 0.9  <1.5 % Final 09/08/2018  7:41 AM [facility]   O2 Content, Arterial 10.9  Not Established mL/dL Final 09/08/2018  7:41 AM [address] [facility]   O2 Therapy Unknown   Final 09/08/2018  7:41 AM [facility]   Testing Performed By     Freda Murphy Name Director Address Valid Date Range   [address] [facility] Hamida Garner  [facility],[address]  [address] [facility] [place] [address] 08/30/17 1233-Present   Lab and Collection     Blood Gas, Arterial on 9/8/2018   Result History     Blood Gas, Arterial on 9/8/2018          Result Information     Flag: Abnormal Status: Final result (Collected: 9/8/2018 07:41) Provider Status: Ordered   Routing History     Priority Sent On From To Message Type    9/6/2018  5:55 AM Steven, Kyin Incoming Lab Results From Zandra Barnard MD     8/31/2018 11:04 PM Marti Harris Incoming Lab Results From Jaret Dos Santos MD     8/30/2018  7:02 AM Marti Harris Incoming Lab Results From Softcristino Rankin, APRN    Click to Print Result   View [address]     Blood Gas, Arterial (Order #[id number]) on 9/8/18   Order Report pt on cpap 8  8lpm RB     Order Details

## 2018-09-09 ENCOUNTER — APPOINTMENT (OUTPATIENT)
Dept: GENERAL RADIOLOGY | Age: 68
DRG: 268 | End: 2018-09-09
Attending: SURGERY
Payer: MEDICARE

## 2018-09-09 LAB
AMYLASE: 23 U/L (ref 28–100)
BASE EXCESS ARTERIAL: -3.8 MMOL/L (ref -2–2)
CARBOXYHEMOGLOBIN ARTERIAL: 2.3 % (ref 0–5)
HCO3 ARTERIAL: 20.4 MMOL/L (ref 22–26)
HCT VFR BLD CALC: 25.5 % (ref 37–47)
HCT VFR BLD CALC: 26.2 % (ref 37–47)
HEMOGLOBIN, ART, EXTENDED: 9.3 G/DL (ref 12–16)
HEMOGLOBIN: 8.4 G/DL (ref 12–16)
HEMOGLOBIN: 8.7 G/DL (ref 12–16)
MAGNESIUM: 1.8 MG/DL (ref 1.6–2.4)
MCH RBC QN AUTO: 29.7 PG (ref 27–31)
MCHC RBC AUTO-ENTMCNC: 32.9 G/DL (ref 33–37)
MCV RBC AUTO: 90.1 FL (ref 81–99)
METHEMOGLOBIN ARTERIAL: 1 %
O2 CONTENT ARTERIAL: 12.2 ML/DL
O2 SAT, ARTERIAL: 92.5 %
O2 THERAPY: ABNORMAL
PCO2 ARTERIAL: 33 MMHG (ref 35–45)
PDW BLD-RTO: 16 % (ref 11.5–14.5)
PH ARTERIAL: 7.4 (ref 7.35–7.45)
PHOSPHORUS: 2.1 MG/DL (ref 2.5–4.5)
PLATELET # BLD: 405 K/UL (ref 130–400)
PMV BLD AUTO: 10.3 FL (ref 9.4–12.3)
PO2 ARTERIAL: 65 MMHG (ref 80–100)
POTASSIUM, WHOLE BLOOD: 3.8
RBC # BLD: 2.83 M/UL (ref 4.2–5.4)
WBC # BLD: 16.3 K/UL (ref 4.8–10.8)

## 2018-09-09 PROCEDURE — 71045 X-RAY EXAM CHEST 1 VIEW: CPT

## 2018-09-09 PROCEDURE — 6360000002 HC RX W HCPCS: Performed by: HOSPITALIST

## 2018-09-09 PROCEDURE — 6360000002 HC RX W HCPCS: Performed by: SURGERY

## 2018-09-09 PROCEDURE — 2500000003 HC RX 250 WO HCPCS: Performed by: NURSE PRACTITIONER

## 2018-09-09 PROCEDURE — 82803 BLOOD GASES ANY COMBINATION: CPT

## 2018-09-09 PROCEDURE — 36600 WITHDRAWAL OF ARTERIAL BLOOD: CPT

## 2018-09-09 PROCEDURE — 2580000003 HC RX 258: Performed by: HOSPITALIST

## 2018-09-09 PROCEDURE — 83735 ASSAY OF MAGNESIUM: CPT

## 2018-09-09 PROCEDURE — 85027 COMPLETE CBC AUTOMATED: CPT

## 2018-09-09 PROCEDURE — 6360000002 HC RX W HCPCS: Performed by: INTERNAL MEDICINE

## 2018-09-09 PROCEDURE — 99233 SBSQ HOSP IP/OBS HIGH 50: CPT | Performed by: INTERNAL MEDICINE

## 2018-09-09 PROCEDURE — 2700000000 HC OXYGEN THERAPY PER DAY

## 2018-09-09 PROCEDURE — 2580000003 HC RX 258: Performed by: FAMILY MEDICINE

## 2018-09-09 PROCEDURE — 94640 AIRWAY INHALATION TREATMENT: CPT

## 2018-09-09 PROCEDURE — 82150 ASSAY OF AMYLASE: CPT

## 2018-09-09 PROCEDURE — 85014 HEMATOCRIT: CPT

## 2018-09-09 PROCEDURE — 6370000000 HC RX 637 (ALT 250 FOR IP): Performed by: INTERNAL MEDICINE

## 2018-09-09 PROCEDURE — 2000000000 HC ICU R&B

## 2018-09-09 PROCEDURE — C9113 INJ PANTOPRAZOLE SODIUM, VIA: HCPCS | Performed by: HOSPITALIST

## 2018-09-09 PROCEDURE — 99233 SBSQ HOSP IP/OBS HIGH 50: CPT | Performed by: FAMILY MEDICINE

## 2018-09-09 PROCEDURE — 85018 HEMOGLOBIN: CPT

## 2018-09-09 PROCEDURE — 2500000003 HC RX 250 WO HCPCS: Performed by: FAMILY MEDICINE

## 2018-09-09 PROCEDURE — 94660 CPAP INITIATION&MGMT: CPT

## 2018-09-09 PROCEDURE — 84132 ASSAY OF SERUM POTASSIUM: CPT

## 2018-09-09 PROCEDURE — 84100 ASSAY OF PHOSPHORUS: CPT

## 2018-09-09 PROCEDURE — 2580000003 HC RX 258: Performed by: SURGERY

## 2018-09-09 RX ADMIN — IPRATROPIUM BROMIDE 0.5 MG: 0.5 SOLUTION RESPIRATORY (INHALATION) at 10:11

## 2018-09-09 RX ADMIN — ONDANSETRON 4 MG: 2 INJECTION INTRAMUSCULAR; INTRAVENOUS at 06:16

## 2018-09-09 RX ADMIN — AMIODARONE HYDROCHLORIDE 200 MG: 200 TABLET ORAL at 07:56

## 2018-09-09 RX ADMIN — Medication 0.5 MG: at 03:00

## 2018-09-09 RX ADMIN — Medication 10 ML: at 07:56

## 2018-09-09 RX ADMIN — IPRATROPIUM BROMIDE 0.5 MG: 0.5 SOLUTION RESPIRATORY (INHALATION) at 14:09

## 2018-09-09 RX ADMIN — GUAIFENESIN 400 MG: 100 SOLUTION ORAL at 13:45

## 2018-09-09 RX ADMIN — MEROPENEM 1 G: 1 INJECTION, POWDER, FOR SOLUTION INTRAVENOUS at 00:41

## 2018-09-09 RX ADMIN — ACETYLCYSTEINE 800 MG: 200 SOLUTION ORAL; RESPIRATORY (INHALATION) at 06:28

## 2018-09-09 RX ADMIN — Medication 0.5 MG: at 06:00

## 2018-09-09 RX ADMIN — GUAIFENESIN 400 MG: 100 SOLUTION ORAL at 17:17

## 2018-09-09 RX ADMIN — Medication 10 ML: at 21:12

## 2018-09-09 RX ADMIN — AMIODARONE HYDROCHLORIDE 200 MG: 200 TABLET ORAL at 21:12

## 2018-09-09 RX ADMIN — ONDANSETRON 4 MG: 2 INJECTION INTRAMUSCULAR; INTRAVENOUS at 16:03

## 2018-09-09 RX ADMIN — GUAIFENESIN 400 MG: 100 SOLUTION ORAL at 07:56

## 2018-09-09 RX ADMIN — MEROPENEM 1 G: 1 INJECTION, POWDER, FOR SOLUTION INTRAVENOUS at 07:55

## 2018-09-09 RX ADMIN — IPRATROPIUM BROMIDE 0.5 MG: 0.5 SOLUTION RESPIRATORY (INHALATION) at 20:36

## 2018-09-09 RX ADMIN — IPRATROPIUM BROMIDE 0.5 MG: 0.5 SOLUTION RESPIRATORY (INHALATION) at 06:26

## 2018-09-09 RX ADMIN — GUAIFENESIN 400 MG: 100 SOLUTION ORAL at 21:12

## 2018-09-09 RX ADMIN — DEXMEDETOMIDINE HYDROCHLORIDE 0.2 MCG/KG/HR: 100 INJECTION, SOLUTION INTRAVENOUS at 05:27

## 2018-09-09 RX ADMIN — PANTOPRAZOLE SODIUM 40 MG: 40 INJECTION, POWDER, FOR SOLUTION INTRAVENOUS at 07:56

## 2018-09-09 RX ADMIN — MEROPENEM 1 G: 1 INJECTION, POWDER, FOR SOLUTION INTRAVENOUS at 17:17

## 2018-09-09 ASSESSMENT — PAIN SCALES - GENERAL
PAINLEVEL_OUTOF10: 0
PAINLEVEL_OUTOF10: 9
PAINLEVEL_OUTOF10: 0
PAINLEVEL_OUTOF10: 7
PAINLEVEL_OUTOF10: 0

## 2018-09-09 ASSESSMENT — PAIN DESCRIPTION - DESCRIPTORS
DESCRIPTORS: ACHING
DESCRIPTORS: ACHING

## 2018-09-09 ASSESSMENT — PAIN DESCRIPTION - LOCATION
LOCATION: ABDOMEN
LOCATION: ABDOMEN

## 2018-09-09 ASSESSMENT — PAIN DESCRIPTION - ORIENTATION: ORIENTATION: MID

## 2018-09-09 ASSESSMENT — PAIN DESCRIPTION - PAIN TYPE
TYPE: SURGICAL PAIN
TYPE: SURGICAL PAIN

## 2018-09-09 NOTE — PROGRESS NOTES
moves all extremities   Musculoskeletal: She exhibits edema- improved. She exhibits no deformity. Skin: Skin is warm and dry. She is not diaphoretic. No erythema. No pallor  Psych: she is not oriented to time, self or place    Recent Labs      09/07/18   0555   09/08/18   0556   09/08/18   1800  09/08/18   2357  09/09/18   0500   WBC  17.8*   --   17.0*   --    --    --   16.3*   RBC  2.86*   --   2.60*   --    --    --   2.83*   HGB  8.6*   < >  7.8*   < >  8.3*  8.7*  8.4*   HCT  25.7*   < >  23.7*   < >  25.1*  26.2*  25.5*   PLT  290   --   336   --    --    --   405*   MCV  89.9   --   91.2   --    --    --   90.1   MCH  30.1   --   30.0   --    --    --   29.7   MCHC  33.5   --   32.9*   --    --    --   32.9*   RDW  16.1*   --   16.1*   --    --    --   16.0*    < > = values in this interval not displayed. Recent Labs      09/07/18 0555 09/07/18 1348 09/08/18 0741  09/09/18   0621   NA  141   --    --    --    K  4.0  4.3  3.9  3.8   CL  105   --    --    --    CO2  20*   --    --    --    BUN  28*   --    --    --    CREATININE  0.9   --    --    --    CALCIUM  8.1*   --    --    --    GLUCOSE  96   --    --    --       Recent Labs      09/07/18 1348 09/08/18   0741  09/09/18   0621   PHART  7.370  7.460*  7.400   [id number]  32.0*  30.0*  33.0*   PO2ART  43.0*  69.0*  65.0*   JVC1EWA  18.5*  21.3*  20.4*   [id number]  80.3*  94.8  92.5   BEART  -6.0*  -2.1*  -3.8*     Recent Labs      09/07/18   0555   09/09/18   0500   AST  44*   --    --    ALT  66*   --    --    ALKPHOS  89   --    --    BILITOT  2.4*   --    --    MG  1.8   < >  1.8   CALCIUM  8.1*   --    --    PHOS  2.0*   < >  2.1*    < > = values in this interval not displayed. Radiograph:   XR CHEST PORTABLE 9/9/2018 5:00 AM   HISTORY: Respiratory failure   Comparison: 9/7/2018    Findings:    Lines and tubes are stable in position.  No new opacities or   pneumothoraces are visualized in the chest. The cardiomediastinal   silhouette and pulmonary vascularity are unchanged.     No acute osseous or soft tissue abnormality is noted.        Impression   Impression:    1. No significant interval change since previous exam.     My radiograph interpretation: pulmonary edema pattern and LLL opacity persists, no significant change or improvement     Pulmonary Assessment:  1. Postoperative respiratory failure with hypoxia; compensated with cpap  2. Volume overload, stable  3. Atrial fibrillation with rapid ventricular response, resolved. 4. Presumed pulmonary infection, gram negative, on abx  5. Enchephalopathy  6. Bradycardia in the setting of multiple infusions    Recommend:   · Continue cpap and transition to high flow oxygen as tolerated  · Continue atrovent and robitussin  · Will d/c mucomyst   · Continue Merrem D#8 today. 8 days typically sufficient although reasonable to extend given persistent severe and life-threatening illness. · Monitor confusion; precedex infusion noted and pt currently is not performing any self-endangering behavior. Would recommend trying to get precedex off. · Up out of bed when confusion improves    Electronically signed by BRAYDON Rodarte on 9/9/2018 at 1:14 PM    She remains on the CPAP mask. She has no fevers or chills. Remains on Precedex. Chest has diminished breath sounds. For now we will need to continue as were doing. Hopefully the Precedex can be discontinued at some point we can make more progress towards mobilization and strengthening. Continue oxygen and supplement. I have seen and examined patient personally, performing a face-to-face diagnostic evaluation with plan of care reviewed and developed with APRN and nursing staff. I have addended and/or modified the above history of present illness, physical examination, and assessment and plan to reflect my findings and impressions. Essential elements of the care plan were discussed with APRN above.   Agree with findings and assessment/plan as documented above. Electronically signed by Woody Connelly on 9/9/2018, 2:29 PM    EMR Dragon/Transcription disclaimer: Much of this encounter note is an electronic transcription/translation of spoken language to printed text.  The electronic translation of spoken language may permit erroneous, or at times, nonsensical words or phrases to be inadvertently transcribed; although I have reviewed the note for such errors, some may still exist.

## 2018-09-09 NOTE — PROGRESS NOTES
appears well-nourished. No distress. She is not intubated. HENT:   Head: Normocephalic and atraumatic. Nose: Nose normal.   Eyes: Pupils are equal, round, and reactive to light. Right eye exhibits no discharge. Left eye exhibits no discharge. Neck: Neck supple. No tracheal deviation present. Cardiovascular: Normal rate and normal heart sounds. Pulmonary/Chest: No stridor. She is not intubated. No respiratory distress. She has no wheezes. She has no rales. Abdominal: Soft. She exhibits no distension and no mass. There is no guarding. Musculoskeletal: She exhibits no edema or deformity. Neurological: She is alert. No cranial nerve deficit. Sedated and intubated. Non focal.   Skin: Skin is warm. She is not diaphoretic. No erythema. Psychiatric:   Unable to obtain. Labs:   Recent Labs      09/07/18   0555 09/08/18   0556   09/08/18   1800  09/08/18   2357  09/09/18   0500   WBC  17.8*   --   17.0*   --    --    --   16.3*   RBC  2.86*   --   2.60*   --    --    --   2.83*   HGB  8.6*   < >  7.8*   < >  8.3*  8.7*  8.4*   HCT  25.7*   < >  23.7*   < >  25.1*  26.2*  25.5*   PLT  290   --   336   --    --    --   405*    < > = values in this interval not displayed. Recent Labs      09/07/18   0555  09/07/18   1348  09/08/18   0741  09/09/18   0621   NA  141   --    --    --    K  4.0  4.3  3.9  3.8   ANIONGAP  16   --    --    --    CL  105   --    --    --    CO2  20*   --    --    --    BUN  28*   --    --    --    CREATININE  0.9   --    --    --    GLUCOSE  96   --    --    --    CALCIUM  8.1*   --    --    --      Recent Labs      09/07/18   0555  09/08/18   0556  09/09/18   0500   MG  1.8  1.9  1.8   PHOS  2.0*  2.2*  2.1*     Recent Labs      09/07/18   0555   AST  44*   ALT  66*   BILITOT  2.4*   ALKPHOS  89     HgBA1c:  No components found for: HGBA1C  Troponin T:   No results for input(s): TROPONINI in the last 72 hours.   Pro-BNP: No results for input(s): BNP in the last 72 hours.  INR: No results for input(s): INR in the last 72 hours. ABGs:   Lab Results   Component Value Date    PHART 7.400 09/09/2018    PO2ART 65.0 09/09/2018    OHI9AYH 33.0 09/09/2018     UA:  No results for input(s): NITRITE, COLORU, PHUR, LABCAST, WBCUA, RBCUA, MUCUS, TRICHOMONAS, YEAST, BACTERIA, CLARITYU, SPECGRAV, LEUKOCYTESUR, UROBILINOGEN, BILIRUBINUR, BLOODU, GLUCOSEU, AMORPHOUS in the last 72 hours. Invalid input(s): Merari Levels    RAD:  CHEST PORTABLE 9/2/18   1. Life support lines described above. 2.. Bilateral pulmonary opacities with small left pleural effusion and  left basilar consolidation. Findings favorable for pulmonary edema  and/or multifocal pneumonia.     EKG/Telemetry: Afib    Echo:   Normal left ventricular size with preserved LV function and an estimated   ejection fraction of approximately 55-60%.   No evidence of left ventricular mass or thrombus noted.   The right ventricle was not clearly visualized .   Normal size left atrium.   Mitral valve leaflets are mildly thickened with preserved leaflet   mobility.   Trace mitral regurgitation.   Individual aortic valve leaflets are not clearly visualized.   Trace tricuspid regurgitation .  Norrine [place] is a small circumferential pericardial effusion noted    Micro:   CULTURE, RESPIRATORY  (Abnormal) 09/01/2018  8:55 AM [facility]      Isolation in progress of Light growth 2nd Gram negative josh and   Moderate growth suspicious Alpha Strep species     Gram Stain Result 09/01/2018  8:55 AM [facility]   Few Epithelial Cells   Many WBC's (Polymorphonuclear)   Mixed bacterial morphotypes suggestive of   Normal respiratory ruthy     Organism  (Abnormal) 09/01/2018  8:55 AM [facility]   Pseudomonas aeruginosa        Blood and urine cultures pending    IMPRESSION / PLAN:  Principal Problem:    AAA (abdominal aortic aneurysm) (HCC)  Active Problems:    Renal artery stenosis (HCC)    Essential hypertension

## 2018-09-09 NOTE — PROGRESS NOTES
209  (2.9)    IV Piggyback  (mL/kg) 200  (2.8)   200  (2.8)    Shift Total  (mL/kg) 507.8  (7.1) 84.2  (1.2)  592  (8.3)   O  U  T  P  U  T   Urine  (mL/kg/hr) 425  (0.7) 180  605    Shift Total  (mL/kg) 425  (6) 180  (2.5)  605  (8.5)   Weight (kg) 71.2 71.2 71.2 71.2         MEDS:     Scheduled Meds:   amiodarone  200 mg Oral BID    meropenem  1 g Intravenous Q8H    acetylcysteine  600 mg Inhalation BID    ipratropium  0.5 mg Nebulization 4x daily    chlorhexidine  15 mL Mouth/Throat BID    guaiFENesin  400 mg Per NG tube 4x Daily    pantoprazole  40 mg Intravenous Daily    sodium chloride flush  10 mL Intravenous 2 times per day     Continuous Infusions:   dexmedetomidine (PRECEDEX) IV infusion 0.2 mcg/kg/hr (09/09/18 0527)    fat emulsion Stopped (09/04/18 0832)    midazolam Stopped (09/03/18 1154)    norepinephrine Stopped (09/06/18 2200)     PRN Meds:  LORazepam 1 mg Q4H PRN   sodium chloride flush 10 mL PRN   potassium chloride 10 mEq PRN   ondansetron 4 mg Q8H PRN   metoprolol tartrate 25 mg Q12H PRN   cloNIDine 0.1 mg Q2H PRN   HYDROmorphone 0.5 mg Q1H PRN   Or     HYDROmorphone 0.25 mg Q1H PRN         PHYSICAL EXAM:     CONSTITUTIONAL:  Alert and oriented times 3, no acute distress  LUNGS:  Chest expands equally bilaterally upon respiration, no accessory muscle used.  Ausculation reveals no wheezes, rales or rhonchi  CARDIOVASCULAR:  Heart sounds are normal.  Regular rate and rhythm without murmur  ABDOMEN: soft, nontender, nondistended, no masses or organomegaly, still hypoactive bowel sounds  WOUND/INCISION:  C/D/I  EXTREMITIES:   Feet warm without signs of distal embolization       LABS:        Recent Labs      09/07/18   0555   09/08/18   0556   09/08/18   1800  09/08/18   2357  09/09/18   0500   WBC  17.8*   --   17.0*   --    --    --   16.3*   RBC  2.86*   --   2.60*   --    --    --   2.83*   HGB  8.6*   < >  7.8*   < >  8.3*  8.7*  8.4*   HCT  25.7*   < >  23.7*   < >  25.1*  26.2* ([address]   2. Anemia of expected blood loss with surgery  3. Elevated bilirubin thought to be secondary to reabsorption of blood, gallbladder sludge with TPN          PLAN:   OOB  Try advancing TF  Supportive care.   Appreciate hospitalist and pulmonary service help

## 2018-09-09 NOTE — PROGRESS NOTES
[facility]  Daily Progress Note      Chief Complaint: AFib    Interval Hx: NSR    ROS:  The rest of the systems are negative except Interval Hx    Current Inpatient Medications:   amiodarone  200 mg Oral BID    meropenem  1 g Intravenous Q8H    acetylcysteine  600 mg Inhalation BID    ipratropium  0.5 mg Nebulization 4x daily    chlorhexidine  15 mL Mouth/Throat BID    guaiFENesin  400 mg Per NG tube 4x Daily    pantoprazole  40 mg Intravenous Daily    sodium chloride flush  10 mL Intravenous 2 times per day       IV Infusions (if any):   dexmedetomidine (PRECEDEX) IV infusion 0.2 mcg/kg/hr (09/09/18 0527)    fat emulsion Stopped (09/04/18 0832)    midazolam Stopped (09/03/18 1154)    norepinephrine Stopped (09/06/18 2200)       Physical Exam:   BP (!) 111/51   Pulse 76   Temp 98.1 °F (36.7 °C) (Temporal)   Resp 18   Ht 5' 3\" (1.6 m)   Wt 156 lb 14.4 oz (71.2 kg)   SpO2 97%   Breastfeeding? No   BMI 27.79 kg/m²       Intake/Output Summary (Last 24 hours) at 09/09/18 0951  Last data filed at 09/09/18 0800   Gross per 24 hour   Intake          1084.26 ml   Output              960 ml   Net           124.26 ml       Gen - NAD  Neck - Supple  ENMT - MMM, OP Clear  Cardio - No JVD     Clear s1 s2, no gallop, rub, murmur                No edema, 2+ radials  Resp - Normal effort, CTA Bilat  GI - soft, non-tender, no HSM  Psych - A+O x 3, normal affect     Diagnostics:      Recent Labs      09/07/18   0555   09/08/18   0556   09/08/18   1800  09/08/18   2357  09/09/18   0500   WBC  17.8*   --   17.0*   --    --    --   16.3*   HGB  8.6*   < >  7.8*   < >  8.3*  8.7*  8.4*   PLT  290   --   336   --    --    --   405*    < > = values in this interval not displayed.       Recent Labs      09/07/18   0555  09/07/18   1348  09/08/18   0556  09/08/18   0741  09/09/18   0500  09/09/18   0621   NA  141   --    --    --    --    --    K  4.0  4.3   --   3.9   --   3.8   CL  105   --    --    -- --    --    CO2  20*   --    --    --    --    --    BUN  28*   --    --    --    --    --    CREATININE  0.9   --    --    --    --    --    LABGLOM  >60   --    --    --    --    --    MG  1.8   --   1.9   --   1.8   --    CALCIUM  8.1*   --    --    --    --    --    PHOS  2.0*   --   2.2*   --   2.1*   --       No results for input(s): TROPONINI, PROBNP in the last 72 hours.      Tele - NSR    Assessment:     78 y.o. female with Inder [employer] now in [employer] with amio    Plan:     Switch to po amiodarone      Mallory Kong MD  [facility] [place] [email or web address]    9/9/2018 9:51 AM

## 2018-09-10 LAB
AMYLASE: 28 U/L (ref 28–100)
HCT VFR BLD CALC: 23.1 % (ref 37–47)
HEMOGLOBIN: 7.5 G/DL (ref 12–16)
MAGNESIUM: 1.8 MG/DL (ref 1.6–2.4)
MCH RBC QN AUTO: 30 PG (ref 27–31)
MCHC RBC AUTO-ENTMCNC: 32.5 G/DL (ref 33–37)
MCV RBC AUTO: 92.4 FL (ref 81–99)
PDW BLD-RTO: 16.6 % (ref 11.5–14.5)
PHOSPHORUS: 2.3 MG/DL (ref 2.5–4.5)
PLATELET # BLD: 386 K/UL (ref 130–400)
PMV BLD AUTO: 10 FL (ref 9.4–12.3)
RBC # BLD: 2.5 M/UL (ref 4.2–5.4)
WBC # BLD: 12.8 K/UL (ref 4.8–10.8)

## 2018-09-10 PROCEDURE — 82150 ASSAY OF AMYLASE: CPT

## 2018-09-10 PROCEDURE — 99232 SBSQ HOSP IP/OBS MODERATE 35: CPT | Performed by: INTERNAL MEDICINE

## 2018-09-10 PROCEDURE — 2500000003 HC RX 250 WO HCPCS: Performed by: NURSE PRACTITIONER

## 2018-09-10 PROCEDURE — C9113 INJ PANTOPRAZOLE SODIUM, VIA: HCPCS | Performed by: HOSPITALIST

## 2018-09-10 PROCEDURE — 94640 AIRWAY INHALATION TREATMENT: CPT

## 2018-09-10 PROCEDURE — 6360000002 HC RX W HCPCS: Performed by: HOSPITALIST

## 2018-09-10 PROCEDURE — 6360000002 HC RX W HCPCS: Performed by: SURGERY

## 2018-09-10 PROCEDURE — 99231 SBSQ HOSP IP/OBS SF/LOW 25: CPT | Performed by: INTERNAL MEDICINE

## 2018-09-10 PROCEDURE — 2000000000 HC ICU R&B

## 2018-09-10 PROCEDURE — 2580000003 HC RX 258: Performed by: HOSPITALIST

## 2018-09-10 PROCEDURE — 85027 COMPLETE CBC AUTOMATED: CPT

## 2018-09-10 PROCEDURE — 2700000000 HC OXYGEN THERAPY PER DAY

## 2018-09-10 PROCEDURE — 92526 ORAL FUNCTION THERAPY: CPT

## 2018-09-10 PROCEDURE — 94660 CPAP INITIATION&MGMT: CPT

## 2018-09-10 PROCEDURE — 6360000002 HC RX W HCPCS: Performed by: INTERNAL MEDICINE

## 2018-09-10 PROCEDURE — 83735 ASSAY OF MAGNESIUM: CPT

## 2018-09-10 PROCEDURE — 6370000000 HC RX 637 (ALT 250 FOR IP): Performed by: INTERNAL MEDICINE

## 2018-09-10 PROCEDURE — 84100 ASSAY OF PHOSPHORUS: CPT

## 2018-09-10 PROCEDURE — 2580000003 HC RX 258: Performed by: SURGERY

## 2018-09-10 RX ADMIN — Medication 0.5 MG: at 14:16

## 2018-09-10 RX ADMIN — AMIODARONE HYDROCHLORIDE 200 MG: 200 TABLET ORAL at 08:57

## 2018-09-10 RX ADMIN — IPRATROPIUM BROMIDE 0.5 MG: 0.5 SOLUTION RESPIRATORY (INHALATION) at 14:17

## 2018-09-10 RX ADMIN — Medication 10 ML: at 20:09

## 2018-09-10 RX ADMIN — IPRATROPIUM BROMIDE 0.5 MG: 0.5 SOLUTION RESPIRATORY (INHALATION) at 06:29

## 2018-09-10 RX ADMIN — GUAIFENESIN 400 MG: 100 SOLUTION ORAL at 08:57

## 2018-09-10 RX ADMIN — Medication 0.5 MG: at 18:46

## 2018-09-10 RX ADMIN — PANTOPRAZOLE SODIUM 40 MG: 40 INJECTION, POWDER, FOR SOLUTION INTRAVENOUS at 08:58

## 2018-09-10 RX ADMIN — AMIODARONE HYDROCHLORIDE 200 MG: 200 TABLET ORAL at 20:09

## 2018-09-10 RX ADMIN — MEROPENEM 1 G: 1 INJECTION, POWDER, FOR SOLUTION INTRAVENOUS at 08:58

## 2018-09-10 RX ADMIN — Medication 0.25 MG: at 00:34

## 2018-09-10 RX ADMIN — IPRATROPIUM BROMIDE 0.5 MG: 0.5 SOLUTION RESPIRATORY (INHALATION) at 10:18

## 2018-09-10 RX ADMIN — IPRATROPIUM BROMIDE 0.5 MG: 0.5 SOLUTION RESPIRATORY (INHALATION) at 18:37

## 2018-09-10 RX ADMIN — Medication 0.5 MG: at 09:58

## 2018-09-10 RX ADMIN — MEROPENEM 1 G: 1 INJECTION, POWDER, FOR SOLUTION INTRAVENOUS at 19:04

## 2018-09-10 RX ADMIN — Medication 10 ML: at 09:00

## 2018-09-10 RX ADMIN — MEROPENEM 1 G: 1 INJECTION, POWDER, FOR SOLUTION INTRAVENOUS at 00:34

## 2018-09-10 ASSESSMENT — PAIN DESCRIPTION - ORIENTATION: ORIENTATION: LOWER

## 2018-09-10 ASSESSMENT — PAIN SCALES - GENERAL
PAINLEVEL_OUTOF10: 5
PAINLEVEL_OUTOF10: 0
PAINLEVEL_OUTOF10: 9
PAINLEVEL_OUTOF10: 0
PAINLEVEL_OUTOF10: 8
PAINLEVEL_OUTOF10: 6
PAINLEVEL_OUTOF10: 10

## 2018-09-10 ASSESSMENT — PAIN DESCRIPTION - LOCATION: LOCATION: BACK

## 2018-09-10 ASSESSMENT — PAIN DESCRIPTION - PAIN TYPE: TYPE: CHRONIC PAIN
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
## 2018-09-10 NOTE — PROGRESS NOTES
Speech Language Pathology  Facility/Department: [facility] ICU  SWALLOW THERAPY    NAME: Demarco Marte  : 1950  MRN: [id number]    ADMISSION DATE: 2018  ADMITTING DIAGNOSIS: has Irritable bowel syndrome; Osteoarthritis; Anxiety; Depression; Labyrinthitis; AAA (abdominal aortic aneurysm) ([address] Carotid artery stenosis; Hyperlipemia; TIA (transient ischemic attack); Fibromuscular dysplasia ([address] Diarrhea; History of colon polyps; Chronic heartburn; Antiplatelet or antithrombotic long-term use; Colon polyps; Renal artery stenosis ([address] Celiac artery stenosis ([address] Essential hypertension; Bilateral carotid artery stenosis; Ischemic hepatitis; Acute respiratory failure with hypoxia ([address] CKD (chronic kidney disease), stage III; Metabolic acidosis; Acute blood loss as cause of postoperative anemia; Atrial fibrillation with rapid ventricular response ([address] Positive sputum culture for Pseudomonas; Fever in adult; and Shock circulatory ([address] on her problem list.    Date of Treat: 9/10/2018  Evaluating Therapist: Joe Barbosa    Current Diet level:  Current Liquid Diet : NPO    Reason for Referral  Demarco Marte was referred for a bedside swallow evaluation to assess the efficiency of her swallow function, identify signs and symptoms of aspiration and make recommendations regarding safe dietary consistencies, effective compensatory strategies, and safe eating environment. Impression  Observed patient's swallowing function. Patient exhibits slow oral prep of more solid consistencies (ice), fast oral transit and suspected swallow delay with thin liquids, and mild-moderately decreased and inconsistently sluggish laryngeal elevation for swallow airway protection. Even so, no outward S/S penetration/aspiration was noted with any ice chip trial, puree consistency trial, honey thick H2O trial, or nectar thick H2O trial presented during treatment session this date.  Patient did exhibit S/S penetration/aspiration Dysfunction  Impaired Mastication: Ice chips (Patient exhibited slow oral prep of single ice chip trials presented by SLP.)  Suspected Premature Bolus Loss: Thin - cup (Patient exhibited fast oral transit of thin H2O trials presented independently via cup.)  Oral Phase - Comment: Oral transit of single ice chip trials primarily measured 1-2 seconds in length. Oral transit of puree consistency trials, presented by SLP, primarily measured 1-2 seconds in length and no oral cavity residue was noted post swallows. Oral transit of honey thick H2O trials and nectar thick H2O trials, presented independently via cup, primarily measured 1-2 seconds in length. Indicators of Pharyngeal Phase Dysfunction  Pharyngeal Phase: Exceptions  Indicators of Pharyngeal Phase Dysfunction  Delayed Swallow: Thin - cup (Suspect secondary to oral transit times.)  Decreased Laryngeal Elevation: All (Laryngeal elevation was considered to be mild-moderately decreased and inconsistently sluggish for swallow airway protection.)  Cough - Delayed: Thin - cup (Mild, delayed coughs were noted following thin H2O trials.)  Pharyngeal: No outward S/S penetration/aspiration was noted with any ice chip trial, puree consistency trial, honey thick H2O trial, or nectar thick H2O trial. As previously mentioned, mild, delayed coughs were observed following thin H2O trials. At this time, would trial puree consistency and nectar thick liquids. Meds crushed in pudding or applesauce. If patient receives mouth care prior to intake, okay for ice chips IN BETWEEN MEALS for comfort.      Electronically signed by SVEN Madrid on 9/10/2018 at 9:49 AM

## 2018-09-10 NOTE — PROGRESS NOTES
Vascular Surgery  Dr.Scott Janelle Meehan   Daily Progress Note    Pt Name: Filemon Jung Record Number: [id number]  Date of Birth 1950   Today's Date: 9/10/2018    SUBJECTIVE:     Patient was seen and examined, is much more alert/oriented today. Complaining of gas this am, no BM yet  Wanting to get OOB this am     OBJECTIVE:     Patient Vitals for the past 24 hrs:   BP Temp Temp src Pulse Resp SpO2 Weight   09/10/18 0802 (!) 141/50 98.2 °F (36.8 °C) Temporal 76 19 95 % -   09/10/18 0600 (!) 135/50 - - 69 21 95 % -   09/10/18 0500 (!) 134/51 - - 71 17 95 % -   09/10/18 0400 (!) 116/43 97 °F (36.1 °C) Temporal 70 16 96 % -   09/10/18 0300 (!) 123/42 - - 71 18 95 % -   09/10/18 0200 - - - 73 18 96 % -   09/10/18 0100 - - - 75 19 95 % -   09/10/18 0000 (!) 133/44 97 °F (36.1 °C) Temporal 76 23 96 % 177 lb 9.6 oz (80.6 kg)   09/09/18 2300 (!) 114/51 - - 71 18 98 % -   09/09/18 2200 (!) 125/49 - - 74 24 96 % -   09/09/18 2100 (!) 125/46 - - 72 23 96 % -   09/09/18 2000 (!) 126/52 97.1 °F (36.2 °C) Temporal 71 22 96 % -   09/09/18 1900 - - - 77 16 97 % -   09/09/18 1800 (!) 133/43 - - 86 16 95 % -   09/09/18 1700 129/61 - - 85 15 94 % -   09/09/18 1600 (!) 112/54 98.1 °F (36.7 °C) Temporal 80 22 92 % -   09/09/18 1500 (!) 139/59 - - 76 19 97 % -   09/09/18 1410 - - - - 19 100 % -   09/09/18 1409 - - - - 19 97 % -   09/09/18 1400 109/75 - - 82 19 97 % -   09/09/18 1300 (!) 134/58 - - 79 22 96 % -   09/09/18 1200 128/63 99.3 °F (37.4 °C) Temporal 74 19 96 % -   09/09/18 1100 131/62 - - 76 19 96 % -   09/09/18 1013 - - - - 17 98 % -   09/09/18 1000 129/60 - - 72 18 99 % -       Intake/Output Summary (Last 24 hours) at 09/10/18 0906  Last data filed at 09/10/18 0600   Gross per 24 hour   Intake            596.5 ml   Output             1370 ml   Net           -773.5 ml     In: 395.5 [I.V.:45.5; NG/GT:150]  Out: 1070 [Urine:895]    I/O last 3 completed shifts:   In: 733.7 [I.V.:73.7; NG/GT:360; IV Piggyback:300]  Out: 7360 alert today, moving extremities, speech is clear  WOUND/INCISION:  Midline incision line with staples, incision line is CDI and healing well  EXTREMITY:Feet warm to touch/pink color bilaterally with +palp DP/PT pulses  LABS:     CBC: Recent Labs      09/08/18   0556   09/08/18   2357  09/09/18   0500  09/10/18   0350   WBC  17.0*   --    --   16.3*  12.8*   RBC  2.60*   --    --   2.83*  2.50*   HGB  7.8*   < >  8.7*  8.4*  7.5*   HCT  23.7*   < >  26.2*  25.5*  23.1*   MCV  91.2   --    --   90.1  92.4   MCH  30.0   --    --   29.7  30.0   MCHC  32.9*   --    --   32.9*  32.5*   RDW  16.1*   --    --   16.0*  16.6*   PLT  336   --    --   405*  386   MPV  10.4   --    --   10.3  10.0    < > = values in this interval not displayed. Last 3 CMP:   Recent Labs      09/07/18   1348  09/08/18   0741  09/09/18   0621   K  4.3  3.9  3.8      Calcium:   Lab Results   Component Value Date    CALCIUM 8.1 09/07/2018    CALCIUM 7.9 09/06/2018    CALCIUM 8.2 09/05/2018        DVT prophylaxis:                                  [x] SCDs                              ASSESSMENT:     1. POD -open AAA and left renal artery bypass. Complicated by pneumonia, atelectasis, reintubation and ileus.   2. HD # 11  Patient Active Problem List   Diagnosis    Irritable bowel syndrome    Osteoarthritis    Anxiety    Depression    Labyrinthitis    AAA (abdominal aortic aneurysm) (HCC)    Carotid artery stenosis    Hyperlipemia    TIA (transient ischemic attack)    Fibromuscular dysplasia (HCC)    Diarrhea    History of colon polyps    Chronic heartburn    Antiplatelet or antithrombotic long-term use    Colon polyps    Renal artery stenosis (HCC)    Celiac artery stenosis (HCC)    Essential hypertension    Bilateral carotid artery stenosis    Ischemic hepatitis    Acute respiratory failure with hypoxia (HCC)    CKD (chronic kidney disease), stage III    Metabolic acidosis    Acute blood loss as cause of postoperative

## 2018-09-10 NOTE — PROGRESS NOTES
time.    · Continue atrovent and robitussin  · Continue Merrem D#9 today. 8 days is typically sufficient although reasonable to extend given persistent severe and life-threatening illness. · Monitor confusion; precedex infusion now off. · Up out of bed when confusion improves, she appears to be doing much better over the entire weekend. Electronically signed by BRAYDON Valero on 9/10/2018 at 7:23 AM  Physician's substantive portion:  Subjective: The patient is doing well from a pulmonary standpoint. She appears comfortable nasal cannula. Objective: She has fair air movement on chest exam.  Assessment/recommendation: We will continue neb treatments and pulmonary toilet measures. I have seen and examined patient personally, performing a face-to-face diagnostic evaluation with plan of care reviewed and developed with APRN. I have addended and/or modified the above history of present illness, physical examination, and assessment and plan to reflect my findings and impressions. Essential elements of the care plan were discussed with APRN above. Agree with findings and assessment/plan as documented above.     Electronically signed by Manda Okeefe MD on 9/10/18 at 10:59 AM

## 2018-09-10 NOTE — PROGRESS NOTES
Nutrition Assessment    Type and Reason for Visit: Reassess    Nutrition Recommendations: Aware pt to have trial of puree diet with nectar thick liquids per SLP. If pt unable to consume 50% or more of meal, suggest resuming previous TF order to meet nutritional needs. TF: Jevity 1.5 at 42ml/hr and 1 proteinex bottle flush daily. Malnutrition Assessment:  · Malnutrition Status: At risk for malnutrition  · Context: Acute illness or injury  · Findings of the 6 clinical characteristics of malnutrition (Minimum of 2 out of 6 clinical characteristics is required to make the diagnosis of moderate or severe Protein Calorie Malnutrition based on AND/ASPEN Guidelines):  1. Energy Intake-Less than or equal to 50%, greater than 7 days    2. Weight Loss-No significant weight loss,    3. Fat Loss-Unable to assess,    4. Muscle Loss-Unable to assess,    5. Fluid Accumulation-No significant fluid accumulation,    6.  Strength-Not measured    Nutrition Diagnosis:   · Problem: Inadequate oral intake  · Etiology: related to Acute injury/trauma     Signs and symptoms:  as evidenced by NPO status due to medical condition    Nutrition Assessment:  · Subjective Assessment: Pt seen by SLP this morning, to have trial of purees with NTL. TF on hold. Will follow for tolerance to po diet. If unable to consume 50% or more, recommend resuming TF to meet increased needs.    · Nutrition-Focused Physical Findings: extubated  · Wound Type: Skin Tears, Surgical Wound  · Current Nutrition Therapies:  · Oral Diet Orders: NPO   · Oral Diet intake: NPO  · Oral Nutrition Supplement (ONS) Orders: None  · ONS intake: NPO  · Anthropometric Measures:  · Ht: 5' 3\" (160 cm)   · Current Body Wt: 177 lb 9.6 oz (80.6 kg)  · Admission Body Wt: 141 lb (64 kg)  · Ideal Body Wt: 115 lb (52.2 kg), % Ideal Body 138%  · Adjusted Body Wt:  , body weight adjusted for    · BMI Classification: BMI 30.0 - 34.9 Obese Class I  · Comparative Standards (Estimated

## 2018-09-10 NOTE — PROGRESS NOTES
Speech Language Pathology  Facility/Department: [facility] ICU  Daily Treatment Note  NAME: Cayetano Buchanan  : 1950  MRN: [id number]    Date of Treat: 9/10/2018  Evaluating Therapist: Anamika Osullivan    Patient Treat:   Patient alert, in bed, upon entry. Family member present. Patient was transferred to bedside chair for lunch by RN. Fed patient lunch. With puree consistency presentations, administered by SLP, patient exhibited slow oral prep. Oral transit of puree consistency presentations primarily measured 1-2 seconds in length and min lingual residue was noted post swallows that cleared with nectar thick liquid wash and additional swallows. With nectar thick liquid presentations, administered via spoon by SLP, oral transit primarily measured 1-2 seconds in length. Laryngeal elevation during swallow initiation was considered to be mild-moderately decreased and inconsistently sluggish for swallow airway protection. Even so, no outward S/S penetration/aspiration was observed with any nectar thick liquid presentation. Patient exhibited just mild, delayed throat clears following puree consistency presentations; patient was able to produce swallows, following throat clears, upon command by SLP. For overall intake, patient was accepting of 3 1/2 teaspoon presentations each of pureed green beans, mashed potatoes, and meat as well as teaspoons of nectar thick liquid following every bite. At this time, recommend continuation current diet. Meds crushed in pudding or applesauce. TOTAL FEED. Plan:  Continued daily Speech/Language treatment with goals per plan of care.     Recommendations  Requires SLP Intervention: Yes    Electronically signed by SVEN Richardson on 9/10/2018 at 2:08 PM

## 2018-09-11 ENCOUNTER — APPOINTMENT (OUTPATIENT)
Dept: CT IMAGING | Age: 68
DRG: 268 | End: 2018-09-11
Attending: SURGERY
Payer: MEDICARE

## 2018-09-11 PROBLEM — J93.9 PNEUMOTHORAX ON LEFT: Status: ACTIVE | Noted: 2018-09-11

## 2018-09-11 LAB
ALBUMIN SERPL-MCNC: 2.6 G/DL (ref 3.5–5.2)
ALP BLD-CCNC: 68 U/L (ref 35–104)
ALT SERPL-CCNC: 37 U/L (ref 5–33)
AMYLASE: 27 U/L (ref 28–100)
AMYLASE: 29 U/L (ref 28–100)
ANION GAP SERPL CALCULATED.3IONS-SCNC: 16 MMOL/L (ref 7–19)
AST SERPL-CCNC: 33 U/L (ref 5–32)
BILIRUB SERPL-MCNC: 1.7 MG/DL (ref 0.2–1.2)
BUN BLDV-MCNC: 17 MG/DL (ref 8–23)
CALCIUM SERPL-MCNC: 7.8 MG/DL (ref 8.8–10.2)
CHLORIDE BLD-SCNC: 102 MMOL/L (ref 98–111)
CO2: 22 MMOL/L (ref 22–29)
CREAT SERPL-MCNC: 1 MG/DL (ref 0.5–0.9)
GFR NON-AFRICAN AMERICAN: 55
GLUCOSE BLD-MCNC: 68 MG/DL (ref 74–109)
HCT VFR BLD CALC: 24.3 % (ref 37–47)
HCT VFR BLD CALC: 27.5 % (ref 37–47)
HEMOGLOBIN: 7.8 G/DL (ref 12–16)
HEMOGLOBIN: 8.7 G/DL (ref 12–16)
MAGNESIUM: 1.7 MG/DL (ref 1.6–2.4)
MAGNESIUM: 1.8 MG/DL (ref 1.6–2.4)
MCH RBC QN AUTO: 29.8 PG (ref 27–31)
MCH RBC QN AUTO: 29.8 PG (ref 27–31)
MCHC RBC AUTO-ENTMCNC: 31.6 G/DL (ref 33–37)
MCHC RBC AUTO-ENTMCNC: 32.1 G/DL (ref 33–37)
MCV RBC AUTO: 92.7 FL (ref 81–99)
MCV RBC AUTO: 94.2 FL (ref 81–99)
PDW BLD-RTO: 16.6 % (ref 11.5–14.5)
PDW BLD-RTO: 16.8 % (ref 11.5–14.5)
PHOSPHORUS: 1.9 MG/DL (ref 2.5–4.5)
PHOSPHORUS: 2.1 MG/DL (ref 2.5–4.5)
PLATELET # BLD: 169 K/UL (ref 130–400)
PLATELET # BLD: 469 K/UL (ref 130–400)
PLATELET SLIDE REVIEW: ADEQUATE
PMV BLD AUTO: 10 FL (ref 9.4–12.3)
PMV BLD AUTO: 11.7 FL (ref 9.4–12.3)
POTASSIUM SERPL-SCNC: 4 MMOL/L (ref 3.5–5)
RBC # BLD: 2.62 M/UL (ref 4.2–5.4)
RBC # BLD: 2.92 M/UL (ref 4.2–5.4)
SODIUM BLD-SCNC: 140 MMOL/L (ref 136–145)
TOTAL PROTEIN: 4.7 G/DL (ref 6.6–8.7)
TROPONIN: 0.02 NG/ML (ref 0–0.03)
TROPONIN: 0.03 NG/ML (ref 0–0.03)
WBC # BLD: 14.7 K/UL (ref 4.8–10.8)
WBC # BLD: 15 K/UL (ref 4.8–10.8)

## 2018-09-11 PROCEDURE — 83735 ASSAY OF MAGNESIUM: CPT

## 2018-09-11 PROCEDURE — 6360000002 HC RX W HCPCS: Performed by: INTERNAL MEDICINE

## 2018-09-11 PROCEDURE — G8987 SELF CARE CURRENT STATUS: HCPCS

## 2018-09-11 PROCEDURE — G8979 MOBILITY GOAL STATUS: HCPCS

## 2018-09-11 PROCEDURE — G8978 MOBILITY CURRENT STATUS: HCPCS

## 2018-09-11 PROCEDURE — 2580000003 HC RX 258: Performed by: SURGERY

## 2018-09-11 PROCEDURE — 94660 CPAP INITIATION&MGMT: CPT

## 2018-09-11 PROCEDURE — 84484 ASSAY OF TROPONIN QUANT: CPT

## 2018-09-11 PROCEDURE — 6360000002 HC RX W HCPCS: Performed by: HOSPITALIST

## 2018-09-11 PROCEDURE — 2580000003 HC RX 258: Performed by: HOSPITALIST

## 2018-09-11 PROCEDURE — 6360000002 HC RX W HCPCS: Performed by: SURGERY

## 2018-09-11 PROCEDURE — 6360000002 HC RX W HCPCS: Performed by: NURSE PRACTITIONER

## 2018-09-11 PROCEDURE — 71250 CT THORAX DX C-: CPT

## 2018-09-11 PROCEDURE — 84100 ASSAY OF PHOSPHORUS: CPT

## 2018-09-11 PROCEDURE — 99231 SBSQ HOSP IP/OBS SF/LOW 25: CPT | Performed by: INTERNAL MEDICINE

## 2018-09-11 PROCEDURE — 94640 AIRWAY INHALATION TREATMENT: CPT

## 2018-09-11 PROCEDURE — 99222 1ST HOSP IP/OBS MODERATE 55: CPT | Performed by: INTERNAL MEDICINE

## 2018-09-11 PROCEDURE — 97530 THERAPEUTIC ACTIVITIES: CPT

## 2018-09-11 PROCEDURE — 97165 OT EVAL LOW COMPLEX 30 MIN: CPT

## 2018-09-11 PROCEDURE — 2580000003 HC RX 258: Performed by: NURSE PRACTITIONER

## 2018-09-11 PROCEDURE — 82150 ASSAY OF AMYLASE: CPT

## 2018-09-11 PROCEDURE — 80053 COMPREHEN METABOLIC PANEL: CPT

## 2018-09-11 PROCEDURE — 6370000000 HC RX 637 (ALT 250 FOR IP): Performed by: INTERNAL MEDICINE

## 2018-09-11 PROCEDURE — 2700000000 HC OXYGEN THERAPY PER DAY

## 2018-09-11 PROCEDURE — G8988 SELF CARE GOAL STATUS: HCPCS

## 2018-09-11 PROCEDURE — 2000000000 HC ICU R&B

## 2018-09-11 PROCEDURE — C9113 INJ PANTOPRAZOLE SODIUM, VIA: HCPCS | Performed by: HOSPITALIST

## 2018-09-11 PROCEDURE — 85027 COMPLETE CBC AUTOMATED: CPT

## 2018-09-11 PROCEDURE — 97161 PT EVAL LOW COMPLEX 20 MIN: CPT

## 2018-09-11 RX ORDER — FUROSEMIDE 10 MG/ML
40 INJECTION INTRAMUSCULAR; INTRAVENOUS ONCE
Status: COMPLETED | OUTPATIENT
Start: 2018-09-11 | End: 2018-09-11

## 2018-09-11 RX ADMIN — AMIODARONE HYDROCHLORIDE 200 MG: 200 TABLET ORAL at 21:01

## 2018-09-11 RX ADMIN — PANTOPRAZOLE SODIUM 40 MG: 40 INJECTION, POWDER, FOR SOLUTION INTRAVENOUS at 07:17

## 2018-09-11 RX ADMIN — Medication 0.5 MG: at 18:09

## 2018-09-11 RX ADMIN — IPRATROPIUM BROMIDE 0.5 MG: 0.5 SOLUTION RESPIRATORY (INHALATION) at 14:12

## 2018-09-11 RX ADMIN — Medication 10 ML: at 08:30

## 2018-09-11 RX ADMIN — ONDANSETRON 4 MG: 2 INJECTION INTRAMUSCULAR; INTRAVENOUS at 18:19

## 2018-09-11 RX ADMIN — MEROPENEM 1 G: 1 INJECTION, POWDER, FOR SOLUTION INTRAVENOUS at 17:30

## 2018-09-11 RX ADMIN — IPRATROPIUM BROMIDE 0.5 MG: 0.5 SOLUTION RESPIRATORY (INHALATION) at 06:41

## 2018-09-11 RX ADMIN — Medication 10 ML: at 21:01

## 2018-09-11 RX ADMIN — FUROSEMIDE 40 MG: 10 INJECTION, SOLUTION INTRAMUSCULAR; INTRAVENOUS at 18:10

## 2018-09-11 RX ADMIN — AMIODARONE HYDROCHLORIDE 200 MG: 200 TABLET ORAL at 08:30

## 2018-09-11 RX ADMIN — IPRATROPIUM BROMIDE 0.5 MG: 0.5 SOLUTION RESPIRATORY (INHALATION) at 10:05

## 2018-09-11 RX ADMIN — Medication 0.5 MG: at 13:08

## 2018-09-11 RX ADMIN — MEROPENEM 1 G: 1 INJECTION, POWDER, FOR SOLUTION INTRAVENOUS at 08:30

## 2018-09-11 RX ADMIN — Medication 0.5 MG: at 21:01

## 2018-09-11 RX ADMIN — MEROPENEM 1 G: 1 INJECTION, POWDER, FOR SOLUTION INTRAVENOUS at 00:51

## 2018-09-11 RX ADMIN — Medication 0.5 MG: at 11:48

## 2018-09-11 RX ADMIN — IPRATROPIUM BROMIDE 0.5 MG: 0.5 SOLUTION RESPIRATORY (INHALATION) at 18:31

## 2018-09-11 RX ADMIN — Medication 0.5 MG: at 14:49

## 2018-09-11 RX ADMIN — Medication 0.5 MG: at 08:06

## 2018-09-11 RX ADMIN — Medication 0.5 MG: at 00:54

## 2018-09-11 ASSESSMENT — PAIN SCALES - GENERAL
PAINLEVEL_OUTOF10: 5
PAINLEVEL_OUTOF10: 9
PAINLEVEL_OUTOF10: 0
PAINLEVEL_OUTOF10: 0
PAINLEVEL_OUTOF10: 9
PAINLEVEL_OUTOF10: 8
PAINLEVEL_OUTOF10: 9
PAINLEVEL_OUTOF10: 0
PAINLEVEL_OUTOF10: 7
PAINLEVEL_OUTOF10: 10
PAINLEVEL_OUTOF10: 9
PAINLEVEL_OUTOF10: 9

## 2018-09-11 ASSESSMENT — PAIN DESCRIPTION - PROGRESSION: CLINICAL_PROGRESSION: NOT CHANGED

## 2018-09-11 ASSESSMENT — PAIN DESCRIPTION - ONSET: ONSET: SUDDEN

## 2018-09-11 ASSESSMENT — PAIN DESCRIPTION - ORIENTATION
ORIENTATION: MID
ORIENTATION: MID

## 2018-09-11 ASSESSMENT — PAIN DESCRIPTION - PAIN TYPE
TYPE: ACUTE PAIN
TYPE: ACUTE PAIN

## 2018-09-11 ASSESSMENT — PAIN DESCRIPTION - LOCATION
LOCATION: BACK
LOCATION: CHEST

## 2018-09-11 ASSESSMENT — PAIN DESCRIPTION - FREQUENCY: FREQUENCY: CONTINUOUS

## 2018-09-11 ASSESSMENT — PAIN DESCRIPTION - DESCRIPTORS: DESCRIPTORS: ACHING;DULL

## 2018-09-11 NOTE — PROGRESS NOTES
Vascular Surgery  Dr.Scott Christin Mathur   Daily Progress Note    Pt Name: Filemon Jung Record Number: [id number]  Date of Birth 1950   Today's Date: 9/11/2018        SUBJECTIVE:     Patient was seen and examined, awake and alert today. Pain is controlled, states her back is the most uncomfortable thing today  has not had nausea/vomiting  has had constipation, still no BM    OBJECTIVE:     Patient Vitals for the past 24 hrs:   BP Temp Temp src Pulse Resp SpO2   09/11/18 0644 - - - - 19 100 %   09/11/18 0600 (!) 144/57 - - 93 29 93 %   09/11/18 0500 (!) 131/51 - - 64 20 93 %   09/11/18 0400 115/83 98.5 °F (36.9 °C) Temporal 70 17 94 %   09/11/18 0300 (!) 124/42 - - 74 24 92 %   09/11/18 0200 (!) 137/47 - - 65 19 92 %   09/11/18 0000 (!) 137/52 97.8 °F (36.6 °C) Temporal 82 24 94 %   09/10/18 2300 (!) 143/57 - - 73 20 94 %   09/10/18 2200 (!) 138/56 - - 77 18 96 %   09/10/18 2100 (!) 147/60 - - 75 19 95 %   09/10/18 2015 - - - 90 14 95 %   09/10/18 2000 (!) 148/58 98.2 °F (36.8 °C) Temporal 77 18 96 %   09/10/18 1902 (!) 140/59 - - 72 16 96 %   09/10/18 1802 (!) 156/61 - - 75 20 98 %   09/10/18 1702 (!) 146/59 - - 79 20 94 %   09/10/18 1643 (!) 147/56 - - 77 17 95 %   09/10/18 1600 - - - 75 17 94 %   09/10/18 1500 - - - 77 16 96 %   09/10/18 1300 (!) 139/54 - - 70 17 96 %   09/10/18 1200 (!) 142/48 98.6 °F (37 °C) Temporal 70 19 94 %   09/10/18 1102 (!) 142/51 - - 69 17 97 %   09/10/18 1000 122/74 - - 89 19 93 %         Intake/Output Summary (Last 24 hours) at 09/11/18 0951  Last data filed at 09/11/18 0600   Gross per 24 hour   Intake              200 ml   Output             1525 ml   Net            -1325 ml     In: 200   Out: 1000 [Urine:1000]    I/O last 3 completed shifts:   In: 300 [IV Piggyback:300]  Out: 9613 [Urine:1550; Emesis/NG output:200]       Date 09/11/18 0000 - 09/11/18 2359   Shift 7565-4901 9246-0304 2859-1116 24 Hour Total   I  N  T  A  K  E   IV Piggyback  (mL/kg) 100  (1.2)   100  (1.2)

## 2018-09-11 NOTE — PROGRESS NOTES
Physical Therapy    Facility/Department: [facility] ICU  Initial Assessment    NAME: Demarco Marte  : 1950  MRN: [id number]    Date of Service: 2018    Discharge Recommendations:           Patient Diagnosis(es): The encounter diagnosis was Abdominal aortic aneurysm (AAA) without rupture ([facility] [address] has a past medical history of Anxiety; Depression; Fibromuscular dysplasia ([facility] [address] Hyperlipemia; Hypertension; Irritable bowel syndrome; Labyrinthitis; Migraine; Osteoarthritis; Post concussive syndrome; and Stroke ([facility] [address] has a past surgical history that includes Cataract removal (16); Colonoscopy (); Upper gastrointestinal endoscopy (N/A, 2016); Colonoscopy (N/A, 2016); Hysterectomy, total abdominal; and pr repr aneurysm/grft ins,abdominal aorta (N/A, 2018). Restrictions     Vision/Hearing        Subjective  General  Chart Reviewed: Yes  Follows Commands: Within Functional Limits  Subjective  Subjective: Agrees to work with therapy  Pain Screening  Patient Currently in Pain: Yes          Orientation  Orientation  Overall Orientation Status: Within Functional Limits    Social/Functional History     Objective          AROM RLE (degrees)  RLE AROM: WFL  AROM LLE (degrees)  LLE AROM : WFL  Strength RLE  Comment: Grossly 3/5  Strength LLE  Comment: Grossly 3/5        Bed mobility  Supine to Sit: Maximum assistance  Sit to Supine: Maximum assistance  Scooting: Maximal assistance  Transfers  Sit to Stand: Moderate Assistance  Stand to sit: Moderate Assistance  Bed to Chair: Moderate assistance  Ambulation  Ambulation?: Yes  Ambulation 1  Device: Hand-Held Assist  Assistance:  Moderate assistance  Quality of Gait: Unsteady  Distance: 4 feet     Balance  Posture: Fair  Sitting - Static: Good  Sitting - Dynamic: Good  Standing - Static: Poor  Standing - Dynamic: Poor        Assessment   Body structures, Functions, Activity limitations: Decreased functional mobility   Treatment Diagnosis: Decline in mobility  Prognosis: Good  Decision Making: Low Complexity  REQUIRES PT FOLLOW UP: Yes  Activity Tolerance  Activity Tolerance: Patient limited by fatigue;Patient limited by endurance         Plan   Plan  Times per week: 7  Times per day: Daily  Plan weeks: 2  Current Treatment Recommendations: Strengthening, Balance Training, Functional Mobility Training, Transfer Training, Gait Training    G-Code  PT G-Codes  Functional Assessment Tool Used: Bed to chair transfer  Score: Moderate assist  Functional Limitation: Mobility: Walking and moving around  Mobility: Walking and Moving Around Current Status (): At least 60 percent but less than 80 percent impaired, limited or restricted  Mobility: Walking and Moving Around Goal Status ():  At least 1 percent but less than 20 percent impaired, limited or restricted  OutComes Score                                           AM-PAC Score             Goals  Short term goals  Time Frame for Short term goals: 2 weeks  Short term goal 1: Independent with bed mobility  Short term goal 2: Transfer bed to chair with minimal assist of 1  Short term goal 3: Ambulate 300 feet without assistive device and CGA of 1       Therapy Time   Individual Concurrent Group Co-treatment   Time In           Time Out           Minutes                   Latonia Hanley PT       Electronically signed by Latonia Hanley PT on 9/11/2018 at 8:49 AM

## 2018-09-11 NOTE — PROGRESS NOTES
Speech Language Pathology  Facility/Department: [facility]    NAME: Anupama Trivedi  : 1950  MRN: [id number]     Attempted to see patient this afternoon for swallow exercises. However, patient appeared lethargic. Patient placed on hold. Will attempt 18.     Electronically signed by SVEN Cage on 2018 at 2:14 PM

## 2018-09-11 NOTE — PROGRESS NOTES
Shell Weber is a 78 y.o. female patient.     Current Facility-Administered Medications   Medication Dose Route Frequency Provider Last Rate Last Dose    amiodarone (CORDARONE) tablet 200 mg  200 mg Oral BID Claudell Cornet, MD   200 mg at 09/10/18 2009    LORazepam (ATIVAN) injection 1 mg  1 mg Intravenous Q4H PRN Zeb Alvarez MD   1 mg at 09/08/18 0535    meropenem (MERREM) 1 g in sodium chloride 0.9 % 100 mL IVPB (mini-bag)  1 g Intravenous Q8H Lainey Gonsales MD   Stopped at 09/11/18 0121    fat emulsion 20 % infusion 250 mL  250 mL Intravenous Once per day on Mon Thu Zeb Alvarez MD   Stopped at 09/04/18 6121    ipratropium (ATROVENT) 0.02 % nebulizer solution 0.5 mg  0.5 mg Nebulization 4x daily Gina Ryan MD   0.5 mg at 09/11/18 0641    chlorhexidine (PERIDEX) 0.12 % solution 15 mL  15 mL Mouth/Throat BID Librado Harris MD   15 mL at 09/08/18 2104    pantoprazole (PROTONIX) injection 40 mg  40 mg Intravenous Daily Librado Harris MD   40 mg at 09/10/18 0858    sodium chloride flush 0.9 % injection 10 mL  10 mL Intravenous 2 times per day Santos Lopez MD   10 mL at 09/10/18 2009    sodium chloride flush 0.9 % injection 10 mL  10 mL Intravenous PRN Santos Lopez MD   10 mL at 09/01/18 1834    potassium chloride 10 mEq/100 mL IVPB (Peripheral Line)  10 mEq Intravenous PRN Santos Lopez MD        ondansetron [facility]) injection 4 mg  4 mg Intravenous Q8H PRN Santos Lopez MD   4 mg at 09/09/18 1603    metoprolol tartrate (LOPRESSOR) tablet 25 mg  25 mg Oral Q12H PRN Santos Lopez MD   25 mg at 08/31/18 1932    cloNIDine (CATAPRES) tablet 0.1 mg  0.1 mg Oral Q2H PRN Santos Lopez MD   0.1 mg at 08/31/18 1830    HYDROmorphone (DILAUDID) 0.5-0.9 MG/ML-% injection 0.5 mg  0.5 mg Intravenous Q1H PRN Santos Lopez MD   0.5 mg at 09/11/18 0054    Or    HYDROmorphone (DILAUDID) 0.5-0.9 MG/ML-% injection 0.25 mg  0.25 mg Intravenous Q1H PRN Santos Lopez MD   0.25 mg at 09/10/18 0034     Allergies   Allergen Reactions    Colestipol Shortness Of Breath and Other (See Comments)     Heart races    Codeine      Blocks her kidneys     Prednisone Other (See Comments)     Increased heart rate and insomnia    Aspirin Nausea And Vomiting     Makes her stomach bleed     Principal Problem:    AAA (abdominal aortic aneurysm) (HCC)  Active Problems:    Renal artery stenosis (HCC)    Essential hypertension    Ischemic hepatitis    Acute respiratory failure with hypoxia (HCC)    CKD (chronic kidney disease), stage III    Metabolic acidosis    Acute blood loss as cause of postoperative anemia    Atrial fibrillation with rapid ventricular response ([facility])    Positive sputum culture for Pseudomonas    Fever in adult    Shock circulatory ([facility] [address]  Resolved Problems:    * No resolved hospital problems. *    Blood pressure (!) 144/57, pulse 93, temperature 98.5 °F (36.9 °C), temperature source Temporal, resp. rate 19, height 5' 3\" (1.6 m), weight 177 lb 9.6 oz (80.6 kg), SpO2 100 %, not currently breastfeeding. Subjective   No new complaints    Review of systems  Gen.: No fever or chills  Cardiovascular: No chest pain or palpitations  Pulmonary: No shortness of breath or productive coughing  Gastrointestinal: No abdominal pain, nausea, vomiting or diarrhea  Neurologic: No focal numbness or weakness    Objective     General: in no distress  Pulmonary: Lungs clear, unlabored breathing  Cardiovascular: Heart regular without murmur, no peripheral edema  Gastrointestinal: Abdomen slightly distended and firm, nontender to light pressure, no guarding or masses  Neurologic: Alert, no focal motor deficits  Skin: Warm and dry. No rash. Assessment & Plan        Postop day #12 AAA repair: As per vascular surgery     Acute hypoxemic respiratory failure / pseudomonas aeruginosa on sputum culture: Improved. As per pulmonary. Day # 10 of Merrem.  Titrate oxygen.     Acute blood loss anemia: Hemoglobin is stable at 7.8 today. Follow.     Paroxysmal atrial fibrillation: Resolved. As per cardiology.     Hypertension: Controlled     Hyperbilirubinemia: Combined direct and indirect. Improving. Other liver functions have also improved. Follow.     Acute encephalopathy: Probably toxic metabolic. Resolved      Guillaume Medel MD  9/11/2018 9/11/2018   1740    CT of the chest performed today shows bilateral pleural effusions, small volume ascites and a 20% pneumothorax on the left. Recent echocardiogram showed normal LVEF. Patient appears to be somewhat volume overloaded and a one-time dose of Lasix is ordered. Patient is in no respiratory distress and follow-up chest x-ray is ordered for the a.m.

## 2018-09-11 NOTE — PROGRESS NOTES
assistance  Standing Balance  Sit to stand: Contact guard assistance  Stand to sit: Contact guard assistance  Functional Mobility  Functional - Mobility Device: Rolling Walker  Assist Level: Contact guard assistance  Bed mobility  Supine to Sit: Moderate assistance  Sit to Supine: Moderate assistance  Scooting: Moderate assistance  Transfers  Sit to stand: Contact guard assistance  Stand to sit: Contact guard assistance        Cognition  Overall Cognitive Status: WFL         LUE AROM (degrees)  LUE AROM : WFL  Left Hand AROM (degrees)  Left Hand AROM: WFL  RUE AROM (degrees)  RUE AROM : WFL  Right Hand AROM (degrees)  Right Hand AROM: WFL           Assessment   Performance deficits / Impairments: Decreased functional mobility ; Decreased ADL status; Decreased balance;Decreased high-level IADLs;Decreased endurance  Assessment: Pt benefits from skilled OT to address decreased I with ADL tasks, balance, endurance, and functional mobility s/p AAA without rupture  Treatment Diagnosis: AAA   Prognosis: Good  Decision Making: Low Complexity  History: HLD, OA, Depression  REQUIRES OT FOLLOW UP: Yes  Activity Tolerance  Activity Tolerance: Patient Tolerated treatment well  Safety Devices  Safety Devices in place: Yes  Type of devices: Call light within reach; Left in chair;Nurse notified          Plan   Plan  Times per week: 3-5x/wk  Current Treatment Recommendations: Balance Training, Strengthening, Safety Education & Training, Equipment Evaluation, Education, & procurement, Patient/Caregiver Education & Training, Functional Mobility Training, Self-Care / ADL, Home Management Training  G-Code  OT G-codes  Functional Assessment Tool Used: ADL tasks   Functional Limitation: Self care  Self Care Current Status (): At least 60 percent but less than 80 percent impaired, limited or restricted  Self Care Goal Status ():  At least 40 percent but less than 60 percent impaired, limited or restricted       Goals  Short term

## 2018-09-11 NOTE — PROGRESS NOTES
Patient wanted to get up to the bathroom instead of bedside commode. Got extension tubing for oxygen. She did ok until she was coming back to bed, then she became SOA and desated again. Also c/o discomfort under left breast. Dilaudid 0.5mg IV given. Will monitor. Daughter now here at the bedside.

## 2018-09-11 NOTE — PROGRESS NOTES
Depression  REQUIRES OT FOLLOW UP: Yes  Activity Tolerance  Activity Tolerance: Patient Tolerated treatment well  Safety Devices  Safety Devices in place: Yes  Type of devices: Call light within reach; Left in chair;Nurse notified         Plan   Plan  Times per week: 3-5x/wk  Current Treatment Recommendations: Balance Training, Strengthening, Safety Education & Training, Equipment Evaluation, Education, & procurement, Patient/Caregiver Education & Training, Functional Mobility Training, Self-Care / ADL, Home Management Training    G-Code  OT G-codes  Functional Assessment Tool Used: ADL tasks   Functional Limitation: Self care  Self Care Current Status (): At least 60 percent but less than 80 percent impaired, limited or restricted  Self Care Goal Status ():  At least 40 percent but less than 60 percent impaired, limited or restricted          Goals  Short term goals  Time Frame for Short term goals: 1 week  Short term goal 1: Pt will complete total body bathing/dressing Modified I  Short term goal 2: Pt will be Modified I for functional mobility  Short term goal 3: Pt will be Modified I for toileting task/transfer  Long term goals  Long term goal 1: Home   Patient Goals   Patient goals : ADL and self-care tasks        Electronically signed by Heather Marques OT on 9/11/2018 at 3:00 PM    Heather Marques OT

## 2018-09-12 ENCOUNTER — APPOINTMENT (OUTPATIENT)
Dept: GENERAL RADIOLOGY | Age: 68
DRG: 268 | End: 2018-09-12
Attending: SURGERY
Payer: MEDICARE

## 2018-09-12 LAB
C-REACTIVE PROTEIN: 8.52 MG/DL (ref 0–0.5)
TROPONIN: 0.03 NG/ML (ref 0–0.03)
TROPONIN: 0.03 NG/ML (ref 0–0.03)

## 2018-09-12 PROCEDURE — 6360000002 HC RX W HCPCS: Performed by: INTERNAL MEDICINE

## 2018-09-12 PROCEDURE — 6360000002 HC RX W HCPCS: Performed by: SURGERY

## 2018-09-12 PROCEDURE — 97116 GAIT TRAINING THERAPY: CPT

## 2018-09-12 PROCEDURE — 6360000002 HC RX W HCPCS: Performed by: NURSE PRACTITIONER

## 2018-09-12 PROCEDURE — 84484 ASSAY OF TROPONIN QUANT: CPT

## 2018-09-12 PROCEDURE — 2700000000 HC OXYGEN THERAPY PER DAY

## 2018-09-12 PROCEDURE — 71045 X-RAY EXAM CHEST 1 VIEW: CPT

## 2018-09-12 PROCEDURE — 2580000003 HC RX 258: Performed by: SURGERY

## 2018-09-12 PROCEDURE — 94640 AIRWAY INHALATION TREATMENT: CPT

## 2018-09-12 PROCEDURE — 6360000002 HC RX W HCPCS: Performed by: HOSPITALIST

## 2018-09-12 PROCEDURE — 99233 SBSQ HOSP IP/OBS HIGH 50: CPT | Performed by: INTERNAL MEDICINE

## 2018-09-12 PROCEDURE — 84145 PROCALCITONIN (PCT): CPT

## 2018-09-12 PROCEDURE — 99232 SBSQ HOSP IP/OBS MODERATE 35: CPT | Performed by: INTERNAL MEDICINE

## 2018-09-12 PROCEDURE — C9113 INJ PANTOPRAZOLE SODIUM, VIA: HCPCS | Performed by: HOSPITALIST

## 2018-09-12 PROCEDURE — 6370000000 HC RX 637 (ALT 250 FOR IP): Performed by: INTERNAL MEDICINE

## 2018-09-12 PROCEDURE — 86140 C-REACTIVE PROTEIN: CPT

## 2018-09-12 PROCEDURE — 2000000000 HC ICU R&B

## 2018-09-12 PROCEDURE — 2580000003 HC RX 258: Performed by: NURSE PRACTITIONER

## 2018-09-12 PROCEDURE — 92526 ORAL FUNCTION THERAPY: CPT

## 2018-09-12 RX ORDER — AMIODARONE HYDROCHLORIDE 200 MG/1
200 TABLET ORAL DAILY
Status: DISCONTINUED | OUTPATIENT
Start: 2018-09-13 | End: 2018-09-21 | Stop reason: HOSPADM

## 2018-09-12 RX ORDER — METOCLOPRAMIDE HYDROCHLORIDE 5 MG/ML
10 INJECTION INTRAMUSCULAR; INTRAVENOUS EVERY 6 HOURS
Status: DISCONTINUED | OUTPATIENT
Start: 2018-09-12 | End: 2018-09-21 | Stop reason: HOSPADM

## 2018-09-12 RX ORDER — METOCLOPRAMIDE HYDROCHLORIDE 5 MG/ML
10 INJECTION INTRAMUSCULAR; INTRAVENOUS EVERY 6 HOURS
Status: DISCONTINUED | OUTPATIENT
Start: 2018-09-12 | End: 2018-09-12 | Stop reason: SDUPTHER

## 2018-09-12 RX ADMIN — PANTOPRAZOLE SODIUM 40 MG: 40 INJECTION, POWDER, FOR SOLUTION INTRAVENOUS at 08:56

## 2018-09-12 RX ADMIN — Medication 0.25 MG: at 02:43

## 2018-09-12 RX ADMIN — IPRATROPIUM BROMIDE 0.5 MG: 0.5 SOLUTION RESPIRATORY (INHALATION) at 06:16

## 2018-09-12 RX ADMIN — MEROPENEM 1 G: 1 INJECTION, POWDER, FOR SOLUTION INTRAVENOUS at 01:12

## 2018-09-12 RX ADMIN — METOCLOPRAMIDE 10 MG: 5 INJECTION, SOLUTION INTRAMUSCULAR; INTRAVENOUS at 22:46

## 2018-09-12 RX ADMIN — Medication 0.5 MG: at 09:30

## 2018-09-12 RX ADMIN — MEROPENEM 1 G: 1 INJECTION, POWDER, FOR SOLUTION INTRAVENOUS at 08:56

## 2018-09-12 RX ADMIN — AMIODARONE HYDROCHLORIDE 200 MG: 200 TABLET ORAL at 20:15

## 2018-09-12 RX ADMIN — Medication 0.5 MG: at 05:56

## 2018-09-12 RX ADMIN — IPRATROPIUM BROMIDE 0.5 MG: 0.5 SOLUTION RESPIRATORY (INHALATION) at 10:14

## 2018-09-12 RX ADMIN — Medication 10 ML: at 21:10

## 2018-09-12 RX ADMIN — IPRATROPIUM BROMIDE 0.5 MG: 0.5 SOLUTION RESPIRATORY (INHALATION) at 18:47

## 2018-09-12 RX ADMIN — Medication 0.5 MG: at 17:33

## 2018-09-12 RX ADMIN — AMIODARONE HYDROCHLORIDE 200 MG: 200 TABLET ORAL at 08:56

## 2018-09-12 RX ADMIN — IPRATROPIUM BROMIDE 0.5 MG: 0.5 SOLUTION RESPIRATORY (INHALATION) at 14:25

## 2018-09-12 RX ADMIN — Medication 0.5 MG: at 00:12

## 2018-09-12 RX ADMIN — Medication 10 ML: at 08:57

## 2018-09-12 RX ADMIN — Medication 0.25 MG: at 03:54

## 2018-09-12 RX ADMIN — MEROPENEM 1 G: 1 INJECTION, POWDER, FOR SOLUTION INTRAVENOUS at 16:47

## 2018-09-12 RX ADMIN — METOCLOPRAMIDE 10 MG: 5 INJECTION, SOLUTION INTRAMUSCULAR; INTRAVENOUS at 11:13

## 2018-09-12 RX ADMIN — METOCLOPRAMIDE 10 MG: 5 INJECTION, SOLUTION INTRAMUSCULAR; INTRAVENOUS at 16:47

## 2018-09-12 ASSESSMENT — PAIN SCALES - WONG BAKER: WONGBAKER_NUMERICALRESPONSE: 6

## 2018-09-12 ASSESSMENT — PAIN DESCRIPTION - LOCATION: LOCATION: BACK;CHEST

## 2018-09-12 ASSESSMENT — PAIN SCALES - GENERAL
PAINLEVEL_OUTOF10: 2
PAINLEVEL_OUTOF10: 0
PAINLEVEL_OUTOF10: 10
PAINLEVEL_OUTOF10: 7
PAINLEVEL_OUTOF10: 8
PAINLEVEL_OUTOF10: 10
PAINLEVEL_OUTOF10: 0
PAINLEVEL_OUTOF10: 8
PAINLEVEL_OUTOF10: 0
PAINLEVEL_OUTOF10: 8

## 2018-09-12 ASSESSMENT — PAIN DESCRIPTION - PAIN TYPE: TYPE: ACUTE PAIN

## 2018-09-12 ASSESSMENT — PAIN DESCRIPTION - ORIENTATION: ORIENTATION: LEFT

## 2018-09-12 NOTE — PROGRESS NOTES
Vascular Surgery  Dr.Scott Mc Franco   Daily Progress Note    Pt Name: Filemon Jung Record Number: [id number]  Date of Birth 1950   Today's Date: 9/12/2018    SUBJECTIVE:     Patient was seen and examined,is more short of breath today.   Pain is  controlled  has had nausea, no vomiting  has had constipation, no BM post op    OBJECTIVE:     Patient Vitals for the past 24 hrs:   BP Temp Temp src Pulse Resp SpO2   09/12/18 1100 (!) 105/47 - - 90 19 95 %   09/12/18 1014 - - - - 16 100 %   09/12/18 1000 108/65 - - 84 16 92 %   09/12/18 0900 130/61 - - 83 17 (!) 83 %   09/12/18 0800 131/60 97.3 °F (36.3 °C) Temporal 81 17 94 %   09/12/18 0700 132/61 - - 76 22 91 %   09/12/18 0616 - - - - 26 94 %   09/12/18 0530 95/80 98.8 °F (37.1 °C) Temporal 100 20 (!) 88 %   09/12/18 0430 (!) 124/58 98.9 °F (37.2 °C) Temporal 83 19 97 %   09/12/18 0330 118/62 98.8 °F (37.1 °C) Temporal 84 19 98 %   09/12/18 0230 125/63 98.9 °F (37.2 °C) Temporal - - -   09/12/18 0200 125/63 98.6 °F (37 °C) Temporal 84 20 95 %   09/12/18 0130 (!) 126/58 98.6 °F (37 °C) Temporal 86 21 92 %   09/12/18 0030 124/60 97.8 °F (36.6 °C) Temporal 88 19 91 %   09/11/18 2330 123/61 98.4 °F (36.9 °C) Temporal 91 16 97 %   09/11/18 2230 (!) 121/59 98.7 °F (37.1 °C) Temporal 90 18 92 %   09/11/18 2130 119/74 98.5 °F (36.9 °C) Temporal 88 22 (!) 88 %   09/11/18 2030 128/63 98.4 °F (36.9 °C) Temporal 104 24 93 %   09/11/18 1930 (!) 118/58 99 °F (37.2 °C) Temporal 87 17 91 %   09/11/18 1730 (!) 148/56 - - 73 25 95 %   09/11/18 1715 (!) 145/49 - - 68 21 95 %   09/11/18 1630 139/67 - - 83 20 92 %   09/11/18 1500 (!) 130/58 - - 92 25 94 %   09/11/18 1430 (!) 135/56 - - 105 27 (!) 83 %   09/11/18 1330 (!) 142/54 - - 68 19 94 %   09/11/18 1300 (!) 145/63 - - 89 30 93 %   09/11/18 1200 (!) 152/49 - - 67 20 92 %       Intake/Output Summary (Last 24 hours) at 09/12/18 1131  Last data filed at 09/12/18 0926   Gross per 24 hour   Intake              210 ml   Output with +DP/PT   LABS:     CBC: Recent Labs      09/10/18   0350  09/11/18   0417  09/11/18   1855   WBC  12.8*  14.7*  15.0*   RBC  2.50*  2.62*  2.92*   HGB  7.5*  7.8*  8.7*   HCT  23.1*  24.3*  27.5*   MCV  92.4  92.7  94.2   MCH  30.0  29.8  29.8   MCHC  32.5*  32.1*  31.6*   RDW  16.6*  16.6*  16.8*   PLT  386  469*  169   MPV  10.0  10.0  11.7      Last 3 CMP:   Recent Labs      09/11/18   0417   NA  140   K  4.0   CL  102   CO2  22   BUN  17   CREATININE  1.0*   GLUCOSE  68*   CALCIUM  7.8*   PROT  4.7*   LABALBU  2.6*   BILITOT  1.7*   ALKPHOS  68   AST  33*   ALT  37*      Troponin:   Recent Labs      09/11/18   2140  09/12/18   0345  09/12/18   1030   TROPONINI  0.03  0.03  0.03     Calcium:   Lab Results   Component Value Date    CALCIUM 7.8 09/11/2018    CALCIUM 8.1 09/07/2018    CALCIUM 7.9 09/06/2018      DVT prophylaxis:                                  [x] SCDs        Impression   1.  Bilateral pleural effusions and small volume of ascites are likely   due to volume overload. 2.  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 3.  Small moderate pneumothorax on the left. This is approximately   20%. Signed by Dr Danielle Phillip on 9/11/2018 5:27 PM        EXAM: XR CHEST PORTABLE -- 9/12/2018 6:00 AM   HISTORY: 78 years, Female, volume overload, progress note today   indicates acute hypoxemic respiratory failure, pseudomonas on sputum   culture, postop day 13 after abdominal aortic aneurysm   COMPARISON: 9/19/2018, 9/11/2018   TECHNIQUE:  1 images.  Frontal view of the chest.   FINDINGS:     Right central venous catheter with tip overlying the lower SVC. There is a 17 mm left pneumothorax. Small bilateral pleural effusions   and bibasilar opacities. Cardiac and mediastinal silhouette within normal limits. Calcified   aortic atherosclerosis. Surgical clips overlie the left upper abdomen.  Midline

## 2018-09-12 NOTE — PROGRESS NOTES
Dysfunction  Suspected Premature Bolus Loss: Thin - straw (Patient exhibited inconsistently fast oral transit of thin H2O trials presented independently via straw.)  Oral Phase - Comment: Oral transit of puree consistency presentations, administered independently, primarily measured 1 second in length and no oral cavity residue was noted post swallows. Oral transit of nectar thick liquid presentations, administered independently via spoon, primarily measured 1-2 seconds in length. Indicators of Pharyngeal Phase Dysfunction  Pharyngeal Phase: Exceptions  Indicators of Pharyngeal Phase Dysfunction  Delayed Swallow: Thin - straw (Suspect secondary to oral transit times.)  Decreased Laryngeal Elevation: All (Laryngeal elevation was considered to be mild-moderately decreased and inconsistently sluggish for swallow airway protection.)  Cough - Delayed: Thin - straw (Mild, delayed coughs were noted following thin H2O trials.)  Pharyngeal: No outward S/S penetration/aspiration was noted with any puree consistency presentation or nectar thick liquid presentation. As previously mentioned, patient exhibited S/S penetration/aspiration with thin H2O trials with mild, delayed coughs observed post swallows. At this time, would recommend continuation current diet. Meds in pudding or applesauce. Assist in meal set-up. If patient receives mouth care prior to intake, okay for thin H2O IN BETWEEN MEALS for comfort.      Electronically signed by SVEN Cedeño on 9/12/2018 at 1:25 PM

## 2018-09-12 NOTE — PROGRESS NOTES
Nutrition Assessment    Type and Reason for Visit: Reassess    Nutrition Recommendations: Aware pt to have reglan started. Intakes are not meeting needs. Will follow for intakes/GI tolerance. May need to consider trophic feeding of low fiber EN formula (Vital or Osmolite) or start PN again if bowel function is not returning? Malnutrition Assessment:  · Malnutrition Status: At risk for malnutrition  · Context: Acute illness or injury  · Findings of the 6 clinical characteristics of malnutrition (Minimum of 2 out of 6 clinical characteristics is required to make the diagnosis of moderate or severe Protein Calorie Malnutrition based on AND/ASPEN Guidelines):  1. Energy Intake-Less than or equal to 50%, greater than 7 days    2. Weight Loss-No significant weight loss,    3. Fat Loss-Unable to assess,    4. Muscle Loss-Unable to assess,    5. Fluid Accumulation-No significant fluid accumulation,    6.  Strength-Not measured    Nutrition Diagnosis:   · Problem: Inadequate oral intake  · Etiology: related to Acute injury/trauma, Difficulty swallowing     Signs and symptoms:  as evidenced by Intake 0-25%, Swallow study results    Nutrition Assessment:  · Subjective Assessment: Diet continues with Puree and NTL. Pt intakes are not meeting needs. Pt experiencing bloating and having no BM/flatus. Only tolerating bites/sips. Aware to start reglan today per RN. Will follow. NGT out.    · Nutrition-Focused Physical Findings: extubated  · Wound Type: Surgical Wound, Skin Tears  · Current Nutrition Therapies:  · Oral Diet Orders: Dysphagia 1 (Pureed), Nectar Thick   · Oral Diet intake: 1-25% (bites and sips)  · Oral Nutrition Supplement (ONS) Orders: None (starting Ensure Pudding)  · ONS intake: Unable to assess  · Anthropometric Measures:  · Ht: 5' 3\" (160 cm)   · Current Body Wt: 177 lb 9.6 oz (80.6 kg)  · Admission Body Wt: 141 lb (64 kg)  · Ideal Body Wt: 115 lb (52.2 kg), % Ideal Body 138%  · BMI Classification: BMI 30.0 - 34.9 Obese Class I  · Comparative Standards (Estimated Nutrition Needs):  · Estimated Daily Total Kcal: [id number]  · Estimated Daily Protein (g):     Estimated Intake vs Estimated Needs: Intake Less Than Needs    Nutrition Risk Level: High    Nutrition Interventions:   Continue current diet, Start ONS  Continued Inpatient Monitoring    Nutrition Evaluation:   · Evaluation: No progress toward goals   · Goals: Meet needs via nutrition support or PO intake    · Monitoring: Meal Intake, Supplement Intake, Weight, Pertinent Labs    See Adult Nutrition Doc Flowsheet for more detail.      Electronically signed by Memo Sheridan MS, RD, LD on 9/12/18 at 11:07 AM

## 2018-09-12 NOTE — PROGRESS NOTES
09/12/18 1349   Restrictions/Precautions   Restrictions/Precautions Fall Risk   Position Activity Restriction   Other position/activity restrictions NC O2, telemetry, Central line    General   Chart Reviewed Yes   Subjective   Subjective Patient in bed agrees to walk   Pain Screening   Patient Currently in Pain Yes   Pain Assessment   Pain Assessment Faces   Pain Type Acute pain   Pain Location Back; Chest   Pain Orientation Left   Mcmillan-Quiñonez Pain Rating 6   Oxygen Therapy   O2 Device Nasal cannula   Orientation   Overall Orientation Status WFL   Transfers   Sit to Stand Minimal Assistance   Stand to sit Minimal Assistance   Ambulation   Ambulation?  Yes   Ambulation 1   Device Rolling Walker   Other Apparatus O2   Assistance Contact guard assistance   Quality of Gait Steady   Distance 200'   Comments Patient pushed WC around unit   Balance   Posture Fair   Sitting - Static Good   Sitting - Dynamic Fair;+   Standing - Static Fair;+   Standing - Dynamic Fair;+   Short term goals   Time Frame for Short term goals 2 weeks   Short term goal 1 Independent with bed mobility   Short term goal 2 Transfer bed to chair with minimal assist of 1   Short term goal 3 Ambulate 300 feet without assistive device and CGA of 1   Conditions Requiring Skilled Therapeutic Intervention   Body structures, Functions, Activity limitations Decreased functional mobility    Activity Tolerance   Activity Tolerance Patient Tolerated treatment well   Safety Devices   Type of devices Left in bed;Call light within reach   Physical Therapy    Electronically signed by Sol Cochran PTA on 9/12/2018 at 1:55 PM

## 2018-09-12 NOTE — PROGRESS NOTES
insomnia    Aspirin Nausea And Vomiting     Makes her stomach bleed     Principal Problem:    AAA (abdominal aortic aneurysm) (HCC)  Active Problems:    Renal artery stenosis (HCC)    Essential hypertension    Ischemic hepatitis    Acute respiratory failure with hypoxia (HCC)    CKD (chronic kidney disease), stage III    Metabolic acidosis    Acute blood loss as cause of postoperative anemia    Atrial fibrillation with rapid ventricular response (HCC)    Positive sputum culture for Pseudomonas    Fever in adult    Shock circulatory (HCC)    Pneumothorax on left  Resolved Problems:    * No resolved hospital problems. *    Blood pressure 95/80, pulse 100, temperature 98.8 °F (37.1 °C), temperature source Temporal, resp. rate 26, height 5' 3\" (1.6 m), weight 177 lb 9.6 oz (80.6 kg), SpO2 94 %, not currently breastfeeding. Subjective   No complaints. Still no bowel movements or flatus. Review of systems:  Gen.: No fever or chills  Cardiovascular: No chest pain or palpitations  Pulmonary: No shortness of breath or productive coughing  Gastrointestinal: No abdominal pain, nausea, vomiting or diarrhea  Neurologic: No focal numbness or weakness    Objective     General: in no distress  Pulmonary: Lungs clear, unlabored breathing  Cardiovascular: Heart regular without murmur, no peripheral edema  Gastrointestinal: Abdomen mildly distended but not rigid, nontender to mild pressure, no guarding or masses  Neurologic: Alert, no focal motor deficits  Skin: Warm and dry. No rash. Assessment & Plan        Postop day #13 AAA repair: As per vascular surgery     Acute hypoxemic respiratory failure / pseudomonas aeruginosa on sputum culture: Improved. As per pulmonary. Day # 11 of Merrem. Titrate oxygen. Left pneumothorax (20%): As per pulmonary. Follow-up chest x-ray pending.     Acute blood loss anemia: Hemoglobin is Improved at 8.7 today. Follow. Leukocytosis: Persisting. Patient remains afebrile. Follow.  Check pro calcitonin and CRP.     Paroxysmal atrial fibrillation: Resolved. As per cardiology.     Hypertension: Controlled     Hyperbilirubinemia: Combined direct and indirect. Improving. Other liver functions have also improved. CMP in a.m.     Acute encephalopathy: Probably toxic metabolic.  Resolved.         Jannell Kanner, MD  9/12/2018

## 2018-09-12 NOTE — PROGRESS NOTES
Pulmonary and Critical Care Progress Note [facility]    Patient: Carolina Love    1950    MR# [id number]    Acct# [de-identified]   09/12/18   7:29 AM  Referring Provider: Burl Sever, MD    Chief Complaint: s/p extubation 9-6-18    Interval history: Patient has developed a left sided pneumothorax overnight. She is oxygenating well on 2L NC O2 with sats in the mid 90's. She is having some left sided anterior chest discomfort. She was up several times  out of bed yesterday and ambulated around the unit with assistance from her RN. She remains in good spirits. Her bowels have still not moved. No family at bedside. amiodarone 200 mg Oral BID   meropenem 1 g Intravenous Q8H   ipratropium 0.5 mg Nebulization 4x daily   pantoprazole 40 mg Intravenous Daily   sodium chloride flush 10 mL Intravenous 2 times per day       Review of Systems:   Review of Systems - History obtained from nurse and the patient  Psychological ROS: negative for - anxiety, disorientation or hallucinations  Respiratory ROS: no cough, shortness of breath, or wheezing  Cardiovascular ROS: some left sided chest discomfort and dyspnea on exertion  Gastrointestinal ROS: positive for - abdominal tenderness       Physical Exam:  Blood pressure (!) 104/43, pulse (!) 49, temperature 98.1 °F (36.7 °C), temperature source Temporal, resp. rate 20, height 5' 3\" (1.6 m), weight 159 lb 8 oz (72.3 kg), SpO2 97 %,     Intake/Output Summary (Last 24 hours) at 09/12/18 0729  Last data filed at 09/12/18 0530   Gross per 24 hour   Intake              310 ml   Output             2075 ml   Net            -1765 ml   Physical Exam Constitutional: She appears well-developed and well-nourished. Head: Normocephalic and atraumatic. NC O2 in place. Eyes: Conjunctivae are normal. Pupils are reactive. Neck: Neck supple. Cardiovascular: Regular rhythm in the 80's. No murmur heard. Pulmonary/Chest:  She has diminished breath sounds.  She's having no distress. Abdominal: Soft. Minimally tender with palpation with some noted distention. Neuro: Stronger, moves all extremities, no specific deficits noted. Musculoskeletal:  She exhibits no deformity. Skin: Skin is warm and dry. She is not diaphoretic. No erythema. No pallor  Psych: She is calm and pleasant. She is oriented. Recent Labs      09/10/18   0350  09/11/18   0417  09/11/18   1855   WBC  12.8*  14.7*  15.0*   RBC  2.50*  2.62*  2.92*   HGB  7.5*  7.8*  8.7*   HCT  23.1*  24.3*  27.5*   PLT  386  469*  169   MCV  92.4  92.7  94.2   MCH  30.0  29.8  29.8   MCHC  32.5*  32.1*  31.6*   RDW  16.6*  16.6*  16.8*      Recent Labs      09/11/18 0417   NA  140   K  4.0   CL  102   CO2  22   BUN  17   CREATININE  1.0*   CALCIUM  7.8*   GLUCOSE  68*      No results for input(s): PHART, VRQ0EJQ, PO2ART, GUP6ECN, V7HTTPJM, BEART in the last 72 hours. Recent Labs      09/11/18 0417 09/11/18 1855 09/12/18   0345   AST  33*   --    --    --    --    ALT  37*   --    --    --    --    ALKPHOS  68   --    --    --    --    BILITOT  1.7*   --    --    --    --    MG  1.7   --   1.8   --    --    CALCIUM  7.8*   --    --    --    --    PHOS  1.9*   --   2.1*   --    --    TROPONINI   --    < >   --    < >  0.03    < > = values in this interval not displayed. Radiograph: Official report pending     My radiograph interpretation: left sided pneumothorax, around 25%    Pulmonary Assessment:  1. Postoperative respiratory failure with hypoxia, much improved   2. Volume overload, stable  3. Atrial fibrillation with rapid ventricular response, resolved. 4. Presumed pulmonary infection, gram negative, on abx  5. Encephalopathy, improving   6. Bradycardia, improved   7. Left sided pneumothorax, being monitored     Recommend:   · F/u CXR again tomorrow, monitor pneumothorax   · Cpap as needed. She appears to be doing well on minimal NC O2 at the present time.     · Continue ambulation and working with

## 2018-09-13 ENCOUNTER — APPOINTMENT (OUTPATIENT)
Dept: GENERAL RADIOLOGY | Age: 68
DRG: 268 | End: 2018-09-13
Attending: SURGERY
Payer: MEDICARE

## 2018-09-13 LAB
ALBUMIN SERPL-MCNC: 2.8 G/DL (ref 3.5–5.2)
ALP BLD-CCNC: 69 U/L (ref 35–104)
ALT SERPL-CCNC: 34 U/L (ref 5–33)
AMYLASE: 28 U/L (ref 28–100)
ANION GAP SERPL CALCULATED.3IONS-SCNC: 11 MMOL/L (ref 7–19)
APTT: 35.6 SEC (ref 26–36.2)
AST SERPL-CCNC: 32 U/L (ref 5–32)
BILIRUB SERPL-MCNC: 1.4 MG/DL (ref 0.2–1.2)
BUN BLDV-MCNC: 13 MG/DL (ref 8–23)
CALCIUM SERPL-MCNC: 8.4 MG/DL (ref 8.8–10.2)
CHLORIDE BLD-SCNC: 97 MMOL/L (ref 98–111)
CO2: 28 MMOL/L (ref 22–29)
CREAT SERPL-MCNC: 1.1 MG/DL (ref 0.5–0.9)
GFR NON-AFRICAN AMERICAN: 50
GLUCOSE BLD-MCNC: 108 MG/DL (ref 74–109)
HCT VFR BLD CALC: 24.3 % (ref 37–47)
HEMOGLOBIN: 8 G/DL (ref 12–16)
MAGNESIUM: 1.7 MG/DL (ref 1.6–2.4)
MCH RBC QN AUTO: 30.4 PG (ref 27–31)
MCHC RBC AUTO-ENTMCNC: 32.9 G/DL (ref 33–37)
MCV RBC AUTO: 92.4 FL (ref 81–99)
PDW BLD-RTO: 16.6 % (ref 11.5–14.5)
PHOSPHORUS: 2.1 MG/DL (ref 2.5–4.5)
PLATELET # BLD: 489 K/UL (ref 130–400)
PLATELET # BLD: 497 K/UL (ref 130–400)
PMV BLD AUTO: 10.6 FL (ref 9.4–12.3)
POTASSIUM SERPL-SCNC: 3.9 MMOL/L (ref 3.5–5)
RBC # BLD: 2.63 M/UL (ref 4.2–5.4)
SODIUM BLD-SCNC: 136 MMOL/L (ref 136–145)
TOTAL PROTEIN: 5 G/DL (ref 6.6–8.7)
WBC # BLD: 12.8 K/UL (ref 4.8–10.8)

## 2018-09-13 PROCEDURE — 80053 COMPREHEN METABOLIC PANEL: CPT

## 2018-09-13 PROCEDURE — 82150 ASSAY OF AMYLASE: CPT

## 2018-09-13 PROCEDURE — 6370000000 HC RX 637 (ALT 250 FOR IP): Performed by: INTERNAL MEDICINE

## 2018-09-13 PROCEDURE — C9113 INJ PANTOPRAZOLE SODIUM, VIA: HCPCS | Performed by: HOSPITALIST

## 2018-09-13 PROCEDURE — 6360000002 HC RX W HCPCS: Performed by: SURGERY

## 2018-09-13 PROCEDURE — 85027 COMPLETE CBC AUTOMATED: CPT

## 2018-09-13 PROCEDURE — 94640 AIRWAY INHALATION TREATMENT: CPT

## 2018-09-13 PROCEDURE — 6360000002 HC RX W HCPCS: Performed by: INTERNAL MEDICINE

## 2018-09-13 PROCEDURE — 85730 THROMBOPLASTIN TIME PARTIAL: CPT

## 2018-09-13 PROCEDURE — 6360000002 HC RX W HCPCS: Performed by: HOSPITALIST

## 2018-09-13 PROCEDURE — 83735 ASSAY OF MAGNESIUM: CPT

## 2018-09-13 PROCEDURE — 2580000003 HC RX 258: Performed by: SURGERY

## 2018-09-13 PROCEDURE — 32551 INSERTION OF CHEST TUBE: CPT

## 2018-09-13 PROCEDURE — 99232 SBSQ HOSP IP/OBS MODERATE 35: CPT | Performed by: INTERNAL MEDICINE

## 2018-09-13 PROCEDURE — 71045 X-RAY EXAM CHEST 1 VIEW: CPT

## 2018-09-13 PROCEDURE — 85049 AUTOMATED PLATELET COUNT: CPT

## 2018-09-13 PROCEDURE — 2000000000 HC ICU R&B

## 2018-09-13 PROCEDURE — 2700000000 HC OXYGEN THERAPY PER DAY

## 2018-09-13 PROCEDURE — 0W9B30Z DRAINAGE OF LEFT PLEURAL CAVITY WITH DRAINAGE DEVICE, PERCUTANEOUS APPROACH: ICD-10-PCS | Performed by: THORACIC SURGERY (CARDIOTHORACIC VASCULAR SURGERY)

## 2018-09-13 PROCEDURE — 84100 ASSAY OF PHOSPHORUS: CPT

## 2018-09-13 RX ORDER — HYDROMORPHONE HCL IN 0.9% NACL 0.5 MG/ML
1 SYRINGE (ML) INTRAVENOUS
Status: DISCONTINUED | OUTPATIENT
Start: 2018-09-13 | End: 2018-09-20

## 2018-09-13 RX ORDER — HYDROMORPHONE HCL IN 0.9% NACL 0.5 MG/ML
0.5 SYRINGE (ML) INTRAVENOUS ONCE
Status: COMPLETED | OUTPATIENT
Start: 2018-09-13 | End: 2018-09-13

## 2018-09-13 RX ORDER — HYDROMORPHONE HCL IN 0.9% NACL 0.5 MG/ML
0.5 SYRINGE (ML) INTRAVENOUS
Status: DISCONTINUED | OUTPATIENT
Start: 2018-09-13 | End: 2018-09-20

## 2018-09-13 RX ADMIN — Medication 0.5 MG: at 16:30

## 2018-09-13 RX ADMIN — IPRATROPIUM BROMIDE 0.5 MG: 0.5 SOLUTION RESPIRATORY (INHALATION) at 10:08

## 2018-09-13 RX ADMIN — Medication 1 MG: at 21:10

## 2018-09-13 RX ADMIN — ONDANSETRON 4 MG: 2 INJECTION INTRAMUSCULAR; INTRAVENOUS at 08:39

## 2018-09-13 RX ADMIN — METOCLOPRAMIDE 10 MG: 5 INJECTION, SOLUTION INTRAMUSCULAR; INTRAVENOUS at 17:06

## 2018-09-13 RX ADMIN — METOCLOPRAMIDE 10 MG: 5 INJECTION, SOLUTION INTRAMUSCULAR; INTRAVENOUS at 11:14

## 2018-09-13 RX ADMIN — Medication 0.5 MG: at 20:23

## 2018-09-13 RX ADMIN — Medication 10 ML: at 20:24

## 2018-09-13 RX ADMIN — IPRATROPIUM BROMIDE 0.5 MG: 0.5 SOLUTION RESPIRATORY (INHALATION) at 19:27

## 2018-09-13 RX ADMIN — Medication 0.5 MG: at 17:34

## 2018-09-13 RX ADMIN — Medication 10 ML: at 08:48

## 2018-09-13 RX ADMIN — IPRATROPIUM BROMIDE 0.5 MG: 0.5 SOLUTION RESPIRATORY (INHALATION) at 06:12

## 2018-09-13 RX ADMIN — AMIODARONE HYDROCHLORIDE 200 MG: 200 TABLET ORAL at 08:48

## 2018-09-13 RX ADMIN — IPRATROPIUM BROMIDE 0.5 MG: 0.5 SOLUTION RESPIRATORY (INHALATION) at 14:05

## 2018-09-13 RX ADMIN — Medication 0.5 MG: at 23:32

## 2018-09-13 RX ADMIN — METOCLOPRAMIDE 10 MG: 5 INJECTION, SOLUTION INTRAMUSCULAR; INTRAVENOUS at 23:32

## 2018-09-13 RX ADMIN — METOCLOPRAMIDE 10 MG: 5 INJECTION, SOLUTION INTRAMUSCULAR; INTRAVENOUS at 05:02

## 2018-09-13 RX ADMIN — Medication 0.5 MG: at 12:38

## 2018-09-13 RX ADMIN — PANTOPRAZOLE SODIUM 40 MG: 40 INJECTION, POWDER, FOR SOLUTION INTRAVENOUS at 08:48

## 2018-09-13 RX ADMIN — Medication 0.5 MG: at 08:20

## 2018-09-13 ASSESSMENT — PAIN DESCRIPTION - DESCRIPTORS
DESCRIPTORS: ACHING

## 2018-09-13 ASSESSMENT — PAIN DESCRIPTION - PAIN TYPE
TYPE: ACUTE PAIN

## 2018-09-13 ASSESSMENT — PAIN DESCRIPTION - ORIENTATION
ORIENTATION: LEFT

## 2018-09-13 ASSESSMENT — PAIN SCALES - GENERAL
PAINLEVEL_OUTOF10: 4
PAINLEVEL_OUTOF10: 9
PAINLEVEL_OUTOF10: 9
PAINLEVEL_OUTOF10: 7
PAINLEVEL_OUTOF10: 10
PAINLEVEL_OUTOF10: 8
PAINLEVEL_OUTOF10: 10
PAINLEVEL_OUTOF10: 9
PAINLEVEL_OUTOF10: 6
PAINLEVEL_OUTOF10: 5

## 2018-09-13 ASSESSMENT — PAIN DESCRIPTION - FREQUENCY
FREQUENCY: CONTINUOUS

## 2018-09-13 ASSESSMENT — PAIN DESCRIPTION - LOCATION
LOCATION: CHEST
LOCATION: RIB CAGE
LOCATION: BACK;CHEST
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
## 2018-09-13 NOTE — PROGRESS NOTES
no JVD, trachea is in the midline  CARDIOVASCULAR  PMI is in the left mid line clavicular position, Normal S1 and S2 with a grade 1/6 systolic murmur. No S3 or S4    PULMONARY  No respiratory distress. scattered wheezes and rales. Breath sounds in both  lung fields are Decreased  ABDOMEN   soft, non tender, no rebound, no hepatomegaly or splenomegaly  MUSCULOSKELETAL   Prone/Supine, digitals and nails are without clubbing or cyanosis  EXTREMITIES - trace edema  NEUROLOGIC - cranial nerves, II-XII, are normal  SKIN - turgor is normal, no rash  PSYCHIATRIC - normal mood and affect, alert and orientated x 3, judgement and insight appear appropriate      ASSESSMENT:    ALL THE CARDIOLOGY PROBLEMS ARE LISTED ABOVE; HOWEVER, THE FOLLOWING SPECIFIC CARDIAC PROBLEMS / CONDITIONS WERE ADDRESSED AND TREATED DURING THE OFFICE VISIT TODAY:                                                                                            MEDICAL DECISION MAKING                   Cardiac Specific Problem / Diagnosis   Discussion and Data Reviewed Diagnostic / Therapeutic  Procedures Ordered Management Options Selected                 1.  Paroxymal atrial fibrillation Initial presentation during this evaluation     Still on amiodarone and remains in NSR No Continue current medications                    2. hypertension show no change    The blood pressure for the lastr 36 hours has been:  Systolic (36QHT), FLY:283 , Min:95 , PMA:812    Diastolic (15IOY), DBO:79, Min:47, Max:80             Off levophed Continue current medications:                                 3. hypercholesteremia show no change Patient has a history of hypercholesteremia, which is managed and is on current therapy.  The lipid profile is:               Lab Results   Component Value Date     HDL 51 08/22/2018     LDLDIRECT 81 11/29/2014     LDLCALC 97 08/22/2018     TRIG 168 09/03/2018    No Continue current medications:                 CONSIDERATIONS, THOUGHTS,

## 2018-09-13 NOTE — CONSULTS
Father     Substance Abuse Father     Cancer Maternal Grandmother         colon    Lung Cancer Brother     Colon Cancer Mother     Other Sister         Aneurysm    Colon Polyps Neg Hx     Esophageal Cancer Neg Hx     Liver Cancer Neg Hx     Liver Disease Neg Hx     Rectal Cancer Neg Hx     Stomach Cancer Neg Hx          Review of Systems:  Constitutional:  No fever, chills, night sweats, or weight loss  HEENT: No vision loss, double vision, blurred vision, or tearing. No hearing loss, tinnitus, or infection. No nasal discharge or epistaxis. No dysphagia. Respiratory:  No shortness of breath, cough, or sputum production. No history of TB exposure. Cardiovascular: No hypertension, hypotension, anginal type chest pain, dizziness, or syncopal spells. No history of palpitations. Peripheral Vascular:  No history of claudication. Gastrointestinal:  No nausea, vomiting, diarrhea, or constipation. No reflux or gastroesophageal reflux disease. Genitourinary:  No dysuria, urgency, frequency, or history of urinary tract infections. No history of nephrolithiasis or renal insufficiency. Neurological:  No history of cerebrovascular accident, transient ischemic attack, or amaurosis fugax. No history of seizure disorder. Integumentary: No rash, history of nonhealing wounds or skin cancer removal.   Psychiatric:  No anxiety or depression. Endocrine: No polyuria, polydipsia, or significant weight gain. No heat or cold intolerance. Musculoskeletal: No limit to range of motion of joints or swelling of limbs. Hematologic: No history of DVT, PE, or anemia. Physical Examination:    BP (!) 137/55   Pulse 87   Temp 99.2 °F (37.3 °C) (Temporal)   Resp 20   Ht 5' 3\" (1.6 m)   Wt 177 lb 9.6 oz (80.6 kg)   SpO2 91%   Breastfeeding? No   BMI 31.46 kg/m²       General:  Alert & Oriented x 3 in no apparent distress. HEENT:  Normocephalic without evidence of old or recent trauma. Sclerae clear. CLARITYU CLOUDY 09/02/2018    SPECGRAV 1.014 09/02/2018    LEUKOCYTESUR TRACE 09/02/2018    UROBILINOGEN 0.2 09/02/2018    BILIRUBINUR Negative 09/02/2018    BLOODU LARGE 09/02/2018    GLUCOSEU Negative 09/02/2018    AMORPHOUS Rare 09/02/2018       Diagnosis:  Inc and Sx left ptx  Plan:     Place pleurex catheter.  Risks and laternatives d/w pt who states she understands and agrees to proceed        Jolly Charles MD  9/13/2018  5:03 PM

## 2018-09-13 NOTE — PROGRESS NOTES
Nutrition Assessment    Type and Reason for Visit: Reassess    Nutrition Recommendations: continue current nutritional initerventions    Malnutrition Assessment:  · Malnutrition Status: At risk for malnutrition  · Context: Acute illness or injury  · Findings of the 6 clinical characteristics of malnutrition (Minimum of 2 out of 6 clinical characteristics is required to make the diagnosis of moderate or severe Protein Calorie Malnutrition based on AND/ASPEN Guidelines):  1. Energy Intake-Less than or equal to 50%, greater than 7 days    2. Weight Loss-No significant weight loss,    3. Fat Loss-Unable to assess,    4. Muscle Loss-Unable to assess,    5. Fluid Accumulation-No significant fluid accumulation,    6.  Strength-Not measured    Nutrition Diagnosis:   · Problem: Inadequate oral intake  · Etiology: related to Acute injury/trauma, Difficulty swallowing     Signs and symptoms:  as evidenced by Intake 0-25%, Swallow study results    Nutrition Assessment:  · Subjective Assessment: Pt did not eat but a bite of breakfast this morning. Still did not have BM but \"I did pass gas. \"  Was hoping to do better for lunch  · Nutrition-Focused Physical Findings: extubated  · Wound Type: Surgical Wound, Skin Tears  · Current Nutrition Therapies:  · Oral Diet Orders: Full Liquid, Nectar Thick   · Oral Diet intake: 1-25%  · Oral Nutrition Supplement (ONS) Orders:  (Ensure pudding)  · ONS intake: 1-25%     · Anthropometric Measures:  · Ht: 5' 3\" (160 cm)   · Current Body Wt: 177 lb 9.6 oz (80.6 kg)  · Admission Body Wt: 141 lb (64 kg)  · Ideal Body Wt: 115 lb (52.2 kg), % Ideal Body 138%  · BMI Classification: BMI 30.0 - 34.9 Obese Class I     · Comparative Standards (Estimated Nutrition Needs):  · Estimated Daily Total Kcal: 6638-6664  · Estimated Daily Protein (g):     Estimated Intake vs Estimated Needs: Intake Less Than Needs    Nutrition Risk Level: High    Nutrition Interventions:   Continue current diet, Continue current ONS  Continued Inpatient Monitoring    Nutrition Evaluation:   · Evaluation: No progress toward goals   · Goals: Meet needs via nutrition support or PO intake    · Monitoring: Meal Intake, Supplement Intake, Diet Tolerance, Skin Integrity, Weight, Pertinent Labs    See Adult Nutrition Doc Flowsheet for more detail.      Electronically signed by Mark Anthony Shi MS, RD, LD on 9/13/18 at 3:00 PM    Contact Number: [phone]

## 2018-09-13 NOTE — PROGRESS NOTES
Vascular Surgery  Dr.Scott Hanson   Daily Progress Note    Pt Name: Filemon Jung Record Number: [id number]  Date of Birth 1950   Today's Date: 9/13/2018    SUBJECTIVE:     Patient was seen and examined,sitting in chair at bedside  Pain is  controlled  Complaining of nausea after her pain meds (emesis bag in hand)  has had constipation, no BM post op but did pass gas this am    OBJECTIVE:     Patient Vitals for the past 24 hrs:   BP Temp Temp src Pulse Resp SpO2   09/13/18 0700 (!) 147/59 - - 77 23 -   09/13/18 0612 - - - - 23 95 %   09/13/18 0600 (!) 110/42 - - 78 26 (!) 84 %   09/13/18 0500 (!) 135/53 - - 83 28 90 %   09/13/18 0400 (!) 150/52 97.6 °F (36.4 °C) - 95 23 91 %   09/13/18 0300 (!) 145/64 - - 83 17 95 %   09/13/18 0200 (!) 124/52 - - 88 25 (!) 88 %   09/13/18 0100 (!) 140/56 - - 87 22 -   09/13/18 0000 (!) 128/55 97.9 °F (36.6 °C) - 86 21 90 %   09/12/18 2300 (!) 133/57 - - 94 19 (!) 89 %   09/12/18 2200 (!) 119/53 - - 86 20 90 %   09/12/18 2130 128/60 - - 85 20 (!) 89 %   09/12/18 2100 (!) 130/56 - - 84 21 91 %   09/12/18 2030 - 98 °F (36.7 °C) - 88 26 (!) 87 %   09/12/18 2015 - - - 83 20 (!) 89 %   09/12/18 2000 - - - 85 19 (!) 87 %   09/12/18 1930 (!) 114/54 - - 91 21 (!) 89 %   09/12/18 1700 (!) 121/55 - - 83 21 -   09/12/18 1600 128/69 98 °F (36.7 °C) Temporal 85 26 93 %   09/12/18 1500 (!) 130/57 - - 83 18 93 %   09/12/18 1427 - - - - 21 90 %   09/12/18 1400 119/65 - - 86 20 96 %   09/12/18 1302 115/79 - - 92 17 90 %   09/12/18 1202 - 98 °F (36.7 °C) Temporal - - -   09/12/18 1100 (!) 105/47 - - 90 19 95 %   09/12/18 1014 - - - - 16 100 %   09/12/18 1000 108/65 - - 84 16 92 %   09/12/18 0900 130/61 - - 83 17 (!) 83 %       Intake/Output Summary (Last 24 hours) at 09/13/18 0846  Last data filed at 09/13/18 0531   Gross per 24 hour   Intake              600 ml   Output             1150 ml   Net             -550 ml     In: 200 [P.O.:200]  Out: 850 [Urine:850]    I/O last 3 completed

## 2018-09-13 NOTE — PROGRESS NOTES
As per cardiology.     Hypertension: Controlled     Hyperbilirubinemia: Combined direct and indirect. Continues to improve. Other liver function tests remain unremarkable.     Acute encephalopathy: Probably toxic metabolic.  Resolved.       Paul Savage, MD  9/13/2018

## 2018-09-14 ENCOUNTER — APPOINTMENT (OUTPATIENT)
Dept: GENERAL RADIOLOGY | Age: 68
DRG: 268 | End: 2018-09-14
Attending: SURGERY
Payer: MEDICARE

## 2018-09-14 LAB
ALBUMIN SERPL-MCNC: 2.8 G/DL (ref 3.5–5.2)
ALP BLD-CCNC: 68 U/L (ref 35–104)
ALT SERPL-CCNC: 34 U/L (ref 5–33)
AMYLASE: 35 U/L (ref 28–100)
ANION GAP SERPL CALCULATED.3IONS-SCNC: 10 MMOL/L (ref 7–19)
AST SERPL-CCNC: 39 U/L (ref 5–32)
BILIRUB SERPL-MCNC: 1.4 MG/DL (ref 0.2–1.2)
BUN BLDV-MCNC: 13 MG/DL (ref 8–23)
CALCIUM SERPL-MCNC: 8.6 MG/DL (ref 8.8–10.2)
CHLORIDE BLD-SCNC: 100 MMOL/L (ref 98–111)
CO2: 30 MMOL/L (ref 22–29)
CREAT SERPL-MCNC: 1.1 MG/DL (ref 0.5–0.9)
GFR NON-AFRICAN AMERICAN: 50
GLUCOSE BLD-MCNC: 95 MG/DL (ref 74–109)
HCT VFR BLD CALC: 25.5 % (ref 37–47)
HEMOGLOBIN: 8 G/DL (ref 12–16)
MAGNESIUM: 1.9 MG/DL (ref 1.6–2.4)
MCH RBC QN AUTO: 29.5 PG (ref 27–31)
MCHC RBC AUTO-ENTMCNC: 31.4 G/DL (ref 33–37)
MCV RBC AUTO: 94.1 FL (ref 81–99)
PDW BLD-RTO: 16.4 % (ref 11.5–14.5)
PHOSPHORUS: 2.8 MG/DL (ref 2.5–4.5)
PLATELET # BLD: 504 K/UL (ref 130–400)
PMV BLD AUTO: 10 FL (ref 9.4–12.3)
POTASSIUM SERPL-SCNC: 4.1 MMOL/L (ref 3.5–5)
PROCALCITONIN: 0.23 NG/ML
RBC # BLD: 2.71 M/UL (ref 4.2–5.4)
SODIUM BLD-SCNC: 140 MMOL/L (ref 136–145)
TOTAL PROTEIN: 5 G/DL (ref 6.6–8.7)
WBC # BLD: 11.5 K/UL (ref 4.8–10.8)

## 2018-09-14 PROCEDURE — 82150 ASSAY OF AMYLASE: CPT

## 2018-09-14 PROCEDURE — 6360000002 HC RX W HCPCS: Performed by: INTERNAL MEDICINE

## 2018-09-14 PROCEDURE — 94640 AIRWAY INHALATION TREATMENT: CPT

## 2018-09-14 PROCEDURE — 84100 ASSAY OF PHOSPHORUS: CPT

## 2018-09-14 PROCEDURE — 6360000002 HC RX W HCPCS: Performed by: HOSPITALIST

## 2018-09-14 PROCEDURE — C9113 INJ PANTOPRAZOLE SODIUM, VIA: HCPCS | Performed by: HOSPITALIST

## 2018-09-14 PROCEDURE — 83735 ASSAY OF MAGNESIUM: CPT

## 2018-09-14 PROCEDURE — 2580000003 HC RX 258: Performed by: SURGERY

## 2018-09-14 PROCEDURE — 85027 COMPLETE CBC AUTOMATED: CPT

## 2018-09-14 PROCEDURE — 2700000000 HC OXYGEN THERAPY PER DAY

## 2018-09-14 PROCEDURE — 1210000000 HC MED SURG R&B

## 2018-09-14 PROCEDURE — 32551 INSERTION OF CHEST TUBE: CPT

## 2018-09-14 PROCEDURE — 6360000002 HC RX W HCPCS: Performed by: SURGERY

## 2018-09-14 PROCEDURE — 6370000000 HC RX 637 (ALT 250 FOR IP): Performed by: INTERNAL MEDICINE

## 2018-09-14 PROCEDURE — 80053 COMPREHEN METABOLIC PANEL: CPT

## 2018-09-14 PROCEDURE — 99221 1ST HOSP IP/OBS SF/LOW 40: CPT | Performed by: INTERNAL MEDICINE

## 2018-09-14 PROCEDURE — 71045 X-RAY EXAM CHEST 1 VIEW: CPT

## 2018-09-14 RX ADMIN — Medication 1 MG: at 07:27

## 2018-09-14 RX ADMIN — METOCLOPRAMIDE 10 MG: 5 INJECTION, SOLUTION INTRAMUSCULAR; INTRAVENOUS at 22:58

## 2018-09-14 RX ADMIN — METOCLOPRAMIDE 10 MG: 5 INJECTION, SOLUTION INTRAMUSCULAR; INTRAVENOUS at 10:05

## 2018-09-14 RX ADMIN — IPRATROPIUM BROMIDE 0.5 MG: 0.5 SOLUTION RESPIRATORY (INHALATION) at 20:18

## 2018-09-14 RX ADMIN — METOCLOPRAMIDE 10 MG: 5 INJECTION, SOLUTION INTRAMUSCULAR; INTRAVENOUS at 17:59

## 2018-09-14 RX ADMIN — Medication 1 MG: at 16:31

## 2018-09-14 RX ADMIN — Medication 1 MG: at 10:05

## 2018-09-14 RX ADMIN — Medication 1 MG: at 04:19

## 2018-09-14 RX ADMIN — Medication 10 ML: at 22:59

## 2018-09-14 RX ADMIN — IPRATROPIUM BROMIDE 0.5 MG: 0.5 SOLUTION RESPIRATORY (INHALATION) at 10:46

## 2018-09-14 RX ADMIN — AMIODARONE HYDROCHLORIDE 200 MG: 200 TABLET ORAL at 07:27

## 2018-09-14 RX ADMIN — METOCLOPRAMIDE 10 MG: 5 INJECTION, SOLUTION INTRAMUSCULAR; INTRAVENOUS at 04:19

## 2018-09-14 RX ADMIN — Medication 10 ML: at 09:00

## 2018-09-14 RX ADMIN — METHYLNALTREXONE BROMIDE 8 MG: 12 INJECTION, SOLUTION SUBCUTANEOUS at 15:03

## 2018-09-14 RX ADMIN — IPRATROPIUM BROMIDE 0.5 MG: 0.5 SOLUTION RESPIRATORY (INHALATION) at 06:28

## 2018-09-14 RX ADMIN — Medication 1 MG: at 18:46

## 2018-09-14 RX ADMIN — PANTOPRAZOLE SODIUM 40 MG: 40 INJECTION, POWDER, FOR SOLUTION INTRAVENOUS at 07:27

## 2018-09-14 RX ADMIN — IPRATROPIUM BROMIDE 0.5 MG: 0.5 SOLUTION RESPIRATORY (INHALATION) at 14:20

## 2018-09-14 ASSESSMENT — PAIN SCALES - GENERAL
PAINLEVEL_OUTOF10: 0
PAINLEVEL_OUTOF10: 8
PAINLEVEL_OUTOF10: 7
PAINLEVEL_OUTOF10: 8
PAINLEVEL_OUTOF10: 8
PAINLEVEL_OUTOF10: 3
PAINLEVEL_OUTOF10: 7
PAINLEVEL_OUTOF10: 6

## 2018-09-14 ASSESSMENT — PAIN DESCRIPTION - FREQUENCY: FREQUENCY: CONTINUOUS

## 2018-09-14 ASSESSMENT — PAIN DESCRIPTION - PAIN TYPE
TYPE: ACUTE PAIN

## 2018-09-14 ASSESSMENT — PAIN DESCRIPTION - LOCATION
LOCATION: OTHER (COMMENT)
LOCATION: RIB CAGE

## 2018-09-14 ASSESSMENT — PAIN DESCRIPTION - ORIENTATION: ORIENTATION: LEFT

## 2018-09-14 ASSESSMENT — PAIN DESCRIPTION - DESCRIPTORS: DESCRIPTORS: ACHING

## 2018-09-14 NOTE — PROGRESS NOTES
[Urine:450]    I/O last 3 completed shifts: In: 360 [P.O.:360]  Out: 750 [Urine:450; Chest Tube:300]       Date 09/14/18 0000 - 09/14/18 2359   Shift 0000-0759 4474-0302 3671-5901 24 Hour Total   I  N  T  A  K  E   I.V.  (mL/kg)  50  (0.7)  50  (0.7)    Shift Total  (mL/kg)  50  (0.7)  50  (0.7)   O  U  T  P  U  T   Urine  (mL/kg/hr) 450  (0.8)   450    Chest Tube 30 0  30    Shift Total  (mL/kg) 480  (6.9) 0  (0)  480  (6.9)   Weight (kg) 69.4 69.4 69.4 69.4     Wt Readings from Last 3 Encounters:   09/14/18 153 lb (69.4 kg)   08/27/18 141 lb (64 kg)   08/22/18 140 lb (63.5 kg)      Body mass index is 27.1 kg/m². Diet: Dietary Nutrition Supplements: Low Calorie High Protein Supplement  DIET FULL LIQUID; [place] Thick    MEDS:     Scheduled Meds:   metoclopramide  10 mg Intravenous Q6H    amiodarone  200 mg Oral Daily    ipratropium  0.5 mg Nebulization 4x daily    pantoprazole  40 mg Intravenous Daily    sodium chloride flush  10 mL Intravenous 2 times per day     Continuous Infusions:  PRN Meds:  HYDROmorphone 0.5 mg Q1H PRN   Or     HYDROmorphone 1 mg Q1H PRN   LORazepam 1 mg Q4H PRN   sodium chloride flush 10 mL PRN   potassium chloride 10 mEq PRN   ondansetron 4 mg Q8H PRN   metoprolol tartrate 25 mg Q12H PRN   cloNIDine 0.1 mg Q2H PRN       PHYSICAL EXAM:     CONSTITUTIONAL: Alert and oriented times 3, no acute distress and cooperative to examination. LUNGS: chest clear bilaterally anterior, diminished lower lobe sounds; chest tube to water seal with no leak noted   CARDIOVASCULAR: regular rate and rhythm  ABDOMEN: soft, nontender, nondistended, no masses or organomegaly  NEUROLOGIC: Awake, alert, oriented to name, place and time. normal.  WOUND/INCISION:  Midline incision line healing well, no drainage, no erythema  EXTREMITY:feet are warm to touch/pink color, palp DP pulses bilaterally.  Still with bruised appearing areas to plantar forefoot bilaterally, patient states right lateral forefoot area Acute blood loss as cause of postoperative anemia    Atrial fibrillation with rapid ventricular response (HCC)    Positive sputum culture for Pseudomonas    Fever in adult    Shock circulatory ([facility] [address]    Pneumothorax on left   2. Chief Complaint:  No chief complaint on file. PLAN:     1. Breathing better today  2. Appetite still poor  3. Chest tube to water seal, no leak  4. ? To third floor soon  5. Agree with above. I examined the patient. Hesitant to place NGT because it was not straightforward for anesthesia to place in surgery with general anesthesia. I don't want to traumatize esophagus and patient. Will start GI motility agent.

## 2018-09-14 NOTE — OP NOTE
was placed by Anesthesia. The patient's abdomen was  prepped and draped in the usual sterile procedure. A midline abdominal incision was made subxiphoid to suprapubic with knife. This was carried down through subcutaneous tissue with Bovie  electrocautery. The midline fascia was scored with Bovie electrocautery  and then opened under direct visualization with sharp dissection. The  patient did have some adhesions, which were taken down with sharp  dissection. General abdominal exploration was performed with the above  findings. A self-retaining retractor was placed and the patient's small  bowel was moved to the right side of the abdomen. The retroperitoneum was  dissected along the anterior lateral wall of the abdominal aorta. The  duodenum was then retracted laterally. The abdominal aortic aneurysm was  then dissected all the way to the renal vein. The renal vein was anterior  to the aorta. We had to ligate the renal vein near the inferior vena cava  with 2-0 silk suture and then the left renal vein was transected and then  oversewn with 4-0 Prolene suture. The left and right renal arteries were  then dissected circumferentially and vessel loops were placed for future  vascular control. The suprarenal aorta was then dissected and room made  for a clamp here. Next, the bilateral common iliac arteries were dissected and vessel loops  were placed around here for future vascular control. The patient was given  intravenous heparin. The left renal artery at this point was palpated and  the pulse as stated above was very weak. We performed intraoperative  duplex and there appears to be a very high-grade stenosis near the origin  of the left renal artery. Therefore, we decided to perform a renal artery  bypass.   An 18 x 9 bifurcated PTFE was brought into the field and the main  body was trimmed to length and then also the left side of the main body I  performed a graftotomy and a 7 mm PTFE graft to the both lower extremities. The patient tolerated  this well. All anastomoses were hemostatic. The lumbars were not bleeding. The  aortic sac and retroperitoneum were closed over the PTFE with interrupted  2-0 Vicryl figure-of-eight sutures. The self-retaining retractors were  removed and sponge count and instrument count were correct. The colon and  small bowel are well perfused. Of note, there was no backbleeding from the  inferior mesenteric artery. The midline abdominal fascia was closed with  #1 running PDS suture, subcutaneous tissues were closed with interrupted  3-0 Vicryl sutures and the skin incision was closed with staples. The  patient tolerated the procedure well and was extubated and brought to the  recovery room in fair condition.         Cristian Beverly MD    D: 09/14/2018 14:59:40      T: 09/14/2018 15:42:57     [email or web address]  Job#: [id number]     Doc#: [id number]    CC:  MD Shreyas Redd MD

## 2018-09-14 NOTE — PROGRESS NOTES
Body Wt: 153 lb (69.4 kg)  · Admission Body Wt: 141 lb (64 kg)  · Ideal Body Wt: 115 lb (52.2 kg), % Ideal Body 138%  · BMI Classification: BMI 25.0 - 29.9 Overweight  · Comparative Standards (Estimated Nutrition Needs):  · Estimated Daily Total Kcal: 3883-6124  · Estimated Daily Protein (g):     Estimated Intake vs Estimated Needs: Intake Less Than Needs    Nutrition Risk Level: High    Nutrition Interventions:   Continue current diet, Modify current ONS  Continued Inpatient Monitoring    Nutrition Evaluation:   · Evaluation: No progress toward goals   · Goals: Meet needs via nutrition support or PO intake    · Monitoring: Meal Intake, Supplement Intake, Diet Tolerance, Skin Integrity, Weight, Pertinent Labs    See Adult Nutrition Doc Flowsheet for more detail.      Electronically signed by Chaim Campbell MS, RD, LD on 9/14/18 at 11:13 AM

## 2018-09-14 NOTE — PROGRESS NOTES
Principal Problem:    AAA (abdominal aortic aneurysm) (HCC)  Active Problems:    Renal artery stenosis (HCC)    Essential hypertension    Ischemic hepatitis    Acute respiratory failure with hypoxia (HCC)    CKD (chronic kidney disease), stage III    Metabolic acidosis    Acute blood loss as cause of postoperative anemia    Atrial fibrillation with rapid ventricular response (HCC)    Positive sputum culture for Pseudomonas    Fever in adult    Shock circulatory (HCC)    Pneumothorax on left  Resolved Problems:    * No resolved hospital problems. *    Blood pressure (!) 134/56, pulse 76, temperature 97 °F (36.1 °C), temperature source Temporal, resp. rate 12, height 5' 3\" (1.6 m), weight 153 lb (69.4 kg), SpO2 94 %, not currently breastfeeding. Subjective   No new complaints. Passing a little flatus but still no bowel movement. Review of systems:Gen.: No fever or chills  Cardiovascular: No chest pain or palpitations  Pulmonary: No shortness of breath or productive coughing  Gastrointestinal: No abdominal pain, nausea, vomiting or diarrhea  Neurologic: No focal numbness or weakness    Objective     General: in no distress  Pulmonary: Lungs with a few crackles on the left, clear on the right, unlabored breathing, small bore chest tube in place on the left  Cardiovascular: Heart regular without murmur, no peripheral edema  Gastrointestinal: Abdomen firm but not rigid, nontender to light pressure, no guarding or masses, midline surgical incision still has staples present appears to be healing well  Neurologic: Alert, no focal motor deficits  Skin: Warm and dry. No rash. Assessment & Plan     Postop day #15 AAA repair: As per vascular surgery. Ileus seems to be improving.     Acute hypoxemic respiratory failure / pseudomonas aeruginosa on sputum culture: Improved. As per pulmonary. Status post therapy with Merrem. Titrate oxygen.     Left pneumothorax (20%): Stable. As per pulmonary.  Small bore chest tube

## 2018-09-14 NOTE — OP NOTE
ARLETHNCJANIYA [facility] [facility] AMANDA [address], [address] [facility]                                 OPERATIVE REPORT    PATIENT NAME: Moreno Mueller                     :        1950  MED REC NO:   [id number]                              ROOM:       [facility]  ACCOUNT NO:   [de-identified]                           ADMIT DATE: 2018  PROVIDER:     Najma Gomez MD    DATE OF PROCEDURE:  2018    PREOPERATIVE DIAGNOSIS:  Increasing left pneumothorax after repair of  abdominal aortic aneurysm. POSTOPERATIVE DIAGNOSIS:  Increasing left pneumothorax after repair of  abdominal aortic aneurysm. .    OPERATION PERFORMED:  Placement of left PleurX catheter. OPERATIVE PROCEDURE:  After obtaining informed consent, the left chest was  prepped and draped in a sterile fashion. The area above approximately the  sixth intercostal space laterally was infiltrated with lidocaine. The  patient was also given 0.5 mg of Dilaudid IV. A nick was made in the skin  with 11 blade. PleurX catheter was then placed directly over the rib into  the pleural space and advanced appropriately. The PleurX   tube was then  hooked up to the drainage system. The patient tolerated the procedure  well. Chest x-ray showed near complete expansion of the left lung except  for a small residual apical pneumothorax. The patient had improvements in  her saturations and tolerated the procedure well.           Devon Alvarado MD    D: 2018 18:20:26      T: 2018 23:47:51     [email or web address]  Job#: [id number]     Doc#: [id number]    CC:

## 2018-09-15 ENCOUNTER — APPOINTMENT (OUTPATIENT)
Dept: GENERAL RADIOLOGY | Age: 68
DRG: 268 | End: 2018-09-15
Attending: SURGERY
Payer: MEDICARE

## 2018-09-15 LAB
ALBUMIN SERPL-MCNC: 2.7 G/DL (ref 3.5–5.2)
ALP BLD-CCNC: 67 U/L (ref 35–104)
ALT SERPL-CCNC: 30 U/L (ref 5–33)
AMYLASE: 22 U/L (ref 28–100)
ANION GAP SERPL CALCULATED.3IONS-SCNC: 12 MMOL/L (ref 7–19)
AST SERPL-CCNC: 26 U/L (ref 5–32)
BILIRUB SERPL-MCNC: 1.1 MG/DL (ref 0.2–1.2)
BUN BLDV-MCNC: 14 MG/DL (ref 8–23)
CALCIUM SERPL-MCNC: 8.2 MG/DL (ref 8.8–10.2)
CHLORIDE BLD-SCNC: 97 MMOL/L (ref 98–111)
CO2: 27 MMOL/L (ref 22–29)
CREAT SERPL-MCNC: 1 MG/DL (ref 0.5–0.9)
GFR NON-AFRICAN AMERICAN: 55
GLUCOSE BLD-MCNC: 103 MG/DL (ref 70–99)
GLUCOSE BLD-MCNC: 91 MG/DL (ref 70–99)
GLUCOSE BLD-MCNC: 97 MG/DL (ref 74–109)
HCT VFR BLD CALC: 28.2 % (ref 37–47)
HEMOGLOBIN: 8.7 G/DL (ref 12–16)
MAGNESIUM: 2 MG/DL (ref 1.6–2.4)
MCH RBC QN AUTO: 29.7 PG (ref 27–31)
MCHC RBC AUTO-ENTMCNC: 30.9 G/DL (ref 33–37)
MCV RBC AUTO: 96.2 FL (ref 81–99)
PDW BLD-RTO: 15.8 % (ref 11.5–14.5)
PERFORMED ON: ABNORMAL
PERFORMED ON: NORMAL
PHOSPHORUS: 2.7 MG/DL (ref 2.5–4.5)
PLATELET # BLD: 479 K/UL (ref 130–400)
PMV BLD AUTO: 10.4 FL (ref 9.4–12.3)
POTASSIUM SERPL-SCNC: 4.7 MMOL/L (ref 3.5–5)
RBC # BLD: 2.93 M/UL (ref 4.2–5.4)
SODIUM BLD-SCNC: 136 MMOL/L (ref 136–145)
TOTAL PROTEIN: 5.2 G/DL (ref 6.6–8.7)
WBC # BLD: 15.4 K/UL (ref 4.8–10.8)

## 2018-09-15 PROCEDURE — 84100 ASSAY OF PHOSPHORUS: CPT

## 2018-09-15 PROCEDURE — 80053 COMPREHEN METABOLIC PANEL: CPT

## 2018-09-15 PROCEDURE — 99024 POSTOP FOLLOW-UP VISIT: CPT | Performed by: SURGERY

## 2018-09-15 PROCEDURE — 99231 SBSQ HOSP IP/OBS SF/LOW 25: CPT | Performed by: INTERNAL MEDICINE

## 2018-09-15 PROCEDURE — 85027 COMPLETE CBC AUTOMATED: CPT

## 2018-09-15 PROCEDURE — 1210000000 HC MED SURG R&B

## 2018-09-15 PROCEDURE — 6360000002 HC RX W HCPCS: Performed by: INTERNAL MEDICINE

## 2018-09-15 PROCEDURE — 82948 REAGENT STRIP/BLOOD GLUCOSE: CPT

## 2018-09-15 PROCEDURE — 71045 X-RAY EXAM CHEST 1 VIEW: CPT

## 2018-09-15 PROCEDURE — C9113 INJ PANTOPRAZOLE SODIUM, VIA: HCPCS | Performed by: HOSPITALIST

## 2018-09-15 PROCEDURE — 6370000000 HC RX 637 (ALT 250 FOR IP): Performed by: INTERNAL MEDICINE

## 2018-09-15 PROCEDURE — 82150 ASSAY OF AMYLASE: CPT

## 2018-09-15 PROCEDURE — 2700000000 HC OXYGEN THERAPY PER DAY

## 2018-09-15 PROCEDURE — 94640 AIRWAY INHALATION TREATMENT: CPT

## 2018-09-15 PROCEDURE — 6360000002 HC RX W HCPCS: Performed by: HOSPITALIST

## 2018-09-15 PROCEDURE — 83735 ASSAY OF MAGNESIUM: CPT

## 2018-09-15 PROCEDURE — 97116 GAIT TRAINING THERAPY: CPT

## 2018-09-15 PROCEDURE — 2580000003 HC RX 258: Performed by: SURGERY

## 2018-09-15 PROCEDURE — 36415 COLL VENOUS BLD VENIPUNCTURE: CPT

## 2018-09-15 PROCEDURE — 93922 UPR/L XTREMITY ART 2 LEVELS: CPT

## 2018-09-15 PROCEDURE — 99231 SBSQ HOSP IP/OBS SF/LOW 25: CPT | Performed by: NURSE PRACTITIONER

## 2018-09-15 PROCEDURE — 6360000002 HC RX W HCPCS: Performed by: SURGERY

## 2018-09-15 RX ADMIN — IPRATROPIUM BROMIDE 0.5 MG: 0.5 SOLUTION RESPIRATORY (INHALATION) at 07:45

## 2018-09-15 RX ADMIN — Medication 10 ML: at 09:31

## 2018-09-15 RX ADMIN — METOCLOPRAMIDE 10 MG: 5 INJECTION, SOLUTION INTRAMUSCULAR; INTRAVENOUS at 06:33

## 2018-09-15 RX ADMIN — AMIODARONE HYDROCHLORIDE 200 MG: 200 TABLET ORAL at 09:31

## 2018-09-15 RX ADMIN — Medication 1 MG: at 14:26

## 2018-09-15 RX ADMIN — IPRATROPIUM BROMIDE 0.5 MG: 0.5 SOLUTION RESPIRATORY (INHALATION) at 15:59

## 2018-09-15 RX ADMIN — Medication 10 ML: at 00:36

## 2018-09-15 RX ADMIN — IPRATROPIUM BROMIDE 0.5 MG: 0.5 SOLUTION RESPIRATORY (INHALATION) at 18:56

## 2018-09-15 RX ADMIN — Medication 1 MG: at 18:32

## 2018-09-15 RX ADMIN — Medication 1 MG: at 00:36

## 2018-09-15 RX ADMIN — Medication 1 MG: at 04:07

## 2018-09-15 RX ADMIN — Medication 1 MG: at 09:31

## 2018-09-15 RX ADMIN — PANTOPRAZOLE SODIUM 40 MG: 40 INJECTION, POWDER, FOR SOLUTION INTRAVENOUS at 09:31

## 2018-09-15 RX ADMIN — METOCLOPRAMIDE 10 MG: 5 INJECTION, SOLUTION INTRAMUSCULAR; INTRAVENOUS at 17:30

## 2018-09-15 RX ADMIN — IPRATROPIUM BROMIDE 0.5 MG: 0.5 SOLUTION RESPIRATORY (INHALATION) at 11:34

## 2018-09-15 RX ADMIN — METOCLOPRAMIDE 10 MG: 5 INJECTION, SOLUTION INTRAMUSCULAR; INTRAVENOUS at 11:30

## 2018-09-15 ASSESSMENT — PAIN SCALES - GENERAL
PAINLEVEL_OUTOF10: 8
PAINLEVEL_OUTOF10: 8
PAINLEVEL_OUTOF10: 7
PAINLEVEL_OUTOF10: 8
PAINLEVEL_OUTOF10: 0
PAINLEVEL_OUTOF10: 6
PAINLEVEL_OUTOF10: 4
PAINLEVEL_OUTOF10: 4
PAINLEVEL_OUTOF10: 8
PAINLEVEL_OUTOF10: 6
PAINLEVEL_OUTOF10: 2

## 2018-09-15 ASSESSMENT — PAIN DESCRIPTION - PAIN TYPE
TYPE: ACUTE PAIN
TYPE: ACUTE PAIN

## 2018-09-15 NOTE — PROGRESS NOTES
Megan Santillan transferred to 330 from 144 via bed. Reason for transfer: Lower level of care   Explained reason for transfer to Patient. Belongings: 3 bags of belongings with patient  with patient at bedside . Soft chart transferred with patient: Yes. Telemetry box number N/A transferred with patient: N/A. Report given to: [facility] LPN, via telephone.       Electronically signed by Abimbola Eason RN on 9/14/2018 at 9:13 PM

## 2018-09-15 NOTE — PROGRESS NOTES
200 mg Oral Daily Luna Campbell MD   200 mg at 09/15/18 0931    LORazepam (ATIVAN) injection 1 mg  1 mg Intravenous Q4H PRN Brittni Husain MD   1 mg at 09/08/18 0535    ipratropium (ATROVENT) 0.02 % nebulizer solution 0.5 mg  0.5 mg Nebulization 4x daily Jason Jain MD   0.5 mg at 09/15/18 1134    pantoprazole (PROTONIX) injection 40 mg  40 mg Intravenous Daily Alena Arriola MD   40 mg at 09/15/18 0931    sodium chloride flush 0.9 % injection 10 mL  10 mL Intravenous 2 times per day Desire Steinberg MD   10 mL at 09/15/18 0931    sodium chloride flush 0.9 % injection 10 mL  10 mL Intravenous PRN Desire Steinberg MD   10 mL at 09/15/18 0036    potassium chloride 10 mEq/100 mL IVPB (Peripheral Line)  10 mEq Intravenous PRN Desire Steinberg MD        ondansetron [facility]) injection 4 mg  4 mg Intravenous Q8H PRN Desire Steinberg MD   4 mg at 09/13/18 0839    metoprolol tartrate (LOPRESSOR) tablet 25 mg  25 mg Oral Q12H PRN Desire Steinberg MD   25 mg at 08/31/18 1932    cloNIDine (CATAPRES) tablet 0.1 mg  0.1 mg Oral Q2H PRN Desire Steinberg MD   0.1 mg at 08/31/18 1830        Labs:     Recent Labs      09/12/18   2330  09/13/18   1525  09/14/18   0427  09/15/18   0717   WBC  12.8*   --   11.5*  15.4*   RBC  2.63*   --   2.71*  2.93*   HGB  8.0*   --   8.0*  8.7*   HCT  24.3*   --   25.5*  28.2*   MCV  92.4   --   94.1  96.2   MCH  30.4   --   29.5  29.7   MCHC  32.9*   --   31.4*  30.9*   PLT  489*  497*  504*  479*     Recent Labs      09/12/18   2330  09/14/18   0427  09/15/18   0717   NA  136  140  136   K  3.9  4.1  4.7   ANIONGAP  11  10  12   CL  97*  100  97*   CO2  28  30*  27   BUN  13  13  14   CREATININE  1.1*  1.1*  1.0*   GLUCOSE  108  95  97   CALCIUM  8.4*  8.6*  8.2*     Recent Labs      09/12/18   2330 09/14/18 0427  09/15/18   0717   MG  1.7  1.9  2.0   PHOS  2.1*  2.8  2.7     Recent Labs      09/12/18 2330 09/14/18 0427  09/15/18   0717   AST  32  39*  26   ALT  34*  34* Surgery Following    Ischemic hepatitis - Noted    CKD (chronic kidney disease), stage III - CR stable    Metabolic acidosis - Resolved    Fever in adult - Resolved    Shock circulatory ([facility] [address] - Resolved      Advance Directive: Full Code    DVT prophylaxis: SCDs    Discharge planning: TBD. Medically stable. For PA/LAT CXR in AM.         Nic Granados, APRN    9/15/2018  3:55 PM    I have seen and examined the patient today. The patient has no new complaints. Passing more flatus but still no bowel movement. Vital signs reviewed  General: Female no distress  Pulmonary: Lungs clear, unlabored breathing  Cardiovascular: Heart regular without murmur, no peripheral edema  Gastrointestinal: Abdomen soft, nontender, no guarding or masses  Neurologic: Alert, no focal motor deficits  Skin: Warm and dry. No rash. I agree with the above note including the assessment and plan.

## 2018-09-15 NOTE — PROGRESS NOTES
09/13/2018      PLAN:     1. Remove pneumothorax catheter  2.  PA/LAT CXR in AM      Electronically signed by BRAYDON Luna on 9/15/18 at 11:53 AM

## 2018-09-15 NOTE — PROGRESS NOTES
Physical Therapy  Name: Curry Michael  MRN:  [id number]  Date of service:  9/15/2018     09/15/18 1656   Subjective   Subjective patient willing to walk again   Bed Mobility   Supine to Sit Contact guard assistance   Sit to Supine Minimal assistance   Transfers   Sit to Stand Contact guard assistance   Stand to sit Contact guard assistance   Bed to Chair Contact guard assistance   Ambulation   Ambulation?  Yes   Ambulation 1   Surface level tile   Device Rolling Walker   Other Apparatus O2   Assistance Contact guard assistance   Distance 170'   Short term goals   Time Frame for Short term goals 2 weeks   Short term goal 1 Independent with bed mobility   Short term goal 2 Transfer bed to chair with minimal assist of 1   Short term goal 3 Ambulate 300 feet without assistive device and CGA of 1   Conditions Requiring Skilled Therapeutic Intervention   Body structures, Functions, Activity limitations Decreased functional mobility    Treatment Diagnosis Decline in mobility   Activity Tolerance   Activity Tolerance Patient Tolerated treatment well   Plan   Times per week 7   Times per day Daily   Plan weeks 2   Current Treatment Recommendations Strengthening;Balance Training;Functional Mobility Training;Transfer Training;Gait Training         Electronically signed by Cathie Chou PTA on 9/15/2018 at 4:59 PM

## 2018-09-15 NOTE — PROGRESS NOTES
Physical Therapy  Name: Nikki Bah  MRN:  [id number]  Date of service:  9/15/2018     09/15/18 1138   Subjective   Subjective patient willing to walk   Bed Mobility   Supine to Sit Minimal assistance   Sit to Supine Minimal assistance   Transfers   Sit to Stand Minimal Assistance   Stand to sit Minimal Assistance   Bed to Chair Minimal assistance   Ambulation   Ambulation?  Yes   Ambulation 1   Surface level tile   Device Rolling Walker   Other Apparatus O2  (chest tube)   Assistance Contact guard assistance   Distance 160'   Short term goals   Time Frame for Short term goals 2 weeks   Short term goal 1 Independent with bed mobility   Short term goal 2 Transfer bed to chair with minimal assist of 1   Short term goal 3 Ambulate 300 feet without assistive device and CGA of 1   Conditions Requiring Skilled Therapeutic Intervention   Body structures, Functions, Activity limitations Decreased functional mobility    Treatment Diagnosis Decline in mobility   Activity Tolerance   Activity Tolerance Patient Tolerated treatment well   Plan   Times per week 7   Times per day Daily   Plan weeks 2   Current Treatment Recommendations Strengthening;Balance Training;Functional Mobility Training;Transfer Training;Gait Training   Safety Devices   Type of devices Left in bed;Call light within reach         Electronically signed by Rachael Bates PTA on 9/15/2018 at 11:40 AM

## 2018-09-15 NOTE — PROGRESS NOTES
Vascular Surgery  Dr.Scott Hanson   Daily Progress Note    Pt Name: Filemon Jung Record Number: [id number]  Date of Birth 1950   Today's Date: 9/15/2018    SUBJECTIVE:     Patient was seen and examined  Pain is  Controlled- will add plain wade    has had constipation-she is passing flatus  Stating chest feels better after chest tube, still with abdominal/back discomfort     OBJECTIVE:     Patient Vitals for the past 24 hrs:   BP Temp Temp src Pulse Resp SpO2 Weight   09/15/18 0759 115/60 97 °F (36.1 °C) Temporal 77 12 93 % -   09/15/18 0556 - - - - - - 143 lb 1.6 oz (64.9 kg)   09/15/18 0505 116/63 97.1 °F (36.2 °C) Temporal 80 14 97 % -   09/15/18 0051 137/70 97.4 °F (36.3 °C) Temporal 80 16 97 % -   09/14/18 2107 (!) 140/70 96.8 °F (36 °C) Temporal 83 16 98 % 142 lb 5 oz (64.6 kg)   09/14/18 2000 (!) 127/54 - - 79 10 100 % -   09/14/18 1900 (!) 120/57 - - 88 19 90 % -   09/14/18 1832 (!) 116/53 - - 89 15 98 % -   09/14/18 1800 (!) 132/54 - - 88 16 (!) 89 % -   09/14/18 1600 (!) 138/49 96.7 °F (35.9 °C) Tympanic 95 13 96 % -   09/14/18 1500 (!) 127/55 - - 85 13 97 % -   09/14/18 1402 (!) 129/52 - - 83 12 - -   09/14/18 1300 (!) 151/58 - - 85 18 (!) 87 % -   09/14/18 1200 (!) 143/58 96.6 °F (35.9 °C) Tympanic 63 9 90 % -   09/14/18 1100 (!) 134/53 - - 71 12 91 % -         Intake/Output Summary (Last 24 hours) at 09/15/18 1043  Last data filed at 09/15/18 0931   Gross per 24 hour   Intake               60 ml   Output              650 ml   Net             -590 ml       In: 10 [I.V.:10]  Out: 250 [Urine:250]    I/O last 3 completed shifts:   In: 100 [P.O.:50; I.V.:50]  Out: 650 [Urine:650]       Date 09/15/18 0000 - 09/15/18 2359   Shift 0363-0000 8281-7801 4118-8071 24 Hour Total   I  N  T  A  K  E   I.V.  (mL/kg)  10  (0.2)  10  (0.2)    Shift Total  (mL/kg)  10  (0.2)  10  (0.2)   O  U  T  P  U  T   Shift Total  (mL/kg)       Weight (kg) 64.9 64.9 64.9 64.9     Wt Readings from Last 3 Encounters:

## 2018-09-16 ENCOUNTER — APPOINTMENT (OUTPATIENT)
Dept: GENERAL RADIOLOGY | Age: 68
DRG: 268 | End: 2018-09-16
Attending: SURGERY
Payer: MEDICARE

## 2018-09-16 LAB
AMYLASE: 29 U/L (ref 28–100)
HCT VFR BLD CALC: 24.8 % (ref 37–47)
HEMOGLOBIN: 7.8 G/DL (ref 12–16)
MAGNESIUM: 1.8 MG/DL (ref 1.6–2.4)
MCH RBC QN AUTO: 29.5 PG (ref 27–31)
MCHC RBC AUTO-ENTMCNC: 31.5 G/DL (ref 33–37)
MCV RBC AUTO: 93.9 FL (ref 81–99)
PDW BLD-RTO: 15.5 % (ref 11.5–14.5)
PHOSPHORUS: 2.9 MG/DL (ref 2.5–4.5)
PLATELET # BLD: 431 K/UL (ref 130–400)
PMV BLD AUTO: 10.3 FL (ref 9.4–12.3)
RBC # BLD: 2.64 M/UL (ref 4.2–5.4)
WBC # BLD: 10.9 K/UL (ref 4.8–10.8)

## 2018-09-16 PROCEDURE — 83735 ASSAY OF MAGNESIUM: CPT

## 2018-09-16 PROCEDURE — 1210000000 HC MED SURG R&B

## 2018-09-16 PROCEDURE — 84100 ASSAY OF PHOSPHORUS: CPT

## 2018-09-16 PROCEDURE — 6360000002 HC RX W HCPCS: Performed by: INTERNAL MEDICINE

## 2018-09-16 PROCEDURE — 36415 COLL VENOUS BLD VENIPUNCTURE: CPT

## 2018-09-16 PROCEDURE — 2700000000 HC OXYGEN THERAPY PER DAY

## 2018-09-16 PROCEDURE — 97116 GAIT TRAINING THERAPY: CPT

## 2018-09-16 PROCEDURE — 71046 X-RAY EXAM CHEST 2 VIEWS: CPT

## 2018-09-16 PROCEDURE — 99231 SBSQ HOSP IP/OBS SF/LOW 25: CPT | Performed by: INTERNAL MEDICINE

## 2018-09-16 PROCEDURE — 82150 ASSAY OF AMYLASE: CPT

## 2018-09-16 PROCEDURE — 85027 COMPLETE CBC AUTOMATED: CPT

## 2018-09-16 PROCEDURE — 6370000000 HC RX 637 (ALT 250 FOR IP): Performed by: NURSE PRACTITIONER

## 2018-09-16 PROCEDURE — 2580000003 HC RX 258: Performed by: SURGERY

## 2018-09-16 PROCEDURE — C9113 INJ PANTOPRAZOLE SODIUM, VIA: HCPCS | Performed by: HOSPITALIST

## 2018-09-16 PROCEDURE — 99024 POSTOP FOLLOW-UP VISIT: CPT | Performed by: SURGERY

## 2018-09-16 PROCEDURE — 99231 SBSQ HOSP IP/OBS SF/LOW 25: CPT | Performed by: NURSE PRACTITIONER

## 2018-09-16 PROCEDURE — 6370000000 HC RX 637 (ALT 250 FOR IP): Performed by: INTERNAL MEDICINE

## 2018-09-16 PROCEDURE — 6360000002 HC RX W HCPCS: Performed by: SURGERY

## 2018-09-16 PROCEDURE — 94640 AIRWAY INHALATION TREATMENT: CPT

## 2018-09-16 PROCEDURE — 6360000002 HC RX W HCPCS: Performed by: HOSPITALIST

## 2018-09-16 RX ORDER — OXYCODONE HYDROCHLORIDE 5 MG/1
5 TABLET ORAL EVERY 4 HOURS PRN
Status: DISCONTINUED | OUTPATIENT
Start: 2018-09-16 | End: 2018-09-19

## 2018-09-16 RX ORDER — DOCUSATE SODIUM 100 MG/1
100 CAPSULE, LIQUID FILLED ORAL 2 TIMES DAILY
Status: DISCONTINUED | OUTPATIENT
Start: 2018-09-16 | End: 2018-09-21 | Stop reason: HOSPADM

## 2018-09-16 RX ORDER — POLYETHYLENE GLYCOL 3350 17 G/17G
17 POWDER, FOR SOLUTION ORAL DAILY
Status: DISCONTINUED | OUTPATIENT
Start: 2018-09-16 | End: 2018-09-21 | Stop reason: HOSPADM

## 2018-09-16 RX ADMIN — Medication 1 MG: at 22:00

## 2018-09-16 RX ADMIN — OXYCODONE HYDROCHLORIDE 5 MG: 5 TABLET ORAL at 15:41

## 2018-09-16 RX ADMIN — PANTOPRAZOLE SODIUM 40 MG: 40 INJECTION, POWDER, FOR SOLUTION INTRAVENOUS at 07:53

## 2018-09-16 RX ADMIN — Medication 10 ML: at 21:51

## 2018-09-16 RX ADMIN — METOCLOPRAMIDE 10 MG: 5 INJECTION, SOLUTION INTRAMUSCULAR; INTRAVENOUS at 16:23

## 2018-09-16 RX ADMIN — METOCLOPRAMIDE 10 MG: 5 INJECTION, SOLUTION INTRAMUSCULAR; INTRAVENOUS at 00:03

## 2018-09-16 RX ADMIN — POLYETHYLENE GLYCOL 3350 17 G: 17 POWDER, FOR SOLUTION ORAL at 15:41

## 2018-09-16 RX ADMIN — AMIODARONE HYDROCHLORIDE 200 MG: 200 TABLET ORAL at 07:21

## 2018-09-16 RX ADMIN — Medication 10 ML: at 00:02

## 2018-09-16 RX ADMIN — ONDANSETRON 4 MG: 2 INJECTION INTRAMUSCULAR; INTRAVENOUS at 01:33

## 2018-09-16 RX ADMIN — DOCUSATE SODIUM 100 MG: 100 CAPSULE, LIQUID FILLED ORAL at 21:50

## 2018-09-16 RX ADMIN — METOCLOPRAMIDE 10 MG: 5 INJECTION, SOLUTION INTRAMUSCULAR; INTRAVENOUS at 06:21

## 2018-09-16 RX ADMIN — ONDANSETRON 4 MG: 2 INJECTION INTRAMUSCULAR; INTRAVENOUS at 22:06

## 2018-09-16 RX ADMIN — METOCLOPRAMIDE 10 MG: 5 INJECTION, SOLUTION INTRAMUSCULAR; INTRAVENOUS at 21:51

## 2018-09-16 RX ADMIN — IPRATROPIUM BROMIDE 0.5 MG: 0.5 SOLUTION RESPIRATORY (INHALATION) at 07:57

## 2018-09-16 RX ADMIN — IPRATROPIUM BROMIDE 0.5 MG: 0.5 SOLUTION RESPIRATORY (INHALATION) at 19:10

## 2018-09-16 RX ADMIN — DOCUSATE SODIUM 100 MG: 100 CAPSULE, LIQUID FILLED ORAL at 15:41

## 2018-09-16 RX ADMIN — IPRATROPIUM BROMIDE 0.5 MG: 0.5 SOLUTION RESPIRATORY (INHALATION) at 11:22

## 2018-09-16 RX ADMIN — ONDANSETRON 4 MG: 2 INJECTION INTRAMUSCULAR; INTRAVENOUS at 18:01

## 2018-09-16 RX ADMIN — METOCLOPRAMIDE 10 MG: 5 INJECTION, SOLUTION INTRAMUSCULAR; INTRAVENOUS at 12:04

## 2018-09-16 RX ADMIN — Medication 1 MG: at 13:33

## 2018-09-16 RX ADMIN — Medication 10 ML: at 01:25

## 2018-09-16 RX ADMIN — Medication 10 ML: at 12:04

## 2018-09-16 RX ADMIN — Medication 1 MG: at 01:25

## 2018-09-16 RX ADMIN — IPRATROPIUM BROMIDE 0.5 MG: 0.5 SOLUTION RESPIRATORY (INHALATION) at 15:36

## 2018-09-16 ASSESSMENT — PAIN DESCRIPTION - LOCATION
LOCATION: OTHER (COMMENT)
LOCATION: OTHER (COMMENT)

## 2018-09-16 ASSESSMENT — PAIN DESCRIPTION - PAIN TYPE
TYPE: SURGICAL PAIN
TYPE: SURGICAL PAIN

## 2018-09-16 ASSESSMENT — PAIN DESCRIPTION - DESCRIPTORS: DESCRIPTORS: ACHING

## 2018-09-16 ASSESSMENT — PAIN SCALES - GENERAL
PAINLEVEL_OUTOF10: 7
PAINLEVEL_OUTOF10: 4
PAINLEVEL_OUTOF10: 4
PAINLEVEL_OUTOF10: 9
PAINLEVEL_OUTOF10: 6
PAINLEVEL_OUTOF10: 6
PAINLEVEL_OUTOF10: 8
PAINLEVEL_OUTOF10: 8
PAINLEVEL_OUTOF10: 7
PAINLEVEL_OUTOF10: 5

## 2018-09-16 ASSESSMENT — PAIN SCALES - WONG BAKER: WONGBAKER_NUMERICALRESPONSE: 6

## 2018-09-16 ASSESSMENT — PAIN DESCRIPTION - ORIENTATION: ORIENTATION: LEFT

## 2018-09-16 ASSESSMENT — PAIN DESCRIPTION - FREQUENCY: FREQUENCY: CONTINUOUS

## 2018-09-16 NOTE — PROGRESS NOTES
Physical Therapy  Name: Yara Pinto  MRN:  [id number]  Date of service:  9/16/2018 09/16/18 0943   Subjective   Subjective willing to walk wwith encouragement   Bed Mobility   Supine to Sit Stand by assistance   Transfers   Sit to Stand Contact guard assistance;Stand by assistance   Stand to sit Contact guard assistance;Stand by assistance   Bed to Chair Contact guard assistance   Ambulation   Ambulation? Yes   Ambulation 1   Surface level tile   Device Rolling Walker   Other Apparatus O2   Assistance Contact guard assistance   Quality of Gait Steady   Distance 200'   Comments up in chair after gait with all needs in reach   Short term goals   Time Frame for Short term goals 2 weeks   Short term goal 1 Independent with bed mobility   Short term goal 2 Transfer bed to chair with minimal assist of 1   Short term goal 3 Ambulate 300 feet without assistive device and CGA of 1   Conditions Requiring Skilled Therapeutic Intervention   Body structures, Functions, Activity limitations Decreased functional mobility    Treatment Diagnosis Decline in mobility   Discharge Recommendations Continue to assess pending progress   Activity Tolerance   Activity Tolerance Patient Tolerated treatment well   Plan   Times per week 7   Times per day Daily   Plan weeks 2   Current Treatment Recommendations Strengthening;Balance Training;Functional Mobility Training;Transfer Training;Gait Training   Safety Devices   Type of devices Call light within reach; Left in chair         Electronically signed by Jeffery Woo PTA on 9/16/2018 at 9:45 AM

## 2018-09-16 NOTE — PROGRESS NOTES
Vascular Surgery  Dr.Scott Hanson   Daily Progress Note    Pt Name: Filemon Jung Record Number: [id number]  Date of Birth 1950   Today's Date: 9/16/2018    SUBJECTIVE:     Patient was seen and examined  Pain is  Controlled-  added plain tyleno    has had constipation-she is passing flatus  Chest tube removed    OBJECTIVE:     Patient Vitals for the past 24 hrs:   BP Temp Temp src Pulse Resp SpO2 Weight   09/16/18 0706 114/69 97 °F (36.1 °C) Temporal 82 16 97 % -   09/16/18 0511 - - - - - - 146 lb 1 oz (66.3 kg)   09/15/18 1942 (!) 93/46 97.3 °F (36.3 °C) Temporal 86 16 94 % -   09/15/18 0759 115/60 97 °F (36.1 °C) Temporal 77 12 93 % -         Intake/Output Summary (Last 24 hours) at 09/16/18 0730  Last data filed at 09/16/18 0511   Gross per 24 hour   Intake               20 ml   Output              950 ml   Net             -930 ml       In: 10 [I.V.:10]  Out: 950 [Urine:950]    I/O last 3 completed shifts: In: 20 [I.V.:20]  Out: 950 [Urine:950]       Date 09/16/18 0000 - 09/16/18 2359   Shift 2219-2567 7787-4633 0913-2065 24 Hour Total   I  N  T  A  K  E   I.V.  (mL/kg) 10  (0.2)   10  (0.2)    Shift Total  (mL/kg) 10  (0.2)   10  (0.2)   O  U  T  P  U  T   Urine  (mL/kg/hr) 750   750    Shift Total  (mL/kg) 750  (11.3)   750  (11.3)   Weight (kg) 66.3 66.3 66.3 66.3     Wt Readings from Last 3 Encounters:   09/16/18 146 lb 1 oz (66.3 kg)   08/27/18 141 lb (64 kg)   08/22/18 140 lb (63.5 kg)      Body mass index is 25.87 kg/m².      Diet: Dietary Nutrition Supplements: Low Calorie High Protein Supplement  DIET FULL LIQUID; [place] Thick    MEDS:     Scheduled Meds:   metoclopramide  10 mg Intravenous Q6H    amiodarone  200 mg Oral Daily    ipratropium  0.5 mg Nebulization 4x daily    pantoprazole  40 mg Intravenous Daily    sodium chloride flush  10 mL Intravenous 2 times per day     Continuous Infusions:  PRN Meds:    HYDROmorphone 0.5 mg Q1H PRN   Or     HYDROmorphone 1 mg Q1H PRN   LORazepam 1 Problem List   Diagnosis    Irritable bowel syndrome    Osteoarthritis    Anxiety    Depression    Labyrinthitis    AAA (abdominal aortic aneurysm) (HCC)    Carotid artery stenosis    Hyperlipemia    TIA (transient ischemic attack)    Fibromuscular dysplasia (HCC)    Diarrhea    History of colon polyps    Chronic heartburn    Antiplatelet or antithrombotic long-term use    Colon polyps    Renal artery stenosis (HCC)    Celiac artery stenosis (HCC)    Essential hypertension    Bilateral carotid artery stenosis    Ischemic hepatitis    Acute respiratory failure with hypoxia (HCC)    CKD (chronic kidney disease), stage III    Metabolic acidosis    Acute blood loss as cause of postoperative anemia    Atrial fibrillation with rapid ventricular response (HCC)    Positive sputum culture for Pseudomonas    Fever in adult    Shock circulatory ([facility] [address]    Pneumothorax on left         Impression KWESI 9-15-18        Based on ankle-brachial indices and doppler waveforms, the patient has    relatively normal flow to the bilateral lower extremity arterial system at   [facility]        Signature    Rt JW 1.21, Left JW 1.21    ----------------------------------------------------------------    Electronically signed by Chanelle Simpson MD(Interpreting    physician) on 09/16/2018 07:09 AM       CXR 9-16-18  Impression   1. Suspect similar persistent left apical pneumothorax. Interval chest   tube removal.   2. Similar small bilateral pleural effusions and bibasilar opacities   appear   Signed by Dr Dolores William on 9/16/2018 8:08 AM       Today labs WBC 10.9, Hgb 7.8, Plt 431, Mg 1.8, Po4 2.9, amylase 29,      PLAN:     1. Breathing better today  2. Appetite still poor  3. Chest tube out  4. tylenol for pain  5.  Increase activity with assistance

## 2018-09-16 NOTE — PROGRESS NOTES
tablet 200 mg  200 mg Oral Daily Serafin Frederick MD   200 mg at 09/16/18 2046    LORazepam (ATIVAN) injection 1 mg  1 mg Intravenous Q4H PRN Jayson Perez MD   1 mg at 09/08/18 0535    ipratropium (ATROVENT) 0.02 % nebulizer solution 0.5 mg  0.5 mg Nebulization 4x daily Jeffry Green MD   0.5 mg at 09/16/18 1122    pantoprazole (PROTONIX) injection 40 mg  40 mg Intravenous Daily Carlos Capps MD   40 mg at 09/16/18 0753    sodium chloride flush 0.9 % injection 10 mL  10 mL Intravenous 2 times per day Kristen Hassan MD   10 mL at 09/16/18 1204    sodium chloride flush 0.9 % injection 10 mL  10 mL Intravenous PRN Kristen Hassan MD   10 mL at 09/16/18 0125    potassium chloride 10 mEq/100 mL IVPB (Peripheral Line)  10 mEq Intravenous PRN Kristen Hassan MD        ondansetron [facility]) injection 4 mg  4 mg Intravenous Q8H PRN Kristen Hassan MD   4 mg at 09/16/18 0133    metoprolol tartrate (LOPRESSOR) tablet 25 mg  25 mg Oral Q12H PRN Kristen Hassan MD   25 mg at 08/31/18 1932    cloNIDine (CATAPRES) tablet 0.1 mg  0.1 mg Oral Q2H PRN Kristen Hassan MD   0.1 mg at 08/31/18 1830        Labs:     Recent Labs      09/14/18   0427  09/15/18   0717  09/16/18   0716   WBC  11.5*  15.4*  10.9*   RBC  2.71*  2.93*  2.64*   HGB  8.0*  8.7*  7.8*   HCT  25.5*  28.2*  24.8*   MCV  94.1  96.2  93.9   MCH  29.5  29.7  29.5   MCHC  31.4*  30.9*  31.5*   PLT  504*  479*  431*     Recent Labs      09/14/18   0427  09/15/18   0717   NA  140  136   K  4.1  4.7   ANIONGAP  10  12   CL  100  97*   CO2  30*  27   BUN  13  14   CREATININE  1.1*  1.0*   GLUCOSE  95  97   CALCIUM  8.6*  8.2*     Recent Labs      09/14/18   0427  09/15/18   0717  09/16/18   0716   MG  1.9  2.0  1.8   PHOS  2.8  2.7  2.9     Recent Labs      09/14/18   0427  09/15/18   0717   AST  39*  26   ALT  34*  30   BILITOT  1.4*  1.1   ALKPHOS  68  67         Objective:   Vitals: /69   Pulse 82   Temp 97 °F (36.1 °C) (Temporal)   Resp stable    Metabolic acidosis - Resolved    Fever in adult - Resolved    Shock circulatory ([facility] [address] - Resolved      Advance Directive: Full Code    DVT prophylaxis: SCDs    Discharge planning: TBD. Check venous ultrasound of right upper extremity. Add colace and miralax. Add oral pain medication. Lazarus Sola, APRN    9/16/2018   4:35 PM    I have seen and examined the patient today. Vital signs reviewed  General: in no distress  Pulmonary: Lungs clear, unlabored breathing  Cardiovascular: Heart regular without murmur, no peripheral edema  Gastrointestinal: Abdomen soft, nontender, no guarding or masses  Neurologic: Alert, no focal motor deficits  Skin: Warm and dry. I agree with the above note including the assessment and plan.

## 2018-09-16 NOTE — PROGRESS NOTES
Physical Therapy  Name: Aniceto Su  MRN:  [id number]  Date of service:  9/16/2018 09/16/18 1556   Subjective   Subjective willing to walk again   Bed Mobility   Supine to Sit Stand by assistance   Sit to Supine Contact guard assistance   Transfers   Sit to Stand Contact guard assistance;Stand by assistance   Stand to sit Contact guard assistance;Stand by assistance   Bed to Chair Contact guard assistance   Ambulation   Ambulation? Yes   Ambulation 1   Surface level tile   Device Rolling Walker   Other Apparatus O2   Assistance Contact guard assistance   Quality of Gait Steady   Distance 200'   Comments btb after gait with all needs in reach   Short term goals   Time Frame for Short term goals 2 weeks   Short term goal 1 Independent with bed mobility   Short term goal 2 Transfer bed to chair with minimal assist of 1   Short term goal 3 Ambulate 300 feet without assistive device and CGA of 1   Conditions Requiring Skilled Therapeutic Intervention   Body structures, Functions, Activity limitations Decreased functional mobility    Treatment Diagnosis Decline in mobility   Discharge Recommendations Continue to assess pending progress   Activity Tolerance   Activity Tolerance Patient Tolerated treatment well   Plan   Times per week 7   Times per day Daily   Plan weeks 2   Current Treatment Recommendations Strengthening;Balance Training;Functional Mobility Training;Transfer Training;Gait Training   Safety Devices   Type of devices Call light within reach; Left in bed         Electronically signed by Federico Aschoff, PTA on 9/16/2018 at 3:58 PM

## 2018-09-16 NOTE — PROGRESS NOTES
Note: I asasisted Dr. Amber Messina in repair of abdominal Aortic aneurysm 8-30-18. I assisted with planning, exposure, implanting the graft, the left renal bypass and closure.

## 2018-09-17 ENCOUNTER — APPOINTMENT (OUTPATIENT)
Dept: CT IMAGING | Age: 68
DRG: 268 | End: 2018-09-17
Attending: SURGERY
Payer: MEDICARE

## 2018-09-17 ENCOUNTER — APPOINTMENT (OUTPATIENT)
Dept: GENERAL RADIOLOGY | Age: 68
DRG: 268 | End: 2018-09-17
Attending: SURGERY
Payer: MEDICARE

## 2018-09-17 LAB
ALBUMIN SERPL-MCNC: 2.7 G/DL (ref 3.5–5.2)
ALP BLD-CCNC: 75 U/L (ref 35–104)
ALT SERPL-CCNC: 25 U/L (ref 5–33)
AMYLASE: 33 U/L (ref 28–100)
ANION GAP SERPL CALCULATED.3IONS-SCNC: 9 MMOL/L (ref 7–19)
AST SERPL-CCNC: 24 U/L (ref 5–32)
BILIRUB SERPL-MCNC: 1 MG/DL (ref 0.2–1.2)
BUN BLDV-MCNC: 14 MG/DL (ref 8–23)
CALCIUM SERPL-MCNC: 8.7 MG/DL (ref 8.8–10.2)
CHLORIDE BLD-SCNC: 99 MMOL/L (ref 98–111)
CO2: 32 MMOL/L (ref 22–29)
CREAT SERPL-MCNC: 1.1 MG/DL (ref 0.5–0.9)
GFR NON-AFRICAN AMERICAN: 50
GLUCOSE BLD-MCNC: 113 MG/DL (ref 74–109)
HCT VFR BLD CALC: 26.5 % (ref 37–47)
HEMOGLOBIN: 8.3 G/DL (ref 12–16)
MAGNESIUM: 1.8 MG/DL (ref 1.6–2.4)
MCH RBC QN AUTO: 29.7 PG (ref 27–31)
MCHC RBC AUTO-ENTMCNC: 31.3 G/DL (ref 33–37)
MCV RBC AUTO: 95 FL (ref 81–99)
PDW BLD-RTO: 15.3 % (ref 11.5–14.5)
PHOSPHORUS: 3 MG/DL (ref 2.5–4.5)
PLATELET # BLD: 398 K/UL (ref 130–400)
PMV BLD AUTO: 10.2 FL (ref 9.4–12.3)
POTASSIUM SERPL-SCNC: 4.2 MMOL/L (ref 3.5–5)
RBC # BLD: 2.79 M/UL (ref 4.2–5.4)
SODIUM BLD-SCNC: 140 MMOL/L (ref 136–145)
TOTAL PROTEIN: 5.3 G/DL (ref 6.6–8.7)
WBC # BLD: 10.4 K/UL (ref 4.8–10.8)

## 2018-09-17 PROCEDURE — 6360000002 HC RX W HCPCS: Performed by: INTERNAL MEDICINE

## 2018-09-17 PROCEDURE — 6360000002 HC RX W HCPCS: Performed by: HOSPITALIST

## 2018-09-17 PROCEDURE — 1210000000 HC MED SURG R&B

## 2018-09-17 PROCEDURE — 6370000000 HC RX 637 (ALT 250 FOR IP): Performed by: SURGERY

## 2018-09-17 PROCEDURE — 93971 EXTREMITY STUDY: CPT

## 2018-09-17 PROCEDURE — 97530 THERAPEUTIC ACTIVITIES: CPT

## 2018-09-17 PROCEDURE — 99232 SBSQ HOSP IP/OBS MODERATE 35: CPT | Performed by: HOSPITALIST

## 2018-09-17 PROCEDURE — 82150 ASSAY OF AMYLASE: CPT

## 2018-09-17 PROCEDURE — 80053 COMPREHEN METABOLIC PANEL: CPT

## 2018-09-17 PROCEDURE — 6370000000 HC RX 637 (ALT 250 FOR IP): Performed by: INTERNAL MEDICINE

## 2018-09-17 PROCEDURE — 84100 ASSAY OF PHOSPHORUS: CPT

## 2018-09-17 PROCEDURE — 85027 COMPLETE CBC AUTOMATED: CPT

## 2018-09-17 PROCEDURE — 2700000000 HC OXYGEN THERAPY PER DAY

## 2018-09-17 PROCEDURE — 6360000002 HC RX W HCPCS: Performed by: SURGERY

## 2018-09-17 PROCEDURE — 94640 AIRWAY INHALATION TREATMENT: CPT

## 2018-09-17 PROCEDURE — 97116 GAIT TRAINING THERAPY: CPT

## 2018-09-17 PROCEDURE — 36415 COLL VENOUS BLD VENIPUNCTURE: CPT

## 2018-09-17 PROCEDURE — 2580000003 HC RX 258: Performed by: SURGERY

## 2018-09-17 PROCEDURE — 74176 CT ABD & PELVIS W/O CONTRAST: CPT

## 2018-09-17 PROCEDURE — C9113 INJ PANTOPRAZOLE SODIUM, VIA: HCPCS | Performed by: HOSPITALIST

## 2018-09-17 PROCEDURE — 71045 X-RAY EXAM CHEST 1 VIEW: CPT

## 2018-09-17 PROCEDURE — 92526 ORAL FUNCTION THERAPY: CPT

## 2018-09-17 PROCEDURE — 6370000000 HC RX 637 (ALT 250 FOR IP): Performed by: NURSE PRACTITIONER

## 2018-09-17 PROCEDURE — 83735 ASSAY OF MAGNESIUM: CPT

## 2018-09-17 RX ADMIN — METOCLOPRAMIDE 10 MG: 5 INJECTION, SOLUTION INTRAMUSCULAR; INTRAVENOUS at 13:30

## 2018-09-17 RX ADMIN — ONDANSETRON 4 MG: 2 INJECTION INTRAMUSCULAR; INTRAVENOUS at 21:27

## 2018-09-17 RX ADMIN — OXYCODONE HYDROCHLORIDE 5 MG: 5 TABLET ORAL at 13:27

## 2018-09-17 RX ADMIN — POLYETHYLENE GLYCOL 3350 17 G: 17 POWDER, FOR SOLUTION ORAL at 08:11

## 2018-09-17 RX ADMIN — ONDANSETRON 4 MG: 2 INJECTION INTRAMUSCULAR; INTRAVENOUS at 13:29

## 2018-09-17 RX ADMIN — Medication 1 MG: at 17:51

## 2018-09-17 RX ADMIN — Medication 10 ML: at 11:15

## 2018-09-17 RX ADMIN — Medication 10 ML: at 03:45

## 2018-09-17 RX ADMIN — METOCLOPRAMIDE 10 MG: 5 INJECTION, SOLUTION INTRAMUSCULAR; INTRAVENOUS at 04:42

## 2018-09-17 RX ADMIN — IPRATROPIUM BROMIDE 0.5 MG: 0.5 SOLUTION RESPIRATORY (INHALATION) at 07:48

## 2018-09-17 RX ADMIN — METOCLOPRAMIDE 10 MG: 5 INJECTION, SOLUTION INTRAMUSCULAR; INTRAVENOUS at 17:51

## 2018-09-17 RX ADMIN — OXYCODONE HYDROCHLORIDE 5 MG: 5 TABLET ORAL at 21:27

## 2018-09-17 RX ADMIN — IPRATROPIUM BROMIDE 0.5 MG: 0.5 SOLUTION RESPIRATORY (INHALATION) at 11:13

## 2018-09-17 RX ADMIN — METOCLOPRAMIDE 10 MG: 5 INJECTION, SOLUTION INTRAMUSCULAR; INTRAVENOUS at 23:27

## 2018-09-17 RX ADMIN — PANTOPRAZOLE SODIUM 40 MG: 40 INJECTION, POWDER, FOR SOLUTION INTRAVENOUS at 11:15

## 2018-09-17 RX ADMIN — IPRATROPIUM BROMIDE 0.5 MG: 0.5 SOLUTION RESPIRATORY (INHALATION) at 15:15

## 2018-09-17 RX ADMIN — AMIODARONE HYDROCHLORIDE 200 MG: 200 TABLET ORAL at 08:11

## 2018-09-17 RX ADMIN — IPRATROPIUM BROMIDE 0.5 MG: 0.5 SOLUTION RESPIRATORY (INHALATION) at 20:10

## 2018-09-17 RX ADMIN — DOCUSATE SODIUM 100 MG: 100 CAPSULE, LIQUID FILLED ORAL at 08:11

## 2018-09-17 RX ADMIN — Medication 10 ML: at 21:09

## 2018-09-17 RX ADMIN — MUPIROCIN: 20 OINTMENT TOPICAL at 21:09

## 2018-09-17 RX ADMIN — Medication 1 MG: at 03:44

## 2018-09-17 RX ADMIN — DOCUSATE SODIUM 100 MG: 100 CAPSULE, LIQUID FILLED ORAL at 21:09

## 2018-09-17 ASSESSMENT — PAIN SCALES - GENERAL
PAINLEVEL_OUTOF10: 0
PAINLEVEL_OUTOF10: 8
PAINLEVEL_OUTOF10: 8
PAINLEVEL_OUTOF10: 4
PAINLEVEL_OUTOF10: 6
PAINLEVEL_OUTOF10: 4
PAINLEVEL_OUTOF10: 0
PAINLEVEL_OUTOF10: 6
PAINLEVEL_OUTOF10: 0
PAINLEVEL_OUTOF10: 8
PAINLEVEL_OUTOF10: 8
PAINLEVEL_OUTOF10: 6

## 2018-09-17 ASSESSMENT — PAIN DESCRIPTION - PAIN TYPE: TYPE: SURGICAL PAIN

## 2018-09-17 ASSESSMENT — PAIN SCALES - WONG BAKER
WONGBAKER_NUMERICALRESPONSE: 0
WONGBAKER_NUMERICALRESPONSE: 0

## 2018-09-17 ASSESSMENT — PAIN DESCRIPTION - ORIENTATION: ORIENTATION: LEFT

## 2018-09-17 ASSESSMENT — PAIN DESCRIPTION - ONSET: ONSET: SUDDEN

## 2018-09-17 ASSESSMENT — PAIN DESCRIPTION - DESCRIPTORS: DESCRIPTORS: ACHING

## 2018-09-17 ASSESSMENT — PAIN DESCRIPTION - LOCATION: LOCATION: OTHER (COMMENT)

## 2018-09-17 NOTE — PROGRESS NOTES
Right upper extremity venous duplex performed. There is no evidence of DVT, SVT, or REFLUX in the areas that could be visualized at this time. This is a preliminary report. Final report pending.

## 2018-09-17 NOTE — PROGRESS NOTES
Speech Language Pathology  Facility/Department: [facility]/VAS/MED    SWALLOW THERAPY    NAME: Jackie Lynch  : 1950  MRN: [id number]    ADMISSION DATE: 2018  ADMITTING DIAGNOSIS: has Irritable bowel syndrome; Osteoarthritis; Anxiety; Depression; Labyrinthitis; AAA (abdominal aortic aneurysm) ([address] Carotid artery stenosis; Hyperlipemia; TIA (transient ischemic attack); Fibromuscular dysplasia ([address] Diarrhea; History of colon polyps; Chronic heartburn; Antiplatelet or antithrombotic long-term use; Colon polyps; Renal artery stenosis ([address] Celiac artery stenosis ([address] Essential hypertension; Bilateral carotid artery stenosis; Ischemic hepatitis; Acute respiratory failure with hypoxia ([address] CKD (chronic kidney disease), stage III; Metabolic acidosis; Acute blood loss as cause of postoperative anemia; Atrial fibrillation with rapid ventricular response ([address] Positive sputum culture for Pseudomonas; Fever in adult; Shock circulatory ([address] and Pneumothorax on left on her problem list.    Date of Treat: 2018  Evaluating Therapist: Alessandra Gutiérrez    Current Diet level:  Current Liquid Diet : Full with nectar thick liquids    Reason for Referral  Jackie Lynch was referred for a bedside swallow evaluation to assess the efficiency of her swallow function, identify signs and symptoms of aspiration and make recommendations regarding safe dietary consistencies, effective compensatory strategies, and safe eating environment. Impression  Observed patient's swallowing function. Patient exhibits functional oral and pharyngeal stages of swallowing with no outward S/S penetration/aspiration noted with any regular solid consistency trial or thin H2O trial presented during treatment session this date. From a speech therapy standpoint, patient would be recommended a regular solid consistency and thin liquid diet. However, no bowel movement still documented/reported.  Will continue to

## 2018-09-17 NOTE — PROGRESS NOTES
Occupational Therapy  Facility/Department: [facility]/VAS/MED  Daily Treatment Note  NAME: Gerald Feldman  : 1950  MRN: [id number]    Date of Service: 2018    Discharge Recommendations:  Home with Home health OT       Patient Diagnosis(es): The encounter diagnosis was Abdominal aortic aneurysm (AAA) without rupture ([facility] has a past medical history of Anxiety; Depression; Fibromuscular dysplasia ([facility] Hyperlipemia; Hypertension; Irritable bowel syndrome; Labyrinthitis; Migraine; Osteoarthritis; Post concussive syndrome; and Stroke ([facility] [address] has a past surgical history that includes Cataract removal (16); Colonoscopy (); Upper gastrointestinal endoscopy (N/A, 2016); Colonoscopy (N/A, 2016); Hysterectomy, total abdominal; and pr repr aneurysm/grft ins,abdominal aorta (N/A, 2018). Restrictions  Restrictions/Precautions  Restrictions/Precautions: Fall Risk  Position Activity Restriction  Other position/activity restrictions: NC O2, telemetry, Central line   Subjective   General  Chart Reviewed: Yes  Patient assessed for rehabilitation services?: Yes  Response to previous treatment: Patient with no complaints from previous session  Family / Caregiver Present: Yes (Daughter)  Referring Practitioner: Dr. Nolberto Fletcher  Patient Currently in Pain: No  Vital Signs  Patient Currently in Pain: No   Orientation     Objective             Balance  Sitting Balance: Stand by assistance  Standing Balance: Contact guard assistance  Standing Balance  Sit to stand: Stand by assistance  Stand to sit: Stand by assistance  Bed mobility  Supine to Sit: Stand by assistance  Transfers  Stand Step Transfers: Contact guard assistance  Sit to stand: Stand by assistance  Stand to sit: Stand by assistance                                                                 Assessment   Performance deficits / Impairments: Decreased functional mobility ; Decreased ADL status; Decreased balance;Decreased high-level IADLs;Decreased endurance  Assessment: Pt tolerated tx well, pt is doing very well with transfers, ADL's and functional mobility. Pt's daughter is a PTA and is working with pt on exercises for upper and lower body.   Treatment Diagnosis: AAA   Prognosis: Good  History: HLD, OA, Depression  REQUIRES OT FOLLOW UP: No  Activity Tolerance  Activity Tolerance: Patient Tolerated treatment well          Plan   Plan  Times per week: 3-5x/wk  Current Treatment Recommendations: Balance Training, Strengthening, Safety Education & Training, Equipment Evaluation, Education, & procurement, Patient/Caregiver Education & Training, Functional Mobility Training, Self-Care / ADL, Home Management Training  G-Code     OutComes Score                                           AM-PAC Score             Goals  Short term goals  Time Frame for Short term goals: 1 week  Short term goal 1: Pt will complete total body bathing/dressing Modified I  Short term goal 2: Pt will be Modified I for functional mobility  Short term goal 3: Pt will be Modified I for toileting task/transfer  Long term goals  Long term goal 1: Home   Patient Goals   Patient goals : ADL and self-care tasks        Therapy Time   Individual Concurrent Group Co-treatment   Time In           Time Out           Minutes                   MINI Tabares

## 2018-09-17 NOTE — PROGRESS NOTES
Continuous Infusions:  PRN Meds:  oxyCODONE 5 mg Q4H PRN   HYDROmorphone 0.5 mg Q1H PRN   Or     HYDROmorphone 1 mg Q1H PRN   LORazepam 1 mg Q4H PRN   sodium chloride flush 10 mL PRN   potassium chloride 10 mEq PRN   ondansetron 4 mg Q8H PRN   metoprolol tartrate 25 mg Q12H PRN   cloNIDine 0.1 mg Q2H PRN         PHYSICAL EXAM:     CONSTITUTIONAL: Alert and oriented times 3, no acute distress and cooperative to examination. LUNGS: Chest expands equally bilaterally upon respiration, slightly diminished breath sounds to LLL. Percell Brakeman Ausculation reveals no wheezes, rales or rhonchi. CARDIOVASCULAR: Regular rate and rhythm  ABDOMEN:softly distended with scattered bowel sounds  NEUROLOGIC: Awake, alert, oriented to name, place and time. WOUND/INCISION:  Midline incision with staples CDI, healing well, no drainage, no erythema. Small dressing at chest tube site, RUE with gauze dressing around, patient states she has a sore from BP cuff  EXTREMITY:feet warm to touch/pink color bilaterally, +palp DP/PT pulses noted. LABS:     CBC: Recent Labs      09/15/18   0717 09/16/18   0716  09/17/18   0614   WBC  15.4*  10.9*  10.4   RBC  2.93*  2.64*  2.79*   HGB  8.7*  7.8*  8.3*   HCT  28.2*  24.8*  26.5*   MCV  96.2  93.9  95.0   MCH  29.7  29.5  29.7   MCHC  30.9*  31.5*  31.3*   RDW  15.8*  15.5*  15.3*   PLT  479*  431*  398   MPV  10.4  10.3  10.2      Last 3 CMP:   Recent Labs      09/15/18   0717  09/17/18   0614   NA  136  140   K  4.7  4.2   CL  97*  99   CO2  27  32*   BUN  14  14   CREATININE  1.0*  1.1*   GLUCOSE  97  113*   CALCIUM  8.2*  8.7*   PROT  5.2*  5.3*   LABALBU  2.7*  2.7*   BILITOT  1.1  1.0   ALKPHOS  67  75   AST  26  24   ALT  30  25      Calcium:   Lab Results   Component Value Date    CALCIUM 8.7 09/17/2018    CALCIUM 8.2 09/15/2018    CALCIUM 8.6 09/14/2018        DVT prophylaxis:                                 [x] SCDs      Narrative   Examination.  XR CHEST PORTABLE 9/17/18   History:

## 2018-09-17 NOTE — PROGRESS NOTES
Nutrition Assessment    Type and Reason for Visit: Reassess    Nutrition Recommendations: continue current POC    Malnutrition Assessment:  · Malnutrition Status: At risk for malnutrition  · Context: Acute illness or injury  · Findings of the 6 clinical characteristics of malnutrition (Minimum of 2 out of 6 clinical characteristics is required to make the diagnosis of moderate or severe Protein Calorie Malnutrition based on AND/ASPEN Guidelines):  1. Energy Intake-Less than or equal to 50%, greater than 7 days    2. Weight Loss-No significant weight loss,    3. Fat Loss-Unable to assess,    4. Muscle Loss-Unable to assess,    5. Fluid Accumulation-No significant fluid accumulation,    6.  Strength-Not measured    Nutrition Diagnosis:   · Problem: Inadequate oral intake  · Etiology: related to Acute injury/trauma, Difficulty swallowing     Signs and symptoms:  as evidenced by Intake 0-25%, Swallow study results    Nutrition Assessment:  · Subjective Assessment: PO intake has increased a bit with intake now ranging between 0-50%.   continuing with current ONS  · Nutrition-Focused Physical Findings: extubated  · Wound Type: Surgical Wound, Skin Tears  · Current Nutrition Therapies:  · Oral Diet Orders: Full Liquid, Nectar Thick   · Oral Diet intake: 1-25%, 26-50%  · Oral Nutrition Supplement (ONS) Orders: Low Calorie, High Protein, Clear Liquid Oral Supplement  · ONS intake: 1-25%, 26-50%, 51-75%     · Anthropometric Measures:  · Ht: 5' 3\" (160 cm)   · Current Body Wt: 143 lb 6 oz (65 kg)  · Admission Body Wt: 141 lb (64 kg)  · Ideal Body Wt: 115 lb (52.2 kg), % Ideal Body 138%  · BMI Classification: BMI 25.0 - 29.9 Overweight     · Comparative Standards (Estimated Nutrition Needs):  · Estimated Daily Total Kcal: 3128-4004  · Estimated Daily Protein (g):     Estimated Intake vs Estimated Needs: Intake Less Than Needs, Intake Improving    Nutrition Risk Level: High    Nutrition Interventions:   Continue

## 2018-09-17 NOTE — PROGRESS NOTES
HOSPITAL MEDICINE  - PROGRESS NOTE    Admit Date: 8/30/2018         CC: dyspnea    Subjective: still dyspneic without oxygen    Objective: mild distress    Diet: Dietary Nutrition Supplements: Low Calorie High Protein Supplement  DIET FULL LIQUID; [place] Thick  Dietary Nutrition Supplements: Clear Liquid Oral Supplement  Pain is:None  Nausea:None  Bowel Movement/Flatus no    Data:   Scheduled Meds: Reviewed  Current Facility-Administered Medications   Medication Dose Route Frequency Provider Last Rate Last Dose    mupirocin (BACTROBAN) 2 % ointment   Topical BID Laura Miller MD        docusate sodium (COLACE) capsule 100 mg  100 mg Oral BID BRAYDON Landa   100 mg at 09/17/18 8483    polyethylene glycol (GLYCOLAX) packet 17 g  17 g Oral Daily BRAYDON Landa   17 g at 09/17/18 2010    oxyCODONE (ROXICODONE) immediate release tablet 5 mg  5 mg Oral Q4H PRN BRAYDON Landa   5 mg at 09/17/18 1327    HYDROmorphone (DILAUDID) 0.5-0.9 MG/ML-% injection 0.5 mg  0.5 mg Intravenous Q1H PRN Annamarie Hua MD        Or    HYDROmorphone (DILAUDID) 0.5-0.9 MG/ML-% injection 1 mg  1 mg Intravenous Q1H PRN Annamarie Hua MD   1 mg at 09/17/18 0344    metoclopramide (REGLAN) injection 10 mg  10 mg Intravenous Q6H Laura Miller MD   10 mg at 09/17/18 1330    amiodarone (CORDARONE) tablet 200 mg  200 mg Oral Daily Nick Andrade MD   200 mg at 09/17/18 0811    LORazepam (ATIVAN) injection 1 mg  1 mg Intravenous Q4H PRN Jesus Finn MD   1 mg at 09/08/18 0535    ipratropium (ATROVENT) 0.02 % nebulizer solution 0.5 mg  0.5 mg Nebulization 4x daily Masha Ray MD   0.5 mg at 09/17/18 1515    pantoprazole (PROTONIX) injection 40 mg  40 mg Intravenous Daily Sean Fuentes MD   40 mg at 09/17/18 1115    sodium chloride flush 0.9 % injection 10 mL  10 mL Intravenous 2 times per day Laura Miller MD   10 mL at 09/17/18 4938    sodium chloride flush 0.9 % injection 10 mL  10 mL Intravenous PRN Aydee Greenwood MD   10 mL at 09/17/18 0345    potassium chloride 10 mEq/100 mL IVPB (Peripheral Line)  10 mEq Intravenous PRN Aydee Greenwood MD        [facility]ron [facility]) injection 4 mg  4 mg Intravenous Q8H PRN Aydee Greenwood MD   4 mg at 09/17/18 1329    metoprolol tartrate (LOPRESSOR) tablet 25 mg  25 mg Oral Q12H PRN Aydee Greenwood MD   25 mg at 08/31/18 1932    cloNIDine (CATAPRES) tablet 0.1 mg  0.1 mg Oral Q2H PRN Aydee Greenwood MD   0.1 mg at 08/31/18 1830     DVT Prophylaxis: Sequential Compression Devices      Intake/Output Summary (Last 24 hours) at 09/17/18 1714  Last data filed at 09/17/18 0930   Gross per 24 hour   Intake              470 ml   Output              300 ml   Net              170 ml     CBC:   Recent Labs      09/15/18   0717  09/16/18   0716  09/17/18   0614   WBC  15.4*  10.9*  10.4   HGB  8.7*  7.8*  8.3*   PLT  479*  431*  398     BMP:  Recent Labs      09/15/18   0717  09/17/18   0614   NA  136  140   K  4.7  4.2   CL  97*  99   CO2  27  32*   BUN  14  14   CREATININE  1.0*  1.1*   GLUCOSE  97  113*     ABGs:   Lab Results   Component Value Date    PHART 7.400 09/09/2018    PO2ART 65.0 09/09/2018    ULW3IET 33.0 09/09/2018         Objective:   Vitals: /66   Pulse 74   Temp 97.1 °F (36.2 °C) (Temporal)   Resp 20   Ht 5' 3\" (1.6 m)   Wt 143 lb 6 oz (65 kg)   SpO2 96%   Breastfeeding? No   BMI 25.40 kg/m²   General appearance: alert, appears stated age and cooperative  Skin: Skin color, texture, turgor normal.   HEENT: Head: Normocephalic, no lesions, without obvious abnormality.   Neck: no adenopathy, no carotid bruit, no JVD and supple, symmetrical, trachea midline  Lungs: clear to auscultation bilaterally  Heart: regular rate and rhythm, S1, S2 normal, no murmur, click, rub or gallop  Abdomen: soft, nontender, no BS  Extremities: extremities normal, atraumatic, no cyanosis or edema  Lymphatic: No significant lymph node enlargement papable  Neurologic: Mental status: Alert, oriented, thought content appropriate        Assessment & Plan:    · AAA sp repair 8/30  · Pneumothorax- tiny residual now  · PNA- treated with ab  · afib with RVR- on amiodarone per cardiology   · History of CVA- restart Plavix when ok with VS  · Hypoxic acute resp failure- on oxygen- may need home oxygen     Jeff Burrell MD

## 2018-09-18 ENCOUNTER — APPOINTMENT (OUTPATIENT)
Dept: GENERAL RADIOLOGY | Age: 68
DRG: 268 | End: 2018-09-18
Attending: SURGERY
Payer: MEDICARE

## 2018-09-18 LAB
AMYLASE: 32 U/L (ref 28–100)
HCT VFR BLD CALC: 25.5 % (ref 37–47)
HEMOGLOBIN: 8.1 G/DL (ref 12–16)
MAGNESIUM: 1.6 MG/DL (ref 1.6–2.4)
MCH RBC QN AUTO: 29 PG (ref 27–31)
MCHC RBC AUTO-ENTMCNC: 31.8 G/DL (ref 33–37)
MCV RBC AUTO: 91.4 FL (ref 81–99)
PDW BLD-RTO: 14.9 % (ref 11.5–14.5)
PHOSPHORUS: 3.5 MG/DL (ref 2.5–4.5)
PLATELET # BLD: 357 K/UL (ref 130–400)
PMV BLD AUTO: 10.5 FL (ref 9.4–12.3)
RBC # BLD: 2.79 M/UL (ref 4.2–5.4)
WBC # BLD: 9.9 K/UL (ref 4.8–10.8)

## 2018-09-18 PROCEDURE — 2580000003 HC RX 258: Performed by: SURGERY

## 2018-09-18 PROCEDURE — 99232 SBSQ HOSP IP/OBS MODERATE 35: CPT | Performed by: HOSPITALIST

## 2018-09-18 PROCEDURE — 1210000000 HC MED SURG R&B

## 2018-09-18 PROCEDURE — 6370000000 HC RX 637 (ALT 250 FOR IP): Performed by: INTERNAL MEDICINE

## 2018-09-18 PROCEDURE — 71045 X-RAY EXAM CHEST 1 VIEW: CPT

## 2018-09-18 PROCEDURE — 6360000002 HC RX W HCPCS: Performed by: INTERNAL MEDICINE

## 2018-09-18 PROCEDURE — 97116 GAIT TRAINING THERAPY: CPT

## 2018-09-18 PROCEDURE — 6360000002 HC RX W HCPCS: Performed by: SURGERY

## 2018-09-18 PROCEDURE — 2700000000 HC OXYGEN THERAPY PER DAY

## 2018-09-18 PROCEDURE — 94761 N-INVAS EAR/PLS OXIMETRY MLT: CPT

## 2018-09-18 PROCEDURE — 6360000002 HC RX W HCPCS: Performed by: HOSPITALIST

## 2018-09-18 PROCEDURE — 82150 ASSAY OF AMYLASE: CPT

## 2018-09-18 PROCEDURE — 85027 COMPLETE CBC AUTOMATED: CPT

## 2018-09-18 PROCEDURE — 94640 AIRWAY INHALATION TREATMENT: CPT

## 2018-09-18 PROCEDURE — 83735 ASSAY OF MAGNESIUM: CPT

## 2018-09-18 PROCEDURE — 92526 ORAL FUNCTION THERAPY: CPT

## 2018-09-18 PROCEDURE — 84100 ASSAY OF PHOSPHORUS: CPT

## 2018-09-18 PROCEDURE — 6370000000 HC RX 637 (ALT 250 FOR IP): Performed by: NURSE PRACTITIONER

## 2018-09-18 PROCEDURE — 36415 COLL VENOUS BLD VENIPUNCTURE: CPT

## 2018-09-18 PROCEDURE — C9113 INJ PANTOPRAZOLE SODIUM, VIA: HCPCS | Performed by: HOSPITALIST

## 2018-09-18 RX ADMIN — OXYCODONE HYDROCHLORIDE 5 MG: 5 TABLET ORAL at 08:10

## 2018-09-18 RX ADMIN — METOCLOPRAMIDE 10 MG: 5 INJECTION, SOLUTION INTRAMUSCULAR; INTRAVENOUS at 16:57

## 2018-09-18 RX ADMIN — METOCLOPRAMIDE 10 MG: 5 INJECTION, SOLUTION INTRAMUSCULAR; INTRAVENOUS at 11:10

## 2018-09-18 RX ADMIN — IPRATROPIUM BROMIDE 0.5 MG: 0.5 SOLUTION RESPIRATORY (INHALATION) at 11:30

## 2018-09-18 RX ADMIN — MUPIROCIN: 20 OINTMENT TOPICAL at 08:11

## 2018-09-18 RX ADMIN — DOCUSATE SODIUM 100 MG: 100 CAPSULE, LIQUID FILLED ORAL at 08:10

## 2018-09-18 RX ADMIN — METOCLOPRAMIDE 10 MG: 5 INJECTION, SOLUTION INTRAMUSCULAR; INTRAVENOUS at 05:03

## 2018-09-18 RX ADMIN — IPRATROPIUM BROMIDE 0.5 MG: 0.5 SOLUTION RESPIRATORY (INHALATION) at 19:11

## 2018-09-18 RX ADMIN — POLYETHYLENE GLYCOL 3350 17 G: 17 POWDER, FOR SOLUTION ORAL at 08:10

## 2018-09-18 RX ADMIN — ONDANSETRON 4 MG: 2 INJECTION INTRAMUSCULAR; INTRAVENOUS at 21:27

## 2018-09-18 RX ADMIN — Medication 10 ML: at 08:10

## 2018-09-18 RX ADMIN — OXYCODONE HYDROCHLORIDE 5 MG: 5 TABLET ORAL at 15:47

## 2018-09-18 RX ADMIN — OXYCODONE HYDROCHLORIDE 5 MG: 5 TABLET ORAL at 21:27

## 2018-09-18 RX ADMIN — AMIODARONE HYDROCHLORIDE 200 MG: 200 TABLET ORAL at 08:10

## 2018-09-18 RX ADMIN — DOCUSATE SODIUM 100 MG: 100 CAPSULE, LIQUID FILLED ORAL at 21:27

## 2018-09-18 RX ADMIN — METHYLNALTREXONE BROMIDE 8 MG: 12 INJECTION, SOLUTION SUBCUTANEOUS at 11:06

## 2018-09-18 RX ADMIN — MUPIROCIN: 20 OINTMENT TOPICAL at 21:27

## 2018-09-18 RX ADMIN — PANTOPRAZOLE SODIUM 40 MG: 40 INJECTION, POWDER, FOR SOLUTION INTRAVENOUS at 08:10

## 2018-09-18 RX ADMIN — IPRATROPIUM BROMIDE 0.5 MG: 0.5 SOLUTION RESPIRATORY (INHALATION) at 15:53

## 2018-09-18 RX ADMIN — IPRATROPIUM BROMIDE 0.5 MG: 0.5 SOLUTION RESPIRATORY (INHALATION) at 07:59

## 2018-09-18 RX ADMIN — ONDANSETRON 4 MG: 2 INJECTION INTRAMUSCULAR; INTRAVENOUS at 08:10

## 2018-09-18 RX ADMIN — Medication 10 ML: at 21:35

## 2018-09-18 RX ADMIN — METOCLOPRAMIDE 10 MG: 5 INJECTION, SOLUTION INTRAMUSCULAR; INTRAVENOUS at 23:16

## 2018-09-18 ASSESSMENT — PAIN SCALES - GENERAL
PAINLEVEL_OUTOF10: 8
PAINLEVEL_OUTOF10: 7
PAINLEVEL_OUTOF10: 0
PAINLEVEL_OUTOF10: 3
PAINLEVEL_OUTOF10: 7
PAINLEVEL_OUTOF10: 2

## 2018-09-18 ASSESSMENT — PAIN DESCRIPTION - ORIENTATION: ORIENTATION: LEFT

## 2018-09-18 ASSESSMENT — PAIN DESCRIPTION - LOCATION: LOCATION: CHEST

## 2018-09-18 ASSESSMENT — PAIN DESCRIPTION - PAIN TYPE: TYPE: ACUTE PAIN

## 2018-09-18 NOTE — PROGRESS NOTES
acidosis    Acute blood loss as cause of postoperative anemia    Atrial fibrillation with rapid ventricular response (HCC)    Positive sputum culture for Pseudomonas    Fever in adult    Shock circulatory ([facility] [address]    Pneumothorax on left       Chief Complaint:  No chief complaint on file. PLAN:     1. RUE venous duplex negative for DVT/SVT  2. Taking small amounts of diet-receiving oral high protein supplements from dietary  3. Encourage incentive spirometer, ambulation  4. Repeat Relistor today, continue reglan q6h  5.     Agree with above. I examined the patient.

## 2018-09-18 NOTE — PROGRESS NOTES
PO    Compensatory Swallowing Strategies  Compensatory Swallowing Strategies: Upright as possible for all oral intake;Small bites/sips;Eat/Feed slowly; Alternate solids and liquids; External pacing;Remain upright for 30-45 minutes after meals    Observed patient's swallowing function with the following observations noted:    Oral Phase   Oral Phase - Comment: Patient exhibited functional oral prep and transit of regular solid consistency presentations, administered independently, with timing of oral transit primarily measuring 1-2 seconds in length and with no oral cavity residue noted post swallows. Oral transit of thin liquid presentations, administered independently via cup, primarily measured 1 second in length. Pharyngeal Phase  Pharyngeal: Patient exhibited functional laryngeal elevation during swallow initiation with no outward S/S penetration/aspiration noted with any regular solid consistency presentation or thin liquid presentation administered during treatment session this date. At this time, recommend continuation current diet. Self-feed. No further acute speech therapy is felt to be warranted. Patient to be D/C.      Electronically signed by SVEN Waterman on 9/18/2018 at 3:53 PM

## 2018-09-18 NOTE — PROGRESS NOTES
HOSPITAL MEDICINE  - PROGRESS NOTE    Admit Date: 8/30/2018         CC: dyspnea     Subjective: still dyspneic without oxygen, no BM, no flatus     Objective: mild to moderate distress    Diet: Dietary Nutrition Supplements: Low Calorie High Protein Supplement  DIET GENERAL;  Pain is:None  Nausea:Mild  Bowel Movement/Flatus no    Data:   Scheduled Meds: Reviewed  Current Facility-Administered Medications   Medication Dose Route Frequency Provider Last Rate Last Dose    mupirocin (BACTROBAN) 2 % ointment   Topical BID Taylor Lofton MD        docusate sodium (COLACE) capsule 100 mg  100 mg Oral BID Chestine Qasim, APRN   100 mg at 09/18/18 0810    polyethylene glycol (GLYCOLAX) packet 17 g  17 g Oral Daily Chestine Qasim, APRN   17 g at 09/18/18 0810    oxyCODONE (ROXICODONE) immediate release tablet 5 mg  5 mg Oral Q4H PRN Chestine Qasim, APRN   5 mg at 09/18/18 1547    HYDROmorphone (DILAUDID) 0.5-0.9 MG/ML-% injection 0.5 mg  0.5 mg Intravenous Q1H PRN Gio Tucker MD        Or    HYDROmorphone (DILAUDID) 0.5-0.9 MG/ML-% injection 1 mg  1 mg Intravenous Q1H PRN Gio Tucker MD   1 mg at 09/17/18 1751    metoclopramide (REGLAN) injection 10 mg  10 mg Intravenous Q6H Taylor Lofton MD   10 mg at 09/18/18 1110    amiodarone (CORDARONE) tablet 200 mg  200 mg Oral Daily Sharin Rubinstein, MD   200 mg at 09/18/18 0810    LORazepam (ATIVAN) injection 1 mg  1 mg Intravenous Q4H PRN Mabel Alexander MD   1 mg at 09/08/18 0535    ipratropium (ATROVENT) 0.02 % nebulizer solution 0.5 mg  0.5 mg Nebulization 4x daily Eligah Habermann, MD   0.5 mg at 09/18/18 1553    pantoprazole (PROTONIX) injection 40 mg  40 mg Intravenous Daily Lizzy Lindquist MD   40 mg at 09/18/18 0810    sodium chloride flush 0.9 % injection 10 mL  10 mL Intravenous 2 times per day Taylor Lofton MD   10 mL at 09/18/18 0810    sodium chloride flush 0.9 % injection 10 mL 10 mL Intravenous PRN Keith Teran MD   10 mL at 09/17/18 0345    potassium chloride 10 mEq/100 mL IVPB (Peripheral Line)  10 mEq Intravenous PRN Keith Teran MD        ondansetron [facility]) injection 4 mg  4 mg Intravenous Q8H PRN Keith Teran MD   4 mg at 09/18/18 0810    metoprolol tartrate (LOPRESSOR) tablet 25 mg  25 mg Oral Q12H PRN Keith Teran MD   25 mg at 08/31/18 1932    cloNIDine (CATAPRES) tablet 0.1 mg  0.1 mg Oral Q2H PRN Keith Teran MD   0.1 mg at 08/31/18 1830     DVT Prophylaxis: Sequential Compression Devices    Continuous Infusions:    Intake/Output Summary (Last 24 hours) at 09/18/18 1646  Last data filed at 09/18/18 0952   Gross per 24 hour   Intake                0 ml   Output              300 ml   Net             -300 ml     CBC:   Recent Labs      09/16/18   0716  09/17/18   0614  09/18/18   0553   WBC  10.9*  10.4  9.9   HGB  7.8*  8.3*  8.1*   PLT  431*  398  357     BMP:  Recent Labs      09/17/18   0614   NA  140   K  4.2   CL  99   CO2  32*   BUN  14   CREATININE  1.1*   GLUCOSE  113*     ABGs:   Lab Results   Component Value Date    PHART 7.400 09/09/2018    PO2ART 65.0 09/09/2018    BZS3UWO 33.0 09/09/2018         Objective:   Vitals: /66   Pulse 72   Temp 96.4 °F (35.8 °C) (Temporal)   Resp 18   Ht 5' 3\" (1.6 m)   Wt 137 lb (62.1 kg)   SpO2 95%   Breastfeeding? No   BMI 24.27 kg/m²   General appearance: alert, appears stated age and cooperative  Skin: Skin color, texture, turgor normal.   HEENT: Head: Normocephalic, no lesions, without obvious abnormality.   Neck: no adenopathy, no carotid bruit, no JVD and supple, symmetrical, trachea midline  Lungs: clear to auscultation bilaterally  Heart: regular rate and rhythm, S1, S2 normal, no murmur, click, rub or gallop  Abdomen: soft, tender, no BS, no rebound, no guarding   Extremities: extremities normal, atraumatic, no cyanosis or edema  Lymphatic: No significant lymph node enlargement

## 2018-09-19 ENCOUNTER — APPOINTMENT (OUTPATIENT)
Dept: GENERAL RADIOLOGY | Age: 68
DRG: 268 | End: 2018-09-19
Attending: SURGERY
Payer: MEDICARE

## 2018-09-19 LAB
ABO/RH: NORMAL
AMYLASE: 36 U/L (ref 28–100)
ANTIBODY SCREEN: NORMAL
BLOOD BANK DISPENSE STATUS: NORMAL
BLOOD BANK PRODUCT CODE: NORMAL
BPU ID: NORMAL
DESCRIPTION BLOOD BANK: NORMAL
HCT VFR BLD CALC: 25 % (ref 37–47)
HEMOGLOBIN: 7.8 G/DL (ref 12–16)
MAGNESIUM: 1.6 MG/DL (ref 1.6–2.4)
MCH RBC QN AUTO: 28.9 PG (ref 27–31)
MCHC RBC AUTO-ENTMCNC: 31.2 G/DL (ref 33–37)
MCV RBC AUTO: 92.6 FL (ref 81–99)
PDW BLD-RTO: 15.2 % (ref 11.5–14.5)
PHOSPHORUS: 3.6 MG/DL (ref 2.5–4.5)
PLATELET # BLD: 373 K/UL (ref 130–400)
PMV BLD AUTO: 10.3 FL (ref 9.4–12.3)
RBC # BLD: 2.7 M/UL (ref 4.2–5.4)
WBC # BLD: 8.6 K/UL (ref 4.8–10.8)

## 2018-09-19 PROCEDURE — 2580000003 HC RX 258: Performed by: SURGERY

## 2018-09-19 PROCEDURE — 84100 ASSAY OF PHOSPHORUS: CPT

## 2018-09-19 PROCEDURE — 97116 GAIT TRAINING THERAPY: CPT

## 2018-09-19 PROCEDURE — C9113 INJ PANTOPRAZOLE SODIUM, VIA: HCPCS | Performed by: HOSPITALIST

## 2018-09-19 PROCEDURE — 86901 BLOOD TYPING SEROLOGIC RH(D): CPT

## 2018-09-19 PROCEDURE — 6370000000 HC RX 637 (ALT 250 FOR IP): Performed by: INTERNAL MEDICINE

## 2018-09-19 PROCEDURE — 36430 TRANSFUSION BLD/BLD COMPNT: CPT

## 2018-09-19 PROCEDURE — 6360000002 HC RX W HCPCS: Performed by: FAMILY MEDICINE

## 2018-09-19 PROCEDURE — P9016 RBC LEUKOCYTES REDUCED: HCPCS

## 2018-09-19 PROCEDURE — 1210000000 HC MED SURG R&B

## 2018-09-19 PROCEDURE — 85027 COMPLETE CBC AUTOMATED: CPT

## 2018-09-19 PROCEDURE — 94640 AIRWAY INHALATION TREATMENT: CPT

## 2018-09-19 PROCEDURE — 86850 RBC ANTIBODY SCREEN: CPT

## 2018-09-19 PROCEDURE — 6360000002 HC RX W HCPCS: Performed by: INTERNAL MEDICINE

## 2018-09-19 PROCEDURE — 6370000000 HC RX 637 (ALT 250 FOR IP): Performed by: SURGERY

## 2018-09-19 PROCEDURE — 2700000000 HC OXYGEN THERAPY PER DAY

## 2018-09-19 PROCEDURE — 6370000000 HC RX 637 (ALT 250 FOR IP): Performed by: NURSE PRACTITIONER

## 2018-09-19 PROCEDURE — 36415 COLL VENOUS BLD VENIPUNCTURE: CPT

## 2018-09-19 PROCEDURE — 86900 BLOOD TYPING SEROLOGIC ABO: CPT

## 2018-09-19 PROCEDURE — 6360000002 HC RX W HCPCS: Performed by: HOSPITALIST

## 2018-09-19 PROCEDURE — 83735 ASSAY OF MAGNESIUM: CPT

## 2018-09-19 PROCEDURE — 71045 X-RAY EXAM CHEST 1 VIEW: CPT

## 2018-09-19 PROCEDURE — 82150 ASSAY OF AMYLASE: CPT

## 2018-09-19 PROCEDURE — 6360000002 HC RX W HCPCS: Performed by: SURGERY

## 2018-09-19 PROCEDURE — 99232 SBSQ HOSP IP/OBS MODERATE 35: CPT | Performed by: FAMILY MEDICINE

## 2018-09-19 RX ORDER — ONDANSETRON 2 MG/ML
4 INJECTION INTRAMUSCULAR; INTRAVENOUS EVERY 6 HOURS PRN
Status: DISCONTINUED | OUTPATIENT
Start: 2018-09-19 | End: 2018-09-21 | Stop reason: HOSPADM

## 2018-09-19 RX ORDER — OXYCODONE HYDROCHLORIDE 5 MG/1
10 TABLET ORAL EVERY 4 HOURS PRN
Status: DISCONTINUED | OUTPATIENT
Start: 2018-09-19 | End: 2018-09-20

## 2018-09-19 RX ADMIN — POLYETHYLENE GLYCOL 3350 17 G: 17 POWDER, FOR SOLUTION ORAL at 09:09

## 2018-09-19 RX ADMIN — LORAZEPAM 1 MG: 2 INJECTION INTRAMUSCULAR; INTRAVENOUS at 22:26

## 2018-09-19 RX ADMIN — METOCLOPRAMIDE 10 MG: 5 INJECTION, SOLUTION INTRAMUSCULAR; INTRAVENOUS at 22:26

## 2018-09-19 RX ADMIN — Medication 10 ML: at 22:25

## 2018-09-19 RX ADMIN — IPRATROPIUM BROMIDE 0.5 MG: 0.5 SOLUTION RESPIRATORY (INHALATION) at 06:58

## 2018-09-19 RX ADMIN — Medication 1 MG: at 13:22

## 2018-09-19 RX ADMIN — OXYCODONE HYDROCHLORIDE 5 MG: 5 TABLET ORAL at 09:17

## 2018-09-19 RX ADMIN — DOCUSATE SODIUM 100 MG: 100 CAPSULE, LIQUID FILLED ORAL at 22:26

## 2018-09-19 RX ADMIN — IPRATROPIUM BROMIDE 0.5 MG: 0.5 SOLUTION RESPIRATORY (INHALATION) at 20:20

## 2018-09-19 RX ADMIN — Medication 1 MG: at 16:27

## 2018-09-19 RX ADMIN — PANTOPRAZOLE SODIUM 40 MG: 40 INJECTION, POWDER, FOR SOLUTION INTRAVENOUS at 09:08

## 2018-09-19 RX ADMIN — Medication 10 ML: at 04:40

## 2018-09-19 RX ADMIN — MUPIROCIN: 20 OINTMENT TOPICAL at 09:09

## 2018-09-19 RX ADMIN — Medication 1 MG: at 22:26

## 2018-09-19 RX ADMIN — OXYCODONE HYDROCHLORIDE 10 MG: 5 TABLET ORAL at 22:26

## 2018-09-19 RX ADMIN — MUPIROCIN: 20 OINTMENT TOPICAL at 22:27

## 2018-09-19 RX ADMIN — Medication 10 ML: at 09:08

## 2018-09-19 RX ADMIN — METOCLOPRAMIDE 10 MG: 5 INJECTION, SOLUTION INTRAMUSCULAR; INTRAVENOUS at 11:19

## 2018-09-19 RX ADMIN — ONDANSETRON 4 MG: 2 INJECTION INTRAMUSCULAR; INTRAVENOUS at 14:18

## 2018-09-19 RX ADMIN — METOCLOPRAMIDE 10 MG: 5 INJECTION, SOLUTION INTRAMUSCULAR; INTRAVENOUS at 16:27

## 2018-09-19 RX ADMIN — IPRATROPIUM BROMIDE 0.5 MG: 0.5 SOLUTION RESPIRATORY (INHALATION) at 10:40

## 2018-09-19 RX ADMIN — AMIODARONE HYDROCHLORIDE 200 MG: 200 TABLET ORAL at 09:08

## 2018-09-19 RX ADMIN — ONDANSETRON 4 MG: 2 INJECTION INTRAMUSCULAR; INTRAVENOUS at 09:17

## 2018-09-19 RX ADMIN — DOCUSATE SODIUM 100 MG: 100 CAPSULE, LIQUID FILLED ORAL at 09:08

## 2018-09-19 RX ADMIN — METOCLOPRAMIDE 10 MG: 5 INJECTION, SOLUTION INTRAMUSCULAR; INTRAVENOUS at 04:39

## 2018-09-19 ASSESSMENT — PAIN SCALES - GENERAL
PAINLEVEL_OUTOF10: 8
PAINLEVEL_OUTOF10: 7
PAINLEVEL_OUTOF10: 7
PAINLEVEL_OUTOF10: 0
PAINLEVEL_OUTOF10: 8
PAINLEVEL_OUTOF10: 0
PAINLEVEL_OUTOF10: 0

## 2018-09-19 NOTE — PROGRESS NOTES
3, no acute distress and cooperative to examination. LUNGS: Chest expands equally bilaterally upon respiration, slightly diminished breath sounds bilaterally  . Ausculation reveals no wheezes, rales or rhonchi. CARDIOVASCULAR: Regular rate and rhythm  ABDOMEN:softly distended with  hypoactive bowel sounds to all quads  NEUROLOGIC: Awake, alert, oriented to name, place and time. WOUND/INCISION:  Midline incision with staples CDI, healing well, no drainage, no erythema. Small dressing at chest tube site, RUE with gauze dressing CDI over abrasion from BP cuff  EXTREMITY:feet warm to touch/pink color bilaterally, +palp DP/PT pulses noted. Still with slight bruising to right plantar lateral forefoot, left lateral plantar forefoot, and small area medial plantar forefoot- these areas improving    LABS:     CBC:   Recent Labs      09/17/18   0614  09/18/18   0553  09/19/18   0324   WBC  10.4  9.9  8.6   RBC  2.79*  2.79*  2.70*   HGB  8.3*  8.1*  7.8*   HCT  26.5*  25.5*  25.0*   MCV  95.0  91.4  92.6   MCH  29.7  29.0  28.9   MCHC  31.3*  31.8*  31.2*   RDW  15.3*  14.9*  15.2*   PLT  398  357  373   MPV  10.2  10.5  10.3      Last 3 CMP:   Recent Labs      09/17/18   0614   NA  140   K  4.2   CL  99   CO2  32*   BUN  14   CREATININE  1.1*   GLUCOSE  113*   CALCIUM  8.7*   PROT  5.3*   LABALBU  2.7*   BILITOT  1.0   ALKPHOS  75   AST  24   ALT  25      Calcium:   Lab Results   Component Value Date    CALCIUM 8.7 09/17/2018    CALCIUM 8.2 09/15/2018    CALCIUM 8.6 09/14/2018        DVT prophylaxis:                                 [x] SCDs    Impression   Impression CT ABDOMEN/PELVIS:   1. Interval endovascular stent graft repair of an abdominal aortic   aneurysm. Native aneurysm sac measures 3.9 cm, presumably measuring up   to 4.2 cm.   2. Ascites. Bilateral pleural effusions. 3. Fluid throughout the small bowel without transition point,   suggesting postoperative ileus. 4. Anemia.    Signed by Dr Pratik Gonsales on protein supplements from dietary, few bites of solids  4. Encourage incentive spirometer, is ambulating in [place]  5. Reglan q6h  6. Complains that she is \"exausted\"      Agree with above. I examined the patient and explained plan to patient and family.

## 2018-09-19 NOTE — CARE COORDINATION
SW visited with Pt and daughter; Pt lives with her daughter; no current [facility] services at this time; possibly would want a walker at d/c    Electronically signed by Carolyn Bergeron [facility] on 9/19/2018 at 5:05 PM

## 2018-09-19 NOTE — PROGRESS NOTES
Inpatient Monitoring    Nutrition Evaluation:   · Evaluation: Progressing toward goals   · Goals: Meet needs via nutrition support or PO intake    · Monitoring: Meal Intake, Supplement Intake, Diet Tolerance, Skin Integrity, Weight, Pertinent Labs    See Adult Nutrition Doc Flowsheet for more detail.      Electronically signed by Migel Pichardo MS, RD, LD on 9/19/18 at 4:22 PM    Contact Number: [phone]

## 2018-09-19 NOTE — PROGRESS NOTES
Called down to lab about type and screen, stated they were running it now. Will await blood bank to call.

## 2018-09-20 ENCOUNTER — APPOINTMENT (OUTPATIENT)
Dept: GENERAL RADIOLOGY | Age: 68
DRG: 268 | End: 2018-09-20
Attending: SURGERY
Payer: MEDICARE

## 2018-09-20 PROBLEM — K59.03 CONSTIPATION DUE TO OPIOID THERAPY: Status: ACTIVE | Noted: 2018-09-20

## 2018-09-20 PROBLEM — T40.2X5A CONSTIPATION DUE TO OPIOID THERAPY: Status: ACTIVE | Noted: 2018-09-20

## 2018-09-20 LAB
AMYLASE: 49 U/L (ref 28–100)
HCT VFR BLD CALC: 34.3 % (ref 37–47)
HEMOGLOBIN: 10.8 G/DL (ref 12–16)
MAGNESIUM: 1.7 MG/DL (ref 1.6–2.4)
MCH RBC QN AUTO: 29.1 PG (ref 27–31)
MCHC RBC AUTO-ENTMCNC: 31.5 G/DL (ref 33–37)
MCV RBC AUTO: 92.5 FL (ref 81–99)
PDW BLD-RTO: 15.6 % (ref 11.5–14.5)
PHOSPHORUS: 3.8 MG/DL (ref 2.5–4.5)
PLATELET # BLD: 303 K/UL (ref 130–400)
PMV BLD AUTO: 10.4 FL (ref 9.4–12.3)
RBC # BLD: 3.71 M/UL (ref 4.2–5.4)
WBC # BLD: 9.7 K/UL (ref 4.8–10.8)

## 2018-09-20 PROCEDURE — 82150 ASSAY OF AMYLASE: CPT

## 2018-09-20 PROCEDURE — 36415 COLL VENOUS BLD VENIPUNCTURE: CPT

## 2018-09-20 PROCEDURE — 84100 ASSAY OF PHOSPHORUS: CPT

## 2018-09-20 PROCEDURE — 94640 AIRWAY INHALATION TREATMENT: CPT

## 2018-09-20 PROCEDURE — 6370000000 HC RX 637 (ALT 250 FOR IP): Performed by: FAMILY MEDICINE

## 2018-09-20 PROCEDURE — 85027 COMPLETE CBC AUTOMATED: CPT

## 2018-09-20 PROCEDURE — C9113 INJ PANTOPRAZOLE SODIUM, VIA: HCPCS | Performed by: HOSPITALIST

## 2018-09-20 PROCEDURE — 6370000000 HC RX 637 (ALT 250 FOR IP): Performed by: SURGERY

## 2018-09-20 PROCEDURE — 83735 ASSAY OF MAGNESIUM: CPT

## 2018-09-20 PROCEDURE — 6360000002 HC RX W HCPCS: Performed by: INTERNAL MEDICINE

## 2018-09-20 PROCEDURE — 97116 GAIT TRAINING THERAPY: CPT

## 2018-09-20 PROCEDURE — 2580000003 HC RX 258: Performed by: SURGERY

## 2018-09-20 PROCEDURE — 6360000002 HC RX W HCPCS: Performed by: HOSPITALIST

## 2018-09-20 PROCEDURE — 71045 X-RAY EXAM CHEST 1 VIEW: CPT

## 2018-09-20 PROCEDURE — 6370000000 HC RX 637 (ALT 250 FOR IP): Performed by: NURSE PRACTITIONER

## 2018-09-20 PROCEDURE — 6360000002 HC RX W HCPCS: Performed by: SURGERY

## 2018-09-20 PROCEDURE — 99233 SBSQ HOSP IP/OBS HIGH 50: CPT | Performed by: FAMILY MEDICINE

## 2018-09-20 PROCEDURE — 1210000000 HC MED SURG R&B

## 2018-09-20 PROCEDURE — 6370000000 HC RX 637 (ALT 250 FOR IP): Performed by: INTERNAL MEDICINE

## 2018-09-20 RX ORDER — TRAMADOL HYDROCHLORIDE 50 MG/1
50 TABLET ORAL EVERY 6 HOURS PRN
Status: DISCONTINUED | OUTPATIENT
Start: 2018-09-20 | End: 2018-09-21 | Stop reason: HOSPADM

## 2018-09-20 RX ORDER — FLUOXETINE 10 MG/1
10 CAPSULE ORAL DAILY
Status: DISCONTINUED | OUTPATIENT
Start: 2018-09-20 | End: 2018-09-21 | Stop reason: HOSPADM

## 2018-09-20 RX ORDER — HYDROMORPHONE HCL IN 0.9% NACL 0.5 MG/ML
1 SYRINGE (ML) INTRAVENOUS
Status: DISCONTINUED | OUTPATIENT
Start: 2018-09-20 | End: 2018-09-21 | Stop reason: HOSPADM

## 2018-09-20 RX ORDER — HYDROMORPHONE HCL IN 0.9% NACL 0.5 MG/ML
0.5 SYRINGE (ML) INTRAVENOUS
Status: DISCONTINUED | OUTPATIENT
Start: 2018-09-20 | End: 2018-09-21 | Stop reason: HOSPADM

## 2018-09-20 RX ADMIN — NALOXEGOL OXALATE 12.5 MG: 12.5 TABLET, FILM COATED ORAL at 11:09

## 2018-09-20 RX ADMIN — METOCLOPRAMIDE 10 MG: 5 INJECTION, SOLUTION INTRAMUSCULAR; INTRAVENOUS at 06:22

## 2018-09-20 RX ADMIN — Medication 10 ML: at 20:05

## 2018-09-20 RX ADMIN — METOCLOPRAMIDE 10 MG: 5 INJECTION, SOLUTION INTRAMUSCULAR; INTRAVENOUS at 20:05

## 2018-09-20 RX ADMIN — DOCUSATE SODIUM 100 MG: 100 CAPSULE, LIQUID FILLED ORAL at 20:05

## 2018-09-20 RX ADMIN — METOCLOPRAMIDE 10 MG: 5 INJECTION, SOLUTION INTRAMUSCULAR; INTRAVENOUS at 11:09

## 2018-09-20 RX ADMIN — OXYCODONE HYDROCHLORIDE 10 MG: 5 TABLET ORAL at 06:22

## 2018-09-20 RX ADMIN — AMIODARONE HYDROCHLORIDE 200 MG: 200 TABLET ORAL at 08:57

## 2018-09-20 RX ADMIN — IPRATROPIUM BROMIDE 0.5 MG: 0.5 SOLUTION RESPIRATORY (INHALATION) at 15:51

## 2018-09-20 RX ADMIN — POLYETHYLENE GLYCOL 3350 17 G: 17 POWDER, FOR SOLUTION ORAL at 08:57

## 2018-09-20 RX ADMIN — IPRATROPIUM BROMIDE 0.5 MG: 0.5 SOLUTION RESPIRATORY (INHALATION) at 19:36

## 2018-09-20 RX ADMIN — MUPIROCIN: 20 OINTMENT TOPICAL at 08:57

## 2018-09-20 RX ADMIN — IPRATROPIUM BROMIDE 0.5 MG: 0.5 SOLUTION RESPIRATORY (INHALATION) at 07:00

## 2018-09-20 RX ADMIN — FLUOXETINE 10 MG: 10 CAPSULE ORAL at 12:33

## 2018-09-20 RX ADMIN — MUPIROCIN: 20 OINTMENT TOPICAL at 20:12

## 2018-09-20 RX ADMIN — IPRATROPIUM BROMIDE 0.5 MG: 0.5 SOLUTION RESPIRATORY (INHALATION) at 11:07

## 2018-09-20 RX ADMIN — OXYCODONE HYDROCHLORIDE 10 MG: 5 TABLET ORAL at 11:14

## 2018-09-20 RX ADMIN — PANTOPRAZOLE SODIUM 40 MG: 40 INJECTION, POWDER, FOR SOLUTION INTRAVENOUS at 08:57

## 2018-09-20 RX ADMIN — Medication 10 ML: at 08:57

## 2018-09-20 RX ADMIN — DOCUSATE SODIUM 100 MG: 100 CAPSULE, LIQUID FILLED ORAL at 08:57

## 2018-09-20 ASSESSMENT — PAIN DESCRIPTION - PAIN TYPE: TYPE: ACUTE PAIN

## 2018-09-20 ASSESSMENT — PAIN SCALES - GENERAL
PAINLEVEL_OUTOF10: 0
PAINLEVEL_OUTOF10: 7
PAINLEVEL_OUTOF10: 8
PAINLEVEL_OUTOF10: 0
PAINLEVEL_OUTOF10: 0
PAINLEVEL_OUTOF10: 7

## 2018-09-20 ASSESSMENT — PAIN DESCRIPTION - FREQUENCY: FREQUENCY: CONTINUOUS

## 2018-09-20 ASSESSMENT — PAIN DESCRIPTION - LOCATION: LOCATION: ABDOMEN;CHEST

## 2018-09-20 ASSESSMENT — PAIN DESCRIPTION - DESCRIPTORS: DESCRIPTORS: ACHING

## 2018-09-20 NOTE — PROGRESS NOTES
Hospitalist Progress Note  9/20/2018 10:19 AM  Subjective:   Admit Date: 8/30/2018  PCP: Crow Stone MD    Chief Complaint: abdominal pain    Subjective: Patient has still not had a bowel movement. Has received Relistor injections x2 without relief. Continues to have abdominal discomfort and bloating sensation. History is otherwise unchanged. Cumulative Hospital History:   Admitted 8-30 for AAA and left renal artery stenosis repair, transferred to ICU postsurgically. Hospitalist service consulted for hypoxia on 9-1. Given IVF due to hypotension but third-spaced. Placed on non-rebreather mask, then biPAP, then intubated for hypercapneic and hypoxic respiratory failure. Pseudomonas in sputum. Required pressors. Developed atrial fibrillation with RVR 9-2. Amiodarone and digoxin achieved rate control. Placed on meropenem. Extubated 9-6. Has been tapered off supplemental oxygen. ROS: 14 point review of systems is negative except as specifically addressed above. Dietary Nutrition Supplements: Low Calorie High Protein Supplement  DIET GENERAL;     Intake/Output Summary (Last 24 hours) at 09/20/18 1019  Last data filed at 09/20/18 0957   Gross per 24 hour   Intake              360 ml   Output                0 ml   Net              360 ml     Medications:  Current Facility-Administered Medications   Medication Dose Route Frequency Provider Last Rate Last Dose    naloxegol (MOVANTIK) tablet 12.5 mg  12.5 mg Oral ESTELLA Su DO        oxyCODONE (ROXICODONE) immediate release tablet 10 mg  10 mg Oral Q4H PRN Kathy Mays MD   10 mg at 09/20/18 0622    ondansetron (ZOFRAN) injection 4 mg  4 mg Intravenous Q6H PRN Kathy Mays MD   4 mg at 09/19/18 1418    mupirocin (BACTROBAN) 2 % ointment   Topical BID Kathy Mays MD        docusate sodium (COLACE) capsule 100 mg  100 mg Oral BID Ami Jarvis, APRN   100 mg at 09/20/18 0857    polyethylene glycol (GLYCOLAX) packet 17 g  17 g Oral Daily Ford Muniz, APRN   17 g at 09/20/18 0857    HYDROmorphone (DILAUDID) 0.5-0.9 MG/ML-% injection 0.5 mg  0.5 mg Intravenous Q1H PRN Adrianne Valle MD        Or    HYDROmorphone (DILAUDID) 0.5-0.9 MG/ML-% injection 1 mg  1 mg Intravenous Q1H PRN Adrianne Valle MD   1 mg at 09/19/18 2226    metoclopramide (REGLAN) injection 10 mg  10 mg Intravenous Q6H Bo Tinoco MD   10 mg at 09/20/18 3376    amiodarone (CORDARONE) tablet 200 mg  200 mg Oral Daily Yonatan Ortiz MD   200 mg at 09/20/18 0857    LORazepam (ATIVAN) injection 1 mg  1 mg Intravenous Q4H PRN Renee Gatica MD   1 mg at 09/19/18 2226    ipratropium (ATROVENT) 0.02 % nebulizer solution 0.5 mg  0.5 mg Nebulization 4x daily Kristina Hernandez MD   0.5 mg at 09/20/18 0700    pantoprazole (PROTONIX) injection 40 mg  40 mg Intravenous Daily Bentley Nicole MD   40 mg at 09/20/18 0857    sodium chloride flush 0.9 % injection 10 mL  10 mL Intravenous 2 times per day Bo Tinoco MD   10 mL at 09/20/18 0857    sodium chloride flush 0.9 % injection 10 mL  10 mL Intravenous PRN Bo Tinoco MD   10 mL at 09/19/18 0440    potassium chloride 10 mEq/100 mL IVPB (Peripheral Line)  10 mEq Intravenous PRN Bo Tinoco MD        metoprolol tartrate (LOPRESSOR) tablet 25 mg  25 mg Oral Q12H PRN Bo Tinoco MD   25 mg at 08/31/18 1932    cloNIDine (CATAPRES) tablet 0.1 mg  0.1 mg Oral Q2H PRN Bo Tinoco MD   0.1 mg at 08/31/18 1830        Labs:     Recent Labs      09/18/18   0553  09/19/18   0324  09/20/18   0300   WBC  9.9  8.6  9.7   RBC  2.79*  2.70*  3.71*   HGB  8.1*  7.8*  10.8*   HCT  25.5*  25.0*  34.3*   MCV  91.4  92.6  92.5   MCH  29.0  28.9  29.1   MCHC  31.8*  31.2*  31.5*   PLT  357  373  303     No results for input(s): NA, K, ANIONGAP, CL, CO2, BUN, CREATININE, GLUCOSE, CALCIUM, GFR in the last 72 hours.   Recent Labs      09/18/18   0553  09/19/18   0324  09/20/18   0300   MG  1.6  1.6  1.7   PHOS  3.5  3.6 3.8     No results for input(s): AST, ALT, ALB, BILITOT, ALKPHOS, ALB in the last 72 hours. ABGs:No results for input(s): PHART, JPF5SYL, PO2ART, KSH5GKC, BEART, HGBAE, Q0IJIPZI, CARBOXHGBART, 02THERAPY in the last 72 hours. HgBA1c: Invalid input(s): HGBA1C  FLP:    Lab Results   Component Value Date    TRIG 305 09/06/2018    HDL 51 08/22/2018    LDLCALC 97 08/22/2018    LDLDIRECT 81 11/29/2014    LABVLDL 20 07/28/2016     TSH:    Lab Results   Component Value Date    TSH 1.40 11/08/2016     Troponin T: No results for input(s): TROPONINI in the last 72 hours. INR: No results for input(s): INR in the last 72 hours. Objective:   Vitals: /69   Pulse 72   Temp 96.7 °F (35.9 °C)   Resp 16   Ht 5' 3\" (1.6 m)   Wt 135 lb 8 oz (61.5 kg)   SpO2 98%   Breastfeeding? No   BMI 24.00 kg/m²   24HR INTAKE/OUTPUT:      Intake/Output Summary (Last 24 hours) at 09/20/18 1019  Last data filed at 09/20/18 0957   Gross per 24 hour   Intake              360 ml   Output                0 ml   Net              360 ml     General appearance: alert and cooperative with exam  HEENT: atraumatic, eyes with clear conjunctiva and normal lids, pupils and irises normal, external ears and nose are normal, lips normal.  Neck without masses, lympadenopathy, bruit, thyroid normal  Lungs: no increased work of breathing, diminished breath sounds bilaterally  Heart: regular rate and rhythm, S1, S2 normal, no murmur, click, rub or gallop  Abdomen: soft, nondistended, incision clean and dry, minimal tenderness without rebound  Extremities: extremities normal, atraumatic, no cyanosis or edema  Neurologic: No focal neurologic deficits, normal sensation, alert and oriented, affect and mood appropriate. Skin: no rashes, nodules.     Assessment and Plan:   Principal Problem:    AAA (abdominal aortic aneurysm) ([address]  Active Problems:    Renal artery stenosis (HCC)    Essential hypertension    Ischemic hepatitis    Acute respiratory failure with hypoxia (HCC)    CKD (chronic kidney disease), stage III    Metabolic acidosis    Acute blood loss as cause of postoperative anemia    Atrial fibrillation with rapid ventricular response (HCC)    Positive sputum culture for Pseudomonas    Fever in adult    Shock circulatory (HCC)    Pneumothorax on left    Constipation due to opioid therapy  Resolved Problems:    * No resolved hospital problems. *    Naloxegol daily for opioid-induced constipation. Wean off supplemental oxygen except at nighttime.     Advance Directive: Full Code    DVT prophylaxis: SCDs    Discharge planning: JANA Simmons DO  Rounding Hospitalist

## 2018-09-20 NOTE — PROGRESS NOTES
09/20/18 1037   Restrictions/Precautions   Restrictions/Precautions Fall Risk   General   Chart Reviewed Yes   Subjective   Subjective Patient in bed agrees to walk   Pain Screening   Patient Currently in Pain Yes   Pain Assessment   Pain Assessment 0-10   Pain Level 7   Pain Type Acute pain   Pain Location Abdomen; Chest   Pain Descriptors Aching   Pain Frequency Continuous   Oxygen Therapy   O2 Device Nasal cannula   O2 Flow Rate (L/min) 2 L/min   Patient Observation   Observations Patient appears to need 02 at night but not  with gait. Spo2 in 90s post gait   Orientation   Overall Orientation Status WFL   Bed Mobility   Rolling Independent   Supine to Sit Modified independent   Transfers   Sit to Stand Independent   Stand to sit Independent   Ambulation   Ambulation?  Yes   Ambulation 1   Surface level tile   Device Rolling Walker   Assistance Stand by assistance   Quality of Gait steady   Distance 600'   Comments Patient in chair post gait o2 off   Balance   Posture Fair   Sitting - Static Good   Sitting - Dynamic Good   Standing - Static Good   Standing - Dynamic Good   Short term goals   Time Frame for Short term goals 2 weeks   Short term goal 1 Independent with bed mobility   Short term goal 2 Transfer bed to chair with minimal assist of 1   Short term goal 3 Ambulate 300 feet without assistive device and CGA of 1   Conditions Requiring Skilled Therapeutic Intervention   Body structures, Functions, Activity limitations Decreased functional mobility    Activity Tolerance   Activity Tolerance Patient Tolerated treatment well   Safety Devices   Type of devices Left in chair;Call light within reach   Physical Therapy    Electronically signed by Janie Brown PTA on 9/20/2018 at 10:45 AM

## 2018-09-20 NOTE — PROGRESS NOTES
Vascular Surgery  Dr.Scott Brian Shah   Daily Progress Note    Pt Name: Filemon Jung Record Number: [id number]  Date of Birth 1950   Today's Date: 9/20/2018    SUBJECTIVE:     Patient was seen and examined. Pain is controlled  has had nausea, appetite poor, she is sipping and nibbling  has had constipation-still no BM post op  Midline incision sore, right lateral plantar foot , states it feels better  States she is still so tired that all she wants to do is sleep    OBJECTIVE:     Patient Vitals for the past 24 hrs:   BP Temp Temp src Pulse Resp SpO2 Weight   09/20/18 0848 108/69 96.7 °F (35.9 °C) - 72 16 98 % -   09/20/18 0742 - - - - - 95 % -   09/20/18 0740 116/67 - - 75 12 (!) 88 % -   09/20/18 0700 - - - - 18 96 % -   09/20/18 0457 - - - - - - 135 lb 8 oz (61.5 kg)   09/19/18 2221 125/63 97 °F (36.1 °C) Temporal 82 16 95 % -   09/19/18 2022 - - - - - 90 % -   09/19/18 1927 109/60 97.8 °F (36.6 °C) Temporal 84 16 90 % -   09/19/18 1818 121/75 98 °F (36.7 °C) Temporal 86 18 93 % -   09/19/18 1756 (!) 117/59 97.3 °F (36.3 °C) Temporal 84 18 94 % -   09/19/18 1229 - - - - - 92 % -       Intake/Output Summary (Last 24 hours) at 09/20/18 1014  Last data filed at 09/20/18 0957   Gross per 24 hour   Intake              360 ml   Output                0 ml   Net              360 ml     In: 360 [P.O.:360]  Out: -     I/O last 3 completed shifts: In: 240 [P.O.:240]  Out: -        Date 09/20/18 0000 - 09/20/18 2359   Shift 0010-6923 0771-2414 1517-2010 24 Hour Total   I  N  T  A  K  E   P.O.  (mL/kg/hr)  120  120    Shift Total  (mL/kg)  120  (2)  120  (2)   O  U  T  P  U  T   Shift Total  (mL/kg)       Weight (kg) 61.5 61.5 61.5 61.5     Wt Readings from Last 3 Encounters:   09/20/18 135 lb 8 oz (61.5 kg)   08/27/18 141 lb (64 kg)   08/22/18 140 lb (63.5 kg)      Body mass index is 24 kg/m².      Diet: Dietary Nutrition Supplements: Low Calorie High Protein Supplement  DIET GENERAL;    MEDS:

## 2018-09-21 VITALS
TEMPERATURE: 97.4 F | RESPIRATION RATE: 18 BRPM | HEART RATE: 83 BPM | SYSTOLIC BLOOD PRESSURE: 141 MMHG | WEIGHT: 137.2 LBS | OXYGEN SATURATION: 91 % | BODY MASS INDEX: 24.31 KG/M2 | DIASTOLIC BLOOD PRESSURE: 72 MMHG | HEIGHT: 63 IN

## 2018-09-21 LAB
AMYLASE: 40 U/L (ref 28–100)
HCT VFR BLD CALC: 30.8 % (ref 37–47)
HEMOGLOBIN: 9.9 G/DL (ref 12–16)
MAGNESIUM: 1.6 MG/DL (ref 1.6–2.4)
MCH RBC QN AUTO: 29 PG (ref 27–31)
MCHC RBC AUTO-ENTMCNC: 32.1 G/DL (ref 33–37)
MCV RBC AUTO: 90.3 FL (ref 81–99)
PDW BLD-RTO: 15.1 % (ref 11.5–14.5)
PHOSPHORUS: 3.7 MG/DL (ref 2.5–4.5)
PLATELET # BLD: 314 K/UL (ref 130–400)
PMV BLD AUTO: 10.3 FL (ref 9.4–12.3)
PREALBUMIN: 9 MG/DL (ref 20–40)
RBC # BLD: 3.41 M/UL (ref 4.2–5.4)
WBC # BLD: 9.2 K/UL (ref 4.8–10.8)

## 2018-09-21 PROCEDURE — 97116 GAIT TRAINING THERAPY: CPT

## 2018-09-21 PROCEDURE — 6360000002 HC RX W HCPCS: Performed by: SURGERY

## 2018-09-21 PROCEDURE — 90686 IIV4 VACC NO PRSV 0.5 ML IM: CPT | Performed by: FAMILY MEDICINE

## 2018-09-21 PROCEDURE — 82150 ASSAY OF AMYLASE: CPT

## 2018-09-21 PROCEDURE — 2580000003 HC RX 258: Performed by: SURGERY

## 2018-09-21 PROCEDURE — 6360000002 HC RX W HCPCS: Performed by: INTERNAL MEDICINE

## 2018-09-21 PROCEDURE — 6370000000 HC RX 637 (ALT 250 FOR IP): Performed by: INTERNAL MEDICINE

## 2018-09-21 PROCEDURE — C9113 INJ PANTOPRAZOLE SODIUM, VIA: HCPCS | Performed by: HOSPITALIST

## 2018-09-21 PROCEDURE — 99232 SBSQ HOSP IP/OBS MODERATE 35: CPT | Performed by: FAMILY MEDICINE

## 2018-09-21 PROCEDURE — 6360000002 HC RX W HCPCS: Performed by: FAMILY MEDICINE

## 2018-09-21 PROCEDURE — 6370000000 HC RX 637 (ALT 250 FOR IP): Performed by: NURSE PRACTITIONER

## 2018-09-21 PROCEDURE — 85027 COMPLETE CBC AUTOMATED: CPT

## 2018-09-21 PROCEDURE — 84134 ASSAY OF PREALBUMIN: CPT

## 2018-09-21 PROCEDURE — 3E0234Z INTRODUCTION OF SERUM, TOXOID AND VACCINE INTO MUSCLE, PERCUTANEOUS APPROACH: ICD-10-PCS | Performed by: FAMILY MEDICINE

## 2018-09-21 PROCEDURE — 83735 ASSAY OF MAGNESIUM: CPT

## 2018-09-21 PROCEDURE — 6370000000 HC RX 637 (ALT 250 FOR IP): Performed by: SURGERY

## 2018-09-21 PROCEDURE — 97530 THERAPEUTIC ACTIVITIES: CPT

## 2018-09-21 PROCEDURE — 6370000000 HC RX 637 (ALT 250 FOR IP): Performed by: FAMILY MEDICINE

## 2018-09-21 PROCEDURE — 84100 ASSAY OF PHOSPHORUS: CPT

## 2018-09-21 PROCEDURE — G0008 ADMIN INFLUENZA VIRUS VAC: HCPCS | Performed by: FAMILY MEDICINE

## 2018-09-21 PROCEDURE — 94640 AIRWAY INHALATION TREATMENT: CPT

## 2018-09-21 PROCEDURE — 36415 COLL VENOUS BLD VENIPUNCTURE: CPT

## 2018-09-21 PROCEDURE — 6360000002 HC RX W HCPCS: Performed by: HOSPITALIST

## 2018-09-21 RX ORDER — ONDANSETRON 8 MG/1
8 TABLET, ORALLY DISINTEGRATING ORAL EVERY 12 HOURS PRN
Qty: 20 TABLET | Refills: 0 | Status: SHIPPED | OUTPATIENT
Start: 2018-09-21 | End: 2019-06-12

## 2018-09-21 RX ORDER — TRAMADOL HYDROCHLORIDE 50 MG/1
50 TABLET ORAL EVERY 8 HOURS PRN
Qty: 30 TABLET | Refills: 0 | Status: SHIPPED | OUTPATIENT
Start: 2018-09-21 | End: 2018-10-01

## 2018-09-21 RX ORDER — AMIODARONE HYDROCHLORIDE 200 MG/1
200 TABLET ORAL DAILY
Qty: 30 TABLET | Refills: 3 | Status: SHIPPED | OUTPATIENT
Start: 2018-09-22 | End: 2018-11-12 | Stop reason: DRUGHIGH

## 2018-09-21 RX ORDER — FLUOXETINE 10 MG/1
10 CAPSULE ORAL DAILY
Qty: 30 CAPSULE | Refills: 3 | Status: SHIPPED | OUTPATIENT
Start: 2018-09-22 | End: 2018-09-27 | Stop reason: SDUPTHER

## 2018-09-21 RX ADMIN — ONDANSETRON 4 MG: 2 INJECTION INTRAMUSCULAR; INTRAVENOUS at 15:52

## 2018-09-21 RX ADMIN — IPRATROPIUM BROMIDE 0.5 MG: 0.5 SOLUTION RESPIRATORY (INHALATION) at 11:06

## 2018-09-21 RX ADMIN — INFLUENZA A VIRUS A/MICHIGAN/45/2015 X-275 (H1N1) ANTIGEN (FORMALDEHYDE INACTIVATED), INFLUENZA A VIRUS A/SINGAPORE/INFIMH-16-0019/2016 IVR-186 (H3N2) ANTIGEN (FORMALDEHYDE INACTIVATED), INFLUENZA B VIRUS B/PHUKET/3073/2013 ANTIGEN (FORMALDEHYDE INACTIVATED), AND INFLUENZA B VIRUS B/MARYLAND/15/2016 BX-69A ANTIGEN (FORMALDEHYDE INACTIVATED) 0.5 ML: 15; 15; 15; 15 INJECTION, SUSPENSION INTRAMUSCULAR at 08:27

## 2018-09-21 RX ADMIN — METOCLOPRAMIDE 10 MG: 5 INJECTION, SOLUTION INTRAMUSCULAR; INTRAVENOUS at 04:58

## 2018-09-21 RX ADMIN — MUPIROCIN: 20 OINTMENT TOPICAL at 09:30

## 2018-09-21 RX ADMIN — TRAMADOL HYDROCHLORIDE 50 MG: 50 TABLET, FILM COATED ORAL at 01:56

## 2018-09-21 RX ADMIN — DOCUSATE SODIUM 100 MG: 100 CAPSULE, LIQUID FILLED ORAL at 08:23

## 2018-09-21 RX ADMIN — FLUOXETINE 10 MG: 10 CAPSULE ORAL at 08:23

## 2018-09-21 RX ADMIN — ONDANSETRON 4 MG: 2 INJECTION INTRAMUSCULAR; INTRAVENOUS at 09:14

## 2018-09-21 RX ADMIN — POLYETHYLENE GLYCOL 3350 17 G: 17 POWDER, FOR SOLUTION ORAL at 08:23

## 2018-09-21 RX ADMIN — IPRATROPIUM BROMIDE 0.5 MG: 0.5 SOLUTION RESPIRATORY (INHALATION) at 07:21

## 2018-09-21 RX ADMIN — IPRATROPIUM BROMIDE 0.5 MG: 0.5 SOLUTION RESPIRATORY (INHALATION) at 15:40

## 2018-09-21 RX ADMIN — METOCLOPRAMIDE 10 MG: 5 INJECTION, SOLUTION INTRAMUSCULAR; INTRAVENOUS at 11:30

## 2018-09-21 RX ADMIN — AMIODARONE HYDROCHLORIDE 200 MG: 200 TABLET ORAL at 08:23

## 2018-09-21 RX ADMIN — PANTOPRAZOLE SODIUM 40 MG: 40 INJECTION, POWDER, FOR SOLUTION INTRAVENOUS at 08:23

## 2018-09-21 RX ADMIN — Medication 10 ML: at 08:25

## 2018-09-21 ASSESSMENT — PAIN SCALES - GENERAL
PAINLEVEL_OUTOF10: 0
PAINLEVEL_OUTOF10: 6
PAINLEVEL_OUTOF10: 0

## 2018-09-21 NOTE — PROGRESS NOTES
MG/ML-% injection 1 mg  1 mg Intravenous Q1H PRN Perla Vigil MD        FLUoxetine (PROZAC) capsule 10 mg  10 mg Oral Daily Theodore Su DO   10 mg at 09/21/18 2611    ondansetron (ZOFRAN) injection 4 mg  4 mg Intravenous Q6H PRN Perla Vigil MD   4 mg at 09/21/18 0914    mupirocin (BACTROBAN) 2 % ointment   Topical BID Perla Vigil MD        docusate sodium (COLACE) capsule 100 mg  100 mg Oral BID Salvador Mail, APRN   100 mg at 09/21/18 0883    polyethylene glycol (GLYCOLAX) packet 17 g  17 g Oral Daily Salvador Mail, APRN   17 g at 09/21/18 6419    metoclopramide (REGLAN) injection 10 mg  10 mg Intravenous Q6H Perla Vigil MD   10 mg at 09/21/18 1130    amiodarone (CORDARONE) tablet 200 mg  200 mg Oral Daily Candice Weaver MD   200 mg at 09/21/18 1188    LORazepam (ATIVAN) injection 1 mg  1 mg Intravenous Q4H PRN Kaleigh Molina MD   1 mg at 09/19/18 2226    ipratropium (ATROVENT) 0.02 % nebulizer solution 0.5 mg  0.5 mg Nebulization 4x daily Nola Burrell MD   0.5 mg at 09/21/18 1106    pantoprazole (PROTONIX) injection 40 mg  40 mg Intravenous Daily Vernie Curling, MD   40 mg at 09/21/18 7834    sodium chloride flush 0.9 % injection 10 mL  10 mL Intravenous 2 times per day Perla Vigil MD   10 mL at 09/21/18 0825    sodium chloride flush 0.9 % injection 10 mL  10 mL Intravenous PRN Perla Vigil MD   10 mL at 09/19/18 0440    potassium chloride 10 mEq/100 mL IVPB (Peripheral Line)  10 mEq Intravenous PRN Perla Vigil MD        metoprolol tartrate (LOPRESSOR) tablet 25 mg  25 mg Oral Q12H PRN Perla Vigil MD   25 mg at 08/31/18 1932    cloNIDine (CATAPRES) tablet 0.1 mg  0.1 mg Oral Q2H PRN Perla Vigil MD   0.1 mg at 08/31/18 1830        Labs:     Recent Labs      09/19/18   0324  09/20/18   0300  09/21/18   0401   WBC  8.6  9.7  9.2   RBC  2.70*  3.71*  3.41*   HGB  7.8*  10.8*  9.9*   HCT  25.0*  34.3*  30.8*   MCV  92.6  92.5  90.3   MCH  28.9  29.1 29.0   MCHC  31.2*  31.5*  32.1*   PLT  373  303  314     No results for input(s): NA, K, ANIONGAP, CL, CO2, BUN, CREATININE, GLUCOSE, CALCIUM, GFR in the last 72 hours. Recent Labs      09/19/18   0324  09/20/18   0300  09/21/18   0401   MG  1.6  1.7  1.6   PHOS  3.6  3.8  3.7     No results for input(s): AST, ALT, ALB, BILITOT, ALKPHOS, ALB in the last 72 hours. ABGs:No results for input(s): PHART, YIX6AIG, PO2ART, JJH5DUN, BEART, HGBAE, G6PNOHUW, CARBOXHGBART, 02THERAPY in the last 72 hours. HgBA1c: Invalid input(s): HGBA1C  FLP:    Lab Results   Component Value Date    TRIG 305 09/06/2018    HDL 51 08/22/2018    LDLCALC 97 08/22/2018    LDLDIRECT 81 11/29/2014    LABVLDL 20 07/28/2016     TSH:    Lab Results   Component Value Date    TSH 1.40 11/08/2016     Troponin T: No results for input(s): TROPONINI in the last 72 hours. INR: No results for input(s): INR in the last 72 hours. Objective:   Vitals: BP (!) 141/72   Pulse 83   Temp 97.4 °F (36.3 °C)   Resp 18   Ht 5' 3\" (1.6 m)   Wt 137 lb 3.2 oz (62.2 kg)   SpO2 91%   Breastfeeding?  No   BMI 24.30 kg/m²   24HR INTAKE/OUTPUT:      Intake/Output Summary (Last 24 hours) at 09/21/18 1425  Last data filed at 09/21/18 1316   Gross per 24 hour   Intake              680 ml   Output              250 ml   Net              430 ml     General appearance: alert and cooperative with exam  HEENT: atraumatic, eyes with clear conjunctiva and normal lids, pupils and irises normal, external ears and nose are normal, lips normal.  Neck without masses, lympadenopathy, bruit, thyroid normal  Lungs: no increased work of breathing, diminished breath sounds bilaterally  Heart: regular rate and rhythm, S1, S2 normal, no murmur, click, rub or gallop  Abdomen: soft, nondistended, incision clean and dry, minimal tenderness without rebound  Extremities: extremities normal, atraumatic, no cyanosis or edema  Neurologic: No focal neurologic deficits, normal sensation, alert and

## 2018-09-21 NOTE — PLAN OF CARE
Problem: ABCDS Injury Assessment  Goal: Absence of physical injury  Outcome: Ongoing
Problem: ABCDS Injury Assessment  Goal: Absence of physical injury  Outcome: Ongoing
Problem: Falls - Risk of:  Goal: Will remain free from falls  Will remain free from falls   Outcome: Ongoing    Goal: Absence of physical injury  Absence of physical injury   Outcome: Ongoing
Problem: Infection - Surgical Site:  Goal: Will show no infection signs and symptoms  Will show no infection signs and symptoms   Outcome: Ongoing
Problem: Nutrition  Goal: Optimal nutrition therapy  Nutrition Problem: Inadequate oral intake  Intervention: Food and/or Nutrient Delivery: Continue NPO, Start Tube Feeding  Nutritional Goals: Meet needs via nutrition support or PO intake           Outcome: Ongoing  Nutrition Problem: Inadequate oral intake  Intervention: Food and/or Nutrient Delivery: Continue current diet, Continue current Tube Feeding  Nutritional Goals: Meet needs via nutrition support or PO intake
Problem: Nutrition  Goal: Optimal nutrition therapy  Nutrition Problem: Inadequate oral intake  Intervention: Food and/or Nutrient Delivery: Continue NPO, Start Tube Feeding  Nutritional Goals: Meet needs via nutrition support or PO intake          Outcome: Ongoing
Problem: Nutrition  Goal: Optimal nutrition therapy  Nutrition Problem: Inadequate oral intake  Intervention: Food and/or Nutrient Delivery: Continue current diet, Start ONS  Nutritional Goals: Meet needs via nutrition support or PO intake     Outcome: Ongoing
Problem: Nutrition  Goal: Optimal nutrition therapy  Nutrition Problem: Inadequate oral intake  Intervention: Food and/or Nutrient Delivery: Continue current diet, Start ONS  Nutritional Goals: Meet needs via nutrition support or PO intake     Outcome: Ongoing  Nutrition Problem: Inadequate oral intake  Intervention: Food and/or Nutrient Delivery: Continue current diet, Start ONS  Nutritional Goals: Meet needs via nutrition support or PO intake
Problem: Nutrition  Goal: Optimal nutrition therapy  Nutrition Problem: Inadequate oral intake  Intervention: Food and/or Nutrient Delivery: Continue current diet, Start ONS  Nutritional Goals: Meet needs via nutrition support or PO intake    Outcome: Ongoing
Problem: Nutrition  Goal: Optimal nutrition therapy  Nutrition Problem: Increased nutrient needs  Intervention: Food and/or Nutrient Delivery: Continue NPO  Nutritional Goals: Meet needs via nutrition support or po      Outcome: Ongoing
Problem: Nutrition  Goal: Optimal nutrition therapy  Nutrition Problem: Increased nutrient needs  Intervention: Food and/or Nutrient Delivery: Continue NPO  Nutritional Goals: Meet needs via nutrition support or po      Outcome: Ongoing  Nutrition Problem: Increased nutrient needs  Intervention: Food and/or Nutrient Delivery: Continue NPO  Nutritional Goals: Meet needs via nutrition support or po
Problem: Nutrition  Goal: Optimal nutrition therapy  Nutrition Problem: Increased nutrient needs  Intervention: Food and/or Nutrient Delivery: Continue NPO  Nutritional Goals: Meet needs via nutrition support or po     Outcome: Ongoing
Problem: Pain:  Goal: Control of acute pain  Control of acute pain   Outcome: Ongoing    Goal: Control of chronic pain  Control of chronic pain   Outcome: Ongoing      Problem: Restraint Use - Nonviolent/Non-Self-Destructive Behavior:  Goal: Absence of restraint indications  Absence of restraint indications   Outcome: Ongoing    Goal: Absence of restraint-related injury  Absence of restraint-related injury   Outcome: Ongoing
Problem: Risk for Impaired Skin Integrity  Goal: Tissue integrity - skin and mucous membranes  Structural intactness and normal physiological function of skin and  mucous membranes.    Outcome: Met This Shift      Problem: Falls - Risk of:  Goal: Will remain free from falls  Will remain free from falls   Outcome: Met This Shift    Goal: Absence of physical injury  Absence of physical injury   Outcome: Met This Shift      Problem: Pain:  Goal: Control of acute pain  Control of acute pain   Outcome: Met This Shift    Goal: Control of chronic pain  Control of chronic pain   Outcome: Met This Shift      Problem: Skin Integrity - Impaired:  Goal: Will show no infection signs and symptoms  Will show no infection signs and symptoms   Outcome: Met This Shift    Goal: Absence of new skin breakdown  Absence of new skin breakdown   Outcome: Met This Shift      Problem: Restraint Use - Nonviolent/Non-Self-Destructive Behavior:  Goal: Absence of restraint indications  Absence of restraint indications   Outcome: Ongoing    Goal: Absence of restraint-related injury  Absence of restraint-related injury   Outcome: Met This Shift      Problem: Urinary Elimination:  Goal: Complications related to the disease process, condition or treatment will be avoided or minimized  Complications related to the disease process, condition or treatment will be avoided or minimized   Outcome: Met This Shift
Problem: Risk for Impaired Skin Integrity  Goal: Tissue integrity - skin and mucous membranes  Structural intactness and normal physiological function of skin and  mucous membranes.    Outcome: Ongoing      Problem: Falls - Risk of:  Goal: Will remain free from falls  Will remain free from falls   Outcome: Met This Shift      Problem: Pain:  Goal: Control of acute pain  Control of acute pain   Outcome: Ongoing      Problem: Infection - Surgical Site:  Goal: Will show no infection signs and symptoms  Will show no infection signs and symptoms   Outcome: Ongoing      Problem: Skin Integrity - Impaired:  Goal: Will show no infection signs and symptoms  Will show no infection signs and symptoms   Outcome: Ongoing    Goal: Absence of new skin breakdown  Absence of new skin breakdown   Outcome: Met This Shift      Problem: Nutrition  Goal: Optimal nutrition therapy  Nutrition Problem: Increased nutrient needs  Intervention: Food and/or Nutrient Delivery: Continue NPO  Nutritional Goals: Meet needs via nutrition support or po      Outcome: Ongoing      Problem: Restraint Use - Nonviolent/Non-Self-Destructive Behavior:  Goal: Absence of restraint indications  Absence of restraint indications   Outcome: Not Met This Shift      Problem: Urinary Elimination:  Goal: Complications related to the disease process, condition or treatment will be avoided or minimized  Complications related to the disease process, condition or treatment will be avoided or minimized   Outcome: Ongoing
Problem: Risk for Impaired Skin Integrity  Goal: Tissue integrity - skin and mucous membranes  Structural intactness and normal physiological function of skin and  mucous membranes.    Outcome: Ongoing      Problem: Falls - Risk of:  Goal: Will remain free from falls  Will remain free from falls   Outcome: Ongoing      Problem: Pain:  Goal: Control of acute pain  Control of acute pain   Outcome: Ongoing
Problem: Risk for Impaired Skin Integrity  Goal: Tissue integrity - skin and mucous membranes  Structural intactness and normal physiological function of skin and  mucous membranes.    Outcome: Ongoing      Problem: Falls - Risk of:  Goal: Will remain free from falls  Will remain free from falls   Outcome: Ongoing      Problem: Pain:  Goal: Control of acute pain  Control of acute pain   Outcome: Ongoing
Problem: Risk for Impaired Skin Integrity  Goal: Tissue integrity - skin and mucous membranes  Structural intactness and normal physiological function of skin and  mucous membranes.    Outcome: Ongoing      Problem: Falls - Risk of:  Goal: Will remain free from falls  Will remain free from falls   Outcome: Ongoing    Goal: Absence of physical injury  Absence of physical injury   Outcome: Ongoing      Problem: Pain:  Goal: Control of acute pain  Control of acute pain   Outcome: Ongoing    Goal: Control of chronic pain  Control of chronic pain   Outcome: Ongoing      Problem: Infection - Surgical Site:  Goal: Will show no infection signs and symptoms  Will show no infection signs and symptoms   Outcome: Ongoing
Problem: Risk for Impaired Skin Integrity  Goal: Tissue integrity - skin and mucous membranes  Structural intactness and normal physiological function of skin and  mucous membranes.    Outcome: Ongoing      Problem: Falls - Risk of:  Goal: Will remain free from falls  Will remain free from falls   Outcome: Ongoing    Goal: Absence of physical injury  Absence of physical injury   Outcome: Ongoing      Problem: Pain:  Goal: Control of acute pain  Control of acute pain   Outcome: Ongoing    Goal: Control of chronic pain  Control of chronic pain   Outcome: Ongoing      Problem: Infection - Surgical Site:  Goal: Will show no infection signs and symptoms  Will show no infection signs and symptoms   Outcome: Ongoing      Problem: Skin Integrity - Impaired:  Goal: Will show no infection signs and symptoms  Will show no infection signs and symptoms   Outcome: Ongoing    Goal: Absence of new skin breakdown  Absence of new skin breakdown   Outcome: Ongoing      Problem: Nutrition  Goal: Optimal nutrition therapy  Nutrition Problem: Inadequate oral intake  Intervention: Food and/or Nutrient Delivery: Continue current diet, Start ONS  Nutritional Goals: Meet needs via nutrition support or PO intake     Outcome: Ongoing
Problem: Risk for Impaired Skin Integrity  Goal: Tissue integrity - skin and mucous membranes  Structural intactness and normal physiological function of skin and  mucous membranes.    Outcome: Ongoing      Problem: Falls - Risk of:  Goal: Will remain free from falls  Will remain free from falls   Outcome: Ongoing    Goal: Absence of physical injury  Absence of physical injury   Outcome: Ongoing      Problem: Pain:  Goal: Control of acute pain  Control of acute pain   Outcome: Ongoing    Goal: Control of chronic pain  Control of chronic pain   Outcome: Ongoing      Problem: Skin Integrity - Impaired:  Goal: Will show no infection signs and symptoms  Will show no infection signs and symptoms   Outcome: Ongoing    Goal: Absence of new skin breakdown  Absence of new skin breakdown   Outcome: Ongoing      Problem: ABCDS Injury Assessment  Goal: Absence of physical injury  Outcome: Ongoing      Problem: Safety:  Goal: Free from accidental physical injury  Free from accidental physical injury   Outcome: Ongoing    Goal: Free from intentional harm  Free from intentional harm   Outcome: Ongoing      Problem: Daily Care:  Goal: Daily care needs are met  Daily care needs are met   Outcome: Ongoing
Problem: Risk for Impaired Skin Integrity  Goal: Tissue integrity - skin and mucous membranes  Structural intactness and normal physiological function of skin and  mucous membranes.    Outcome: Ongoing      Problem: Falls - Risk of:  Goal: Will remain free from falls  Will remain free from falls   Outcome: Ongoing    Goal: Absence of physical injury  Absence of physical injury   Outcome: Ongoing      Problem: Pain:  Goal: Pain level will decrease  Pain level will decrease   Outcome: Ongoing    Goal: Control of acute pain  Control of acute pain   Outcome: Ongoing    Goal: Control of chronic pain  Control of chronic pain   Outcome: Ongoing
Problem: Risk for Impaired Skin Integrity  Goal: Tissue integrity - skin and mucous membranes  Structural intactness and normal physiological function of skin and  mucous membranes.    Outcome: Ongoing      Problem: Falls - Risk of:  Goal: Will remain free from falls  Will remain free from falls   Outcome: Ongoing    Goal: Absence of physical injury  Absence of physical injury   Outcome: Ongoing      Problem: Pain:  Goal: Pain level will decrease  Pain level will decrease   Outcome: Ongoing    Goal: Control of acute pain  Control of acute pain   Outcome: Ongoing    Goal: Control of chronic pain  Control of chronic pain   Outcome: Ongoing      Problem: Infection - Surgical Site:  Goal: Will show no infection signs and symptoms  Will show no infection signs and symptoms   Outcome: Ongoing      Problem: Skin Integrity - Impaired:  Goal: Will show no infection signs and symptoms  Will show no infection signs and symptoms   Outcome: Ongoing    Goal: Absence of new skin breakdown  Absence of new skin breakdown   Outcome: Ongoing      Problem: Urinary Elimination:  Goal: Complications related to the disease process, condition or treatment will be avoided or minimized  Complications related to the disease process, condition or treatment will be avoided or minimized   Outcome: Ongoing      Problem: ABCDS Injury Assessment  Goal: Absence of physical injury  Outcome: Ongoing      Problem: Safety:  Goal: Free from accidental physical injury  Free from accidental physical injury   Outcome: Ongoing    Goal: Free from intentional harm  Free from intentional harm   Outcome: Ongoing      Problem: Daily Care:  Goal: Daily care needs are met  Daily care needs are met   Outcome: Ongoing      Problem: Skin Integrity:  Goal: Absence of new skin breakdown  Absence of new skin breakdown  Outcome: Ongoing
Problem: Risk for Impaired Skin Integrity  Goal: Tissue integrity - skin and mucous membranes  Structural intactness and normal physiological function of skin and  mucous membranes.    Outcome: Ongoing      Problem: Pain:  Goal: Control of acute pain  Control of acute pain   Outcome: Ongoing      Problem: Skin Integrity - Impaired:  Goal: Will show no infection signs and symptoms  Will show no infection signs and symptoms   Outcome: Ongoing
Problem: Risk for Impaired Skin Integrity  Goal: Tissue integrity - skin and mucous membranes  Structural intactness and normal physiological function of skin and  mucous membranes. Outcome: Ongoing      Problem: Falls - Risk of:   Intervention: Gait assessment  N/A    Goal: Will remain free from falls  Will remain free from falls   Outcome: Ongoing    Goal: Absence of physical injury  Absence of physical injury   Outcome: Ongoing      Problem: Pain:  Goal: Pain level will decrease  Pain level will decrease   Outcome: Ongoing  Remains on fentanyl gtt 75mcg  Goal: Control of acute pain  Control of acute pain   Outcome: Ongoing    Goal: Control of chronic pain  Control of chronic pain   Outcome: Ongoing      Problem: Infection - Surgical Site:  Goal: Will show no infection signs and symptoms  Will show no infection signs and symptoms   Outcome: Ongoing  Incision w/o odor or purulent drainage noted    Problem: Skin Integrity - Impaired:  Goal: Will show no infection signs and symptoms  Will show no infection signs and symptoms   Outcome: Ongoing    Goal: Absence of new skin breakdown  Absence of new skin breakdown   Outcome: Ongoing      Problem: Nutrition  Goal: Optimal nutrition therapy  Nutrition Problem: Increased nutrient needs  Intervention: Food and/or Nutrient Delivery: Continue NPO  Nutritional Goals: Meet needs via nutrition support or po      Outcome: Ongoing  Remains NPO w/NG to LIS    Problem: Restraint Use - Nonviolent/Non-Self-Destructive Behavior:  Goal: Absence of restraint indications  Absence of restraint indications   Outcome: Ongoing    Goal: Absence of restraint-related injury  Absence of restraint-related injury   Outcome: Met This Shift      Problem: Urinary Elimination:  Goal: Complications related to the disease process, condition or treatment will be avoided or minimized  Complications related to the disease process, condition or treatment will be avoided or minimized  Outcome:
Ongoing    Goal: Ability to maintain appropriate glucose levels will improve  Ability to maintain appropriate glucose levels will improve   Outcome: Ongoing    Goal: Will show no infection signs and symptoms  Will show no infection signs and symptoms    Outcome: Ongoing      Problem: Pain - Acute:  Goal: Recognizes and communicates pain  Recognizes and communicates pain   Outcome: Ongoing      Problem: Tissue Perfusion - Peripheral, Altered:  Goal: Absence of hematoma at arterial access site  Absence of hematoma at arterial access site   Outcome: Ongoing    Goal: Absence of signs or symptoms of impaired coagulation  Absence of signs or symptoms of impaired coagulation   Outcome: Ongoing    Goal: Circulatory function of lower extremities is within specified parameters  Circulatory function of lower extremities is within specified parameters   Outcome: Ongoing    Goal: Strength of peripheral pulses will increase  Strength of peripheral pulses will increase   Outcome: Ongoing      Problem: Infection:  Goal: Will remain free from infection  Will remain free from infection   Outcome: Ongoing      Problem: Skin Integrity:  Goal: Skin integrity will stabilize  Skin integrity will stabilize   Outcome: Ongoing      Problem: Discharge Planning:  Goal: Patients continuum of care needs are met  Patients continuum of care needs are met   Outcome: Ongoing
Planning:  Goal: Discharged to appropriate level of care  Discharged to appropriate level of care   Outcome: Ongoing      Problem: Infection - Surgical Site:  Goal: Ability to maintain a body temperature in the normal range will improve  Ability to maintain a body temperature in the normal range will improve   Outcome: Ongoing    Goal: Ability to maintain appropriate glucose levels will improve  Ability to maintain appropriate glucose levels will improve   Outcome: Ongoing    Goal: Will show no infection signs and symptoms  Will show no infection signs and symptoms    Outcome: Ongoing      Problem: Pain - Acute:  Goal: Recognizes and communicates pain  Recognizes and communicates pain   Outcome: Ongoing    Goal: Pain level will decrease  Pain level will decrease    Outcome: Ongoing      Problem: Tissue Perfusion - Peripheral, Altered:  Goal: Absence of hematoma at arterial access site  Absence of hematoma at arterial access site   Outcome: Ongoing    Goal: Absence of signs or symptoms of impaired coagulation  Absence of signs or symptoms of impaired coagulation   Outcome: Ongoing    Goal: Circulatory function of lower extremities is within specified parameters  Circulatory function of lower extremities is within specified parameters   Outcome: Ongoing    Goal: Strength of peripheral pulses will increase  Strength of peripheral pulses will increase   Outcome: Ongoing      Problem: Infection:  Goal: Will remain free from infection  Will remain free from infection   Outcome: Ongoing      Problem: Skin Integrity:  Goal: Skin integrity will stabilize  Skin integrity will stabilize   Outcome: Ongoing      Problem: Discharge Planning:  Goal: Patients continuum of care needs are met  Patients continuum of care needs are met   Outcome: Ongoing

## 2018-09-21 NOTE — PROGRESS NOTES
Daily    ipratropium  0.5 mg Nebulization 4x daily    pantoprazole  40 mg Intravenous Daily    sodium chloride flush  10 mL Intravenous 2 times per day     Continuous Infusions:  PRN Meds:    traMADol 50 mg Q6H PRN   HYDROmorphone 0.5 mg Q2H PRN   Or     HYDROmorphone 1 mg Q1H PRN   ondansetron 4 mg Q6H PRN   LORazepam 1 mg Q4H PRN   sodium chloride flush 10 mL PRN   potassium chloride 10 mEq PRN   metoprolol tartrate 25 mg Q12H PRN   cloNIDine 0.1 mg Q2H PRN     PHYSICAL EXAM:     CONSTITUTIONAL: Alert and oriented times 3, no acute distress and cooperative to examination. LUNGS: Chest expands equally bilaterally upon respiration, slightly diminished breath sounds bilaterally  . Ausculation reveals no wheezes, rales or rhonchi. CARDIOVASCULAR: Regular rate and rhythm  ABDOMEN:softly distended with  hypoactive bowel sounds to RLQ  NEUROLOGIC: Awake, alert, oriented to name, place and time. WOUND/INCISION:  Midline incision with staples CDI, healing well, no drainage, no erythema. RUE with gauze dressing CDI over abrasion from BP cuff  EXTREMITY:feet warm to touch/pink color bilaterally, +palp DP/PT pulses noted. Still with slight bruising to right plantar lateral forefoot, left lateral plantar forefoot, and small area medial plantar forefoot- these areas improving    LABS:     CBC:   Recent Labs      09/19/18   0324  09/20/18   0300  09/21/18   0401   WBC  8.6  9.7  9.2   RBC  2.70*  3.71*  3.41*   HGB  7.8*  10.8*  9.9*   HCT  25.0*  34.3*  30.8*   MCV  92.6  92.5  90.3   MCH  28.9  29.1  29.0   MCHC  31.2*  31.5*  32.1*   RDW  15.2*  15.6*  15.1*   PLT  373  303  314   MPV  10.3  10.4  10.3        Calcium:   Lab Results   Component Value Date    CALCIUM 8.7 09/17/2018    CALCIUM 8.2 09/15/2018    CALCIUM 8.6 09/14/2018        DVT prophylaxis:                                 [x] SCDs    ASSESSMENT:     1. Post OP: open AAA and left renal artery bypass.  Complicated by pneumonia, atelectasis, reintubation, spontaneous pneumothorax, and continued ileus. 2. HD # 22  Patient Active Problem List   Diagnosis    Irritable bowel syndrome    Osteoarthritis    Anxiety    Depression    Labyrinthitis    AAA (abdominal aortic aneurysm) (HCC)    Carotid artery stenosis    Hyperlipemia    TIA (transient ischemic attack)    Fibromuscular dysplasia (HCC)    Diarrhea    History of colon polyps    Chronic heartburn    Antiplatelet or antithrombotic long-term use    Colon polyps    Renal artery stenosis (HCC)    Celiac artery stenosis (HCC)    Essential hypertension    Bilateral carotid artery stenosis    Ischemic hepatitis    Acute respiratory failure with hypoxia (HCC)    CKD (chronic kidney disease), stage III    Metabolic acidosis    Acute blood loss as cause of postoperative anemia    Atrial fibrillation with rapid ventricular response (HCC)    Positive sputum culture for Pseudomonas    Fever in adult    Shock circulatory ([facility] [address]    Pneumothorax on left    Constipation due to opioid therapy       Chief Complaint:  No chief complaint on file. PLAN:     1. Hgb 9.9/Hct 30   2. Still taking small amounts of diet-receiving oral high protein supplements from dietary, few bites of solids  3. Encourage incentive spirometer, is ambulating in vaughan, took shower yesterday  4. Reglan q6h  5. More alert today  6. Ambulating in vaugh[place] with assist, steady on feet, will order walker for home  7. Didn't wear 02 last night or today and did well with oxygen sats    Agree with above. I examined the patient and discussed treatment plan with the patient. We have discussed feeding tube vs. TPN with the patient. Ileus seems to be improving and she isn't interested in feeding tube placement.

## 2018-09-21 NOTE — PROGRESS NOTES
Abdominal incision clean and dry with staples intact. Dark brown scaley area noted to right upper arm. Coccyx area and heels clear. Elbows clear. No redness noted over bony prominences.

## 2018-09-21 NOTE — PROGRESS NOTES
Nutrition Assessment    Type and Reason for Visit: Reassess    Nutrition Recommendations: continue current interventions    Malnutrition Assessment:  · Malnutrition Status: At risk for malnutrition  · Context: Acute illness or injury  · Findings of the 6 clinical characteristics of malnutrition (Minimum of 2 out of 6 clinical characteristics is required to make the diagnosis of moderate or severe Protein Calorie Malnutrition based on AND/ASPEN Guidelines):  1. Energy Intake-Less than or equal to 50%, greater than 7 days    2. Weight Loss-No significant weight loss,    3. Fat Loss-Unable to assess,    4. Muscle Loss-Unable to assess,    5. Fluid Accumulation-No significant fluid accumulation,    6.  Strength-Not measured    Nutrition Diagnosis:   · Problem: Inadequate oral intake  · Etiology: related to Acute injury/trauma, Difficulty swallowing     Signs and symptoms:  as evidenced by Intake 0-25%, Swallow study results    Nutrition Assessment:  · Subjective Assessment: patient receiving a General diet. Continuing Ensure Clear.   PO intake is good with intake ranging %  · Nutrition-Focused Physical Findings: extubated  · Wound Type: Surgical Wound  · Current Nutrition Therapies:  · Oral Diet Orders: General   · Oral Diet intake: %  · Oral Nutrition Supplement (ONS) Orders: Clear Liquid Oral Supplement  · ONS intake: 51-75%, %     · Anthropometric Measures:  · Ht: 5' 3\" (160 cm)   · Current Body Wt: 138 lb 3 oz (62.7 kg)  · Admission Body Wt: 141 lb (64 kg)  · Ideal Body Wt: 115 lb (52.2 kg), % Ideal Body 138%  · BMI Classification: BMI 18.5 - 24.9 Normal Weight     · Comparative Standards (Estimated Nutrition Needs):  · Estimated Daily Total Kcal: 7916-0897  · Estimated Daily Protein (g):     Estimated Intake vs Estimated Needs: Intake Improving    Nutrition Risk Level: High    Nutrition Interventions:   Continue current diet, Continue current ONS  Continued Inpatient

## 2018-09-24 ENCOUNTER — TELEPHONE (OUTPATIENT)
Dept: PRIMARY CARE CLINIC | Age: 68
End: 2018-09-24

## 2018-09-27 ENCOUNTER — OFFICE VISIT (OUTPATIENT)
Dept: PRIMARY CARE CLINIC | Age: 68
End: 2018-09-27
Payer: MEDICARE

## 2018-09-27 VITALS
TEMPERATURE: 96.5 F | HEART RATE: 48 BPM | HEIGHT: 63 IN | DIASTOLIC BLOOD PRESSURE: 66 MMHG | RESPIRATION RATE: 22 BRPM | OXYGEN SATURATION: 97 % | SYSTOLIC BLOOD PRESSURE: 110 MMHG | BODY MASS INDEX: 23.57 KG/M2 | WEIGHT: 133 LBS

## 2018-09-27 DIAGNOSIS — R82.90 ABNORMAL URINE: Primary | ICD-10-CM

## 2018-09-27 DIAGNOSIS — R84.5 POSITIVE SPUTUM CULTURE FOR PSEUDOMONAS: ICD-10-CM

## 2018-09-27 DIAGNOSIS — D62 ACUTE BLOOD LOSS AS CAUSE OF POSTOPERATIVE ANEMIA: ICD-10-CM

## 2018-09-27 DIAGNOSIS — I70.1 RENAL ARTERY STENOSIS (HCC): ICD-10-CM

## 2018-09-27 DIAGNOSIS — I71.40 ABDOMINAL AORTIC ANEURYSM (AAA) WITHOUT RUPTURE: ICD-10-CM

## 2018-09-27 DIAGNOSIS — F32.A DEPRESSION, UNSPECIFIED DEPRESSION TYPE: ICD-10-CM

## 2018-09-27 DIAGNOSIS — R15.9 INCONTINENCE OF FECES WITH FECAL URGENCY: ICD-10-CM

## 2018-09-27 DIAGNOSIS — K58.0 IRRITABLE BOWEL SYNDROME WITH DIARRHEA: ICD-10-CM

## 2018-09-27 DIAGNOSIS — I77.1 CELIAC ARTERY STENOSIS (HCC): ICD-10-CM

## 2018-09-27 DIAGNOSIS — R32 URINARY INCONTINENCE, UNSPECIFIED TYPE: ICD-10-CM

## 2018-09-27 DIAGNOSIS — R15.2 INCONTINENCE OF FECES WITH FECAL URGENCY: ICD-10-CM

## 2018-09-27 LAB
APPEARANCE FLUID: ABNORMAL
BILIRUBIN, POC: ABNORMAL
BLOOD URINE, POC: ABNORMAL
CLARITY, POC: ABNORMAL
COLOR, POC: ABNORMAL
GLUCOSE URINE, POC: ABNORMAL
KETONES, POC: ABNORMAL
LEUKOCYTE EST, POC: ABNORMAL
NITRITE, POC: ABNORMAL
PH, POC: 5
PROTEIN, POC: ABNORMAL
SPECIFIC GRAVITY, POC: 1.03
UROBILINOGEN, POC: 0.2

## 2018-09-27 PROCEDURE — 1111F DSCHRG MED/CURRENT MED MERGE: CPT | Performed by: FAMILY MEDICINE

## 2018-09-27 PROCEDURE — 81002 URINALYSIS NONAUTO W/O SCOPE: CPT | Performed by: FAMILY MEDICINE

## 2018-09-27 PROCEDURE — 99496 TRANSJ CARE MGMT HIGH F2F 7D: CPT | Performed by: FAMILY MEDICINE

## 2018-09-27 RX ORDER — PANTOPRAZOLE SODIUM 40 MG/1
40 TABLET, DELAYED RELEASE ORAL DAILY
Qty: 90 TABLET | Refills: 1 | Status: SHIPPED | OUTPATIENT
Start: 2018-09-27 | End: 2019-03-23 | Stop reason: SDUPTHER

## 2018-09-27 RX ORDER — FLUOXETINE HYDROCHLORIDE 20 MG/1
20 CAPSULE ORAL DAILY
Qty: 30 CAPSULE | Refills: 5 | Status: SHIPPED | OUTPATIENT
Start: 2018-09-27 | End: 2019-02-25 | Stop reason: SDUPTHER

## 2018-09-27 RX ORDER — ATENOLOL 50 MG/1
25 TABLET ORAL 2 TIMES DAILY
Qty: 180 TABLET | Refills: 3 | Status: SHIPPED | OUTPATIENT
Start: 2018-09-27 | End: 2018-11-12 | Stop reason: DRUGHIGH

## 2018-09-27 RX ORDER — BENAZEPRIL HYDROCHLORIDE 10 MG/1
10 TABLET ORAL DAILY
Qty: 30 TABLET | Refills: 3 | Status: SHIPPED | OUTPATIENT
Start: 2018-09-27 | End: 2018-11-12 | Stop reason: DRUGHIGH

## 2018-09-27 RX ORDER — DICYCLOMINE HCL 20 MG
TABLET ORAL
Qty: 120 TABLET | Refills: 1 | Status: SHIPPED | OUTPATIENT
Start: 2018-09-27 | End: 2018-12-30 | Stop reason: SDUPTHER

## 2018-09-27 NOTE — PROGRESS NOTES
Post-Discharge Transitional Care Management Services or Hospital Follow Up      Luis Enrique Pandya   YOB: 1950    Date of Office Visit:  9/27/2018  Date of Hospital Admission: 8/30/18  Date of Hospital Discharge: 9/21/18  Risk of hospital readmission (high >=14%.  Medium >=10%) :Readmission Risk Score: 17    Care management risk score Rising risk (score 2-5) and Complex Care (Scores >=6): 5     Non face to face  following discharge, date last encounter closed (first attempt may have been earlier): 9/24/2018  4:31 PM    Call initiated 2 business days of discharge: Yes    Patient Active Problem List   Diagnosis    Irritable bowel syndrome    Osteoarthritis    Anxiety    Depression    Labyrinthitis    AAA [facility]abdominal aortic aneurysm) ([facility] [facility]    Carotid artery stenosis    Hyperlipemia    TIA (transient ischemic attack)    Fibromuscular dysplasia (HCC)    Diarrhea    History of colon polyps    Chronic heartburn    Antiplatelet or antithrombotic long-term use    Colon polyps    Renal artery stenosis (HCC)    Celiac artery stenosis ([address]    Essential hypertension    Bilateral carotid artery stenosis    Ischemic hepatitis    Acute respiratory failure with hypoxia (HCC)    CKD (chronic kidney disease), stage III    Metabolic acidosis    Acute blood loss as cause of postoperative anemia    Atrial fibrillation with rapid ventricular response (HCC)    Positive sputum culture for Pseudomonas    Fever in adult    Shock circulatory ([facility] [address] [facility]    Pneumothorax on left    Constipation due to opioid therapy       Allergies   Allergen Reactions    Colestipol Shortness Of Breath and Other (See Comments)     Heart races    Codeine      Blocks her kidneys     Prednisone Other (See Comments)     Increased heart rate and insomnia    Aspirin Nausea And Vomiting     Makes her stomach bleed       Medications listed as ordered at the time of discharge from hospital   Joe Sheets Medication Instructions MARIANNA:    Printed on:09/27/18 1619   Medication Information                      amiodarone (CORDARONE) 200 MG tablet  Take 1 tablet by mouth daily             atenolol (TENORMIN) 50 MG tablet  Take 0.5 tablets by mouth 2 times daily             benazepril (LOTENSIN) 10 MG tablet  Take 1 tablet by mouth daily             cloNIDine (CATAPRES) 0.1 MG tablet  Take 1 tablet by mouth daily as needed (if blood pressure is 160/90 or greater)             clopidogrel (PLAVIX) 75 MG tablet  TAKE ONE TABLET BY MOUTH DAILY             dicyclomine (BENTYL) 20 MG tablet  TAKE ONE TABLET BY MOUTH FOUR TIMES DAILY BEFORE MEALS AND NIGHTLY             FLUoxetine (PROZAC) 20 MG capsule  Take 1 capsule by mouth daily             ondansetron (ZOFRAN ODT) 8 MG TBDP disintegrating tablet  Take 1 tablet by mouth every 12 hours as needed for Nausea or Vomiting             pantoprazole (PROTONIX) 40 MG tablet  Take 1 tablet by mouth daily             simvastatin (ZOCOR) 40 MG tablet  TAKE ONE TABLET BY MOUTH EVERY EVENING             traMADol (ULTRAM) 50 MG tablet  Take 1 tablet by mouth every 8 hours as needed for Pain for up to 10 days. .                   Medications marked \"taking\" at this time  Outpatient Prescriptions Marked as Taking for the 9/27/18 encounter (Office Visit) with Kendrick Bhat MD   Medication Sig Dispense Refill    pantoprazole (PROTONIX) 40 MG tablet Take 1 tablet by mouth daily 90 tablet 1    dicyclomine (BENTYL) 20 MG tablet TAKE ONE TABLET BY MOUTH FOUR TIMES DAILY BEFORE MEALS AND NIGHTLY 120 tablet 1    benazepril (LOTENSIN) 10 MG tablet Take 1 tablet by mouth daily 30 tablet 3    atenolol (TENORMIN) 50 MG tablet Take 0.5 tablets by mouth 2 times daily 180 tablet 3    FLUoxetine (PROZAC) 20 MG capsule Take 1 capsule by mouth daily 30 capsule 5    traMADol (ULTRAM) 50 MG tablet Take 1 tablet by mouth every 8 hours as needed for Pain for up to 10 days. . 30 tablet 0    amiodarone

## 2018-09-29 LAB
ORGANISM: ABNORMAL
URINE CULTURE, ROUTINE: ABNORMAL
URINE CULTURE, ROUTINE: ABNORMAL

## 2018-10-01 ENCOUNTER — OFFICE VISIT (OUTPATIENT)
Dept: VASCULAR SURGERY | Age: 68
End: 2018-10-01

## 2018-10-01 ENCOUNTER — TELEPHONE (OUTPATIENT)
Dept: PRIMARY CARE CLINIC | Age: 68
End: 2018-10-01

## 2018-10-01 VITALS
SYSTOLIC BLOOD PRESSURE: 116 MMHG | TEMPERATURE: 97.2 F | HEART RATE: 46 BPM | HEIGHT: 63 IN | BODY MASS INDEX: 23.56 KG/M2 | DIASTOLIC BLOOD PRESSURE: 59 MMHG

## 2018-10-01 DIAGNOSIS — I71.40 ABDOMINAL AORTIC ANEURYSM (AAA) WITHOUT RUPTURE: Primary | ICD-10-CM

## 2018-10-01 DIAGNOSIS — I73.9 PVD (PERIPHERAL VASCULAR DISEASE) (HCC): ICD-10-CM

## 2018-10-01 PROCEDURE — 99024 POSTOP FOLLOW-UP VISIT: CPT | Performed by: NURSE PRACTITIONER

## 2018-10-01 RX ORDER — CIPROFLOXACIN 250 MG/1
250 TABLET, FILM COATED ORAL 2 TIMES DAILY
Qty: 20 TABLET | Refills: 0 | Status: SHIPPED | OUTPATIENT
Start: 2018-10-01 | End: 2018-10-11

## 2018-10-01 NOTE — DISCHARGE SUMMARY
Physician Discharge Summary          Patient ID:  Jeni Polina  [id number]  28 y.o.  1950    Date of Admission:  8/30/2018    Date of Discharge:  9/21/2018  5:00 PM     Admitting Physician:  Jami Shore. Emmanuel Rivera M.D. Consults:      1. Pulmonology for left lower lobe lung collapse, pneumonia, ventilator management, acute respiratory failure  2. Hospitalist for medical management of multiple medical problems  3. CT surgery for left spontaneous pneumothorax  4. Cardiology for paroxysmal atrial fibrillation    Primary Diagnosis:    1. Juxtarenal Abdominal Aortic Aneurysm    Other Diagnoses:    1. Left renal artery stenosis  2. Atherosclerosis of native arteries with claudication  3. Essential hypertension  4. Spontaneous left pneumothorax  5. Acute respiratory failure with hypoxia after surgery secondary to left lower lobar collapse and pneumonia  6. Anemia of expected blood loss after surgery requiring blood transfusions  7. CKD III  8. Prolonged postoperative ileus  9. Constipation due to opioid therapy  10. Irritable bowel syndrome   11. Osteoarthritis  12. Anxiety  13. Chronic heartburn  14. Paroxysmal atrial fibrillation    Operative Procedures:      DATE OF PROCEDURE:  08/30/2018     PREOPERATIVE DIAGNOSES:  1.  Juxtarenal abdominal aortic aneurysm. 2.  Left renal artery stenosis.     POSTOPERATIVE DIAGNOSES:  1.  Juxtarenal abdominal aortic aneurysm. 2.  Left renal artery stenosis.     OPERATIVE PROCEDURE:  1. Open repair of abdominal aortic aneurysm with aortobiiliac  reconstruction (18 mm x 9 mm bifurcated PTFE graft). 2.  Left renal artery bypass with 7 mm PTFE (from the main body of the PTFE  graft with end-to-end anastomosis to the left renal artery).   3.  Adhesiolysis.     SURGEON:  Boubacar Marques MD     FIRST ASSISTANT:  Leticia Childress MD     ANESTHESIA:  General endotracheal.     COMPLICATIONS:  None.     FINDINGS:  The patient has no neck and it is truly a juxtarenal to completing this anastomosis, standard  flushing techniques were used to remove any air and debris from the native  vessels and flow was restored to the left renal arteries. There was an  excellent pulse in the renal artery distal to the anastomosis.     Next, the bilateral common iliac arteries at the distal aorta were  transected and any calcified plaque was removed, which was only mild. The  right limb of the bifurcated graft was cut to length and tapered as well as  the left limb. Dr. Ronda Reyes performed the left iliac artery anastomosis and  I performed the right. They are end-to-end anastomosis with 4-0 Prolene  running sutures. Prior to completing these anastomoses, standard flushing  techniques were used to remove any air and debris from the native vessels  and flow was restored to the both lower extremities. The patient tolerated  this well.     All anastomoses were hemostatic. The lumbars were not bleeding. The  aortic sac and retroperitoneum were closed over the PTFE with interrupted  2-0 Vicryl figure-of-eight sutures. The self-retaining retractors were  removed and sponge count and instrument count were correct. The colon and  small bowel are well perfused. Of note, there was no backbleeding from the  inferior mesenteric artery. The midline abdominal fascia was closed with  #1 running PDS suture, subcutaneous tissues were closed with interrupted  3-0 Vicryl sutures and the skin incision was closed with staples. The  patient tolerated the procedure well and was extubated and brought to the  recovery room in fair condition. Other Procedures:      DATE OF PROCEDURE:  09/13/2018     PREOPERATIVE DIAGNOSIS:  Increasing left pneumothorax after repair of  abdominal aortic aneurysm.     POSTOPERATIVE DIAGNOSIS:  Increasing left pneumothorax after repair of  abdominal aortic aneurysm. .     OPERATION PERFORMED:  Placement of left PleurX catheter.     OPERATIVE PROCEDURE:  After obtaining informed

## 2018-10-15 ENCOUNTER — HOSPITAL ENCOUNTER (EMERGENCY)
Age: 68
Discharge: HOME | End: 2018-10-15
Attending: EMERGENCY MEDICINE
Payer: MEDICARE

## 2018-10-15 ENCOUNTER — TELEPHONE (OUTPATIENT)
Dept: CARDIOLOGY | Age: 68
End: 2018-10-15

## 2018-10-15 ENCOUNTER — APPOINTMENT (OUTPATIENT)
Dept: GENERAL RADIOLOGY | Age: 68
End: 2018-10-15
Payer: MEDICARE

## 2018-10-15 ENCOUNTER — APPOINTMENT (OUTPATIENT)
Dept: CT IMAGING | Age: 68
End: 2018-10-15
Payer: MEDICARE

## 2018-10-15 VITALS
HEIGHT: 63 IN | TEMPERATURE: 98.5 F | SYSTOLIC BLOOD PRESSURE: 120 MMHG | WEIGHT: 130 LBS | BODY MASS INDEX: 23.04 KG/M2 | HEART RATE: 50 BPM | DIASTOLIC BLOOD PRESSURE: 75 MMHG | OXYGEN SATURATION: 98 % | RESPIRATION RATE: 16 BRPM

## 2018-10-15 DIAGNOSIS — R07.9 CHEST PAIN, UNSPECIFIED TYPE: ICD-10-CM

## 2018-10-15 DIAGNOSIS — R42 VERTIGO: Primary | ICD-10-CM

## 2018-10-15 DIAGNOSIS — R00.1 BRADYCARDIA: ICD-10-CM

## 2018-10-15 LAB
ALBUMIN SERPL-MCNC: 3.7 G/DL (ref 3.5–5.2)
ALP BLD-CCNC: 97 U/L (ref 35–104)
ALT SERPL-CCNC: 13 U/L (ref 5–33)
ANION GAP SERPL CALCULATED.3IONS-SCNC: 13 MMOL/L (ref 7–19)
APTT: 29.4 SEC (ref 26–36.2)
AST SERPL-CCNC: 17 U/L (ref 5–32)
BASOPHILS ABSOLUTE: 0.1 K/UL (ref 0–0.2)
BASOPHILS RELATIVE PERCENT: 0.4 % (ref 0–1)
BILIRUB SERPL-MCNC: 0.4 MG/DL (ref 0.2–1.2)
BUN BLDV-MCNC: 14 MG/DL (ref 8–23)
CALCIUM SERPL-MCNC: 9.5 MG/DL (ref 8.8–10.2)
CHLORIDE BLD-SCNC: 104 MMOL/L (ref 98–111)
CO2: 24 MMOL/L (ref 22–29)
CREAT SERPL-MCNC: 1.1 MG/DL (ref 0.5–0.9)
EKG P AXIS: NORMAL DEGREES
EKG P-R INTERVAL: NORMAL MS
EKG Q-T INTERVAL: 548 MS
EKG QRS DURATION: 96 MS
EKG QTC CALCULATION (BAZETT): 520 MS
EKG T AXIS: -21 DEGREES
EOSINOPHILS ABSOLUTE: 0.3 K/UL (ref 0–0.6)
EOSINOPHILS RELATIVE PERCENT: 2.8 % (ref 0–5)
GFR NON-AFRICAN AMERICAN: 50
GLUCOSE BLD-MCNC: 114 MG/DL (ref 74–109)
HCT VFR BLD CALC: 37.2 % (ref 37–47)
HEMOGLOBIN: 11.8 G/DL (ref 12–16)
INR BLD: 1.07 (ref 0.88–1.18)
LYMPHOCYTES ABSOLUTE: 2.9 K/UL (ref 1.1–4.5)
LYMPHOCYTES RELATIVE PERCENT: 24.9 % (ref 20–40)
MCH RBC QN AUTO: 28.6 PG (ref 27–31)
MCHC RBC AUTO-ENTMCNC: 31.7 G/DL (ref 33–37)
MCV RBC AUTO: 90.3 FL (ref 81–99)
MONOCYTES ABSOLUTE: 0.8 K/UL (ref 0–0.9)
MONOCYTES RELATIVE PERCENT: 6.9 % (ref 0–10)
NEUTROPHILS ABSOLUTE: 7.5 K/UL (ref 1.5–7.5)
NEUTROPHILS RELATIVE PERCENT: 64.4 % (ref 50–65)
PDW BLD-RTO: 14.6 % (ref 11.5–14.5)
PLATELET # BLD: 324 K/UL (ref 130–400)
PMV BLD AUTO: 10.4 FL (ref 9.4–12.3)
POTASSIUM SERPL-SCNC: 4.2 MMOL/L (ref 3.5–5)
PRO-BNP: 983 PG/ML (ref 0–900)
PROTHROMBIN TIME: 13.8 SEC (ref 12–14.6)
RBC # BLD: 4.12 M/UL (ref 4.2–5.4)
SODIUM BLD-SCNC: 141 MMOL/L (ref 136–145)
TOTAL PROTEIN: 6.7 G/DL (ref 6.6–8.7)
TROPONIN: <0.01 NG/ML (ref 0–0.03)
TROPONIN: <0.01 NG/ML (ref 0–0.03)
WBC # BLD: 11.7 K/UL (ref 4.8–10.8)

## 2018-10-15 PROCEDURE — 84484 ASSAY OF TROPONIN QUANT: CPT

## 2018-10-15 PROCEDURE — 71045 X-RAY EXAM CHEST 1 VIEW: CPT

## 2018-10-15 PROCEDURE — 93005 ELECTROCARDIOGRAM TRACING: CPT

## 2018-10-15 PROCEDURE — 83880 ASSAY OF NATRIURETIC PEPTIDE: CPT

## 2018-10-15 PROCEDURE — 85025 COMPLETE CBC W/AUTO DIFF WBC: CPT

## 2018-10-15 PROCEDURE — 70450 CT HEAD/BRAIN W/O DYE: CPT

## 2018-10-15 PROCEDURE — 85730 THROMBOPLASTIN TIME PARTIAL: CPT

## 2018-10-15 PROCEDURE — 99285 EMERGENCY DEPT VISIT HI MDM: CPT

## 2018-10-15 PROCEDURE — 85610 PROTHROMBIN TIME: CPT

## 2018-10-15 PROCEDURE — 99284 EMERGENCY DEPT VISIT MOD MDM: CPT | Performed by: EMERGENCY MEDICINE

## 2018-10-15 PROCEDURE — 2580000003 HC RX 258: Performed by: EMERGENCY MEDICINE

## 2018-10-15 PROCEDURE — 36415 COLL VENOUS BLD VENIPUNCTURE: CPT

## 2018-10-15 PROCEDURE — 6370000000 HC RX 637 (ALT 250 FOR IP): Performed by: EMERGENCY MEDICINE

## 2018-10-15 PROCEDURE — 80053 COMPREHEN METABOLIC PANEL: CPT

## 2018-10-15 RX ORDER — DIAZEPAM 5 MG/1
5 TABLET ORAL EVERY 6 HOURS PRN
Qty: 10 TABLET | Refills: 0 | Status: SHIPPED | OUTPATIENT
Start: 2018-10-15 | End: 2018-10-25

## 2018-10-15 RX ORDER — DIAZEPAM 5 MG/1
5 TABLET ORAL ONCE
Status: COMPLETED | OUTPATIENT
Start: 2018-10-15 | End: 2018-10-15

## 2018-10-15 RX ORDER — 0.9 % SODIUM CHLORIDE 0.9 %
1000 INTRAVENOUS SOLUTION INTRAVENOUS ONCE
Status: COMPLETED | OUTPATIENT
Start: 2018-10-15 | End: 2018-10-15

## 2018-10-15 RX ORDER — DIAZEPAM 5 MG/ML
5 INJECTION, SOLUTION INTRAMUSCULAR; INTRAVENOUS ONCE
Status: DISCONTINUED | OUTPATIENT
Start: 2018-10-15 | End: 2018-10-15

## 2018-10-15 RX ADMIN — SODIUM CHLORIDE 1000 ML: 9 INJECTION, SOLUTION INTRAVENOUS at 12:11

## 2018-10-15 RX ADMIN — DIAZEPAM 5 MG: 5 TABLET ORAL at 12:03

## 2018-10-15 ASSESSMENT — ENCOUNTER SYMPTOMS
NAUSEA: 1
RHINORRHEA: 0
BACK PAIN: 0
DIARRHEA: 0
VOMITING: 0
SHORTNESS OF BREATH: 0
ABDOMINAL PAIN: 0
SORE THROAT: 0

## 2018-10-15 ASSESSMENT — PAIN SCALES - GENERAL: PAINLEVEL_OUTOF10: 5

## 2018-10-15 NOTE — ED NOTES
Pt ambulated with one assist without difficulty. Not denies dizziness or CP. MD timmons aware.      Reinaldo Fry, RN  10/15/18 6045

## 2018-10-15 NOTE — ED PROVIDER NOTES
[facility] EMERGENCY DEPT  eMERGENCY dEPARTMENT eNCOUnter      Pt Name: Jory Hurd  MRN: [id number]  [place] 1950  Date of evaluation: 10/15/2018  Provider: MD Neal Whipple       Chief Complaint   Patient presents with    Chest Pain    Dizziness         HISTORY OF PRESENT ILLNESS   (Location/Symptom, Timing/Onset,Context/Setting, Quality, Duration, Modifying Factors, Severity)  Note limiting factors. Jory Hurd is a 78 y.o. female who presents to the emergency department With dizziness. The patient states that she woke up from sleep and noted that the room was spinning around her. She has a prior history of inner ear problems however her previous symptoms have been more of a kind of woozy or lightheaded type feeling. She already took 25mg of meclizine with no improvement in her symptoms. The symptoms were severe and started all of a sudden and associated with nausea. Not currently present. It is not positional. It improved with closing her eyes. The patient also reports a severe headache in the back of her head. She has no prior history of severe headaches. In addition the patient is having some chest tightness. It is a squeezing type pain. She had recent open repair of abdominal aortic aneurysm with aortobiiliac reconstruction and left renal artery bypass on August 30 with Dr. Mohan Mccabe. Her hospitalization was complicated by a spontaneous pneumothorax, pneumonia, respiratory failure, atrial fibrillation. The patient denies any abdominal pain. She is just a little sore from surgery however this is not new or different. The patient is bradycardic with a rate in the 40s and this is also normal for her. HPI    NursingNotes were reviewed. REVIEW OF SYSTEMS    (2-9 systems for level 4, 10 or more for level 5)     Review of Systems   Constitutional: Negative for chills and fever. HENT: Negative for rhinorrhea and sore throat. Respiratory: Negative for shortness of breath. abnormality is identified. Signed by Dr Mala Nielsen. Bob on 10/15/2018 11:58 AM      XR CHEST PORTABLE   Final Result   No acute findings. Signed by Dr Theo Galeano on 10/15/2018 11:35 AM            ED BEDSIDE ULTRASOUND:   Performed by ED Physician - none    LABS:  Labs Reviewed   CBC WITH AUTO DIFFERENTIAL - Abnormal; Notable for the following:        Result Value    WBC 11.7 (*)     RBC 4.12 (*)     Hemoglobin 11.8 (*)     MCHC 31.7 (*)     RDW 14.6 (*)     All other components within normal limits   BRAIN NATRIURETIC PEPTIDE - Abnormal; Notable for the following:     Pro- (*)     All other components within normal limits   COMPREHENSIVE METABOLIC PANEL - Abnormal; Notable for the following:     Glucose 114 (*)     CREATININE 1.1 (*)     GFR Non- 50 (*)     All other components within normal limits   APTT   PROTIME-INR   TROPONIN   TROPONIN       All other labs were within normal range or not returned as of this dictation. EMERGENCY DEPARTMENT COURSE and DIFFERENTIALDIAGNOSIS/MDM:   Vitals:    Vitals:    10/15/18 1105 10/15/18 1358 10/15/18 1433   BP: (!) 113/99 125/78 120/75   Pulse: (!) 45 (!) 47 50   Resp: 18 20 16   Temp: 98 °F (36.7 °C)  98.5 °F (36.9 °C)   TempSrc:   Oral   SpO2: 98% 95% 98%   Weight: 130 lb (59 kg)     Height: 5' 3\" (1.6 m)         MDM  Patient is currently asymptomatic after Valium and ambulates easily and without difficulty. Her chest pain has resolved. Patient has chronic bradycardia and is at baseline. No acute EKG changes. Trop negative and cxr shows no acute findings. No signs of dissection. bp normal here. She has equal pulses in all 4 extremities. Pt has ho inner ear problems and her symptoms seem most consistent with a peripheral vertigo due to sudden onset, severe, a/w nausea, and improved with valium. Strong return precautions given.  Pt requested rx for valium, instructed to use sparingly and if her chest pain returns or new abdominal pain, or development of other neuro complaints, return to ED immediately. CONSULTS:  None    PROCEDURES:  Unless otherwise notedbelow, none     Procedures    FINAL IMPRESSION     1. Vertigo    2. Chest pain, unspecified type    3. Bradycardia          DISPOSITION/PLAN   DISPOSITION Decision To Discharge 10/15/2018 01:59:21 PM      PATIENT REFERRED TO:  @FUP@    DISCHARGE MEDICATIONS:  Discharge Medication List as of 10/15/2018  2:02 PM      START taking these medications    Details   diazepam (VALIUM) 5 MG tablet Take 1 tablet by mouth every 6 hours as needed for Anxiety for up to 10 days. ., Disp-10 tablet, R-0Print                (Please note that portions of this note were completed with a voice recognition program.  Efforts were made to edit the dictations butoccasionally words are mis-transcribed.)    Juan Marcial MD (electronically signed)  AttendingEmergency Physician         Juan Marcial MD  10/15/18 9819

## 2018-10-16 ENCOUNTER — TELEPHONE (OUTPATIENT)
Dept: CARDIOLOGY | Age: 68
End: 2018-10-16

## 2018-10-17 ENCOUNTER — TELEPHONE (OUTPATIENT)
Dept: CARDIOLOGY | Age: 68
End: 2018-10-17

## 2018-10-29 ENCOUNTER — OFFICE VISIT (OUTPATIENT)
Dept: PRIMARY CARE CLINIC | Age: 68
End: 2018-10-29
Payer: MEDICARE

## 2018-10-29 VITALS
WEIGHT: 125.2 LBS | HEART RATE: 58 BPM | RESPIRATION RATE: 18 BRPM | TEMPERATURE: 97.2 F | OXYGEN SATURATION: 97 % | DIASTOLIC BLOOD PRESSURE: 68 MMHG | HEIGHT: 63 IN | BODY MASS INDEX: 22.18 KG/M2 | SYSTOLIC BLOOD PRESSURE: 105 MMHG

## 2018-10-29 DIAGNOSIS — I10 ESSENTIAL HYPERTENSION: Primary | ICD-10-CM

## 2018-10-29 DIAGNOSIS — D64.9 ANEMIA, UNSPECIFIED TYPE: ICD-10-CM

## 2018-10-29 PROBLEM — R57.9 SHOCK CIRCULATORY (HCC): Status: RESOLVED | Noted: 2018-09-02 | Resolved: 2018-10-29

## 2018-10-29 PROBLEM — R50.9 FEVER IN ADULT: Status: RESOLVED | Noted: 2018-09-02 | Resolved: 2018-10-29

## 2018-10-29 PROBLEM — D62 ACUTE BLOOD LOSS AS CAUSE OF POSTOPERATIVE ANEMIA: Status: RESOLVED | Noted: 2018-09-02 | Resolved: 2018-10-29

## 2018-10-29 PROBLEM — J96.01 ACUTE RESPIRATORY FAILURE WITH HYPOXIA (HCC): Status: RESOLVED | Noted: 2018-09-02 | Resolved: 2018-10-29

## 2018-10-29 PROBLEM — T40.2X5A CONSTIPATION DUE TO OPIOID THERAPY: Status: RESOLVED | Noted: 2018-09-20 | Resolved: 2018-10-29

## 2018-10-29 PROBLEM — K59.03 CONSTIPATION DUE TO OPIOID THERAPY: Status: RESOLVED | Noted: 2018-09-20 | Resolved: 2018-10-29

## 2018-10-29 LAB
BASOPHILS ABSOLUTE: 0 K/UL (ref 0–0.2)
BASOPHILS RELATIVE PERCENT: 0.4 % (ref 0–1)
EOSINOPHILS ABSOLUTE: 0.3 K/UL (ref 0–0.6)
EOSINOPHILS RELATIVE PERCENT: 2.6 % (ref 0–5)
HCT VFR BLD CALC: 39.5 % (ref 37–47)
HEMOGLOBIN: 12.5 G/DL (ref 12–16)
LYMPHOCYTES ABSOLUTE: 3.8 K/UL (ref 1.1–4.5)
LYMPHOCYTES RELATIVE PERCENT: 37.6 % (ref 20–40)
MCH RBC QN AUTO: 28.8 PG (ref 27–31)
MCHC RBC AUTO-ENTMCNC: 31.6 G/DL (ref 33–37)
MCV RBC AUTO: 91 FL (ref 81–99)
MONOCYTES ABSOLUTE: 0.8 K/UL (ref 0–0.9)
MONOCYTES RELATIVE PERCENT: 8.1 % (ref 0–10)
NEUTROPHILS ABSOLUTE: 5.1 K/UL (ref 1.5–7.5)
NEUTROPHILS RELATIVE PERCENT: 50.3 % (ref 50–65)
PDW BLD-RTO: 14.5 % (ref 11.5–14.5)
PLATELET # BLD: 347 K/UL (ref 130–400)
PMV BLD AUTO: 11 FL (ref 9.4–12.3)
RBC # BLD: 4.34 M/UL (ref 4.2–5.4)
WBC # BLD: 10.1 K/UL (ref 4.8–10.8)

## 2018-10-29 PROCEDURE — 1101F PT FALLS ASSESS-DOCD LE1/YR: CPT | Performed by: FAMILY MEDICINE

## 2018-10-29 PROCEDURE — G8482 FLU IMMUNIZE ORDER/ADMIN: HCPCS | Performed by: FAMILY MEDICINE

## 2018-10-29 PROCEDURE — 99213 OFFICE O/P EST LOW 20 MIN: CPT | Performed by: FAMILY MEDICINE

## 2018-10-29 PROCEDURE — G8420 CALC BMI NORM PARAMETERS: HCPCS | Performed by: FAMILY MEDICINE

## 2018-10-29 PROCEDURE — 1090F PRES/ABSN URINE INCON ASSESS: CPT | Performed by: FAMILY MEDICINE

## 2018-10-29 PROCEDURE — 4040F PNEUMOC VAC/ADMIN/RCVD: CPT | Performed by: FAMILY MEDICINE

## 2018-10-29 PROCEDURE — 1036F TOBACCO NON-USER: CPT | Performed by: FAMILY MEDICINE

## 2018-10-29 PROCEDURE — 36415 COLL VENOUS BLD VENIPUNCTURE: CPT | Performed by: FAMILY MEDICINE

## 2018-10-29 PROCEDURE — G8427 DOCREV CUR MEDS BY ELIG CLIN: HCPCS | Performed by: FAMILY MEDICINE

## 2018-10-29 PROCEDURE — 3017F COLORECTAL CA SCREEN DOC REV: CPT | Performed by: FAMILY MEDICINE

## 2018-10-29 PROCEDURE — G8400 PT W/DXA NO RESULTS DOC: HCPCS | Performed by: FAMILY MEDICINE

## 2018-10-29 PROCEDURE — G8598 ASA/ANTIPLAT THER USED: HCPCS | Performed by: FAMILY MEDICINE

## 2018-10-29 PROCEDURE — 1123F ACP DISCUSS/DSCN MKR DOCD: CPT | Performed by: FAMILY MEDICINE

## 2018-10-29 ASSESSMENT — ENCOUNTER SYMPTOMS
GASTROINTESTINAL NEGATIVE: 1
RESPIRATORY NEGATIVE: 1

## 2018-11-12 ENCOUNTER — OFFICE VISIT (OUTPATIENT)
Dept: CARDIOLOGY | Age: 68
End: 2018-11-12
Payer: MEDICARE

## 2018-11-12 VITALS
BODY MASS INDEX: 22.64 KG/M2 | DIASTOLIC BLOOD PRESSURE: 64 MMHG | HEIGHT: 63 IN | SYSTOLIC BLOOD PRESSURE: 118 MMHG | WEIGHT: 127.8 LBS | OXYGEN SATURATION: 98 % | HEART RATE: 48 BPM

## 2018-11-12 DIAGNOSIS — E78.2 MIXED HYPERLIPIDEMIA: ICD-10-CM

## 2018-11-12 DIAGNOSIS — I71.40 ABDOMINAL AORTIC ANEURYSM (AAA) WITHOUT RUPTURE: ICD-10-CM

## 2018-11-12 DIAGNOSIS — I48.91 ATRIAL FIBRILLATION WITH RAPID VENTRICULAR RESPONSE (HCC): ICD-10-CM

## 2018-11-12 DIAGNOSIS — N18.30 CKD (CHRONIC KIDNEY DISEASE), STAGE III (HCC): ICD-10-CM

## 2018-11-12 DIAGNOSIS — I48.0 PAF (PAROXYSMAL ATRIAL FIBRILLATION) (HCC): Primary | ICD-10-CM

## 2018-11-12 DIAGNOSIS — I10 ESSENTIAL HYPERTENSION: ICD-10-CM

## 2018-11-12 LAB — TSH SERPL DL<=0.05 MIU/L-ACNC: 3.55 UIU/ML (ref 0.27–4.2)

## 2018-11-12 PROCEDURE — 1036F TOBACCO NON-USER: CPT | Performed by: NURSE PRACTITIONER

## 2018-11-12 PROCEDURE — G8420 CALC BMI NORM PARAMETERS: HCPCS | Performed by: NURSE PRACTITIONER

## 2018-11-12 PROCEDURE — 1090F PRES/ABSN URINE INCON ASSESS: CPT | Performed by: NURSE PRACTITIONER

## 2018-11-12 PROCEDURE — 1123F ACP DISCUSS/DSCN MKR DOCD: CPT | Performed by: NURSE PRACTITIONER

## 2018-11-12 PROCEDURE — 93000 ELECTROCARDIOGRAM COMPLETE: CPT | Performed by: NURSE PRACTITIONER

## 2018-11-12 PROCEDURE — G8598 ASA/ANTIPLAT THER USED: HCPCS | Performed by: NURSE PRACTITIONER

## 2018-11-12 PROCEDURE — 3017F COLORECTAL CA SCREEN DOC REV: CPT | Performed by: NURSE PRACTITIONER

## 2018-11-12 PROCEDURE — 99213 OFFICE O/P EST LOW 20 MIN: CPT | Performed by: NURSE PRACTITIONER

## 2018-11-12 PROCEDURE — G8427 DOCREV CUR MEDS BY ELIG CLIN: HCPCS | Performed by: NURSE PRACTITIONER

## 2018-11-12 PROCEDURE — G8482 FLU IMMUNIZE ORDER/ADMIN: HCPCS | Performed by: NURSE PRACTITIONER

## 2018-11-12 PROCEDURE — G8400 PT W/DXA NO RESULTS DOC: HCPCS | Performed by: NURSE PRACTITIONER

## 2018-11-12 PROCEDURE — 1101F PT FALLS ASSESS-DOCD LE1/YR: CPT | Performed by: NURSE PRACTITIONER

## 2018-11-12 PROCEDURE — 4040F PNEUMOC VAC/ADMIN/RCVD: CPT | Performed by: NURSE PRACTITIONER

## 2018-11-12 RX ORDER — BENAZEPRIL HYDROCHLORIDE 10 MG/1
20 TABLET ORAL DAILY
Qty: 30 TABLET | Refills: 3 | Status: SHIPPED | OUTPATIENT
Start: 2018-11-12 | End: 2018-12-05 | Stop reason: SDUPTHER

## 2018-11-12 RX ORDER — ATENOLOL 50 MG/1
25 TABLET ORAL 2 TIMES DAILY
Qty: 180 TABLET | Refills: 3 | Status: SHIPPED | OUTPATIENT
Start: 2018-11-12 | End: 2019-01-08 | Stop reason: ALTCHOICE

## 2018-11-12 RX ORDER — AMIODARONE HYDROCHLORIDE 200 MG/1
100 TABLET ORAL DAILY
Qty: 30 TABLET | Refills: 3 | Status: SHIPPED | OUTPATIENT
Start: 2018-11-12 | End: 2019-01-08 | Stop reason: ALTCHOICE

## 2018-11-12 NOTE — PATIENT INSTRUCTIONS
Decrease amiodarone from 200 mg daily to 100 mg daily  Decrease atenolol from 50 mg twice daily  to 25 mg twice daily   Increase Lotensin from 10 mg daily to 20 mg daily     Completing a Blood Pressure Log    Your doctor has recommended completing a blood pressure log to see what your blood pressure runs at home. Managing your blood pressure can be very beneficial in your overall health. Listed below are a few tips and instructions on how to check your blood pressure correctly. · Always check your blood pressure AFTER taking all blood pressure medications. Waiting about 2 hours gives the best results on how your medication is working. · Before checking your blood pressure come inside, sit comfortably for 5 to 10 minutes and then check your blood pressure. Your arm should rest comfortably, sit up straight with your back against the chair, legs uncrossed and feet touching the floor. · Your blood pressure will typically run higher when you have recently been excercising, smoking, or drinking caffiene and can give inaccurate readings, try to wait 30 minutes before checking your blood pressure. · Check your blood pressure in the mornings and at night for  3 days a week and write it down. After 2 to 3 weeks of gathering readings you can call, e-mail, or fax in your results. Our office number is [phone], our fax number is [phone], and you can always e-mail us via [employer] to send your results directly to your Doctor.

## 2018-11-19 ENCOUNTER — TELEPHONE (OUTPATIENT)
Dept: CARDIOLOGY | Age: 68
End: 2018-11-19

## 2018-12-05 DIAGNOSIS — I10 ESSENTIAL HYPERTENSION: Primary | ICD-10-CM

## 2018-12-05 RX ORDER — BENAZEPRIL HYDROCHLORIDE 20 MG/1
20 TABLET ORAL DAILY
Qty: 90 TABLET | Refills: 3 | Status: ON HOLD | OUTPATIENT
Start: 2018-12-05 | End: 2019-05-24 | Stop reason: HOSPADM

## 2018-12-30 DIAGNOSIS — R15.9 INCONTINENCE OF FECES WITH FECAL URGENCY: ICD-10-CM

## 2018-12-30 DIAGNOSIS — R15.2 INCONTINENCE OF FECES WITH FECAL URGENCY: ICD-10-CM

## 2018-12-30 DIAGNOSIS — K58.0 IRRITABLE BOWEL SYNDROME WITH DIARRHEA: ICD-10-CM

## 2018-12-31 RX ORDER — DICYCLOMINE HCL 20 MG
TABLET ORAL
Qty: 120 TABLET | Refills: 0 | Status: SHIPPED | OUTPATIENT
Start: 2018-12-31 | End: 2019-04-14 | Stop reason: SDUPTHER

## 2019-01-03 ENCOUNTER — HOSPITAL ENCOUNTER (OUTPATIENT)
Dept: VASCULAR LAB | Age: 69
Discharge: HOME | End: 2019-01-03
Payer: MEDICARE

## 2019-01-03 ENCOUNTER — OFFICE VISIT (OUTPATIENT)
Dept: VASCULAR SURGERY | Age: 69
End: 2019-01-03
Payer: MEDICARE

## 2019-01-03 VITALS
OXYGEN SATURATION: 98 % | HEART RATE: 47 BPM | DIASTOLIC BLOOD PRESSURE: 70 MMHG | RESPIRATION RATE: 18 BRPM | SYSTOLIC BLOOD PRESSURE: 124 MMHG

## 2019-01-03 DIAGNOSIS — I73.9 PVD (PERIPHERAL VASCULAR DISEASE) (HCC): ICD-10-CM

## 2019-01-03 DIAGNOSIS — I71.40 ABDOMINAL AORTIC ANEURYSM (AAA) WITHOUT RUPTURE: ICD-10-CM

## 2019-01-03 DIAGNOSIS — I65.23 BILATERAL CAROTID ARTERY STENOSIS: Primary | ICD-10-CM

## 2019-01-03 PROCEDURE — 3017F COLORECTAL CA SCREEN DOC REV: CPT | Performed by: NURSE PRACTITIONER

## 2019-01-03 PROCEDURE — G8420 CALC BMI NORM PARAMETERS: HCPCS | Performed by: NURSE PRACTITIONER

## 2019-01-03 PROCEDURE — 1090F PRES/ABSN URINE INCON ASSESS: CPT | Performed by: NURSE PRACTITIONER

## 2019-01-03 PROCEDURE — 99212 OFFICE O/P EST SF 10 MIN: CPT | Performed by: NURSE PRACTITIONER

## 2019-01-03 PROCEDURE — G8482 FLU IMMUNIZE ORDER/ADMIN: HCPCS | Performed by: NURSE PRACTITIONER

## 2019-01-03 PROCEDURE — 1123F ACP DISCUSS/DSCN MKR DOCD: CPT | Performed by: NURSE PRACTITIONER

## 2019-01-03 PROCEDURE — 93923 UPR/LXTR ART STDY 3+ LVLS: CPT

## 2019-01-03 PROCEDURE — 1036F TOBACCO NON-USER: CPT | Performed by: NURSE PRACTITIONER

## 2019-01-03 PROCEDURE — G8400 PT W/DXA NO RESULTS DOC: HCPCS | Performed by: NURSE PRACTITIONER

## 2019-01-03 PROCEDURE — 4040F PNEUMOC VAC/ADMIN/RCVD: CPT | Performed by: NURSE PRACTITIONER

## 2019-01-03 PROCEDURE — 1101F PT FALLS ASSESS-DOCD LE1/YR: CPT | Performed by: NURSE PRACTITIONER

## 2019-01-03 PROCEDURE — G8427 DOCREV CUR MEDS BY ELIG CLIN: HCPCS | Performed by: NURSE PRACTITIONER

## 2019-01-03 PROCEDURE — G8598 ASA/ANTIPLAT THER USED: HCPCS | Performed by: NURSE PRACTITIONER

## 2019-01-04 ENCOUNTER — TELEPHONE (OUTPATIENT)
Dept: VASCULAR SURGERY | Age: 69
End: 2019-01-04

## 2019-01-04 PROBLEM — I73.9 PVD (PERIPHERAL VASCULAR DISEASE) (HCC): Status: ACTIVE | Noted: 2019-01-04

## 2019-01-07 RX ORDER — BENAZEPRIL HYDROCHLORIDE 10 MG/1
TABLET ORAL
Qty: 30 TABLET | Refills: 0 | Status: SHIPPED | OUTPATIENT
Start: 2019-01-07 | End: 2019-01-08 | Stop reason: DRUGHIGH

## 2019-01-08 ENCOUNTER — OFFICE VISIT (OUTPATIENT)
Dept: CARDIOLOGY | Age: 69
End: 2019-01-08
Payer: MEDICARE

## 2019-01-08 VITALS
HEART RATE: 48 BPM | HEIGHT: 63 IN | SYSTOLIC BLOOD PRESSURE: 122 MMHG | WEIGHT: 135 LBS | BODY MASS INDEX: 23.92 KG/M2 | DIASTOLIC BLOOD PRESSURE: 86 MMHG

## 2019-01-08 DIAGNOSIS — N18.30 CKD (CHRONIC KIDNEY DISEASE), STAGE III (HCC): ICD-10-CM

## 2019-01-08 DIAGNOSIS — R53.83 OTHER FATIGUE: ICD-10-CM

## 2019-01-08 DIAGNOSIS — I71.40 ABDOMINAL AORTIC ANEURYSM (AAA) WITHOUT RUPTURE: ICD-10-CM

## 2019-01-08 DIAGNOSIS — I48.0 PAF (PAROXYSMAL ATRIAL FIBRILLATION) (HCC): Primary | ICD-10-CM

## 2019-01-08 DIAGNOSIS — R94.31 ABNORMAL EKG: ICD-10-CM

## 2019-01-08 DIAGNOSIS — I10 ESSENTIAL HYPERTENSION: ICD-10-CM

## 2019-01-08 PROCEDURE — 1090F PRES/ABSN URINE INCON ASSESS: CPT | Performed by: NURSE PRACTITIONER

## 2019-01-08 PROCEDURE — 1036F TOBACCO NON-USER: CPT | Performed by: NURSE PRACTITIONER

## 2019-01-08 PROCEDURE — G8598 ASA/ANTIPLAT THER USED: HCPCS | Performed by: NURSE PRACTITIONER

## 2019-01-08 PROCEDURE — 93000 ELECTROCARDIOGRAM COMPLETE: CPT | Performed by: NURSE PRACTITIONER

## 2019-01-08 PROCEDURE — 1123F ACP DISCUSS/DSCN MKR DOCD: CPT | Performed by: NURSE PRACTITIONER

## 2019-01-08 PROCEDURE — G8427 DOCREV CUR MEDS BY ELIG CLIN: HCPCS | Performed by: NURSE PRACTITIONER

## 2019-01-08 PROCEDURE — 4040F PNEUMOC VAC/ADMIN/RCVD: CPT | Performed by: NURSE PRACTITIONER

## 2019-01-08 PROCEDURE — 3017F COLORECTAL CA SCREEN DOC REV: CPT | Performed by: NURSE PRACTITIONER

## 2019-01-08 PROCEDURE — G8420 CALC BMI NORM PARAMETERS: HCPCS | Performed by: NURSE PRACTITIONER

## 2019-01-08 PROCEDURE — 99213 OFFICE O/P EST LOW 20 MIN: CPT | Performed by: NURSE PRACTITIONER

## 2019-01-08 PROCEDURE — G8400 PT W/DXA NO RESULTS DOC: HCPCS | Performed by: NURSE PRACTITIONER

## 2019-01-08 PROCEDURE — 1101F PT FALLS ASSESS-DOCD LE1/YR: CPT | Performed by: NURSE PRACTITIONER

## 2019-01-08 PROCEDURE — G8482 FLU IMMUNIZE ORDER/ADMIN: HCPCS | Performed by: NURSE PRACTITIONER

## 2019-01-08 RX ORDER — METOPROLOL SUCCINATE 25 MG/1
12.5 TABLET, EXTENDED RELEASE ORAL DAILY
Qty: 30 TABLET | Refills: 3 | Status: SHIPPED | OUTPATIENT
Start: 2019-01-08 | End: 2019-05-13 | Stop reason: SDUPTHER

## 2019-01-09 DIAGNOSIS — I48.0 PAF (PAROXYSMAL ATRIAL FIBRILLATION) (HCC): ICD-10-CM

## 2019-01-09 DIAGNOSIS — I10 ESSENTIAL HYPERTENSION: Primary | ICD-10-CM

## 2019-01-09 DIAGNOSIS — G45.9 TIA (TRANSIENT ISCHEMIC ATTACK): ICD-10-CM

## 2019-01-09 DIAGNOSIS — I71.40 ABDOMINAL AORTIC ANEURYSM (AAA) WITHOUT RUPTURE: ICD-10-CM

## 2019-01-09 DIAGNOSIS — I73.9 PVD (PERIPHERAL VASCULAR DISEASE) (HCC): ICD-10-CM

## 2019-01-10 ENCOUNTER — HOSPITAL ENCOUNTER (OUTPATIENT)
Dept: NON INVASIVE DIAGNOSTICS | Age: 69
Discharge: HOME | End: 2019-01-10
Payer: MEDICARE

## 2019-01-10 PROCEDURE — 93017 CV STRESS TEST TRACING ONLY: CPT

## 2019-02-07 ENCOUNTER — OFFICE VISIT (OUTPATIENT)
Dept: CARDIOLOGY | Age: 69
End: 2019-02-07
Payer: MEDICARE

## 2019-02-07 VITALS
HEIGHT: 63 IN | HEART RATE: 58 BPM | DIASTOLIC BLOOD PRESSURE: 68 MMHG | BODY MASS INDEX: 24.45 KG/M2 | SYSTOLIC BLOOD PRESSURE: 138 MMHG | WEIGHT: 138 LBS

## 2019-02-07 DIAGNOSIS — I48.0 PAF (PAROXYSMAL ATRIAL FIBRILLATION) (HCC): ICD-10-CM

## 2019-02-07 DIAGNOSIS — I71.40 ABDOMINAL AORTIC ANEURYSM (AAA) WITHOUT RUPTURE: Primary | ICD-10-CM

## 2019-02-07 DIAGNOSIS — I10 ESSENTIAL HYPERTENSION: ICD-10-CM

## 2019-02-07 PROCEDURE — G8482 FLU IMMUNIZE ORDER/ADMIN: HCPCS | Performed by: NURSE PRACTITIONER

## 2019-02-07 PROCEDURE — 99213 OFFICE O/P EST LOW 20 MIN: CPT | Performed by: NURSE PRACTITIONER

## 2019-02-07 PROCEDURE — 1036F TOBACCO NON-USER: CPT | Performed by: NURSE PRACTITIONER

## 2019-02-07 PROCEDURE — 4040F PNEUMOC VAC/ADMIN/RCVD: CPT | Performed by: NURSE PRACTITIONER

## 2019-02-07 PROCEDURE — G8400 PT W/DXA NO RESULTS DOC: HCPCS | Performed by: NURSE PRACTITIONER

## 2019-02-07 PROCEDURE — 1123F ACP DISCUSS/DSCN MKR DOCD: CPT | Performed by: NURSE PRACTITIONER

## 2019-02-07 PROCEDURE — 3017F COLORECTAL CA SCREEN DOC REV: CPT | Performed by: NURSE PRACTITIONER

## 2019-02-07 PROCEDURE — G8427 DOCREV CUR MEDS BY ELIG CLIN: HCPCS | Performed by: NURSE PRACTITIONER

## 2019-02-07 PROCEDURE — 1101F PT FALLS ASSESS-DOCD LE1/YR: CPT | Performed by: NURSE PRACTITIONER

## 2019-02-07 PROCEDURE — G8598 ASA/ANTIPLAT THER USED: HCPCS | Performed by: NURSE PRACTITIONER

## 2019-02-07 PROCEDURE — 93000 ELECTROCARDIOGRAM COMPLETE: CPT | Performed by: NURSE PRACTITIONER

## 2019-02-07 PROCEDURE — G8420 CALC BMI NORM PARAMETERS: HCPCS | Performed by: NURSE PRACTITIONER

## 2019-02-07 PROCEDURE — 1090F PRES/ABSN URINE INCON ASSESS: CPT | Performed by: NURSE PRACTITIONER

## 2019-02-25 DIAGNOSIS — I71.40 ABDOMINAL AORTIC ANEURYSM (AAA) WITHOUT RUPTURE: ICD-10-CM

## 2019-02-25 RX ORDER — FLUOXETINE HYDROCHLORIDE 20 MG/1
CAPSULE ORAL
Qty: 30 CAPSULE | Refills: 5 | Status: SHIPPED | OUTPATIENT
Start: 2019-02-25 | End: 2019-09-21 | Stop reason: SDUPTHER

## 2019-03-07 ENCOUNTER — TELEPHONE (OUTPATIENT)
Dept: PRIMARY CARE CLINIC | Age: 69
End: 2019-03-07

## 2019-03-07 RX ORDER — CLONIDINE HYDROCHLORIDE 0.1 MG/1
0.1 TABLET ORAL DAILY PRN
Qty: 90 TABLET | Refills: 3 | Status: SHIPPED | OUTPATIENT
Start: 2019-03-07 | End: 2021-02-23

## 2019-03-07 RX ORDER — CLOPIDOGREL BISULFATE 75 MG/1
75 TABLET ORAL DAILY
Qty: 90 TABLET | Refills: 3 | Status: SHIPPED | OUTPATIENT
Start: 2019-03-07 | End: 2020-03-30

## 2019-03-25 RX ORDER — PANTOPRAZOLE SODIUM 40 MG/1
TABLET, DELAYED RELEASE ORAL
Qty: 90 TABLET | Refills: 3 | Status: SHIPPED | OUTPATIENT
Start: 2019-03-25 | End: 2020-03-09

## 2019-04-08 ENCOUNTER — TELEPHONE (OUTPATIENT)
Dept: CARDIOLOGY | Age: 69
End: 2019-04-08

## 2019-04-08 NOTE — TELEPHONE ENCOUNTER
Date: TBD after clearance    Cardiologist: Rohini    Procedure: cataract    Surgeon: Octaviano Booth    Last Office Visit: 2/7/19  Reason for office visit and medical concerns addressed at this office visit: AAA (had issues after surgery 11/12/18), carotid artery stenosis, TIA, HTN, PVD, Hyperlipidemia    Testing Performed and Date of Service:  Echo stress test, 1/10/19, EKG 2/7/19    RCRI = 1pt , TIA- Low risk at 0.9% ( wanted to be sure what you think, seems like she has a lot going on outside of just cardiac)  METs have left a message for pt to call me back to review    Current Medications: Plavix, Protonix, Catapres, Prozac, Lotensin, Toprol xl, Bentyl, Valium, Zofran, Zocor    Is the patient currently taking an anticoagulant? If so, what is the diagnosis the patient has been given to warrant the need for the anticoagulant? Plavix (Dr Rex Cotter, PVD?)    Additional Notes: Request for cardiac risk only.

## 2019-04-14 DIAGNOSIS — R15.2 INCONTINENCE OF FECES WITH FECAL URGENCY: ICD-10-CM

## 2019-04-14 DIAGNOSIS — R15.9 INCONTINENCE OF FECES WITH FECAL URGENCY: ICD-10-CM

## 2019-04-14 DIAGNOSIS — K58.0 IRRITABLE BOWEL SYNDROME WITH DIARRHEA: ICD-10-CM

## 2019-04-15 RX ORDER — DICYCLOMINE HCL 20 MG
TABLET ORAL
Qty: 120 TABLET | Refills: 0 | Status: ON HOLD | OUTPATIENT
Start: 2019-04-15 | End: 2019-05-17 | Stop reason: SDUPTHER

## 2019-05-07 RX ORDER — SIMVASTATIN 40 MG
TABLET ORAL
Qty: 90 TABLET | Refills: 3 | Status: SHIPPED | OUTPATIENT
Start: 2019-05-07 | End: 2020-01-29

## 2019-05-13 RX ORDER — METOPROLOL SUCCINATE 25 MG/1
TABLET, EXTENDED RELEASE ORAL
Qty: 45 TABLET | Refills: 0 | Status: SHIPPED | OUTPATIENT
Start: 2019-05-13 | End: 2019-06-12 | Stop reason: SDUPTHER

## 2019-05-17 DIAGNOSIS — R15.9 INCONTINENCE OF FECES WITH FECAL URGENCY: ICD-10-CM

## 2019-05-17 DIAGNOSIS — K58.0 IRRITABLE BOWEL SYNDROME WITH DIARRHEA: ICD-10-CM

## 2019-05-17 DIAGNOSIS — R15.2 INCONTINENCE OF FECES WITH FECAL URGENCY: ICD-10-CM

## 2019-05-18 ENCOUNTER — APPOINTMENT (OUTPATIENT)
Dept: GENERAL RADIOLOGY | Age: 69
DRG: 392 | End: 2019-05-18
Payer: MEDICARE

## 2019-05-18 ENCOUNTER — APPOINTMENT (OUTPATIENT)
Dept: CT IMAGING | Age: 69
DRG: 392 | End: 2019-05-18
Payer: MEDICARE

## 2019-05-18 ENCOUNTER — HOSPITAL ENCOUNTER (INPATIENT)
Age: 69
LOS: 6 days | Discharge: HOME | DRG: 392 | End: 2019-05-24
Attending: EMERGENCY MEDICINE | Admitting: INTERNAL MEDICINE
Payer: MEDICARE

## 2019-05-18 DIAGNOSIS — K52.9 COLITIS: ICD-10-CM

## 2019-05-18 DIAGNOSIS — R11.10 VOMITING AND DIARRHEA: ICD-10-CM

## 2019-05-18 DIAGNOSIS — R93.89 ABNORMAL CT SCAN: Primary | ICD-10-CM

## 2019-05-18 DIAGNOSIS — R07.9 CHEST PAIN, UNSPECIFIED TYPE: ICD-10-CM

## 2019-05-18 DIAGNOSIS — R55 SYNCOPE, UNSPECIFIED SYNCOPE TYPE: ICD-10-CM

## 2019-05-18 DIAGNOSIS — R19.7 VOMITING AND DIARRHEA: ICD-10-CM

## 2019-05-18 LAB
ALBUMIN SERPL-MCNC: 3.5 G/DL (ref 3.5–5.2)
ALP BLD-CCNC: 118 U/L (ref 35–104)
ALT SERPL-CCNC: 8 U/L (ref 5–33)
ANION GAP SERPL CALCULATED.3IONS-SCNC: 11 MMOL/L (ref 7–19)
AST SERPL-CCNC: 14 U/L (ref 5–32)
BILIRUB SERPL-MCNC: <0.2 MG/DL (ref 0.2–1.2)
BILIRUBIN URINE: NEGATIVE
BLOOD, URINE: NEGATIVE
BUN BLDV-MCNC: 21 MG/DL (ref 8–23)
CALCIUM SERPL-MCNC: 8.5 MG/DL (ref 8.8–10.2)
CHLORIDE BLD-SCNC: 112 MMOL/L (ref 98–111)
CLARITY: CLEAR
CO2: 22 MMOL/L (ref 22–29)
COLOR: YELLOW
CREAT SERPL-MCNC: 1.2 MG/DL (ref 0.5–0.9)
D DIMER: 16.38 UG/ML FEU (ref 0–0.48)
GFR NON-AFRICAN AMERICAN: 45
GLUCOSE BLD-MCNC: 112 MG/DL (ref 74–109)
GLUCOSE URINE: NEGATIVE MG/DL
HCT VFR BLD CALC: 35.1 % (ref 37–47)
HEMOGLOBIN: 11.1 G/DL (ref 12–16)
KETONES, URINE: NEGATIVE MG/DL
LACTIC ACID: 1.8 MMOL/L (ref 0.5–1.9)
LEUKOCYTE ESTERASE, URINE: NEGATIVE
LIPASE: 27 U/L (ref 13–60)
LV EF: 60 %
LVEF MODALITY: NORMAL
MCH RBC QN AUTO: 29.7 PG (ref 27–31)
MCHC RBC AUTO-ENTMCNC: 31.6 G/DL (ref 33–37)
MCV RBC AUTO: 93.9 FL (ref 81–99)
NITRITE, URINE: NEGATIVE
PDW BLD-RTO: 13.6 % (ref 11.5–14.5)
PH UA: 6 (ref 5–8)
PLATELET # BLD: 273 K/UL (ref 130–400)
PMV BLD AUTO: 10.5 FL (ref 9.4–12.3)
POTASSIUM SERPL-SCNC: 4.9 MMOL/L (ref 3.5–5)
PROTEIN UA: NEGATIVE MG/DL
RBC # BLD: 3.74 M/UL (ref 4.2–5.4)
SODIUM BLD-SCNC: 145 MMOL/L (ref 136–145)
SPECIFIC GRAVITY UA: >1.045 (ref 1–1.03)
TOTAL PROTEIN: 5.9 G/DL (ref 6.6–8.7)
TROPONIN: <0.01 NG/ML (ref 0–0.03)
URINE REFLEX TO CULTURE: NORMAL
UROBILINOGEN, URINE: 0.2 E.U./DL
WBC # BLD: 9.7 K/UL (ref 4.8–10.8)

## 2019-05-18 PROCEDURE — 93306 TTE W/DOPPLER COMPLETE: CPT

## 2019-05-18 PROCEDURE — 2500000003 HC RX 250 WO HCPCS: Performed by: INTERNAL MEDICINE

## 2019-05-18 PROCEDURE — 99223 1ST HOSP IP/OBS HIGH 75: CPT | Performed by: INTERNAL MEDICINE

## 2019-05-18 PROCEDURE — 87899 AGENT NOS ASSAY W/OPTIC: CPT

## 2019-05-18 PROCEDURE — 1210000000 HC MED SURG R&B

## 2019-05-18 PROCEDURE — 71275 CT ANGIOGRAPHY CHEST: CPT

## 2019-05-18 PROCEDURE — 87015 SPECIMEN INFECT AGNT CONCNTJ: CPT

## 2019-05-18 PROCEDURE — 93970 EXTREMITY STUDY: CPT

## 2019-05-18 PROCEDURE — 74174 CTA ABD&PLVS W/CONTRAST: CPT

## 2019-05-18 PROCEDURE — 36415 COLL VENOUS BLD VENIPUNCTURE: CPT

## 2019-05-18 PROCEDURE — 87045 FECES CULTURE AEROBIC BACT: CPT

## 2019-05-18 PROCEDURE — 87046 STOOL CULTR AEROBIC BACT EA: CPT

## 2019-05-18 PROCEDURE — 2500000003 HC RX 250 WO HCPCS: Performed by: EMERGENCY MEDICINE

## 2019-05-18 PROCEDURE — 6360000002 HC RX W HCPCS: Performed by: EMERGENCY MEDICINE

## 2019-05-18 PROCEDURE — 81003 URINALYSIS AUTO W/O SCOPE: CPT

## 2019-05-18 PROCEDURE — 6370000000 HC RX 637 (ALT 250 FOR IP): Performed by: INTERNAL MEDICINE

## 2019-05-18 PROCEDURE — 85379 FIBRIN DEGRADATION QUANT: CPT

## 2019-05-18 PROCEDURE — 83690 ASSAY OF LIPASE: CPT

## 2019-05-18 PROCEDURE — 99285 EMERGENCY DEPT VISIT HI MDM: CPT

## 2019-05-18 PROCEDURE — 84484 ASSAY OF TROPONIN QUANT: CPT

## 2019-05-18 PROCEDURE — 87427 SHIGA-LIKE TOXIN AG IA: CPT

## 2019-05-18 PROCEDURE — 70450 CT HEAD/BRAIN W/O DYE: CPT

## 2019-05-18 PROCEDURE — 96374 THER/PROPH/DIAG INJ IV PUSH: CPT

## 2019-05-18 PROCEDURE — 87040 BLOOD CULTURE FOR BACTERIA: CPT

## 2019-05-18 PROCEDURE — 83605 ASSAY OF LACTIC ACID: CPT

## 2019-05-18 PROCEDURE — 93005 ELECTROCARDIOGRAM TRACING: CPT

## 2019-05-18 PROCEDURE — 6360000004 HC RX CONTRAST MEDICATION: Performed by: EMERGENCY MEDICINE

## 2019-05-18 PROCEDURE — 80053 COMPREHEN METABOLIC PANEL: CPT

## 2019-05-18 PROCEDURE — 85027 COMPLETE CBC AUTOMATED: CPT

## 2019-05-18 PROCEDURE — 99221 1ST HOSP IP/OBS SF/LOW 40: CPT | Performed by: INTERNAL MEDICINE

## 2019-05-18 PROCEDURE — 99285 EMERGENCY DEPT VISIT HI MDM: CPT | Performed by: EMERGENCY MEDICINE

## 2019-05-18 PROCEDURE — 2580000003 HC RX 258: Performed by: INTERNAL MEDICINE

## 2019-05-18 PROCEDURE — 6360000002 HC RX W HCPCS: Performed by: INTERNAL MEDICINE

## 2019-05-18 PROCEDURE — 71045 X-RAY EXAM CHEST 1 VIEW: CPT

## 2019-05-18 RX ORDER — PREDNISOLONE ACETATE 10 MG/ML
1 SUSPENSION/ DROPS OPHTHALMIC 4 TIMES DAILY
COMMUNITY
End: 2019-06-05

## 2019-05-18 RX ORDER — FLUOXETINE HYDROCHLORIDE 20 MG/1
20 CAPSULE ORAL DAILY
Status: DISCONTINUED | OUTPATIENT
Start: 2019-05-19 | End: 2019-05-24 | Stop reason: HOSPADM

## 2019-05-18 RX ORDER — CLOPIDOGREL BISULFATE 75 MG/1
75 TABLET ORAL DAILY
Status: DISCONTINUED | OUTPATIENT
Start: 2019-05-18 | End: 2019-05-24 | Stop reason: HOSPADM

## 2019-05-18 RX ORDER — SODIUM CHLORIDE 0.9 % (FLUSH) 0.9 %
10 SYRINGE (ML) INJECTION EVERY 12 HOURS SCHEDULED
Status: DISCONTINUED | OUTPATIENT
Start: 2019-05-18 | End: 2019-05-24 | Stop reason: HOSPADM

## 2019-05-18 RX ORDER — CLONIDINE HYDROCHLORIDE 0.1 MG/1
0.1 TABLET ORAL DAILY PRN
Status: DISCONTINUED | OUTPATIENT
Start: 2019-05-18 | End: 2019-05-24 | Stop reason: HOSPADM

## 2019-05-18 RX ORDER — METOPROLOL SUCCINATE 25 MG/1
12.5 TABLET, EXTENDED RELEASE ORAL DAILY
Status: DISCONTINUED | OUTPATIENT
Start: 2019-05-19 | End: 2019-05-24 | Stop reason: HOSPADM

## 2019-05-18 RX ORDER — SIMVASTATIN 40 MG
40 TABLET ORAL EVERY EVENING
Status: DISCONTINUED | OUTPATIENT
Start: 2019-05-18 | End: 2019-05-24 | Stop reason: HOSPADM

## 2019-05-18 RX ORDER — MORPHINE SULFATE 4 MG/ML
4 INJECTION, SOLUTION INTRAMUSCULAR; INTRAVENOUS ONCE
Status: COMPLETED | OUTPATIENT
Start: 2019-05-18 | End: 2019-05-18

## 2019-05-18 RX ORDER — BENAZEPRIL HYDROCHLORIDE 20 MG/1
20 TABLET ORAL DAILY
Status: DISCONTINUED | OUTPATIENT
Start: 2019-05-19 | End: 2019-05-22

## 2019-05-18 RX ORDER — ACETAMINOPHEN 325 MG/1
650 TABLET ORAL EVERY 8 HOURS PRN
Status: DISCONTINUED | OUTPATIENT
Start: 2019-05-18 | End: 2019-05-24 | Stop reason: HOSPADM

## 2019-05-18 RX ORDER — MORPHINE SULFATE 2 MG/ML
2 INJECTION, SOLUTION INTRAMUSCULAR; INTRAVENOUS EVERY 4 HOURS PRN
Status: DISCONTINUED | OUTPATIENT
Start: 2019-05-18 | End: 2019-05-19

## 2019-05-18 RX ORDER — SODIUM CHLORIDE 0.9 % (FLUSH) 0.9 %
10 SYRINGE (ML) INJECTION PRN
Status: DISCONTINUED | OUTPATIENT
Start: 2019-05-18 | End: 2019-05-24 | Stop reason: HOSPADM

## 2019-05-18 RX ORDER — CIPROFLOXACIN 2 MG/ML
400 INJECTION, SOLUTION INTRAVENOUS ONCE
Status: COMPLETED | OUTPATIENT
Start: 2019-05-18 | End: 2019-05-18

## 2019-05-18 RX ORDER — PANTOPRAZOLE SODIUM 40 MG/1
40 TABLET, DELAYED RELEASE ORAL DAILY
Status: DISCONTINUED | OUTPATIENT
Start: 2019-05-19 | End: 2019-05-24 | Stop reason: HOSPADM

## 2019-05-18 RX ORDER — CIPROFLOXACIN 2 MG/ML
400 INJECTION, SOLUTION INTRAVENOUS EVERY 12 HOURS
Status: DISCONTINUED | OUTPATIENT
Start: 2019-05-18 | End: 2019-05-24 | Stop reason: HOSPADM

## 2019-05-18 RX ORDER — TOBRAMYCIN 3 MG/ML
1 SOLUTION/ DROPS OPHTHALMIC 3 TIMES DAILY
COMMUNITY
End: 2019-06-05

## 2019-05-18 RX ORDER — SENNA PLUS 8.6 MG/1
1 TABLET ORAL DAILY PRN
Status: DISCONTINUED | OUTPATIENT
Start: 2019-05-18 | End: 2019-05-24 | Stop reason: HOSPADM

## 2019-05-18 RX ORDER — ONDANSETRON 2 MG/ML
4 INJECTION INTRAMUSCULAR; INTRAVENOUS EVERY 6 HOURS PRN
Status: DISCONTINUED | OUTPATIENT
Start: 2019-05-18 | End: 2019-05-24 | Stop reason: HOSPADM

## 2019-05-18 RX ORDER — SODIUM CHLORIDE, SODIUM LACTATE, POTASSIUM CHLORIDE, CALCIUM CHLORIDE 600; 310; 30; 20 MG/100ML; MG/100ML; MG/100ML; MG/100ML
INJECTION, SOLUTION INTRAVENOUS CONTINUOUS
Status: DISCONTINUED | OUTPATIENT
Start: 2019-05-18 | End: 2019-05-24 | Stop reason: HOSPADM

## 2019-05-18 RX ADMIN — CLOPIDOGREL BISULFATE 75 MG: 75 TABLET ORAL at 22:07

## 2019-05-18 RX ADMIN — CIPROFLOXACIN 400 MG: 2 INJECTION, SOLUTION INTRAVENOUS at 10:46

## 2019-05-18 RX ADMIN — SODIUM CHLORIDE, POTASSIUM CHLORIDE, SODIUM LACTATE AND CALCIUM CHLORIDE: 600; 310; 30; 20 INJECTION, SOLUTION INTRAVENOUS at 14:49

## 2019-05-18 RX ADMIN — Medication 10 ML: at 11:45

## 2019-05-18 RX ADMIN — CIPROFLOXACIN 400 MG: 2 INJECTION, SOLUTION INTRAVENOUS at 23:44

## 2019-05-18 RX ADMIN — Medication 40 MG: at 20:31

## 2019-05-18 RX ADMIN — MORPHINE SULFATE 2 MG: 2 INJECTION, SOLUTION INTRAMUSCULAR; INTRAVENOUS at 22:17

## 2019-05-18 RX ADMIN — ACETAMINOPHEN 650 MG: 325 TABLET ORAL at 16:46

## 2019-05-18 RX ADMIN — METRONIDAZOLE 500 MG: 500 INJECTION, SOLUTION INTRAVENOUS at 11:44

## 2019-05-18 RX ADMIN — SODIUM CHLORIDE, POTASSIUM CHLORIDE, SODIUM LACTATE AND CALCIUM CHLORIDE: 600; 310; 30; 20 INJECTION, SOLUTION INTRAVENOUS at 22:11

## 2019-05-18 RX ADMIN — METRONIDAZOLE 500 MG: 500 INJECTION, SOLUTION INTRAVENOUS at 22:07

## 2019-05-18 RX ADMIN — IOPAMIDOL 90 ML: 755 INJECTION, SOLUTION INTRAVENOUS at 08:55

## 2019-05-18 RX ADMIN — ENOXAPARIN SODIUM 40 MG: 40 INJECTION SUBCUTANEOUS at 14:49

## 2019-05-18 RX ADMIN — MORPHINE SULFATE 4 MG: 4 INJECTION INTRAVENOUS at 08:48

## 2019-05-18 ASSESSMENT — ENCOUNTER SYMPTOMS
DIARRHEA: 1
NAUSEA: 1
EYE PAIN: 0
VOMITING: 1
SHORTNESS OF BREATH: 0
ABDOMINAL PAIN: 1
BLOOD IN STOOL: 0

## 2019-05-18 ASSESSMENT — PAIN SCALES - GENERAL
PAINLEVEL_OUTOF10: 8
PAINLEVEL_OUTOF10: 4
PAINLEVEL_OUTOF10: 8

## 2019-05-18 ASSESSMENT — PAIN DESCRIPTION - ORIENTATION
ORIENTATION: LEFT;LOWER
ORIENTATION: LEFT;LOWER

## 2019-05-18 ASSESSMENT — PAIN DESCRIPTION - PAIN TYPE
TYPE: ACUTE PAIN
TYPE: ACUTE PAIN

## 2019-05-18 ASSESSMENT — PAIN DESCRIPTION - DESCRIPTORS
DESCRIPTORS: CRAMPING
DESCRIPTORS: ACHING

## 2019-05-18 ASSESSMENT — PAIN DESCRIPTION - LOCATION
LOCATION: ABDOMEN
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
## 2019-05-18 NOTE — ED NOTES
Bed: 03  Expected date:   Expected time:   Means of arrival:   Comments:  Jodee Ahn RN  05/18/19 [id number]
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
## 2019-05-18 NOTE — CONSULTS
UPPER GASTROINTESTINAL ENDOSCOPY N/A 2016    Dr Rajani Major-Reactive gastropathy     Social History:   Social History     Tobacco Use    Smoking status: Former Smoker     Packs/day: 0.25     Years: 20.00     Pack years: 5.00     Last attempt to quit: 10/18/2014     Years since quittin.5    Smokeless tobacco: Never Used   Substance Use Topics    Alcohol use: No     Family History:   Family History   Problem Relation Age of Onset    High Blood Pressure Father     Ulcerative Colitis Father     Substance Abuse Father     Cancer Maternal Grandmother         colon    Lung Cancer Brother     Colon Cancer Mother     Other Sister         Aneurysm    Colon Polyps Neg Hx     Esophageal Cancer Neg Hx     Liver Cancer Neg Hx     Liver Disease Neg Hx     Rectal Cancer Neg Hx     Stomach Cancer Neg Hx      Home Meds:  Prior to Admission medications    Medication Sig Start Date End Date Taking?  Authorizing Provider   Multiple Vitamins-Minerals (COMPLETE MULTIVITAMIN/MINERAL PO) Take 1 tablet by mouth daily   Yes Historical Provider, MD   bromfenac (PROLENSA) 0.07 % SOLN Place 1 drop into the left eye daily   Yes Historical Provider, MD   prednisoLONE acetate (PRED FORTE) 1 % ophthalmic suspension Place 1 drop into the left eye 4 times daily   Yes Historical Provider, MD   tobramycin (TOBREX) 0.3 % ophthalmic solution Place 1 drop into the left eye 3 times daily   Yes Historical Provider, MD   metoprolol succinate (TOPROL XL) 25 MG extended release tablet TAKE 1/2 TABLET BY MOUTH EVERY DAY 19  Yes BRAYDON Diaz   simvastatin (ZOCOR) 40 MG tablet TAKE ONE TABLET BY MOUTH EVERY EVENING 19  Yes Lulu Nguyen MD   dicyclomine (BENTYL) 20 MG tablet TAKE 1 TABLET BY MOUTH FOUR TIMES DAILY BEFORE MEALS AND AT NIGHT 4/15/19  Yes Lulu Nguyen MD   pantoprazole (PROTONIX) 40 MG tablet TAKE 1 TABLET BY MOUTH EVERY DAY 3/25/19  Yes Lulu Nguyen MD   cloNIDine (CATAPRES) 0.1 MG tablet Take 1 tablet by mouth daily as needed (if blood pressure is 160/90 or greater) 3/7/19  Yes Soco Hudson MD   clopidogrel (PLAVIX) 75 MG tablet Take 1 tablet by mouth daily 3/7/19  Yes Soco Hudson MD   FLUoxetine (PROZAC) 20 MG capsule TAKE ONE CAPSULE BY MOUTH DAILY 2/25/19  Yes Soco Hudson MD   benazepril (LOTENSIN) 20 MG tablet Take 1 tablet by mouth daily 12/5/18  Yes BRAYDON Rushing   DiazePAM (VALIUM PO) Take by mouth Hosp gave her 10.  PT using PRN for Vertigo   Yes Historical Provider, MD   ondansetron (ZOFRAN ODT) 8 MG TBDP disintegrating tablet Take 1 tablet by mouth every 12 hours as needed for Nausea or Vomiting 9/21/18  Yes Elizabeth Steele MD      Allergies:  Colestipol; Codeine; Prednisone; and Aspirin      Current Meds:      ciprofloxacin  400 mg Intravenous Q12H    metroNIDAZOLE  500 mg Intravenous Q8H    sodium chloride flush  10 mL Intravenous 2 times per day    enoxaparin  40 mg Subcutaneous Daily        lactated ringers 75 mL/hr at 05/18/19 1449       PRN Meds:  sodium chloride flush, ondansetron, senna, acetaminophen, morphine        ROS:  ROS NEGATIVE EXCEPT THOSE MARKED WITH AN \"X\"    GENERAL: [] Fevers, [x] chills, [x] generalized weakness, [] weight loss, []weight gain, [] anorexia  Skin/Breast: [] jaundice, [] new rashes, [] itching   Eyes/Ears/Nose/Mouth/Throat: [] change in vision, [] double vision, [x] light headiness, [] vertigo  CARDIOVASCULAR: [x] chest pain, [] palpitations, [x] syncope, [x] dyspnea on exertion, [] orthopnea  RESPIRATORY: [] SOB, [] cough, [] wheezing, [] hemoptysis  GI: [] dark stools, [] bloody stools, [] BRBPR, [x] abdominal pain (LLQ), [] GERD like symptoms, [x] nausea, [x] vomiting, [] hematemesis, [] jaundice, [] constipation, [x] diarrhea, [] hemorrhoids  : [] Dysuria, [] urgency, [] frequency, [x] change in urine color, [] discharge  MUSCULOSKELETAL: [] muscle pain, [] muscle swelling, [] joint pain, [] muscle weakness  Neurological/Psychiatric: [] Sensory disturbances, [] motor disturbances, [] difficulty with speech, [] paresthesias, [] paralysis, [] depression, [] anxiety   Allergy/Immunological/Lymphatic/Endocrine: [] anemia, [] rashes, [] polyuria, [] polydypsia      Physical Exam:  Vitals:    05/18/19 1102 05/18/19 1107 05/18/19 1142 05/18/19 1413   BP: 118/68  117/66 (!) 140/70   Pulse: 60  58 56   Resp:   12 14   Temp:  98.9 °F (37.2 °C) 97.9 °F (36.6 °C) 97.7 °F (36.5 °C)   TempSrc:  Oral Oral Oral   SpO2: 96%  99% 97%   Weight:       Height:           Constitutional: [x] NAD, [x] of stated age, [x] well nourished  Eyes: [x] conjunctiva clear, [x] Non inflamed irises, [x] no scleral icterus  ENT/Mouth: [x]  Nares patent with pink mucosa, [x] oropharynx clear without exudates or erythema, [x] hearing grossly normal  Head/Neck: [x] symmetrical, [x] supple  Lungs: [x] respirations non labored with good effort, [x] no respiratory distress, [x] no wheezing, [x]  Equal air entry bilaterally  Heart: [x] normal S1S2, [x]  Regular rate, [x] pedal pulses preserved 2/4 bilaterally, [x] no LE edema  Abdomen: [x] +BSx4, [x] ND, [x] soft, [x] no guarding, [x] no peritoneal signs, +LLQ tenderness  Muscuoskeletal: [x]  Normal nails and digits bilaterally, [x] no muscle atrophy, no joint enlargement  Skin/SubQ: [x] No jaundice, [x] warm, dry skin, [x] no rashes on inspection,   Neurologic: [x]  Sensation grossly intact, [x] no slurred speech, [x]  No focal deficits  Psychiatric: [x]  Orientated to person, place, and time; [x] mood and affect unremarkable, [x] memory recent and remote intact      Labs:     Recent Labs     05/18/19  0825   WBC 9.7   RBC 3.74*   HGB 11.1*   HCT 35.1*   MCV 93.9   MCH 29.7   MCHC 31.6*        Recent Labs     05/18/19  0825      K 4.9   ANIONGAP 11   *   CO2 22   BUN 21   CREATININE 1.2*   GLUCOSE 112*   CALCIUM 8.5*     No results for input(s): MG, PHOS in the last 72 hours.   Recent Labs     05/18/19  0825   AST 14 ALT 8   BILITOT <0.2   ALKPHOS 118*     HgBA1c:  No components found for: HGBA1C  FLP:    Lab Results   Component Value Date    TRIG 305 09/06/2018    HDL 51 08/22/2018    LDLCALC 97 08/22/2018    LDLDIRECT 81 11/29/2014    LABVLDL 20 07/28/2016     TSH:    Lab Results   Component Value Date    TSH 3.550 11/12/2018     Troponin T:   Recent Labs     05/18/19  0825 05/18/19  1026 05/18/19  1527   TROPONINI <0.01 <0.01 <0.01     INR: No results for input(s): INR in the last 72 hours. Recent Labs     05/18/19  0825   LIPASE 27       Radiology:  Ct Head Wo Contrast    Result Date: 5/18/2019  CT HEAD WO CONTRAST 5/18/2019 8:00 AM HISTORY: Syncope, fall COMPARISON: CT scan dated 10/15/2018 DOSE LENGTH PRODUCT: 834 mGy cm TECHNIQUE: Helical tomographic images of the brain were obtained without the use of intravenous contrast. FINDINGS: There is no evidence of evolving large vascular territory infarct. No visualized intra-axial or extra-axial hemorrhage. No mass lesion is identified. Incidentally noted cavum septum pellucidum. Ventricles are otherwise unremarkable. The basal cisterns are symmetric. Posterior fossa structures are unremarkable. The included orbits and their contents are unremarkable. The visualized paranasal sinuses, mastoid air cells and middle ear cavities are clear. The visualized osseous structures and overlying soft tissues of the skull and face are unremarkable. 1. No acute intracranial process. Signed by Dr Leobardo Kaur on 5/18/2019 9:32 AM    Cta Chest W Wo Contrast    Result Date: 5/18/2019  CTA CHEST W WO CONTRAST 5/18/2019 8:00 AM HISTORY: Chest pain COMPARISON: CT scan dated 9/11/2018. DLP: 1075 mGy cm TECHNIQUE: Helical tomographic images of the chest were obtained after the administration of intravenous contrast following angiogram protocol. Additionally, 3D MIP reconstructions in the coronal and sagittal planes were provided. FINDINGS:  Pulmonary arteries:  There is adequate enhancement of the pulmonary arteries to evaluate for central and segmental pulmonary emboli. There are no filling defects within the main, lobar, segmental or visualized subsegmental pulmonary arteries. The pulmonary vessels are within normal limits for size. Aorta and great vessels: The aorta is well opacified and demonstrates no aneurysm, stenosis or dissection. The great vessel origins are normal in appearance. Advanced coronary artery calcifications are visualized. Neck base: The imaged portion of the base of the neck appears unremarkable. Lungs: Underlying centrilobular emphysema. Mild peribronchial thickening along the airways. No mass lesion or consolidation. No pleural effusion. No endobronchial lesion. Heart: The heart is normal in size. There is no pericardial effusion. Lymph nodes: No pathologically enlarged mediastinal, hilar, or axillary lymph nodes are present. Bones and soft tissues: The osseous structures of the thorax and surrounding soft tissues demonstrate no acute process. Upper abdomen: The imaged portion of the upper abdomen demonstrates no acute process. 1. No evidence of pulmonary embolus or other acute cardiopulmonary process. 2. Thoracic aorta is normal in caliber. No dissection. 3. Centrilobular emphysema. 4. Coronary artery atheromatous calcification. Signed by Dr Monica Hollis on 5/18/2019 9:39 AM    Xr Chest Portable    Result Date: 5/18/2019  XR CHEST PORTABLE 5/18/2019 8:00 AM HISTORY: Chest pain COMPARISON: Chest exam dated 10/15/2018. FINDINGS: The lungs are clear. No pleural effusion or pneumothorax. The cardiomediastinal silhouette and pulmonary vascularity are within normal limits. The osseous structures and surrounding soft tissues demonstrate no acute abnormality. 1. Stable chest exam without acute process.  Signed by Dr Monica Hollis on 5/18/2019 9:07 AM    Cta Abdomen Pelvis W Wo Contrast    Result Date: 5/18/2019  CTA ABDOMEN PELVIS W WO CONTRAST 5/18/2019 8:00 AM hyperemia in the large bowel mesentery. Multiple fat-containing ventral hernia is identified with no inflammatory changes in the hernia sacs. No acute bony abnormality. 1. Descending and sigmoid colitis which could be infectious or ischemic. The native CASEY is occluded at its origin although it is reconstituted through collaterals. The bowel is enhancing, without evidence of goldie necrosis. 2. Prior open AAA repair. Interval increased size of aneurysm sac, now measuring up to 3.3 cm. The graft is patent. 3. There is a 2.4 cm partially exophytic solid enhancing mass at the right lower renal pole which is suspicious for a renal cell carcinoma. No retroperitoneal adenopathy. 4. Asymmetric left renal atrophy with moderate-to-severe short segment narrowing in the proximal left renal artery. 5. Fat-containing ventral hernias. The results of this exam were discussed with Dr. Karyle Boast on 5/18/2019 at 1007 hours Signed by Dr Monica Hollis on 5/18/2019 10:08 AM      Assessment:  1. Colitis (ischemic vs infectious)  2. Right renal mass  3. Renal artery stenosis  4. AAA repair  5. Chest pain    Plan:  - Pt admits to some abdominal pain that is in the LLQ and is not out of proportion to physical exam. No bloody diarrhea, hematochezia, BRBPR. On the CT abdomen, there is a chronic occlusion of the CASEY with collaterals which was reviewed with the radiologist. Lactic acid is also normal. Will continue IV fluids at this time to ensure perfusion to the colon. Obtain stool cultures. Colonoscopy from 7/2016 did have a tubulovillous polyp that was removed and will need outpatient follow up for surveillance. Consider consultation for the renal mass and renal artery stenosis per primary's choice. - Await stool studies.  Continue cipro and anastasiya Goldberg, DO

## 2019-05-19 LAB
ALBUMIN SERPL-MCNC: 3.1 G/DL (ref 3.5–5.2)
ALP BLD-CCNC: 108 U/L (ref 35–104)
ALT SERPL-CCNC: 6 U/L (ref 5–33)
ANION GAP SERPL CALCULATED.3IONS-SCNC: 10 MMOL/L (ref 7–19)
AST SERPL-CCNC: 12 U/L (ref 5–32)
BASOPHILS ABSOLUTE: 0 K/UL (ref 0–0.2)
BASOPHILS RELATIVE PERCENT: 0.2 % (ref 0–1)
BILIRUB SERPL-MCNC: 0.3 MG/DL (ref 0.2–1.2)
BUN BLDV-MCNC: 19 MG/DL (ref 8–23)
CALCIUM SERPL-MCNC: 8.6 MG/DL (ref 8.8–10.2)
CHLORIDE BLD-SCNC: 109 MMOL/L (ref 98–111)
CO2: 24 MMOL/L (ref 22–29)
CREAT SERPL-MCNC: 1.2 MG/DL (ref 0.5–0.9)
EKG P AXIS: 71 DEGREES
EKG P AXIS: 80 DEGREES
EKG P-R INTERVAL: 182 MS
EKG P-R INTERVAL: 190 MS
EKG Q-T INTERVAL: 450 MS
EKG Q-T INTERVAL: 462 MS
EKG QRS DURATION: 82 MS
EKG QRS DURATION: 84 MS
EKG QTC CALCULATION (BAZETT): 436 MS
EKG QTC CALCULATION (BAZETT): 465 MS
EKG T AXIS: 72 DEGREES
EKG T AXIS: 74 DEGREES
EOSINOPHILS ABSOLUTE: 0 K/UL (ref 0–0.6)
EOSINOPHILS RELATIVE PERCENT: 0.4 % (ref 0–5)
GFR NON-AFRICAN AMERICAN: 45
GLUCOSE BLD-MCNC: 98 MG/DL (ref 74–109)
HCT VFR BLD CALC: 32.3 % (ref 37–47)
HEMOGLOBIN: 10.2 G/DL (ref 12–16)
INR BLD: 1.16 (ref 0.88–1.18)
LYMPHOCYTES ABSOLUTE: 2 K/UL (ref 1.1–4.5)
LYMPHOCYTES RELATIVE PERCENT: 19.1 % (ref 20–40)
MAGNESIUM: 1.9 MG/DL (ref 1.6–2.4)
MCH RBC QN AUTO: 29.8 PG (ref 27–31)
MCHC RBC AUTO-ENTMCNC: 31.6 G/DL (ref 33–37)
MCV RBC AUTO: 94.4 FL (ref 81–99)
MONOCYTES ABSOLUTE: 0.9 K/UL (ref 0–0.9)
MONOCYTES RELATIVE PERCENT: 8.3 % (ref 0–10)
NEUTROPHILS ABSOLUTE: 7.4 K/UL (ref 1.5–7.5)
NEUTROPHILS RELATIVE PERCENT: 71.7 % (ref 50–65)
PDW BLD-RTO: 13.6 % (ref 11.5–14.5)
PLATELET # BLD: 240 K/UL (ref 130–400)
PMV BLD AUTO: 10.9 FL (ref 9.4–12.3)
POTASSIUM REFLEX MAGNESIUM: 4 MMOL/L (ref 3.5–5)
PROTHROMBIN TIME: 14.2 SEC (ref 12–14.6)
RBC # BLD: 3.42 M/UL (ref 4.2–5.4)
SODIUM BLD-SCNC: 143 MMOL/L (ref 136–145)
TOTAL PROTEIN: 5.4 G/DL (ref 6.6–8.7)
WBC # BLD: 10.3 K/UL (ref 4.8–10.8)

## 2019-05-19 PROCEDURE — 99221 1ST HOSP IP/OBS SF/LOW 40: CPT | Performed by: SURGERY

## 2019-05-19 PROCEDURE — 83735 ASSAY OF MAGNESIUM: CPT

## 2019-05-19 PROCEDURE — 80053 COMPREHEN METABOLIC PANEL: CPT

## 2019-05-19 PROCEDURE — 2580000003 HC RX 258: Performed by: SURGERY

## 2019-05-19 PROCEDURE — 99232 SBSQ HOSP IP/OBS MODERATE 35: CPT | Performed by: INTERNAL MEDICINE

## 2019-05-19 PROCEDURE — 2500000003 HC RX 250 WO HCPCS: Performed by: INTERNAL MEDICINE

## 2019-05-19 PROCEDURE — 6360000002 HC RX W HCPCS: Performed by: INTERNAL MEDICINE

## 2019-05-19 PROCEDURE — 1210000000 HC MED SURG R&B

## 2019-05-19 PROCEDURE — 2580000003 HC RX 258: Performed by: INTERNAL MEDICINE

## 2019-05-19 PROCEDURE — 85025 COMPLETE CBC W/AUTO DIFF WBC: CPT

## 2019-05-19 PROCEDURE — 36415 COLL VENOUS BLD VENIPUNCTURE: CPT

## 2019-05-19 PROCEDURE — 85610 PROTHROMBIN TIME: CPT

## 2019-05-19 RX ORDER — MORPHINE SULFATE 2 MG/ML
2 INJECTION, SOLUTION INTRAMUSCULAR; INTRAVENOUS
Status: DISCONTINUED | OUTPATIENT
Start: 2019-05-19 | End: 2019-05-24 | Stop reason: HOSPADM

## 2019-05-19 RX ADMIN — ONDANSETRON 4 MG: 2 INJECTION INTRAMUSCULAR; INTRAVENOUS at 23:42

## 2019-05-19 RX ADMIN — CIPROFLOXACIN 400 MG: 2 INJECTION, SOLUTION INTRAVENOUS at 22:12

## 2019-05-19 RX ADMIN — SODIUM CHLORIDE, POTASSIUM CHLORIDE, SODIUM LACTATE AND CALCIUM CHLORIDE: 600; 310; 30; 20 INJECTION, SOLUTION INTRAVENOUS at 16:08

## 2019-05-19 RX ADMIN — MORPHINE SULFATE 2 MG: 2 INJECTION, SOLUTION INTRAMUSCULAR; INTRAVENOUS at 07:30

## 2019-05-19 RX ADMIN — METRONIDAZOLE 500 MG: 500 INJECTION, SOLUTION INTRAVENOUS at 05:16

## 2019-05-19 RX ADMIN — CIPROFLOXACIN 400 MG: 2 INJECTION, SOLUTION INTRAVENOUS at 10:34

## 2019-05-19 RX ADMIN — MORPHINE SULFATE 2 MG: 2 INJECTION, SOLUTION INTRAMUSCULAR; INTRAVENOUS at 09:41

## 2019-05-19 RX ADMIN — MORPHINE SULFATE 2 MG: 2 INJECTION, SOLUTION INTRAMUSCULAR; INTRAVENOUS at 17:43

## 2019-05-19 RX ADMIN — Medication 40 MG: at 17:41

## 2019-05-19 RX ADMIN — CLOPIDOGREL BISULFATE 75 MG: 75 TABLET ORAL at 07:31

## 2019-05-19 RX ADMIN — METRONIDAZOLE 500 MG: 500 INJECTION, SOLUTION INTRAVENOUS at 11:48

## 2019-05-19 RX ADMIN — ENOXAPARIN SODIUM 40 MG: 40 INJECTION SUBCUTANEOUS at 07:32

## 2019-05-19 RX ADMIN — MORPHINE SULFATE 2 MG: 2 INJECTION, SOLUTION INTRAMUSCULAR; INTRAVENOUS at 22:29

## 2019-05-19 RX ADMIN — MORPHINE SULFATE 2 MG: 2 INJECTION, SOLUTION INTRAMUSCULAR; INTRAVENOUS at 14:40

## 2019-05-19 RX ADMIN — PANTOPRAZOLE SODIUM 40 MG: 40 TABLET, DELAYED RELEASE ORAL at 07:45

## 2019-05-19 RX ADMIN — FLUOXETINE HYDROCHLORIDE 20 MG: 20 CAPSULE ORAL at 07:31

## 2019-05-19 RX ADMIN — MORPHINE SULFATE 2 MG: 2 INJECTION, SOLUTION INTRAMUSCULAR; INTRAVENOUS at 20:30

## 2019-05-19 RX ADMIN — METRONIDAZOLE 500 MG: 500 INJECTION, SOLUTION INTRAVENOUS at 20:30

## 2019-05-19 RX ADMIN — Medication 10 ML: at 07:32

## 2019-05-19 RX ADMIN — METOPROLOL SUCCINATE 12.5 MG: 25 TABLET, EXTENDED RELEASE ORAL at 07:31

## 2019-05-19 RX ADMIN — MORPHINE SULFATE 2 MG: 2 INJECTION, SOLUTION INTRAMUSCULAR; INTRAVENOUS at 03:26

## 2019-05-19 ASSESSMENT — PAIN SCALES - GENERAL
PAINLEVEL_OUTOF10: 10
PAINLEVEL_OUTOF10: 7
PAINLEVEL_OUTOF10: 3
PAINLEVEL_OUTOF10: 8
PAINLEVEL_OUTOF10: 7
PAINLEVEL_OUTOF10: 7
PAINLEVEL_OUTOF10: 10
PAINLEVEL_OUTOF10: 4
PAINLEVEL_OUTOF10: 9
PAINLEVEL_OUTOF10: 9
PAINLEVEL_OUTOF10: 7
PAINLEVEL_OUTOF10: 7

## 2019-05-19 ASSESSMENT — PAIN DESCRIPTION - LOCATION
LOCATION: ABDOMEN

## 2019-05-19 ASSESSMENT — PAIN DESCRIPTION - ORIENTATION: ORIENTATION: LEFT

## 2019-05-19 ASSESSMENT — PAIN DESCRIPTION - DESCRIPTORS: DESCRIPTORS: CRAMPING

## 2019-05-19 ASSESSMENT — PAIN DESCRIPTION - PAIN TYPE
TYPE: ACUTE PAIN

## 2019-05-19 NOTE — PROGRESS NOTES
GI  - PROGRESS NOTE    Subjective:   Admit Date: 5/18/2019  PCP: Je Neal MD    CC: Abdominal pain, colitis, syncope    Pt seen and examined. Pt still has abdominal pain, though it has improved since yesterday. No BM yet. No acute event overnight.          Medications:  Scheduled Meds:   ciprofloxacin  400 mg Intravenous Q12H    metroNIDAZOLE  500 mg Intravenous Q8H    sodium chloride flush  10 mL Intravenous 2 times per day    enoxaparin  40 mg Subcutaneous Daily    benazepril  20 mg Oral Daily    clopidogrel  75 mg Oral Daily    FLUoxetine  20 mg Oral Daily    metoprolol succinate  12.5 mg Oral Daily    pantoprazole  40 mg Oral Daily    simvastatin  40 mg Oral QPM       Continuous Infusions:   lactated ringers 75 mL/hr at 05/18/19 2211       PRN Meds:.morphine, sodium chloride flush, ondansetron, senna, acetaminophen, cloNIDine    Allergies: Colestipol; Codeine; Prednisone; and Aspirin    Labs:     Recent Labs     05/18/19  0825 05/19/19  0230   WBC 9.7 10.3   RBC 3.74* 3.42*   HGB 11.1* 10.2*   HCT 35.1* 32.3*   MCV 93.9 94.4   MCH 29.7 29.8   MCHC 31.6* 31.6*    240     Recent Labs     05/18/19  0825 05/19/19  0230    143   K 4.9 4.0   ANIONGAP 11 10   * 109   CO2 22 24   BUN 21 19   CREATININE 1.2* 1.2*   GLUCOSE 112* 98   CALCIUM 8.5* 8.6*     Recent Labs     05/19/19  0230   MG 1.9     Recent Labs     05/18/19  0825 05/19/19  0230   AST 14 12   ALT 8 6   BILITOT <0.2 0.3   ALKPHOS 118* 108*     HgBA1c:  No components found for: HGBA1C  FLP:    Lab Results   Component Value Date    TRIG 305 09/06/2018    HDL 51 08/22/2018    LDLCALC 97 08/22/2018    LDLDIRECT 81 11/29/2014    LABVLDL 20 07/28/2016     TSH:    Lab Results   Component Value Date    TSH 3.550 11/12/2018     Troponin T:   Recent Labs     05/18/19  1026 05/18/19  1527 05/18/19  1732   TROPONINI <0.01 <0.01 <0.01     INR:   Recent Labs     05/19/19  0230   INR 1.16       Recent Labs     05/18/19  0825   LIPASE 27     -----------------------------------------------------------------  RAD:   Ct Head Wo Contrast    Result Date: 5/18/2019  CT HEAD WO CONTRAST 5/18/2019 8:00 AM HISTORY: Syncope, fall COMPARISON: CT scan dated 10/15/2018 DOSE LENGTH PRODUCT: 834 mGy cm TECHNIQUE: Helical tomographic images of the brain were obtained without the use of intravenous contrast. FINDINGS: There is no evidence of evolving large vascular territory infarct. No visualized intra-axial or extra-axial hemorrhage. No mass lesion is identified. Incidentally noted cavum septum pellucidum. Ventricles are otherwise unremarkable. The basal cisterns are symmetric. Posterior fossa structures are unremarkable. The included orbits and their contents are unremarkable. The visualized paranasal sinuses, mastoid air cells and middle ear cavities are clear. The visualized osseous structures and overlying soft tissues of the skull and face are unremarkable. 1. No acute intracranial process. Signed by Dr Dagoberto Ha on 5/18/2019 9:32 AM    Cta Chest W Wo Contrast    Result Date: 5/18/2019  CTA CHEST W WO CONTRAST 5/18/2019 8:00 AM HISTORY: Chest pain COMPARISON: CT scan dated 9/11/2018. DLP: 1075 mGy cm TECHNIQUE: Helical tomographic images of the chest were obtained after the administration of intravenous contrast following angiogram protocol. Additionally, 3D MIP reconstructions in the coronal and sagittal planes were provided. FINDINGS:  Pulmonary arteries: There is adequate enhancement of the pulmonary arteries to evaluate for central and segmental pulmonary emboli. There are no filling defects within the main, lobar, segmental or visualized subsegmental pulmonary arteries. The pulmonary vessels are within normal limits for size. Aorta and great vessels: The aorta is well opacified and demonstrates no aneurysm, stenosis or dissection. The great vessel origins are normal in appearance. Advanced coronary artery calcifications are visualized.  Neck base: The imaged portion of the base of the neck appears unremarkable. Lungs: Underlying centrilobular emphysema. Mild peribronchial thickening along the airways. No mass lesion or consolidation. No pleural effusion. No endobronchial lesion. Heart: The heart is normal in size. There is no pericardial effusion. Lymph nodes: No pathologically enlarged mediastinal, hilar, or axillary lymph nodes are present. Bones and soft tissues: The osseous structures of the thorax and surrounding soft tissues demonstrate no acute process. Upper abdomen: The imaged portion of the upper abdomen demonstrates no acute process. 1. No evidence of pulmonary embolus or other acute cardiopulmonary process. 2. Thoracic aorta is normal in caliber. No dissection. 3. Centrilobular emphysema. 4. Coronary artery atheromatous calcification. Signed by Dr Antonio Castellanos on 5/18/2019 9:39 AM    Xr Chest Portable    Result Date: 5/18/2019  XR CHEST PORTABLE 5/18/2019 8:00 AM HISTORY: Chest pain COMPARISON: Chest exam dated 10/15/2018. FINDINGS: The lungs are clear. No pleural effusion or pneumothorax. The cardiomediastinal silhouette and pulmonary vascularity are within normal limits. The osseous structures and surrounding soft tissues demonstrate no acute abnormality. 1. Stable chest exam without acute process.  Signed by Dr Antonio Castellanos on 5/18/2019 9:07 AM    Vl Dup Lower Extremity Venous Bilateral    Result Date: 5/19/2019  Vascular Lower Extremities DVT Study Procedure  Demographics   Patient Name  Harrison Hernández  Age                80                D   Patient       [id number]        Gender             Female  Number   Visit Number  [id number]     Interpreting       Ricarda Roach MD                              Physician   Date of Birth 1950    Referring          [facility]                              Physician   Accession     [id number]     [address]  Number +------------------------------------+----------+---------------+----------+ ! SSV                                 ! Yes       ! Yes            ! None      ! +------------------------------------+----------+---------------+----------+ ! Gastroc                             ! Yes       ! Yes            ! None      ! +------------------------------------+----------+---------------+----------+ ! PTV                                 ! Yes       ! Yes            ! None      ! +------------------------------------+----------+---------------+----------+ ! GSV                                 ! Yes       ! Yes            ! None      ! +------------------------------------+----------+---------------+----------+ ! ATV                                 ! Yes       ! Yes            ! None      ! +------------------------------------+----------+---------------+----------+ ! Peroneal                            !Yes       ! Yes            ! None      ! +------------------------------------+----------+---------------+----------+ ! Soleal                              !Yes       ! Yes            ! None      ! +------------------------------------+----------+---------------+----------+ Left Lower Extremities DVT Study Measurements Left 2D Measurements +------------------------------------+----------+---------------+----------+ ! Location                            ! Visualized! Compressibility! Thrombosis! +------------------------------------+----------+---------------+----------+ ! Sapheno Femoral Junction            ! Yes       ! Yes            ! None      ! +------------------------------------+----------+---------------+----------+ ! Common Femoral                      !Yes       ! Yes            ! None      ! +------------------------------------+----------+---------------+----------+ ! Prox Femoral                        !Yes       ! Yes            ! None      ! +------------------------------------+----------+---------------+----------+ ! Mid Femoral !Yes       !Yes            ! None      ! +------------------------------------+----------+---------------+----------+ ! Dist Femoral                        !Yes       ! Yes            ! None      ! +------------------------------------+----------+---------------+----------+ ! Deep Femoral                        !Yes       ! Yes            ! None      ! +------------------------------------+----------+---------------+----------+ ! Popliteal                           !Yes       ! Yes            ! None      ! +------------------------------------+----------+---------------+----------+ ! SSV                                 ! Yes       ! Yes            ! None      ! +------------------------------------+----------+---------------+----------+ ! Gastroc                             ! Yes       ! Yes            ! None      ! +------------------------------------+----------+---------------+----------+ ! PTV                                 ! Yes       ! Yes            ! None      ! +------------------------------------+----------+---------------+----------+ ! GSV                                 ! Yes       ! Yes            ! None      ! +------------------------------------+----------+---------------+----------+ ! ATV                                 ! Yes       ! Yes            ! None      ! +------------------------------------+----------+---------------+----------+ ! Peroneal                            !Yes       ! Yes            ! None      ! +------------------------------------+----------+---------------+----------+ ! Soleal                              !Yes       ! Yes            ! None      ! +------------------------------------+----------+---------------+----------+    Cta Abdomen Pelvis W Wo Contrast    Result Date: 5/18/2019  CTA ABDOMEN PELVIS W WO CONTRAST 5/18/2019 8:00 AM HISTORY: Abdominal pain, history of AAA COMPARISON: CT scan dated 9/17/2018 DLP: 1075 mGy centimeters TECHNIQUE: Helical tomographic images of the abdomen and pelvis utilizing angiographic protocol were obtained before and after the intravenous infusion of contrast. Multiplanar and 3 D reformatted images were provided for review. FINDINGS: Angiogram: The suprarenal aorta is normal in caliber. 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 The graft appears widely patent. The aneurysm sac is decreased in size from the prior exam, measuring up to 3.3 cm. No contrast extravasation identified into the aneurysm sac. Bilateral common, internal and external iliac arteries are nondilated and without flow-limiting stenosis. The celiac artery and its major branches are normal in appearance. The superior mesenteric artery and its proximal branches are normal in appearance. The CASEY is occluded at its origin but is reconstituted distally through collaterals. No flow-limiting stenosis at the right renal artery origin. There is a short segment moderate to severe narrowing in the left renal artery approximately 2.2 cm from its origin (series 6-image 48). Other findings: Centrilobular emphysema in the lungs. Liver is grossly normal. Gallbladder and biliary tree are unremarkable. Pancreas and spleen are unremarkable. Normal adrenal glands. Asymmetric left renal cortical atrophy. There is a 2.4 cm heterogeneously enhancing solid mass at the right inferior renal pole (series 5-image 89 and series 6-image 56). No other renal lesion identified. The ureters are decompressed. No retroperitoneal adenopathy. Stomach is unremarkable. Small bowel is unremarkable. Mild fluid in the proximal colon although the colon is nondistended. There is circumferential wall thickening along the colon from the splenic flexure down to the sigmoid colon. Mild mucosal hyperenhancement and there appears to be an associated mild hyperemia in the large bowel mesentery. Multiple fat-containing ventral hernia is identified with no inflammatory changes in the hernia sacs. No acute bony abnormality.     1. Descending and sigmoid colitis which could be infectious or ischemic. The native CASEY is occluded at its origin although it is reconstituted through collaterals. The bowel is enhancing, without evidence of goldie necrosis. 2. Prior open AAA repair. Interval increased size of aneurysm sac, now measuring up to 3.3 cm. The graft is patent. 3. There is a 2.4 cm partially exophytic solid enhancing mass at the right lower renal pole which is suspicious for a renal cell carcinoma. No retroperitoneal adenopathy. 4. Asymmetric left renal atrophy with moderate-to-severe short segment narrowing in the proximal left renal artery. 5. Fat-containing ventral hernias.  The results of this exam were discussed with Dr. Herberth Parker on 5/18/2019 at 1007 hours Signed by Dr Scarlet Walls on 5/18/2019 10:08 AM      Physical Exam:     Vitals:    05/19/19 0002 05/19/19 0420 05/19/19 0712 05/19/19 1100   BP: 128/66 105/66 105/61 126/60   Pulse: 59 59 58 58   Resp: 16 16 17 15   Temp: 97.6 °F (36.4 °C) 97.5 °F (36.4 °C) 97.5 °F (36.4 °C) 99 °F (37.2 °C)   TempSrc: Temporal Temporal Temporal Temporal   SpO2: 98% 98% 98% 98%   Weight:       Height:         24HR INTAKE/OUTPUT:      Intake/Output Summary (Last 24 hours) at 5/19/2019 1206  Last data filed at 5/19/2019 0399  Gross per 24 hour   Intake 1732.27 ml   Output 1000 ml   Net 732.27 ml     Constitutional: [x] NAD, [x] of stated age, [x] well nourished  Eyes: [x] conjunctiva clear, [x] Non inflamed irises, [x] no scleral icterus  ENT/Mouth: [x]  Nares patent with pink mucosa, [x] oropharynx clear without exudates or erythema, [x] hearing grossly normal  Head/Neck: [x] symmetrical, [x] supple  Lungs: [x] respirations non labored with good effort, [x] no respiratory distress, [x] no wheezing, [x]  Equal air entry bilaterally  Heart: [x] normal S1S2, [x]  Regular rate, [x] pedal pulses preserved 2/4 bilaterally, [x] no LE edema  Abdomen: [x] +BSx4, [x] ND, [x] soft, [x] no guarding, [x] no peritoneal signs, +mild LLQ tenderness  Muscuoskeletal: [x]  Normal nails and digits bilaterally, [x] no muscle atrophy, no joint enlargement  Skin/SubQ: [x] No jaundice, [x] warm, dry skin, [x] no rashes on inspection  Psychiatric: [x]  Orientated to person, place, and time; [x] mood and affect unremarkable, [x] memory recent and remote intact    Impression:       1. Colitis (ischemic vs infectious)  2. Right renal mass  3. Renal artery stenosis  4. AAA repair    Plan:   - Pt's abdominal pain is improving, though still present. She has not had a BM yet, therefore stool studies are pending. Will continue cipro/flagyl and continue IV hydration. Hemodynamically stable.  As previously recommended, she will need outpatient follow up later this year for the colon polyps/surveillance  - will continue to monitor    Vernon Mccormick DO
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
## 2019-05-19 NOTE — PROGRESS NOTES
Hospitalist Progress Note  5/19/2019 8:30 AM  Subjective:   Admit Date: 5/18/2019  PCP: Mine Conde MD    Chief Complaint: Syncope    Subjective: Patient seen and examined. No BM overnight so CDiff not sent. Patient states that she is feeling somewhat better. [facility] History:   Ms. Abbie Villanueva is a 81 yo female with significant PMH IBS, anxiety, CVA, that presents to ER after a syncopal episode. The patient states that she got up to have a BM, she became dizzy and lightheaded and had LOC. Daughter witnessed the event and states patient was out for < 2 minutes. Patient states it did not take long to regain orientation after the event. Patient had multiple loose stools this AM.  She denies any fevers, chills, sweats. She had sudden onset CP while in the ER and CTA was negative for PE. Trop negative. Initial EKG reviewed by ER doc with nothing acute. Patient also endorses abdominal pain. CT shows colitis, ischemic vs infectious. At time of examination, patient states she is beginning to feel better. She has received IVF and IV cipro/flagyl in ER.    05/19: Patient subjectively improved. Will consult onc for renal mass seen on CT and vascular for renal artery stenosis. Continue supportive care and IV abx, appreciate GI recs. ROS: 14 point review of systems is negative except as specifically addressed above. DIET CLEAR LIQUID;     Intake/Output Summary (Last 24 hours) at 5/19/2019 0830  Last data filed at 5/19/2019 0731  Gross per 24 hour   Intake 1732.27 ml   Output 1000 ml   Net 732.27 ml     Medications:   lactated ringers 75 mL/hr at 05/18/19 2211     Current Facility-Administered Medications   Medication Dose Route Frequency Provider Last Rate Last Dose    ciprofloxacin (CIPRO) IVPB 400 mg  400 mg Intravenous Q12H Shimon Matute MD   Stopped at 05/19/19 0044    metronidazole (FLAGYL) 500 mg in NaCl 100 mL IVPB premix  500 mg Intravenous Jimbo MD Rolando Stopped at 05/19/19 0718    sodium chloride flush 0.9 % injection 10 mL  10 mL Intravenous 2 times per day Bridget Chahal MD   10 mL at 05/19/19 0732    sodium chloride flush 0.9 % injection 10 mL  10 mL Intravenous PRN Bridget Chahal MD        [facility]) [facility] 4 mg  4 mg Intravenous Q6H PRN Bridget Chahal MD        enoxaparin (LOVENOX) injection 40 mg  40 mg Subcutaneous Daily Bridget Chahal MD   40 mg at 05/19/19 0732    senna (SENOKOT) tablet 8.6 mg  1 tablet Oral Daily PRN Bridget Chahal MD        acetaminophen (TYLENOL) tablet 650 mg  650 mg Oral Q8H PRN Bridget Chahal MD   650 mg at 05/18/19 1646    lactated ringers infusion   Intravenous Continuous Bridget Chahal MD 75 mL/hr at 05/18/19 2211      morphine (PF) injection 2 mg  2 mg Intravenous Q4H PRN Bridget Chahal MD   2 mg at 05/19/19 0730    benazepril (LOTENSIN) tablet 20 mg  20 mg Oral Daily Bridget Chahal MD        cloNIDine (CATAPRES) tablet 0.1 mg  0.1 mg Oral Daily PRN Bridget Chahal MD        clopidogrel (PLAVIX) tablet 75 mg  75 mg Oral Daily Bridget Chahal MD   75 mg at 05/19/19 0731    FLUoxetine (PROZAC) capsule 20 mg  20 mg Oral Daily Bridget Chahal MD   20 mg at 05/19/19 0731    metoprolol succinate (TOPROL XL) extended release tablet 12.5 mg  12.5 mg Oral Daily Bridget Chahal MD   12.5 mg at 05/19/19 0731    pantoprazole (PROTONIX) tablet 40 mg  40 mg Oral Daily Bridget Chahal MD   40 mg at 05/19/19 0745    simvastatin (ZOCOR) tablet 40 mg  40 mg Oral QPM Bridget Chahal MD   40 mg at 05/18/19 2031        Labs:     Recent Labs     05/18/19  0825 05/19/19  0230   WBC 9.7 10.3   RBC 3.74* 3.42*   HGB 11.1* 10.2*   HCT 35.1* 32.3*   MCV 93.9 94.4   MCH 29.7 29.8   MCHC 31.6* 31.6*    240     Recent Labs     05/18/19  0825 05/19/19  0230    143   K 4.9 4.0   ANIONGAP 11 10   * 109   CO2 22 24   BUN 21 19   CREATININE 1.2* 1.2*   GLUCOSE 112* 98   CALCIUM 8.5* 8.6*     Recent Labs     05/19/19  0230   MG 1.9     Recent Labs     05/18/19  0825 05/19/19  0230   AST 14 12   ALT 8 6   BILITOT <0.2 0.3   ALKPHOS 118* 108*     ABGs:No results for input(s): PHART, SEC2UEG, PO2ART, QBC1DAZ, BEART, HGBAE, L9MBXDJA, CARBOXHGBART, 02THERAPY in the last 72 hours. HgBA1c: No results for input(s): LABA1C in the last 72 hours. FLP:    Lab Results   Component Value Date    TRIG 305 09/06/2018    HDL 51 08/22/2018    LDLCALC 97 08/22/2018    LDLDIRECT 81 11/29/2014    LABVLDL 20 07/28/2016     TSH:    Lab Results   Component Value Date    TSH 3.550 11/12/2018     Troponin T:   Recent Labs     05/18/19  1026 05/18/19  1527 05/18/19  1732   TROPONINI <0.01 <0.01 <0.01     INR:   Recent Labs     05/19/19  0230   INR 1.16       Objective:   Vitals: /61   Pulse 58   Temp 97.5 °F (36.4 °C) (Temporal)   Resp 17   Ht 5' 3\" (1.6 m)   Wt 130 lb (59 kg)   LMP  (LMP Unknown)   SpO2 98%   BMI 23.03 kg/m²   24HR INTAKE/OUTPUT:      Intake/Output Summary (Last 24 hours) at 5/19/2019 0830  Last data filed at 5/19/2019 4859  Gross per 24 hour   Intake 1732.27 ml   Output 1000 ml   Net 732.27 ml     General appearance: alert and cooperative with exam  HEENT: atraumatic, eyes with clear conjunctiva and normal lids  Lungs: no increased work of breathing, clear to auscultation bilaterally without rales, rhonchi or wheezes  Heart: regular rate and rhythm and S1, S2 normal  Abdomen: soft, diffusely tender to deep palpation, no masses/hernias  Extremities: extremities normal without clubbing, atraumatic, no cyanosis or edema  Neurologic: No focal neurologic deficits, normal sensation, alert and oriented, affect and mood appropriate. Skin: no rashes, nodules.         Assessment and Plan:   #  Colitis  - continue cipro/flagyl  - pain control  - IVF  - check Cdiff/fecal leuks  - check blood cxs  - GI consult, appreciate recs     # Syncope  - likely related to above  - r/o other causes  - monitor on tele  - IVF  - check TTE, as below  - PT to eval prior to d/c     # HTN  - resume home meds      # HLD  - resume statin      # Anxiety  - resume home meds      # CP  - continue to trend trop  - monitor on tele  - TTE  - CTA negative for PE    # Renal artery stenosis  - consult vascular, appreciate recs    # Renal mass  - consult onc, appreciate recs  - may need biopsy prior to discharge    TTE:    Summary   Normal LV size and systolic function. LV ejection fraction estimated at   60%.  Grade 1 diastolic dysfunction   Normal right ventricular size and systolic function.   Normal left atrial size   Aortic valve not well visualized. No significant stenosis or regurgitation   noted.   Mitral valve mildly thickened with normal mobility. 1+ mitral   regurgitation. No stenosis noted   Interatrial septum appears intact by color Doppler with no significant   intracardiac shunting noted by bubble study.     Advance Directive: Full Code    DVT prophylaxis: lovenox    Discharge planning: JANA Costello MD  [facility] Hospitalist

## 2019-05-20 LAB
ANION GAP SERPL CALCULATED.3IONS-SCNC: 12 MMOL/L (ref 7–19)
BUN BLDV-MCNC: 14 MG/DL (ref 8–23)
CALCIUM SERPL-MCNC: 8.5 MG/DL (ref 8.8–10.2)
CHLORIDE BLD-SCNC: 104 MMOL/L (ref 98–111)
CO2: 22 MMOL/L (ref 22–29)
CREAT SERPL-MCNC: 1.1 MG/DL (ref 0.5–0.9)
GFR NON-AFRICAN AMERICAN: 49
GLUCOSE BLD-MCNC: 81 MG/DL (ref 74–109)
HCT VFR BLD CALC: 30.2 % (ref 37–47)
HEMOGLOBIN: 9.7 G/DL (ref 12–16)
LACTIC ACID: 0.7 MMOL/L (ref 0.5–1.9)
MCH RBC QN AUTO: 30.5 PG (ref 27–31)
MCHC RBC AUTO-ENTMCNC: 32.1 G/DL (ref 33–37)
MCV RBC AUTO: 95 FL (ref 81–99)
PDW BLD-RTO: 13.2 % (ref 11.5–14.5)
PLATELET # BLD: 217 K/UL (ref 130–400)
PMV BLD AUTO: 10.3 FL (ref 9.4–12.3)
POTASSIUM SERPL-SCNC: 4.1 MMOL/L (ref 3.5–5)
RBC # BLD: 3.18 M/UL (ref 4.2–5.4)
SODIUM BLD-SCNC: 138 MMOL/L (ref 136–145)
WBC # BLD: 10 K/UL (ref 4.8–10.8)

## 2019-05-20 PROCEDURE — 1210000000 HC MED SURG R&B

## 2019-05-20 PROCEDURE — 83605 ASSAY OF LACTIC ACID: CPT

## 2019-05-20 PROCEDURE — 80048 BASIC METABOLIC PNL TOTAL CA: CPT

## 2019-05-20 PROCEDURE — 2580000003 HC RX 258: Performed by: SURGERY

## 2019-05-20 PROCEDURE — 85027 COMPLETE CBC AUTOMATED: CPT

## 2019-05-20 PROCEDURE — 6360000002 HC RX W HCPCS: Performed by: INTERNAL MEDICINE

## 2019-05-20 PROCEDURE — 2700000000 HC OXYGEN THERAPY PER DAY

## 2019-05-20 PROCEDURE — 2500000003 HC RX 250 WO HCPCS: Performed by: INTERNAL MEDICINE

## 2019-05-20 PROCEDURE — 99232 SBSQ HOSP IP/OBS MODERATE 35: CPT | Performed by: INTERNAL MEDICINE

## 2019-05-20 PROCEDURE — 6370000000 HC RX 637 (ALT 250 FOR IP): Performed by: INTERNAL MEDICINE

## 2019-05-20 RX ORDER — TOBRAMYCIN 3 MG/ML
1 SOLUTION/ DROPS OPHTHALMIC 3 TIMES DAILY
Status: DISCONTINUED | OUTPATIENT
Start: 2019-05-20 | End: 2019-05-24 | Stop reason: HOSPADM

## 2019-05-20 RX ORDER — PREDNISOLONE ACETATE 10 MG/ML
1 SUSPENSION/ DROPS OPHTHALMIC 4 TIMES DAILY
Status: DISCONTINUED | OUTPATIENT
Start: 2019-05-20 | End: 2019-05-24 | Stop reason: HOSPADM

## 2019-05-20 RX ORDER — DICYCLOMINE HCL 20 MG
TABLET ORAL
Qty: 120 TABLET | Refills: 0 | Status: SHIPPED | OUTPATIENT
Start: 2019-05-20 | End: 2019-08-27 | Stop reason: SDUPTHER

## 2019-05-20 RX ORDER — PREDNISOLONE ACETATE 10 MG/ML
1 SUSPENSION/ DROPS OPHTHALMIC 4 TIMES DAILY
Status: DISCONTINUED | OUTPATIENT
Start: 2019-05-20 | End: 2019-05-20

## 2019-05-20 RX ADMIN — CLOPIDOGREL BISULFATE 75 MG: 75 TABLET ORAL at 09:33

## 2019-05-20 RX ADMIN — SODIUM CHLORIDE, POTASSIUM CHLORIDE, SODIUM LACTATE AND CALCIUM CHLORIDE: 600; 310; 30; 20 INJECTION, SOLUTION INTRAVENOUS at 01:35

## 2019-05-20 RX ADMIN — ACETAMINOPHEN 650 MG: 325 TABLET ORAL at 03:47

## 2019-05-20 RX ADMIN — METRONIDAZOLE 500 MG: 500 INJECTION, SOLUTION INTRAVENOUS at 20:30

## 2019-05-20 RX ADMIN — PANTOPRAZOLE SODIUM 40 MG: 40 TABLET, DELAYED RELEASE ORAL at 09:31

## 2019-05-20 RX ADMIN — CIPROFLOXACIN 400 MG: 2 INJECTION, SOLUTION INTRAVENOUS at 22:40

## 2019-05-20 RX ADMIN — ACETAMINOPHEN 650 MG: 325 TABLET ORAL at 15:54

## 2019-05-20 RX ADMIN — MORPHINE SULFATE 2 MG: 2 INJECTION, SOLUTION INTRAMUSCULAR; INTRAVENOUS at 17:24

## 2019-05-20 RX ADMIN — Medication 40 MG: at 17:24

## 2019-05-20 RX ADMIN — MORPHINE SULFATE 2 MG: 2 INJECTION, SOLUTION INTRAMUSCULAR; INTRAVENOUS at 20:32

## 2019-05-20 RX ADMIN — ONDANSETRON 4 MG: 2 INJECTION INTRAMUSCULAR; INTRAVENOUS at 23:32

## 2019-05-20 RX ADMIN — FLUOXETINE HYDROCHLORIDE 20 MG: 20 CAPSULE ORAL at 09:33

## 2019-05-20 RX ADMIN — PREDNISOLONE ACETATE 1 DROP: 10 SUSPENSION/ DROPS OPHTHALMIC at 22:40

## 2019-05-20 RX ADMIN — METOPROLOL SUCCINATE 12.5 MG: 25 TABLET, EXTENDED RELEASE ORAL at 11:00

## 2019-05-20 RX ADMIN — MORPHINE SULFATE 2 MG: 2 INJECTION, SOLUTION INTRAMUSCULAR; INTRAVENOUS at 11:59

## 2019-05-20 RX ADMIN — METRONIDAZOLE 500 MG: 500 INJECTION, SOLUTION INTRAVENOUS at 03:44

## 2019-05-20 RX ADMIN — TOBRAMYCIN 1 DROP: 3 SOLUTION OPHTHALMIC at 20:32

## 2019-05-20 RX ADMIN — CIPROFLOXACIN 400 MG: 2 INJECTION, SOLUTION INTRAVENOUS at 10:56

## 2019-05-20 RX ADMIN — SODIUM CHLORIDE, POTASSIUM CHLORIDE, SODIUM LACTATE AND CALCIUM CHLORIDE: 600; 310; 30; 20 INJECTION, SOLUTION INTRAVENOUS at 20:31

## 2019-05-20 RX ADMIN — ENOXAPARIN SODIUM 40 MG: 40 INJECTION SUBCUTANEOUS at 09:28

## 2019-05-20 RX ADMIN — MORPHINE SULFATE 2 MG: 2 INJECTION, SOLUTION INTRAMUSCULAR; INTRAVENOUS at 14:36

## 2019-05-20 RX ADMIN — MORPHINE SULFATE 2 MG: 2 INJECTION, SOLUTION INTRAMUSCULAR; INTRAVENOUS at 09:28

## 2019-05-20 RX ADMIN — METRONIDAZOLE 500 MG: 500 INJECTION, SOLUTION INTRAVENOUS at 11:59

## 2019-05-20 RX ADMIN — MORPHINE SULFATE 2 MG: 2 INJECTION, SOLUTION INTRAMUSCULAR; INTRAVENOUS at 22:40

## 2019-05-20 RX ADMIN — MORPHINE SULFATE 2 MG: 2 INJECTION, SOLUTION INTRAMUSCULAR; INTRAVENOUS at 01:35

## 2019-05-20 ASSESSMENT — PAIN SCALES - GENERAL
PAINLEVEL_OUTOF10: 2
PAINLEVEL_OUTOF10: 8
PAINLEVEL_OUTOF10: 7
PAINLEVEL_OUTOF10: 3
PAINLEVEL_OUTOF10: 4
PAINLEVEL_OUTOF10: 7
PAINLEVEL_OUTOF10: 2
PAINLEVEL_OUTOF10: 8
PAINLEVEL_OUTOF10: 8
PAINLEVEL_OUTOF10: 9
PAINLEVEL_OUTOF10: 7
PAINLEVEL_OUTOF10: 4
PAINLEVEL_OUTOF10: 8

## 2019-05-20 ASSESSMENT — PAIN DESCRIPTION - PAIN TYPE
TYPE: ACUTE PAIN

## 2019-05-20 ASSESSMENT — PAIN DESCRIPTION - ORIENTATION: ORIENTATION: LEFT

## 2019-05-20 ASSESSMENT — PAIN DESCRIPTION - LOCATION
LOCATION: HEAD
LOCATION: ABDOMEN
LOCATION: ABDOMEN

## 2019-05-20 NOTE — PROGRESS NOTES
Hospitalist Progress Note  5/20/2019 10:26 AM  Subjective:   Admit Date: 5/18/2019  PCP: Cherry Zapata MD    Chief Complaint: Syncope    Subjective: Patient seen and examined. Still no BM. Still with abdominal pain. Cumulative Hospital History:   Ms. Fahad Dunne is a 81 yo female with significant PMH IBS, anxiety, CVA, that presents to ER after a syncopal episode. The patient states that she got up to have a BM, she became dizzy and lightheaded and had LOC. Daughter witnessed the event and states patient was out for < 2 minutes. Patient states it did not take long to regain orientation after the event. Patient had multiple loose stools this AM.  She denies any fevers, chills, sweats. She had sudden onset CP while in the ER and CTA was negative for PE. Trop negative. Initial EKG reviewed by ER doc with nothing acute. Patient also endorses abdominal pain. CT shows colitis, ischemic vs infectious. At time of examination, patient states she is beginning to feel better. She has received IVF and IV cipro/flagyl in ER.    05/19: Patient subjectively improved. Will consult onc for renal mass seen on CT and vascular for renal artery stenosis. Continue supportive care and IV abx, appreciate GI recs. 05/20: Continue IV abx and supportive care. Patient still with abdominal pain. Patient will need outpatient follow up with Urology for renal mass. Continue to monitor closely. ROS: 14 point review of systems is negative except as specifically addressed above. DIET CLEAR LIQUID;     Intake/Output Summary (Last 24 hours) at 5/20/2019 1026  Last data filed at 5/20/2019 1009  Gross per 24 hour   Intake 2531 ml   Output 2250 ml   Net 281 ml     Medications:   lactated ringers 125 mL/hr at 05/20/19 0135     Current Facility-Administered Medications   Medication Dose Route Frequency Provider Last Rate Last Dose    morphine (PF) injection 2 mg  2 mg Intravenous Q2H PRN Enrrique Payne MD   2 mg at 05/20/19 0928    ciprofloxacin (CIPRO) IVPB 400 mg  400 mg Intravenous Q12H Janes Ayala MD   Stopped at 05/19/19 2342    metronidazole (FLAGYL) 500 mg in NaCl 100 mL IVPB premix  500 mg Intravenous Alissa Ruff MD   Stopped at 05/20/19 0445    sodium chloride flush 0.9 % injection 10 mL  10 mL Intravenous 2 times per day Janes Ayala MD   10 mL at 05/19/19 0732    sodium chloride flush 0.9 % injection 10 mL  10 mL Intravenous PRN Janes Ayala MD        ondansetron [facility]) injection 4 mg  4 mg Intravenous Q6H PRN Janes Ayala MD   4 mg at 05/19/19 2342    enoxaparin (LOVENOX) injection 40 mg  40 mg Subcutaneous Daily Janes Ayala MD   40 mg at 05/20/19 2106    senna (SENOKOT) tablet 8.6 mg  1 tablet Oral Daily PRN Janes Ayala MD        acetaminophen (TYLENOL) tablet 650 mg  650 mg Oral Q8H PRN Janes Ayala MD   650 mg at 05/20/19 5160    lactated ringers infusion   Intravenous Continuous Adama Olivares  mL/hr at 05/20/19 0135      benazepril (LOTENSIN) tablet 20 mg  20 mg Oral Daily Janes Ayala MD        cloNIDine (CATAPRES) tablet 0.1 mg  0.1 mg Oral Daily PRN Janes Ayala MD        clopidogrel (PLAVIX) tablet 75 mg  75 mg Oral Daily Janes Ayala MD   75 mg at 05/20/19 0933    FLUoxetine (PROZAC) capsule 20 mg  20 mg Oral Daily Janes Ayala MD   20 mg at 05/20/19 0933    metoprolol succinate (TOPROL XL) extended release tablet 12.5 mg  12.5 mg Oral Daily Janes Ayala MD   12.5 mg at 05/19/19 0731    pantoprazole (PROTONIX) tablet 40 mg  40 mg Oral Daily Janes Ayala MD   40 mg at 05/20/19 0931    simvastatin (ZOCOR) tablet 40 mg  40 mg Oral QPM Janes Ayala MD   40 mg at 05/19/19 1741        Labs:     Recent Labs     05/18/19  0825 05/19/19  0230 05/20/19  0240   WBC 9.7 10.3 10.0   RBC 3.74* 3.42* 3.18*   HGB 11.1* 10.2* 9.7*   HCT 35.1* 32.3* 30.2*   MCV 93.9 94.4 95.0   MCH 29.7 29.8 30.5 MCHC 31.6* 31.6* 32.1*    240 217     Recent Labs     05/18/19  0825 05/19/19  0230 05/20/19  0240    143 138   K 4.9 4.0 4.1   ANIONGAP 11 10 12   * 109 104   CO2 22 24 22   BUN 21 19 14   CREATININE 1.2* 1.2* 1.1*   GLUCOSE 112* 98 81   CALCIUM 8.5* 8.6* 8.5*     Recent Labs     05/19/19  0230   MG 1.9     Recent Labs     05/18/19  0825 05/19/19  0230   AST 14 12   ALT 8 6   BILITOT <0.2 0.3   ALKPHOS 118* 108*     ABGs:No results for input(s): PHART, XNX9JHI, PO2ART, ARW2TDG, BEART, HGBAE, R5NMAOAS, CARBOXHGBART, 02THERAPY in the last 72 hours. HgBA1c: No results for input(s): LABA1C in the last 72 hours.   FLP:    Lab Results   Component Value Date    TRIG 305 09/06/2018    HDL 51 08/22/2018    LDLCALC 97 08/22/2018    LDLDIRECT 81 11/29/2014    LABVLDL 20 07/28/2016     TSH:    Lab Results   Component Value Date    TSH 3.550 11/12/2018     Troponin T:   Recent Labs     05/18/19  1026 05/18/19  1527 05/18/19  1732   TROPONINI <0.01 <0.01 <0.01     INR:   Recent Labs     05/19/19  0230   INR 1.16       Objective:   Vitals: /62   Pulse 58   Temp 97.8 °F (36.6 °C) (Temporal)   Resp 16   Ht 5' 3\" (1.6 m)   Wt 130 lb (59 kg)   LMP  (LMP Unknown)   SpO2 99%   BMI 23.03 kg/m²   24HR INTAKE/OUTPUT:      Intake/Output Summary (Last 24 hours) at 5/20/2019 1026  Last data filed at 5/20/2019 1009  Gross per 24 hour   Intake 2531 ml   Output 2250 ml   Net 281 ml     General appearance: alert and cooperative with exam  HEENT: atraumatic, eyes with clear conjunctiva and normal lids  Lungs: no increased work of breathing, clear to auscultation bilaterally without rales, rhonchi or wheezes  Heart: regular rate and rhythm and S1, S2 normal  Abdomen: soft, diffusely tender to deep palpation, no masses/hernias  Extremities: extremities normal without clubbing, atraumatic, no cyanosis or edema  Neurologic: No focal neurologic deficits, normal sensation, alert and oriented, affect and mood appropriate. Skin: no rashes, nodules. Assessment and Plan:   #  Colitis  - continue cipro/flagyl  - pain control  - IVF  - blood cxs, NGTD  - GI consult, appreciate recs     # Syncope  - likely related to above  - r/o other causes  - monitor on tele  - IVF  - check TTE, as below  - PT to eval prior to d/c     # HTN  - resume home meds      # HLD  - resume statin      # Anxiety  - resume home meds      # CP  - serial trop negative  - monitor on tele  - TTE  - CTA negative for PE    # Renal artery stenosis  - consult vascular, appreciate recs    # Renal mass  - follow up with Urology outpatient  - evaluated by onc    TTE:    Summary   Normal LV size and systolic function. LV ejection fraction estimated at   60%.  Grade 1 diastolic dysfunction   Normal right ventricular size and systolic function.   Normal left atrial size   Aortic valve not well visualized. No significant stenosis or regurgitation   noted.   Mitral valve mildly thickened with normal mobility. 1+ mitral   regurgitation. No stenosis noted   Interatrial septum appears intact by color Doppler with no significant   intracardiac shunting noted by bubble study.     Advance Directive: Full Code    DVT prophylaxis: lovenox    Discharge planning: JANA Beck MD  [facility] Hospitalist

## 2019-05-21 LAB
GLUCOSE BLD-MCNC: 88 MG/DL (ref 70–99)
PERFORMED ON: NORMAL

## 2019-05-21 PROCEDURE — 2500000003 HC RX 250 WO HCPCS: Performed by: INTERNAL MEDICINE

## 2019-05-21 PROCEDURE — 99231 SBSQ HOSP IP/OBS SF/LOW 25: CPT | Performed by: NURSE PRACTITIONER

## 2019-05-21 PROCEDURE — 82948 REAGENT STRIP/BLOOD GLUCOSE: CPT

## 2019-05-21 PROCEDURE — 6370000000 HC RX 637 (ALT 250 FOR IP): Performed by: INTERNAL MEDICINE

## 2019-05-21 PROCEDURE — 2700000000 HC OXYGEN THERAPY PER DAY

## 2019-05-21 PROCEDURE — 2580000003 HC RX 258: Performed by: INTERNAL MEDICINE

## 2019-05-21 PROCEDURE — 2580000003 HC RX 258: Performed by: SURGERY

## 2019-05-21 PROCEDURE — 6360000002 HC RX W HCPCS: Performed by: INTERNAL MEDICINE

## 2019-05-21 PROCEDURE — 99232 SBSQ HOSP IP/OBS MODERATE 35: CPT | Performed by: HOSPITALIST

## 2019-05-21 PROCEDURE — 1210000000 HC MED SURG R&B

## 2019-05-21 RX ADMIN — ACETAMINOPHEN 650 MG: 325 TABLET ORAL at 13:47

## 2019-05-21 RX ADMIN — ONDANSETRON 4 MG: 2 INJECTION INTRAMUSCULAR; INTRAVENOUS at 20:32

## 2019-05-21 RX ADMIN — CLOPIDOGREL BISULFATE 75 MG: 75 TABLET ORAL at 08:42

## 2019-05-21 RX ADMIN — PREDNISOLONE ACETATE 1 DROP: 10 SUSPENSION/ DROPS OPHTHALMIC at 08:42

## 2019-05-21 RX ADMIN — PREDNISOLONE ACETATE 1 DROP: 10 SUSPENSION/ DROPS OPHTHALMIC at 16:46

## 2019-05-21 RX ADMIN — ACETAMINOPHEN 650 MG: 325 TABLET ORAL at 23:07

## 2019-05-21 RX ADMIN — PREDNISOLONE ACETATE 1 DROP: 10 SUSPENSION/ DROPS OPHTHALMIC at 12:22

## 2019-05-21 RX ADMIN — CIPROFLOXACIN 400 MG: 2 INJECTION, SOLUTION INTRAVENOUS at 23:01

## 2019-05-21 RX ADMIN — TOBRAMYCIN 1 DROP: 3 SOLUTION OPHTHALMIC at 23:01

## 2019-05-21 RX ADMIN — MORPHINE SULFATE 2 MG: 2 INJECTION, SOLUTION INTRAMUSCULAR; INTRAVENOUS at 01:26

## 2019-05-21 RX ADMIN — PREDNISOLONE ACETATE 1 DROP: 10 SUSPENSION/ DROPS OPHTHALMIC at 23:01

## 2019-05-21 RX ADMIN — MORPHINE SULFATE 2 MG: 2 INJECTION, SOLUTION INTRAMUSCULAR; INTRAVENOUS at 10:46

## 2019-05-21 RX ADMIN — CIPROFLOXACIN 400 MG: 2 INJECTION, SOLUTION INTRAVENOUS at 10:46

## 2019-05-21 RX ADMIN — TOBRAMYCIN 1 DROP: 3 SOLUTION OPHTHALMIC at 08:42

## 2019-05-21 RX ADMIN — METRONIDAZOLE 500 MG: 500 INJECTION, SOLUTION INTRAVENOUS at 20:26

## 2019-05-21 RX ADMIN — Medication 40 MG: at 16:46

## 2019-05-21 RX ADMIN — ACETAMINOPHEN 650 MG: 325 TABLET ORAL at 03:49

## 2019-05-21 RX ADMIN — BENAZEPRIL HYDROCHLORIDE 20 MG: 20 TABLET ORAL at 08:42

## 2019-05-21 RX ADMIN — METRONIDAZOLE 500 MG: 500 INJECTION, SOLUTION INTRAVENOUS at 03:31

## 2019-05-21 RX ADMIN — MORPHINE SULFATE 2 MG: 2 INJECTION, SOLUTION INTRAMUSCULAR; INTRAVENOUS at 03:31

## 2019-05-21 RX ADMIN — ONDANSETRON 4 MG: 2 INJECTION INTRAMUSCULAR; INTRAVENOUS at 07:27

## 2019-05-21 RX ADMIN — TOBRAMYCIN 1 DROP: 3 SOLUTION OPHTHALMIC at 13:47

## 2019-05-21 RX ADMIN — MORPHINE SULFATE 2 MG: 2 INJECTION, SOLUTION INTRAMUSCULAR; INTRAVENOUS at 20:32

## 2019-05-21 RX ADMIN — METRONIDAZOLE 500 MG: 500 INJECTION, SOLUTION INTRAVENOUS at 12:20

## 2019-05-21 RX ADMIN — FLUOXETINE HYDROCHLORIDE 20 MG: 20 CAPSULE ORAL at 08:42

## 2019-05-21 RX ADMIN — METOPROLOL SUCCINATE 12.5 MG: 25 TABLET, EXTENDED RELEASE ORAL at 08:42

## 2019-05-21 RX ADMIN — ENOXAPARIN SODIUM 40 MG: 40 INJECTION SUBCUTANEOUS at 08:43

## 2019-05-21 RX ADMIN — SODIUM CHLORIDE, POTASSIUM CHLORIDE, SODIUM LACTATE AND CALCIUM CHLORIDE: 600; 310; 30; 20 INJECTION, SOLUTION INTRAVENOUS at 20:26

## 2019-05-21 RX ADMIN — MORPHINE SULFATE 2 MG: 2 INJECTION, SOLUTION INTRAMUSCULAR; INTRAVENOUS at 13:57

## 2019-05-21 RX ADMIN — ONDANSETRON 4 MG: 2 INJECTION INTRAMUSCULAR; INTRAVENOUS at 13:57

## 2019-05-21 RX ADMIN — PANTOPRAZOLE SODIUM 40 MG: 40 TABLET, DELAYED RELEASE ORAL at 08:43

## 2019-05-21 RX ADMIN — SODIUM CHLORIDE, POTASSIUM CHLORIDE, SODIUM LACTATE AND CALCIUM CHLORIDE: 600; 310; 30; 20 INJECTION, SOLUTION INTRAVENOUS at 08:43

## 2019-05-21 RX ADMIN — MORPHINE SULFATE 2 MG: 2 INJECTION, SOLUTION INTRAMUSCULAR; INTRAVENOUS at 16:46

## 2019-05-21 RX ADMIN — MORPHINE SULFATE 2 MG: 2 INJECTION, SOLUTION INTRAMUSCULAR; INTRAVENOUS at 07:25

## 2019-05-21 ASSESSMENT — PAIN SCALES - GENERAL
PAINLEVEL_OUTOF10: 6
PAINLEVEL_OUTOF10: 7
PAINLEVEL_OUTOF10: 5
PAINLEVEL_OUTOF10: 7
PAINLEVEL_OUTOF10: 6
PAINLEVEL_OUTOF10: 7
PAINLEVEL_OUTOF10: 5
PAINLEVEL_OUTOF10: 6
PAINLEVEL_OUTOF10: 7
PAINLEVEL_OUTOF10: 2
PAINLEVEL_OUTOF10: 7
PAINLEVEL_OUTOF10: 7
PAINLEVEL_OUTOF10: 2
PAINLEVEL_OUTOF10: 7
PAINLEVEL_OUTOF10: 7
PAINLEVEL_OUTOF10: 2
PAINLEVEL_OUTOF10: 7
PAINLEVEL_OUTOF10: 8

## 2019-05-21 ASSESSMENT — PAIN DESCRIPTION - LOCATION: LOCATION: ABDOMEN

## 2019-05-21 ASSESSMENT — PAIN DESCRIPTION - ORIENTATION: ORIENTATION: LEFT

## 2019-05-21 ASSESSMENT — PAIN DESCRIPTION - PAIN TYPE: TYPE: ACUTE PAIN

## 2019-05-21 NOTE — PROGRESS NOTES
Patient walking back from bathroom with PCA and felt like \"she was going to pass out. \" PCA able to safely ambulate patient to chair. Vitals stable. Blood glucose 88. Patient assisted by nurse and PCA back to bed. Patient alert and resting comfortably with complaints of dizziness. Dr. Livia Guillen notified. Will continue to monitor.

## 2019-05-21 NOTE — PROGRESS NOTES
GI  - PROGRESS NOTE    Subjective:   Admit Date: 5/18/2019  PCP: Yariel Hua MD    CC: Abdominal pain, colitis, syncope    Pt seen and examined. Pt still has abdominal pain continues to improve. She is smiling and sitting in her chair. No acute event overnight. Pt denies rectal bleeding, melena, emesis.         Medications:  Scheduled Meds:   tobramycin  1 drop Left Eye TID    prednisoLONE acetate  1 drop Left Eye 4x Daily    ciprofloxacin  400 mg Intravenous Q12H    metroNIDAZOLE  500 mg Intravenous Q8H    sodium chloride flush  10 mL Intravenous 2 times per day    enoxaparin  40 mg Subcutaneous Daily    benazepril  20 mg Oral Daily    clopidogrel  75 mg Oral Daily    FLUoxetine  20 mg Oral Daily    metoprolol succinate  12.5 mg Oral Daily    pantoprazole  40 mg Oral Daily    simvastatin  40 mg Oral QPM       Continuous Infusions:   lactated ringers 100 mL/hr at 05/21/19 1037       PRN Meds:.morphine, sodium chloride flush, ondansetron, senna, acetaminophen, cloNIDine    Allergies: Colestipol; Codeine; Prednisone; and Aspirin    Labs:     Recent Labs     05/19/19  0230 05/20/19  0240   WBC 10.3 10.0   RBC 3.42* 3.18*   HGB 10.2* 9.7*   HCT 32.3* 30.2*   MCV 94.4 95.0   MCH 29.8 30.5   MCHC 31.6* 32.1*    217     Recent Labs     05/19/19  0230 05/20/19  0240    138   K 4.0 4.1   ANIONGAP 10 12    104   CO2 24 22   BUN 19 14   CREATININE 1.2* 1.1*   GLUCOSE 98 81   CALCIUM 8.6* 8.5*     Recent Labs     05/19/19  0230   MG 1.9     Recent Labs     05/19/19  0230   AST 12   ALT 6   BILITOT 0.3   ALKPHOS 108*     HgBA1c:  No components found for: HGBA1C  FLP:    Lab Results   Component Value Date    TRIG 305 09/06/2018    HDL 51 08/22/2018    LDLCALC 97 08/22/2018    LDLDIRECT 81 11/29/2014    LABVLDL 20 07/28/2016     TSH:    Lab Results   Component Value Date    TSH 3.550 11/12/2018     Troponin T:   Recent Labs     05/18/19  1527 05/18/19  1732   TROPONINI <0.01 <0.01     INR: Recent Labs     05/19/19  0230   INR 1.16       No results for input(s): LIPASE in the last 72 hours. -----------------------------------------------------------------  RAD:   Ct Head Wo Contrast    Result Date: 5/18/2019  CT HEAD WO CONTRAST 5/18/2019 8:00 AM HISTORY: Syncope, fall COMPARISON: CT scan dated 10/15/2018 DOSE LENGTH PRODUCT: 834 mGy cm TECHNIQUE: Helical tomographic images of the brain were obtained without the use of intravenous contrast. FINDINGS: There is no evidence of evolving large vascular territory infarct. No visualized intra-axial or extra-axial hemorrhage. No mass lesion is identified. Incidentally noted cavum septum pellucidum. Ventricles are otherwise unremarkable. The basal cisterns are symmetric. Posterior fossa structures are unremarkable. The included orbits and their contents are unremarkable. The visualized paranasal sinuses, mastoid air cells and middle ear cavities are clear. The visualized osseous structures and overlying soft tissues of the skull and face are unremarkable. 1. No acute intracranial process. Signed by Dr Koby Jay on 5/18/2019 9:32 AM    Cta Chest W Wo Contrast    Result Date: 5/18/2019  CTA CHEST W WO CONTRAST 5/18/2019 8:00 AM HISTORY: Chest pain COMPARISON: CT scan dated 9/11/2018. DLP: 1075 mGy cm TECHNIQUE: Helical tomographic images of the chest were obtained after the administration of intravenous contrast following angiogram protocol. Additionally, 3D MIP reconstructions in the coronal and sagittal planes were provided. FINDINGS:  Pulmonary arteries: There is adequate enhancement of the pulmonary arteries to evaluate for central and segmental pulmonary emboli. There are no filling defects within the main, lobar, segmental or visualized subsegmental pulmonary arteries. The pulmonary vessels are within normal limits for size. Aorta and great vessels: The aorta is well opacified and demonstrates no aneurysm, stenosis or dissection.  The great vessel origins are normal in appearance. Advanced coronary artery calcifications are visualized. Neck base: The imaged portion of the base of the neck appears unremarkable. Lungs: Underlying centrilobular emphysema. Mild peribronchial thickening along the airways. No mass lesion or consolidation. No pleural effusion. No endobronchial lesion. Heart: The heart is normal in size. There is no pericardial effusion. Lymph nodes: No pathologically enlarged mediastinal, hilar, or axillary lymph nodes are present. Bones and soft tissues: The osseous structures of the thorax and surrounding soft tissues demonstrate no acute process. Upper abdomen: The imaged portion of the upper abdomen demonstrates no acute process. 1. No evidence of pulmonary embolus or other acute cardiopulmonary process. 2. Thoracic aorta is normal in caliber. No dissection. 3. Centrilobular emphysema. 4. Coronary artery atheromatous calcification. Signed by Dr Kelly Nicholson on 5/18/2019 9:39 AM    Xr Chest Portable    Result Date: 5/18/2019  XR CHEST PORTABLE 5/18/2019 8:00 AM HISTORY: Chest pain COMPARISON: Chest exam dated 10/15/2018. FINDINGS: The lungs are clear. No pleural effusion or pneumothorax. The cardiomediastinal silhouette and pulmonary vascularity are within normal limits. The osseous structures and surrounding soft tissues demonstrate no acute abnormality. 1. Stable chest exam without acute process.  Signed by Dr Kelly Nicholson on 5/18/2019 9:07 AM    Vl Dup Lower Extremity Venous Bilateral    Result Date: 5/19/2019  Vascular Lower Extremities DVT Study Procedure  Demographics   Patient Name  Haris Franks  Age                80                D   Patient       [id number]        Gender             Female  Number   Visit Number  [id number]     Interpreting       Luisito Bonner MD                              Physician   Date of Birth 1950    Referring          Shiva Mccormick                              Physician   Accession [id number]     Sonographer        Traci Mcfadden  Number                                         RVS, RCS  Procedure Type of Study:   Veins:Lower Extremities DVT Study, VL LOWER EXTREMITY BILATERAL VENOUS  DUPLEX . Indications for Study:Elevated d-dimer. Impression   Normal venous duplex study of the bilateral lower extremity(ies). There is  no evidence of deep or superficial venous thrombosis. Signature   ----------------------------------------------------------------  Electronically signed by Johnny Lopez MD(Interpreting  physician) on 05/19/2019 07:39 AM  ----------------------------------------------------------------  Velocities are measured in cm/s ; Diameters are measured in mm Right Lower Extremities DVT Study Measurements Right 2D Measurements +------------------------------------+----------+---------------+----------+ ! Location                            ! Visualized! Compressibility! Thrombosis! +------------------------------------+----------+---------------+----------+ ! Sapheno Femoral Junction            ! Yes       ! Yes            ! None      ! +------------------------------------+----------+---------------+----------+ ! Common Femoral                      !Yes       ! Yes            ! None      ! +------------------------------------+----------+---------------+----------+ ! Prox Femoral                        !Yes       ! Yes            ! None      ! +------------------------------------+----------+---------------+----------+ ! Mid Femoral                         !Yes       ! Yes            ! None      ! +------------------------------------+----------+---------------+----------+ ! Dist Femoral                        !Yes       ! Yes            ! None      ! +------------------------------------+----------+---------------+----------+ ! Deep Femoral                        !Yes       ! Yes            ! None      ! +------------------------------------+----------+---------------+----------+ ! Popliteal +------------------------------------+----------+---------------+----------+ ! Mid Femoral                         !Yes       ! Yes            ! None      ! +------------------------------------+----------+---------------+----------+ ! Dist Femoral                        !Yes       ! Yes            ! None      ! +------------------------------------+----------+---------------+----------+ ! Deep Femoral                        !Yes       ! Yes            ! None      ! +------------------------------------+----------+---------------+----------+ ! Popliteal                           !Yes       ! Yes            ! None      ! +------------------------------------+----------+---------------+----------+ ! SSV                                 ! Yes       ! Yes            ! None      ! +------------------------------------+----------+---------------+----------+ ! Gastroc                             ! Yes       ! Yes            ! None      ! +------------------------------------+----------+---------------+----------+ ! PTV                                 ! Yes       ! Yes            ! None      ! +------------------------------------+----------+---------------+----------+ ! GSV                                 ! Yes       ! Yes            ! None      ! +------------------------------------+----------+---------------+----------+ ! ATV                                 ! Yes       ! Yes            ! None      ! +------------------------------------+----------+---------------+----------+ ! Peroneal                            !Yes       ! Yes            ! None      ! +------------------------------------+----------+---------------+----------+ ! Soleal                              !Yes       ! Yes            ! None      ! +------------------------------------+----------+---------------+----------+    Cta Abdomen Pelvis W Wo Contrast    Result Date: 5/18/2019  CTA ABDOMEN PELVIS W WO CONTRAST 5/18/2019 8:00 AM HISTORY: Abdominal pain, history of AAA COMPARISON: CT scan dated 9/17/2018 DLP: 1075 mGy centimeters TECHNIQUE: Helical tomographic images of the abdomen and pelvis utilizing angiographic protocol were obtained before and after the intravenous infusion of contrast. Multiplanar and 3 D reformatted images were provided for review. FINDINGS: Angiogram: The suprarenal aorta is normal in caliber. 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 The graft appears widely patent. The aneurysm sac is decreased in size from the prior exam, measuring up to 3.3 cm. No contrast extravasation identified into the aneurysm sac. Bilateral common, internal and external iliac arteries are nondilated and without flow-limiting stenosis. The celiac artery and its major branches are normal in appearance. The superior mesenteric artery and its proximal branches are normal in appearance. The CASEY is occluded at its origin but is reconstituted distally through collaterals. No flow-limiting stenosis at the right renal artery origin. There is a short segment moderate to severe narrowing in the left renal artery approximately 2.2 cm from its origin (series 6-image 48). Other findings: Centrilobular emphysema in the lungs. Liver is grossly normal. Gallbladder and biliary tree are unremarkable. Pancreas and spleen are unremarkable. Normal adrenal glands. Asymmetric left renal cortical atrophy. There is a 2.4 cm heterogeneously enhancing solid mass at the right inferior renal pole (series 5-image 89 and series 6-image 56). No other renal lesion identified. The ureters are decompressed. No retroperitoneal adenopathy. Stomach is unremarkable. Small bowel is unremarkable. Mild fluid in the proximal colon although the colon is nondistended. There is circumferential wall thickening along the colon from the splenic flexure down to the sigmoid colon. Mild mucosal hyperenhancement and there appears to be an associated mild hyperemia in the large bowel mesentery.  Multiple fat-containing ventral hernia is identified with no inflammatory changes in the hernia sacs. No acute bony abnormality. 1. Descending and sigmoid colitis which could be infectious or ischemic. The native CASEY is occluded at its origin although it is reconstituted through collaterals. The bowel is enhancing, without evidence of goldie necrosis. 2. Prior open AAA repair. Interval increased size of aneurysm sac, now measuring up to 3.3 cm. The graft is patent. 3. There is a 2.4 cm partially exophytic solid enhancing mass at the right lower renal pole which is suspicious for a renal cell carcinoma. No retroperitoneal adenopathy. 4. Asymmetric left renal atrophy with moderate-to-severe short segment narrowing in the proximal left renal artery. 5. Fat-containing ventral hernias.  The results of this exam were discussed with Dr. Denisa Yoo on 5/18/2019 at 1007 hours Signed by Dr Kunal Gillespie on 5/18/2019 10:08 AM      Physical Exam:     Vitals:    05/20/19 2245 05/21/19 0329 05/21/19 0654 05/21/19 1127   BP: (!) 148/72 (!) 164/80 (!) 141/69 127/62   Pulse: 66 62 61 61   Resp: 18 18 17 16   Temp: 97.6 °F (36.4 °C) 97.2 °F (36.2 °C) 97.7 °F (36.5 °C) 98.2 °F (36.8 °C)   TempSrc: Temporal Temporal Temporal Temporal   SpO2: 93% 97% 98% 92%   Weight:       Height:         24HR INTAKE/OUTPUT:      Intake/Output Summary (Last 24 hours) at 5/21/2019 1249  Last data filed at 5/21/2019 1020  Gross per 24 hour   Intake 3492.75 ml   Output 3350 ml   Net 142.75 ml     Constitutional: [x] NAD, [x] of stated age, [x] well nourished  Eyes: [x] conjunctiva clear, [x] Non inflamed irises, [x] no scleral icterus  ENT/Mouth: [x]  Nares patent with pink mucosa, [x] oropharynx clear without exudates or erythema, [x] hearing grossly normal  Head/Neck: [x] symmetrical, [x] supple  Lungs: [x] respirations non labored with good effort, [x] no respiratory distress, [x] no wheezing, [x]  Equal air entry bilaterally  Heart: [x] normal S1S2, [x]

## 2019-05-21 NOTE — PROGRESS NOTES
[facility]      Patient:  Baron Angulo  YOB: 1950  Date of Service: 5/21/2019  MRN: [id number]   Acct: [de-identified]   Primary Care Physician: Sheba Zarate MD  Advance Directive: Full Code  Admit Date: 5/18/2019       Hospital Day: 3    Chief Complaint:   Chief Complaint   Patient presents with    Loss of Consciousness    Abdominal Pain    Diarrhea       Subjective:admitted for colitis, abdominal pain, advance diet as tolerated continue abx, no diarrhea c diff test cancelled, daughter at bedside, f/u with urology regarding renal mass     Objective:   VITALS:  /60   Pulse 61   Temp 97.7 °F (36.5 °C)   Resp 16   Ht 5' 3\" (1.6 m)   Wt 130 lb (59 kg)   LMP  (LMP Unknown)   SpO2 97%   BMI 23.03 kg/m²   24HR INTAKE/OUTPUT:      Intake/Output Summary (Last 24 hours) at 5/21/2019 1601  Last data filed at 5/21/2019 1020  Gross per 24 hour   Intake 2148.75 ml   Output 2550 ml   Net -401.25 ml      General appearance: alert and cooperative with exam  HEENT: atraumatic, eyes with clear conjunctiva and normal lids  Lungs: no increased work of breathing, diminished breath sounds bilaterally  Heart: S1, S2 normal  Abdomen: soft, non-tender; bowel sounds normal; no masses,  no organomegaly  Extremities:atraumatic, no cyanosis no edema no calf tenderness   Neurologic:non focal    Skin: no rashes, nodules.       Medications:      lactated ringers 100 mL/hr at 05/21/19 1037      tobramycin  1 drop Left Eye TID    prednisoLONE acetate  1 drop Left Eye 4x Daily    ciprofloxacin  400 mg Intravenous Q12H    metroNIDAZOLE  500 mg Intravenous Q8H    sodium chloride flush  10 mL Intravenous 2 times per day    enoxaparin  40 mg Subcutaneous Daily    benazepril  20 mg Oral Daily    clopidogrel  75 mg Oral Daily    FLUoxetine  20 mg Oral Daily    metoprolol succinate  12.5 mg Oral Daily    pantoprazole  40 mg Oral Daily    simvastatin  40 mg Oral QPM     morphine, sodium chloride flush, ondansetron, senna, acetaminophen, cloNIDine  DIET CLEAR LIQUID;         Lab and other Data:     Recent Labs     05/19/19  0230 05/20/19  0240   WBC 10.3 10.0   HGB 10.2* 9.7*    217     Recent Labs     05/19/19  0230 05/20/19  0240    138   K 4.0 4.1    104   CO2 24 22   BUN 19 14   CREATININE 1.2* 1.1*   GLUCOSE 98 81     Recent Labs     05/19/19  0230   AST 12   ALT 6   BILITOT 0.3   ALKPHOS 108*     Troponin T:   Recent Labs     05/18/19  1732   TROPONINI <0.01     Pro-BNP: No results for input(s): BNP in the last 72 hours. INR:   Recent Labs     05/19/19 0230   INR 1.16     ABGs:   Lab Results   Component Value Date    PHART 7.400 09/09/2018    PO2ART 65.0 09/09/2018    UQS1XLE 33.0 09/09/2018     UA:  Recent Labs     05/18/19  1715   COLORU YELLOW   PHUR 6.0   CLARITYU Clear   SPECGRAV >1.045   LEUKOCYTESUR Negative   UROBILINOGEN 0.2   BILIRUBINUR Negative   BLOODU Negative   GLUCOSEU Negative       Imaging:   VL DUP LOWER EXTREMITY VENOUS BILATERAL   Final Result      CTA ABDOMEN PELVIS W WO CONTRAST   Final Result   1. Descending and sigmoid colitis which could be infectious or   ischemic. The native CASEY is occluded at its origin although it is   reconstituted through collaterals. The bowel is enhancing, without   evidence of goldie necrosis. 2. Prior open AAA repair. Interval increased size of aneurysm sac, now   measuring up to 3.3 cm. The graft is patent. 3. There is a 2.4 cm partially exophytic solid enhancing mass at the   right lower renal pole which is suspicious for a renal cell carcinoma. No retroperitoneal adenopathy. 4. Asymmetric left renal atrophy with moderate-to-severe short segment   narrowing in the proximal left renal artery. 5. Fat-containing ventral hernias. The results of this exam were discussed with Dr. Steffanie Aleman on 5/18/2019   at 1007 hours   Signed by Dr Daniel Gonzáles on 5/18/2019 10:08 AM      CTA CHEST W [address]   Final Result   1.  No evidence of pulmonary embolus or other acute cardiopulmonary   process. 2. Thoracic aorta is normal in caliber. No dissection. 3. Centrilobular emphysema. 4. Coronary artery atheromatous calcification. Signed by Dr Anurag Torres on 5/18/2019 9:39 AM      CT Head WO Contrast   Final Result   1. No acute intracranial process. Signed by Dr Anurag Torres on 5/18/2019 9:32 AM      XR CHEST PORTABLE   Final Result   1. Stable chest exam without acute process. Signed by Dr Anurag Torres on 5/18/2019 9:07 AM        Micro:   Blood Culture, Routine   Date Value Ref Range Status   05/18/2019   Preliminary    No Growth to date. Any change in status will be called.   , No components found for: LABURINE  Patient Active Problem List    Diagnosis Date Noted    Vomiting and diarrhea     Colitis 05/18/2019    Abnormal CT scan     PVD (peripheral vascular disease) ([address] 01/04/2019    PAF (paroxysmal atrial fibrillation) ([address] 11/12/2018    Pneumothorax on left 09/11/2018    Ischemic hepatitis 09/02/2018    CKD (chronic kidney disease), stage III ([address] 19/52/8303    Metabolic acidosis 23/67/1676    Positive sputum culture for Pseudomonas 09/02/2018    Bilateral carotid artery stenosis 08/27/2018    Essential hypertension 08/22/2018    Renal artery stenosis (HCC) 04/28/2017    Celiac artery stenosis (HCC) 04/28/2017    Colon polyps     History of colon polyps 05/18/2016    Chronic heartburn 05/18/2016    Antiplatelet or antithrombotic long-term use 05/18/2016    Fibromuscular dysplasia ([address] 06/24/2014    TIA (transient ischemic attack) 04/25/2014    Hyperlipemia 03/28/2014    AAA (abdominal aortic aneurysm) ([address] 03/19/2013    Carotid artery stenosis 03/19/2013    Irritable bowel syndrome     Osteoarthritis     Anxiety     Depression     Labyrinthitis        Assessment/plan: Active Problems:    Colitis    Abnormal CT scan    Vomiting and diarrhea  Resolved Problems:    * No resolved hospital problems. *      Plan:     admitted for colitis, abdominal pain, advance diet as tolerated continue abx, no diarrhea c diff test cancelled, daughter at bedside, f/u with urology regarding renal mass             Nate Hwang MD  Hospitalist Service  5/21/2019  4:01 PM

## 2019-05-21 NOTE — PROGRESS NOTES
Vascular Surgery Rounding Note                                                     Dr.Timothy Murdock    5/21/2019  9:53 AM      Subjective: - Sitting up in chair. Daughter at bedside. Complaining of left sided abdominal pain. Objective: Invalid input(s): 24H    Intake/Output Summary (Last 24 hours) at 5/21/2019 0953  Last data filed at 5/21/2019 0813  Gross per 24 hour   Intake 3132.75 ml   Output 3350 ml   Net -217.25 ml     In: -   Out: 550 [Urine:550]    Physical Exam:  Awake, alert, appropriate Yes  BP (!) 141/69   Pulse 61   Temp 97.7 °F (36.5 °C) (Temporal)   Resp 17   Ht 5' 3\" (1.6 m)   Wt 130 lb (59 kg)   SpO2 98%   BMI 23.03 kg/m²   Heart - Normal HT with RRR. No murmurs, gallops or rubs. Lung - Clear bilateral breath sounds without wheezes or rhonchi. Neck - Supple. No JVD. Trachea midline. Abdomen - Soft, tender on left side to palpation without guarding. Active bowel sounds. Extremities - No clubbing or cyanosis. No edema. Pedal pulses palpated. Neurologic - Grossly intact. Assessment:    1. Renal Artery Stenosis, Left - No Surgical Intervention at this time as BP stable. 2. PMHx AAA - S/P Repair  3. Colitis - Per Hospitalist      Plan:    1. Discussed with Dr. Rigoberto Pleitez - no Vascular Surgery intervention needed at this time. Will sign off. Call again if needed.          DVT prophylaxis: Lovenox        Kait Paul, APRN-BC

## 2019-05-22 LAB
LACTOFERRIN, FECAL: POSITIVE
OCCULT BLOOD QC: ABNORMAL
OCCULT BLOOD SCREENING: ABNORMAL

## 2019-05-22 PROCEDURE — 99232 SBSQ HOSP IP/OBS MODERATE 35: CPT | Performed by: INTERNAL MEDICINE

## 2019-05-22 PROCEDURE — 6370000000 HC RX 637 (ALT 250 FOR IP): Performed by: HOSPITALIST

## 2019-05-22 PROCEDURE — 2580000003 HC RX 258: Performed by: INTERNAL MEDICINE

## 2019-05-22 PROCEDURE — 99223 1ST HOSP IP/OBS HIGH 75: CPT | Performed by: INTERNAL MEDICINE

## 2019-05-22 PROCEDURE — 1210000000 HC MED SURG R&B

## 2019-05-22 PROCEDURE — 99232 SBSQ HOSP IP/OBS MODERATE 35: CPT | Performed by: HOSPITALIST

## 2019-05-22 PROCEDURE — G0328 FECAL BLOOD SCRN IMMUNOASSAY: HCPCS

## 2019-05-22 PROCEDURE — 2500000003 HC RX 250 WO HCPCS: Performed by: INTERNAL MEDICINE

## 2019-05-22 PROCEDURE — 83630 LACTOFERRIN FECAL (QUAL): CPT

## 2019-05-22 PROCEDURE — 6370000000 HC RX 637 (ALT 250 FOR IP): Performed by: INTERNAL MEDICINE

## 2019-05-22 PROCEDURE — 6360000002 HC RX W HCPCS: Performed by: INTERNAL MEDICINE

## 2019-05-22 RX ORDER — LACTULOSE 10 G/15ML
20 SOLUTION ORAL 3 TIMES DAILY
Status: DISCONTINUED | OUTPATIENT
Start: 2019-05-22 | End: 2019-05-24 | Stop reason: HOSPADM

## 2019-05-22 RX ADMIN — Medication 8.6 MG: at 08:34

## 2019-05-22 RX ADMIN — CLOPIDOGREL BISULFATE 75 MG: 75 TABLET ORAL at 08:21

## 2019-05-22 RX ADMIN — MORPHINE SULFATE 2 MG: 2 INJECTION, SOLUTION INTRAMUSCULAR; INTRAVENOUS at 16:13

## 2019-05-22 RX ADMIN — BENAZEPRIL HYDROCHLORIDE 20 MG: 20 TABLET ORAL at 08:21

## 2019-05-22 RX ADMIN — PREDNISOLONE ACETATE 1 DROP: 10 SUSPENSION/ DROPS OPHTHALMIC at 17:31

## 2019-05-22 RX ADMIN — METRONIDAZOLE 500 MG: 500 INJECTION, SOLUTION INTRAVENOUS at 21:04

## 2019-05-22 RX ADMIN — TOBRAMYCIN 1 DROP: 3 SOLUTION OPHTHALMIC at 08:23

## 2019-05-22 RX ADMIN — METRONIDAZOLE 500 MG: 500 INJECTION, SOLUTION INTRAVENOUS at 03:34

## 2019-05-22 RX ADMIN — FLUOXETINE HYDROCHLORIDE 20 MG: 20 CAPSULE ORAL at 08:22

## 2019-05-22 RX ADMIN — Medication 40 MG: at 17:31

## 2019-05-22 RX ADMIN — PREDNISOLONE ACETATE 1 DROP: 10 SUSPENSION/ DROPS OPHTHALMIC at 13:49

## 2019-05-22 RX ADMIN — ACETAMINOPHEN 650 MG: 325 TABLET ORAL at 21:12

## 2019-05-22 RX ADMIN — LACTULOSE 20 G: 20 SOLUTION ORAL at 13:49

## 2019-05-22 RX ADMIN — MORPHINE SULFATE 2 MG: 2 INJECTION, SOLUTION INTRAMUSCULAR; INTRAVENOUS at 01:08

## 2019-05-22 RX ADMIN — ACETAMINOPHEN 650 MG: 325 TABLET ORAL at 07:50

## 2019-05-22 RX ADMIN — SODIUM CHLORIDE, POTASSIUM CHLORIDE, SODIUM LACTATE AND CALCIUM CHLORIDE: 600; 310; 30; 20 INJECTION, SOLUTION INTRAVENOUS at 10:03

## 2019-05-22 RX ADMIN — PANTOPRAZOLE SODIUM 40 MG: 40 TABLET, DELAYED RELEASE ORAL at 08:22

## 2019-05-22 RX ADMIN — PREDNISOLONE ACETATE 1 DROP: 10 SUSPENSION/ DROPS OPHTHALMIC at 21:04

## 2019-05-22 RX ADMIN — MORPHINE SULFATE 2 MG: 2 INJECTION, SOLUTION INTRAMUSCULAR; INTRAVENOUS at 17:31

## 2019-05-22 RX ADMIN — METOPROLOL SUCCINATE 12.5 MG: 25 TABLET, EXTENDED RELEASE ORAL at 08:22

## 2019-05-22 RX ADMIN — PREDNISOLONE ACETATE 1 DROP: 10 SUSPENSION/ DROPS OPHTHALMIC at 08:23

## 2019-05-22 RX ADMIN — MORPHINE SULFATE 2 MG: 2 INJECTION, SOLUTION INTRAMUSCULAR; INTRAVENOUS at 03:34

## 2019-05-22 RX ADMIN — TOBRAMYCIN 1 DROP: 3 SOLUTION OPHTHALMIC at 21:04

## 2019-05-22 RX ADMIN — CIPROFLOXACIN 400 MG: 2 INJECTION, SOLUTION INTRAVENOUS at 12:19

## 2019-05-22 RX ADMIN — TOBRAMYCIN 1 DROP: 3 SOLUTION OPHTHALMIC at 13:49

## 2019-05-22 RX ADMIN — ONDANSETRON 4 MG: 2 INJECTION INTRAMUSCULAR; INTRAVENOUS at 03:34

## 2019-05-22 RX ADMIN — ENOXAPARIN SODIUM 40 MG: 40 INJECTION SUBCUTANEOUS at 08:22

## 2019-05-22 RX ADMIN — METRONIDAZOLE 500 MG: 500 INJECTION, SOLUTION INTRAVENOUS at 13:51

## 2019-05-22 ASSESSMENT — ENCOUNTER SYMPTOMS
EYES NEGATIVE: 1
SHORTNESS OF BREATH: 0
NAUSEA: 0
VOMITING: 0
GASTROINTESTINAL NEGATIVE: 1
RESPIRATORY NEGATIVE: 1
DIARRHEA: 0

## 2019-05-22 ASSESSMENT — PAIN SCALES - GENERAL
PAINLEVEL_OUTOF10: 10
PAINLEVEL_OUTOF10: 6
PAINLEVEL_OUTOF10: 10
PAINLEVEL_OUTOF10: 8
PAINLEVEL_OUTOF10: 0
PAINLEVEL_OUTOF10: 7

## 2019-05-22 NOTE — PROGRESS NOTES
[facility]      Patient:  Favio Lance  YOB: 1950  Date of Service: 5/22/2019  MRN: [id number]   Acct: [de-identified]   Primary Care Physician: Nadira Hoffman MD  Advance Directive: Full Code  Admit Date: 5/18/2019       Hospital Day: 4    Chief Complaint:   Chief Complaint   Patient presents with    Loss of Consciousness    Abdominal Pain    Diarrhea       Subjective:  No BM since Saturday, still abdominal discomfort, daughter at bedside long talk about renal art stenosis, presyncope, htn, now low bp will order lactulose for BM, orthostatics were -ve, PT consult, up in chair     Objective:   VITALS:  /64   Pulse 67   Temp 96 °F (35.6 °C) (Temporal)   Resp 16   Ht 5' 3\" (1.6 m)   Wt 130 lb (59 kg)   LMP  (LMP Unknown)   SpO2 94%   BMI 23.03 kg/m²   24HR INTAKE/OUTPUT:      Intake/Output Summary (Last 24 hours) at 5/22/2019 0913  Last data filed at 5/22/2019 4156  Gross per 24 hour   Intake 2496.66 ml   Output 1400 ml   Net 1096.66 ml      General appearance: alert and cooperative with exam  HEENT: atraumatic, eyes with clear conjunctiva and normal lids  Lungs: no increased work of breathing, diminished breath sounds bilaterally  Heart: S1, S2 normal  Abdomen: soft, non-tender; bowel sounds normal; no masses,  no organomegaly  Extremities:atraumatic, no cyanosis no edema no calf tenderness   Neurologic:non focal    Skin: no rashes, nodules.       Medications:      lactated ringers 100 mL/hr at 05/21/19 2026      tobramycin  1 drop Left Eye TID    prednisoLONE acetate  1 drop Left Eye 4x Daily    ciprofloxacin  400 mg Intravenous Q12H    metroNIDAZOLE  500 mg Intravenous Q8H    sodium chloride flush  10 mL Intravenous 2 times per day    enoxaparin  40 mg Subcutaneous Daily    benazepril  20 mg Oral Daily    clopidogrel  75 mg Oral Daily    FLUoxetine  20 mg Oral Daily    metoprolol succinate  12.5 mg Oral Daily    pantoprazole  40 mg Oral Daily    simvastatin  40 mg Oral QPM     morphine, sodium chloride flush, ondansetron, senna, acetaminophen, cloNIDine  Dietary Nutrition Supplements: Clear Liquid Oral Supplement  DIET CLEAR LIQUID;         Lab and other Data:     Recent Labs     05/20/19  0240   WBC 10.0   HGB 9.7*        Recent Labs     05/20/19  0240      K 4.1      CO2 22   BUN 14   CREATININE 1.1*   GLUCOSE 81     No results for input(s): AST, ALT, ALB, BILITOT, ALKPHOS in the last 72 hours. Troponin T:   No results for input(s): TROPONINI in the last 72 hours. Pro-BNP: No results for input(s): BNP in the last 72 hours. INR:   No results for input(s): INR in the last 72 hours. ABGs:   Lab Results   Component Value Date    PHART 7.400 09/09/2018    PO2ART 65.0 09/09/2018    YII5XUD 33.0 09/09/2018     UA:  No results for input(s): NITRITE, COLORU, PHUR, LABCAST, WBCUA, RBCUA, MUCUS, TRICHOMONAS, YEAST, BACTERIA, CLARITYU, SPECGRAV, LEUKOCYTESUR, UROBILINOGEN, BILIRUBINUR, BLOODU, GLUCOSEU, AMORPHOUS in the last 72 hours. Invalid input(s): KETONESU    Imaging:   VL DUP LOWER EXTREMITY VENOUS BILATERAL   Final Result      CTA ABDOMEN PELVIS W WO CONTRAST   Final Result   1. Descending and sigmoid colitis which could be infectious or   ischemic. The native CASEY is occluded at its origin although it is   reconstituted through collaterals. The bowel is enhancing, without   evidence of goldie necrosis. 2. Prior open AAA repair. Interval increased size of aneurysm sac, now   measuring up to 3.3 cm. The graft is patent. 3. There is a 2.4 cm partially exophytic solid enhancing mass at the   right lower renal pole which is suspicious for a renal cell carcinoma. No retroperitoneal adenopathy. 4. Asymmetric left renal atrophy with moderate-to-severe short segment   narrowing in the proximal left renal artery. 5. Fat-containing ventral hernias.    The results of this exam were discussed with Dr. Lashell Wallace on 5/18/2019   at 1007 hours   Signed by  Scarlet Walls on 5/18/2019 10:08 AM      CTA CHEST W WO CONTRAST   Final Result   1. No evidence of pulmonary embolus or other acute cardiopulmonary   process. 2. Thoracic aorta is normal in caliber. No dissection. 3. Centrilobular emphysema. 4. Coronary artery atheromatous calcification. Signed by Dr Scarlet Walls on 5/18/2019 9:39 AM      CT Head WO Contrast   Final Result   1. No acute intracranial process. Signed by Dr Scarlet Walls on 5/18/2019 9:32 AM      XR CHEST PORTABLE   Final Result   1. Stable chest exam without acute process. Signed by Dr Scarlet Walls on 5/18/2019 9:07 AM        Micro:   Blood Culture, Routine   Date Value Ref Range Status   05/18/2019   Preliminary    No Growth to date. Any change in status will be called.   , No components found for: LABURINE  Patient Active Problem List    Diagnosis Date Noted    Vomiting and diarrhea     Colitis 05/18/2019    Abnormal CT scan     PVD (peripheral vascular disease) ([address] 01/04/2019    PAF (paroxysmal atrial fibrillation) ([address] 11/12/2018    Pneumothorax on left 09/11/2018    Ischemic hepatitis 09/02/2018    CKD (chronic kidney disease), stage III ([address] 34/83/2476    Metabolic acidosis 08/40/1570    Positive sputum culture for Pseudomonas 09/02/2018    Bilateral carotid artery stenosis 08/27/2018    Essential hypertension 08/22/2018    Renal artery stenosis (HCC) 04/28/2017    Celiac artery stenosis (HCC) 04/28/2017    Colon polyps     History of colon polyps 05/18/2016    Chronic heartburn 05/18/2016    Antiplatelet or antithrombotic long-term use 05/18/2016    Fibromuscular dysplasia ([address] 06/24/2014    TIA (transient ischemic attack) 04/25/2014    Hyperlipemia 03/28/2014    AAA (abdominal aortic aneurysm) ([address] 03/19/2013    Carotid artery stenosis 03/19/2013    Irritable bowel syndrome     Osteoarthritis     Anxiety     Depression     Labyrinthitis        Assessment/plan:    Active Problems:    Colitis Abnormal CT scan    Vomiting and diarrhea  Resolved Problems:    * No resolved hospital problems.  *      Plan:   5/22 presyncpe, not orthostatic, BP is on lower end, echo, cards consult pending h/o AAA repair , left renal art stenosis no surgery per vascular , d/c ACEI No BM since Saturday, still abdominal discomfort, daughter at bedside long talk about renal art stenosis, presyncope, htn, now low bp will order lactulose for BM, orthostatics were -ve, PT consult, up in chair  5/21admitted for colitis, abdominal pain, advance diet as tolerated continue abx, no diarrhea c diff test cancelled, daughter at bedside, f/u with urology regarding renal mass             Mello Kyle MD  Hospitalist Service  5/22/2019  9:13 AM
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
## 2019-05-22 NOTE — PROGRESS NOTES
hours.  -----------------------------------------------------------------  RAD:   Ct Head Wo Contrast    Result Date: 5/18/2019  CT HEAD WO CONTRAST 5/18/2019 8:00 AM HISTORY: Syncope, fall COMPARISON: CT scan dated 10/15/2018 DOSE LENGTH PRODUCT: 834 mGy cm TECHNIQUE: Helical tomographic images of the brain were obtained without the use of intravenous contrast. FINDINGS: There is no evidence of evolving large vascular territory infarct. No visualized intra-axial or extra-axial hemorrhage. No mass lesion is identified. Incidentally noted cavum septum pellucidum. Ventricles are otherwise unremarkable. The basal cisterns are symmetric. Posterior fossa structures are unremarkable. The included orbits and their contents are unremarkable. The visualized paranasal sinuses, mastoid air cells and middle ear cavities are clear. The visualized osseous structures and overlying soft tissues of the skull and face are unremarkable. 1. No acute intracranial process. Signed by Dr Ryan Santos on 5/18/2019 9:32 AM    Cta Chest W Wo Contrast    Result Date: 5/18/2019  CTA CHEST W WO CONTRAST 5/18/2019 8:00 AM HISTORY: Chest pain COMPARISON: CT scan dated 9/11/2018. DLP: 1075 mGy cm TECHNIQUE: Helical tomographic images of the chest were obtained after the administration of intravenous contrast following angiogram protocol. Additionally, 3D MIP reconstructions in the coronal and sagittal planes were provided. FINDINGS:  Pulmonary arteries: There is adequate enhancement of the pulmonary arteries to evaluate for central and segmental pulmonary emboli. There are no filling defects within the main, lobar, segmental or visualized subsegmental pulmonary arteries. The pulmonary vessels are within normal limits for size. Aorta and great vessels: The aorta is well opacified and demonstrates no aneurysm, stenosis or dissection. The great vessel origins are normal in appearance. Advanced coronary artery calcifications are visualized.  Neck base: The imaged portion of the base of the neck appears unremarkable. Lungs: Underlying centrilobular emphysema. Mild peribronchial thickening along the airways. No mass lesion or consolidation. No pleural effusion. No endobronchial lesion. Heart: The heart is normal in size. There is no pericardial effusion. Lymph nodes: No pathologically enlarged mediastinal, hilar, or axillary lymph nodes are present. Bones and soft tissues: The osseous structures of the thorax and surrounding soft tissues demonstrate no acute process. Upper abdomen: The imaged portion of the upper abdomen demonstrates no acute process. 1. No evidence of pulmonary embolus or other acute cardiopulmonary process. 2. Thoracic aorta is normal in caliber. No dissection. 3. Centrilobular emphysema. 4. Coronary artery atheromatous calcification. Signed by Dr Fior Smith on 5/18/2019 9:39 AM    Xr Chest Portable    Result Date: 5/18/2019  XR CHEST PORTABLE 5/18/2019 8:00 AM HISTORY: Chest pain COMPARISON: Chest exam dated 10/15/2018. FINDINGS: The lungs are clear. No pleural effusion or pneumothorax. The cardiomediastinal silhouette and pulmonary vascularity are within normal limits. The osseous structures and surrounding soft tissues demonstrate no acute abnormality. 1. Stable chest exam without acute process.  Signed by Dr Fior Smith on 5/18/2019 9:07 AM    Vl Dup Lower Extremity Venous Bilateral    Result Date: 5/19/2019  Vascular Lower Extremities DVT Study Procedure  Demographics   Patient Name  Pham Garvin  Age                80                D   Patient       [id number]        Gender             Female  Number   Visit Number  [id number]     Interpreting       Gregg Dan MD                              Physician   Date of Birth 1950    Referring          Bashir Morales                              Physician   Accession     [id number]     [id number]  Number [facility], [place]  Procedure Type of Study:   Veins:Lower Extremities DVT Study, VL LOWER EXTREMITY BILATERAL VENOUS  DUPLEX . Indications for Study:Elevated d-dimer. Impression   Normal venous duplex study of the bilateral lower extremity(ies). There is  no evidence of deep or superficial venous thrombosis. Signature   ----------------------------------------------------------------  Electronically signed by Peyton Linares MD(Interpreting  physician) on 05/19/2019 07:39 AM  ----------------------------------------------------------------  Velocities are measured in cm/s ; Diameters are measured in mm Right Lower Extremities DVT Study Measurements Right 2D Measurements +------------------------------------+----------+---------------+----------+ ! Location                            ! Visualized! Compressibility! Thrombosis! +------------------------------------+----------+---------------+----------+ ! Sapheno Femoral Junction            ! Yes       ! Yes            ! None      ! +------------------------------------+----------+---------------+----------+ ! Common Femoral                      !Yes       ! Yes            ! None      ! +------------------------------------+----------+---------------+----------+ ! Prox Femoral                        !Yes       ! Yes            ! None      ! +------------------------------------+----------+---------------+----------+ ! Mid Femoral                         !Yes       ! Yes            ! None      ! +------------------------------------+----------+---------------+----------+ ! Dist Femoral                        !Yes       ! Yes            ! None      ! +------------------------------------+----------+---------------+----------+ ! Deep Femoral                        !Yes       ! Yes            ! None      ! +------------------------------------+----------+---------------+----------+ ! Popliteal                           !Yes       ! Yes            ! None      ! +------------------------------------+----------+---------------+----------+ ! SSV                                 ! Yes       ! Yes            ! None      ! +------------------------------------+----------+---------------+----------+ ! Gastroc                             ! Yes       ! Yes            ! None      ! +------------------------------------+----------+---------------+----------+ ! PTV                                 ! Yes       ! Yes            ! None      ! +------------------------------------+----------+---------------+----------+ ! GSV                                 ! Yes       ! Yes            ! None      ! +------------------------------------+----------+---------------+----------+ ! ATV                                 ! Yes       ! Yes            ! None      ! +------------------------------------+----------+---------------+----------+ ! Peroneal                            !Yes       ! Yes            ! None      ! +------------------------------------+----------+---------------+----------+ ! Soleal                              !Yes       ! Yes            ! None      ! +------------------------------------+----------+---------------+----------+ Left Lower Extremities DVT Study Measurements Left 2D Measurements +------------------------------------+----------+---------------+----------+ ! Location                            ! Visualized! Compressibility! Thrombosis! +------------------------------------+----------+---------------+----------+ ! Sapheno Femoral Junction            ! Yes       ! Yes            ! None      ! +------------------------------------+----------+---------------+----------+ ! Common Femoral                      !Yes       ! Yes            ! None      ! +------------------------------------+----------+---------------+----------+ ! Prox Femoral                        !Yes       ! Yes            ! None      ! +------------------------------------+----------+---------------+----------+ ! Mid Femoral !Yes       !Yes            ! None      ! +------------------------------------+----------+---------------+----------+ ! Dist Femoral                        !Yes       ! Yes            ! None      ! +------------------------------------+----------+---------------+----------+ ! Deep Femoral                        !Yes       ! Yes            ! None      ! +------------------------------------+----------+---------------+----------+ ! Popliteal                           !Yes       ! Yes            ! None      ! +------------------------------------+----------+---------------+----------+ ! SSV                                 ! Yes       ! Yes            ! None      ! +------------------------------------+----------+---------------+----------+ ! Gastroc                             ! Yes       ! Yes            ! None      ! +------------------------------------+----------+---------------+----------+ ! PTV                                 ! Yes       ! Yes            ! None      ! +------------------------------------+----------+---------------+----------+ ! GSV                                 ! Yes       ! Yes            ! None      ! +------------------------------------+----------+---------------+----------+ ! ATV                                 ! Yes       ! Yes            ! None      ! +------------------------------------+----------+---------------+----------+ ! Peroneal                            !Yes       ! Yes            ! None      ! +------------------------------------+----------+---------------+----------+ ! Soleal                              !Yes       ! Yes            ! None      ! +------------------------------------+----------+---------------+----------+    Cta Abdomen Pelvis W Wo Contrast    Result Date: 5/18/2019  CTA ABDOMEN PELVIS W WO CONTRAST 5/18/2019 8:00 AM HISTORY: Abdominal pain, history of AAA COMPARISON: CT scan dated 9/17/2018 DLP: 1075 mGy centimeters TECHNIQUE: Helical tomographic images of the abdomen and pelvis utilizing angiographic protocol were obtained before and after the intravenous infusion of contrast. Multiplanar and 3 D reformatted images were provided for review. FINDINGS: Angiogram: The suprarenal aorta is normal in caliber. 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 The graft appears widely patent. The aneurysm sac is decreased in size from the prior exam, measuring up to 3.3 cm. No contrast extravasation identified into the aneurysm sac. Bilateral common, internal and external iliac arteries are nondilated and without flow-limiting stenosis. The celiac artery and its major branches are normal in appearance. The superior mesenteric artery and its proximal branches are normal in appearance. The CASEY is occluded at its origin but is reconstituted distally through collaterals. No flow-limiting stenosis at the right renal artery origin. There is a short segment moderate to severe narrowing in the left renal artery approximately 2.2 cm from its origin (series 6-image 48). Other findings: Centrilobular emphysema in the lungs. Liver is grossly normal. Gallbladder and biliary tree are unremarkable. Pancreas and spleen are unremarkable. Normal adrenal glands. Asymmetric left renal cortical atrophy. There is a 2.4 cm heterogeneously enhancing solid mass at the right inferior renal pole (series 5-image 89 and series 6-image 56). No other renal lesion identified. The ureters are decompressed. No retroperitoneal adenopathy. Stomach is unremarkable. Small bowel is unremarkable. Mild fluid in the proximal colon although the colon is nondistended. There is circumferential wall thickening along the colon from the splenic flexure down to the sigmoid colon. Mild mucosal hyperenhancement and there appears to be an associated mild hyperemia in the large bowel mesentery. Multiple fat-containing ventral hernia is identified with no inflammatory changes in the hernia sacs. No acute bony abnormality.     1. Descending and sigmoid colitis which could be infectious or ischemic. The native CASEY is occluded at its origin although it is reconstituted through collaterals. The bowel is enhancing, without evidence of goldie necrosis. 2. Prior open AAA repair. Interval increased size of aneurysm sac, now measuring up to 3.3 cm. The graft is patent. 3. There is a 2.4 cm partially exophytic solid enhancing mass at the right lower renal pole which is suspicious for a renal cell carcinoma. No retroperitoneal adenopathy. 4. Asymmetric left renal atrophy with moderate-to-severe short segment narrowing in the proximal left renal artery. 5. Fat-containing ventral hernias.  The results of this exam were discussed with Dr. Maida Rizo on 5/18/2019 at 1007 hours Signed by Dr Leobardo Kaur on 5/18/2019 10:08 AM      Physical Exam:     Vitals:    05/22/19 0309 05/22/19 0312 05/22/19 0725 05/22/19 1046   BP: 116/64 112/64  (!) 110/58   Pulse: 68 67  67   Resp:   16 16   Temp:   96 °F (35.6 °C) 97 °F (36.1 °C)   TempSrc:   Temporal Temporal   SpO2: 96% 94% 94% 94%   Weight:       Height:         24HR INTAKE/OUTPUT:      Intake/Output Summary (Last 24 hours) at 5/22/2019 1144  Last data filed at 5/22/2019 1003  Gross per 24 hour   Intake 2570.12 ml   Output 1400 ml   Net 1170.12 ml     Constitutional: [x] NAD, [x] of stated age, [x] well nourished  Eyes: [x] conjunctiva clear, [x] Non inflamed irises, [x] no scleral icterus  ENT/Mouth: [x]  Nares patent with pink mucosa, [x] oropharynx clear without exudates or erythema, [x] hearing grossly normal  Head/Neck: [x] symmetrical, [x] supple  Lungs: [x] respirations non labored with good effort, [x] no respiratory distress, [x] no wheezing, [x]  Equal air entry bilaterally  Heart: [x] normal S1S2, [x]  Regular rate, [x] pedal pulses preserved 2/4 bilaterally, [x] no LE edema  Abdomen: [x] +BSx4, [x] ND, [x] soft, [x] no guarding, [x] no peritoneal signs, +mild LLQ tenderness  Muscuoskeletal: [x]  Normal nails and digits bilaterally, [x] no muscle atrophy, no joint enlargement  Skin/SubQ: [x] No jaundice, [x] warm, dry skin, [x] no rashes on inspection  Psychiatric: [x]  Orientated to person, place, and time; [x] mood and affect unremarkable, [x] memory recent and remote intact    Impression:       1. Colitis (ischemic vs infectious)  2. Right renal mass  3. Renal artery stenosis  4. AAA repair    Plan:   - Pt's abdominal pain did increase some last night, but overall is improved since admission. She is tolerating slow increase in PO intake. Recommend completing 10 days of cipro/flagyl and increase PO intake as tolerated. - Outpatient follow up to have a colonoscopy in 3 months due to history of previous colon polyps  - GI services are not available after today. If no continued improvement or new symptoms, then pt will have to be treated at another facility. Thank you for allowing us to participate in the treatment of this patient.        Jose Manuel Anne DO

## 2019-05-22 NOTE — PROGRESS NOTES
Alerted to room by staff due to family arriving to the nurses station with claims that she was fainting in the bathroom. Arrived while patient is sitting on toilet, she feels short of breath with some mild shaking in bilateral legs. Vital signs obtained when returned to bed. Cool cloth placed on forehead. Patient remained alert and oriented through whole event. Denies any visual disturbances. Only complaint at this time is dizziness and states \" the floor is moving, its a woozy feeling.  \" Electronically signed by Марина Beach RN on 5/22/2019 at 2:37 PM

## 2019-05-22 NOTE — PROGRESS NOTES
Noted order to advance diet as tolerated. Breakfast was a clear liquid. Lunch was a full liquid. Patient only ate roughly 50% of meal but didn't want to over do it. She never became symptomatic during the meal. Patient felt that she would do well with actual food. Ordered a low fiber due to diagnosis of colitis.  Electronically signed by Minna Rivers RN on 5/22/2019 at 5:52 PM

## 2019-05-23 LAB
BLOOD CULTURE, ROUTINE: NORMAL
CULTURE, BLOOD 2: NORMAL

## 2019-05-23 PROCEDURE — 6360000002 HC RX W HCPCS: Performed by: INTERNAL MEDICINE

## 2019-05-23 PROCEDURE — 6370000000 HC RX 637 (ALT 250 FOR IP): Performed by: INTERNAL MEDICINE

## 2019-05-23 PROCEDURE — 99232 SBSQ HOSP IP/OBS MODERATE 35: CPT | Performed by: HOSPITALIST

## 2019-05-23 PROCEDURE — 1210000000 HC MED SURG R&B

## 2019-05-23 PROCEDURE — 2580000003 HC RX 258: Performed by: INTERNAL MEDICINE

## 2019-05-23 PROCEDURE — 2500000003 HC RX 250 WO HCPCS: Performed by: INTERNAL MEDICINE

## 2019-05-23 RX ADMIN — TOBRAMYCIN 1 DROP: 3 SOLUTION OPHTHALMIC at 20:26

## 2019-05-23 RX ADMIN — TOBRAMYCIN 1 DROP: 3 SOLUTION OPHTHALMIC at 09:17

## 2019-05-23 RX ADMIN — CIPROFLOXACIN 400 MG: 2 INJECTION, SOLUTION INTRAVENOUS at 11:33

## 2019-05-23 RX ADMIN — TOBRAMYCIN 1 DROP: 3 SOLUTION OPHTHALMIC at 15:30

## 2019-05-23 RX ADMIN — CIPROFLOXACIN 400 MG: 2 INJECTION, SOLUTION INTRAVENOUS at 00:00

## 2019-05-23 RX ADMIN — METRONIDAZOLE 500 MG: 500 INJECTION, SOLUTION INTRAVENOUS at 05:28

## 2019-05-23 RX ADMIN — METRONIDAZOLE 500 MG: 500 INJECTION, SOLUTION INTRAVENOUS at 20:26

## 2019-05-23 RX ADMIN — PREDNISOLONE ACETATE 1 DROP: 10 SUSPENSION/ DROPS OPHTHALMIC at 09:17

## 2019-05-23 RX ADMIN — PREDNISOLONE ACETATE 1 DROP: 10 SUSPENSION/ DROPS OPHTHALMIC at 20:26

## 2019-05-23 RX ADMIN — ONDANSETRON 4 MG: 2 INJECTION INTRAMUSCULAR; INTRAVENOUS at 12:26

## 2019-05-23 RX ADMIN — ENOXAPARIN SODIUM 40 MG: 40 INJECTION SUBCUTANEOUS at 09:16

## 2019-05-23 RX ADMIN — Medication 40 MG: at 20:26

## 2019-05-23 RX ADMIN — MORPHINE SULFATE 2 MG: 2 INJECTION, SOLUTION INTRAMUSCULAR; INTRAVENOUS at 02:19

## 2019-05-23 RX ADMIN — METOPROLOL SUCCINATE 12.5 MG: 25 TABLET, EXTENDED RELEASE ORAL at 09:16

## 2019-05-23 RX ADMIN — FLUOXETINE HYDROCHLORIDE 20 MG: 20 CAPSULE ORAL at 09:16

## 2019-05-23 RX ADMIN — PANTOPRAZOLE SODIUM 40 MG: 40 TABLET, DELAYED RELEASE ORAL at 09:16

## 2019-05-23 RX ADMIN — CIPROFLOXACIN 400 MG: 2 INJECTION, SOLUTION INTRAVENOUS at 23:31

## 2019-05-23 RX ADMIN — PREDNISOLONE ACETATE 1 DROP: 10 SUSPENSION/ DROPS OPHTHALMIC at 15:30

## 2019-05-23 RX ADMIN — ACETAMINOPHEN 650 MG: 325 TABLET ORAL at 09:21

## 2019-05-23 RX ADMIN — SODIUM CHLORIDE, POTASSIUM CHLORIDE, SODIUM LACTATE AND CALCIUM CHLORIDE: 600; 310; 30; 20 INJECTION, SOLUTION INTRAVENOUS at 14:12

## 2019-05-23 RX ADMIN — CLOPIDOGREL BISULFATE 75 MG: 75 TABLET ORAL at 09:16

## 2019-05-23 RX ADMIN — METRONIDAZOLE 500 MG: 500 INJECTION, SOLUTION INTRAVENOUS at 14:12

## 2019-05-23 ASSESSMENT — PAIN SCALES - GENERAL
PAINLEVEL_OUTOF10: 8
PAINLEVEL_OUTOF10: 0

## 2019-05-23 NOTE — PROGRESS NOTES
[facility]      Patient:  Tiffanie Mason  YOB: 1950  Date of Service: 5/23/2019  MRN: [id number]   Acct: [de-identified]   Primary Care Physician: Je Neal MD  Advance Directive: Full Code  Admit Date: 5/18/2019       Hospital Day: 5    Chief Complaint:   Chief Complaint   Patient presents with    Loss of Consciousness    Abdominal Pain    Diarrhea       Subjective:  5/23 appreciate cards agree vasovagal syncope, needs event recorder before d/c ,GI signed off  Still weak, poor appetitie no active bm, family at bedside awaiting PT eval, up in chair home am    Objective:   VITALS:  /63   Pulse 64   Temp 97.7 °F (36.5 °C) (Temporal)   Resp 16   Ht 5' 3\" (1.6 m)   Wt 130 lb (59 kg)   LMP  (LMP Unknown)   SpO2 (!) 85%   BMI 23.03 kg/m²   24HR INTAKE/OUTPUT:      Intake/Output Summary (Last 24 hours) at 5/23/2019 7902  Last data filed at 5/23/2019 9718  Gross per 24 hour   Intake 3136.58 ml   Output 1800 ml   Net 1336.58 ml      General appearance: alert and cooperative with exam  HEENT: atraumatic, eyes with clear conjunctiva and normal lids  Lungs: no increased work of breathing, diminished breath sounds bilaterally  Heart: S1, S2 normal  Abdomen: soft, non-tender; bowel sounds normal; no masses,  no organomegaly  Extremities:atraumatic, no cyanosis no edema no calf tenderness   Neurologic:non focal    Skin: no rashes, nodules.       Medications:      lactated ringers 100 mL/hr at 05/22/19 1003      lactulose  20 g Oral TID    tobramycin  1 drop Left Eye TID    prednisoLONE acetate  1 drop Left Eye 4x Daily    ciprofloxacin  400 mg Intravenous Q12H    metroNIDAZOLE  500 mg Intravenous Q8H    sodium chloride flush  10 mL Intravenous 2 times per day    enoxaparin  40 mg Subcutaneous Daily    clopidogrel  75 mg Oral Daily    FLUoxetine  20 mg Oral Daily    metoprolol succinate  12.5 mg Oral Daily    pantoprazole  40 mg Oral Daily    simvastatin  40 mg Oral QPM morphine, sodium chloride flush, ondansetron, senna, acetaminophen, cloNIDine  Dietary Nutrition Supplements: Clear Liquid Oral Supplement  DIET LOW FIBER;         Lab and other Data:     No results for input(s): WBC, HGB, PLT in the last 72 hours. No results for input(s): NA, K, CL, CO2, BUN, CREATININE, GLUCOSE in the last 72 hours. No results for input(s): AST, ALT, ALB, BILITOT, ALKPHOS in the last 72 hours. Troponin T:   No results for input(s): TROPONINI in the last 72 hours. Pro-BNP: No results for input(s): BNP in the last 72 hours. INR:   No results for input(s): INR in the last 72 hours. ABGs:   Lab Results   Component Value Date    PHART 7.400 09/09/2018    PO2ART 65.0 09/09/2018    GPX8YZO 33.0 09/09/2018     UA:  No results for input(s): NITRITE, COLORU, PHUR, LABCAST, WBCUA, RBCUA, MUCUS, TRICHOMONAS, YEAST, BACTERIA, CLARITYU, SPECGRAV, LEUKOCYTESUR, UROBILINOGEN, BILIRUBINUR, BLOODU, GLUCOSEU, AMORPHOUS in the last 72 hours. Invalid input(s): KETONESU    Imaging:   VL DUP LOWER EXTREMITY VENOUS BILATERAL   Final Result      CTA ABDOMEN PELVIS W WO CONTRAST   Final Result   1. Descending and sigmoid colitis which could be infectious or   ischemic. The native CASEY is occluded at its origin although it is   reconstituted through collaterals. The bowel is enhancing, without   evidence of goldie necrosis. 2. Prior open AAA repair. Interval increased size of aneurysm sac, now   measuring up to 3.3 cm. The graft is patent. 3. There is a 2.4 cm partially exophytic solid enhancing mass at the   right lower renal pole which is suspicious for a renal cell carcinoma. No retroperitoneal adenopathy. 4. Asymmetric left renal atrophy with moderate-to-severe short segment   narrowing in the proximal left renal artery. 5. Fat-containing ventral hernias.    The results of this exam were discussed with Dr. Sean Hernandez on 5/18/2019   at 1007 hours   Signed by Dr Mai Brumfield on 5/18/2019 10:08 AM CTA CHEST W WO CONTRAST   Final Result   1. No evidence of pulmonary embolus or other acute cardiopulmonary   process. 2. Thoracic aorta is normal in caliber. No dissection. 3. Centrilobular emphysema. 4. Coronary artery atheromatous calcification. Signed by Dr Ana Cisse on 5/18/2019 9:39 AM      CT Head WO Contrast   Final Result   1. No acute intracranial process. Signed by Dr Ana Cisse on 5/18/2019 9:32 AM      XR CHEST PORTABLE   Final Result   1. Stable chest exam without acute process. Signed by Dr Ana Cisse on 5/18/2019 9:07 AM        Micro:   Blood Culture, Routine   Date Value Ref Range Status   05/18/2019   Preliminary    No Growth to date. Any change in status will be called.   , No components found for: LABURINE  Patient Active Problem List    Diagnosis Date Noted    Vomiting and diarrhea     Colitis 05/18/2019    Abnormal CT scan     PVD (peripheral vascular disease) ([address] 01/04/2019    PAF (paroxysmal atrial fibrillation) ([address] 11/12/2018    Pneumothorax on left 09/11/2018    Ischemic hepatitis 09/02/2018    CKD (chronic kidney disease), stage III ([address] 93/22/7263    Metabolic acidosis 04/70/9832    Positive sputum culture for Pseudomonas 09/02/2018    Bilateral carotid artery stenosis 08/27/2018    Essential hypertension 08/22/2018    Renal artery stenosis (HCC) 04/28/2017    Celiac artery stenosis (HCC) 04/28/2017    Colon polyps     History of colon polyps 05/18/2016    Chronic heartburn 05/18/2016    Antiplatelet or antithrombotic long-term use 05/18/2016    Fibromuscular dysplasia ([address] 06/24/2014    TIA (transient ischemic attack) 04/25/2014    Hyperlipemia 03/28/2014    AAA (abdominal aortic aneurysm) ([address] 03/19/2013    Carotid artery stenosis 03/19/2013    Irritable bowel syndrome     Osteoarthritis     Anxiety     Depression     Labyrinthitis        Assessment/plan:    Active Problems:    Colitis    Abnormal CT scan    Vomiting and diarrhea  Resolved Problems:    * No resolved hospital problems.  *      Plan:    5/23 appreciate cards agree vasovagal syncope, needs event recorder before d/c ,GI signed off  Still weak, poor appetitie no active bm, family at bedside awaiting PT eval, up in chair home am    5/22 presyncpe, not orthostatic, BP is on lower end, echo, cards consult pending h/o AAA repair , left renal art stenosis no surgery per vascular , d/c ACEI No BM since Saturday, still abdominal discomfort, daughter at bedside long talk about renal art stenosis, presyncope, htn, now low bp will order lactulose for BM, orthostatics were -ve, PT consult, up in chair    5/21admitted for colitis, abdominal pain, advance diet as tolerated continue abx, no diarrhea c diff test cancelled, daughter at bedside, f/u with urology regarding renal mass             Anjelica Umaña MD  Hospitalist Service  5/23/2019  8:38 AM

## 2019-05-24 VITALS
OXYGEN SATURATION: 95 % | SYSTOLIC BLOOD PRESSURE: 112 MMHG | DIASTOLIC BLOOD PRESSURE: 54 MMHG | WEIGHT: 130 LBS | HEART RATE: 62 BPM | RESPIRATION RATE: 14 BRPM | TEMPERATURE: 99.1 F | BODY MASS INDEX: 23.04 KG/M2 | HEIGHT: 63 IN

## 2019-05-24 PROCEDURE — 2500000003 HC RX 250 WO HCPCS: Performed by: INTERNAL MEDICINE

## 2019-05-24 PROCEDURE — 99239 HOSP IP/OBS DSCHRG MGMT >30: CPT | Performed by: HOSPITALIST

## 2019-05-24 PROCEDURE — 6360000002 HC RX W HCPCS: Performed by: INTERNAL MEDICINE

## 2019-05-24 PROCEDURE — 2580000003 HC RX 258: Performed by: INTERNAL MEDICINE

## 2019-05-24 PROCEDURE — 6370000000 HC RX 637 (ALT 250 FOR IP): Performed by: INTERNAL MEDICINE

## 2019-05-24 PROCEDURE — 93229 REMOTE 30 DAY ECG TECH SUPP: CPT

## 2019-05-24 RX ORDER — METRONIDAZOLE 500 MG/1
500 TABLET ORAL 3 TIMES DAILY
Qty: 30 TABLET | Refills: 0 | Status: SHIPPED | OUTPATIENT
Start: 2019-05-24 | End: 2019-06-03

## 2019-05-24 RX ORDER — ONDANSETRON 4 MG/1
4 TABLET, FILM COATED ORAL EVERY 8 HOURS PRN
Qty: 30 TABLET | Refills: 0 | Status: SHIPPED | OUTPATIENT
Start: 2019-05-24 | End: 2021-01-27 | Stop reason: SDUPTHER

## 2019-05-24 RX ORDER — CIPROFLOXACIN 500 MG/1
500 TABLET, FILM COATED ORAL 2 TIMES DAILY
Qty: 14 TABLET | Refills: 0 | Status: SHIPPED | OUTPATIENT
Start: 2019-05-24 | End: 2019-05-31

## 2019-05-24 RX ADMIN — METRONIDAZOLE 500 MG: 500 INJECTION, SOLUTION INTRAVENOUS at 04:02

## 2019-05-24 RX ADMIN — PREDNISOLONE ACETATE 1 DROP: 10 SUSPENSION/ DROPS OPHTHALMIC at 08:29

## 2019-05-24 RX ADMIN — TOBRAMYCIN 1 DROP: 3 SOLUTION OPHTHALMIC at 08:29

## 2019-05-24 RX ADMIN — PANTOPRAZOLE SODIUM 40 MG: 40 TABLET, DELAYED RELEASE ORAL at 08:29

## 2019-05-24 RX ADMIN — CLOPIDOGREL BISULFATE 75 MG: 75 TABLET ORAL at 08:29

## 2019-05-24 RX ADMIN — SODIUM CHLORIDE, POTASSIUM CHLORIDE, SODIUM LACTATE AND CALCIUM CHLORIDE: 600; 310; 30; 20 INJECTION, SOLUTION INTRAVENOUS at 04:05

## 2019-05-24 RX ADMIN — METOPROLOL SUCCINATE 12.5 MG: 25 TABLET, EXTENDED RELEASE ORAL at 08:28

## 2019-05-24 RX ADMIN — ENOXAPARIN SODIUM 40 MG: 40 INJECTION SUBCUTANEOUS at 08:29

## 2019-05-24 RX ADMIN — ACETAMINOPHEN 650 MG: 325 TABLET ORAL at 13:19

## 2019-05-24 RX ADMIN — FLUOXETINE HYDROCHLORIDE 20 MG: 20 CAPSULE ORAL at 08:28

## 2019-05-24 RX ADMIN — Medication 10 ML: at 09:04

## 2019-05-24 ASSESSMENT — PAIN SCALES - GENERAL: PAINLEVEL_OUTOF10: 7

## 2019-05-24 NOTE — DISCHARGE SUMMARY
Hospitalist   Discharge Summary    Kristin Meng  :  1950  MRN:  [id number]    Admit date:  2019  Discharge date:  2019    Admitting Physician:  No admitting provider for patient encounter. Advance Directive: Full Code    Consults: GI /colitis, vascular, hem oncology renal mass, cardiology syncope     Primary Care Physician:  Soco Hudosn MD    Discharge Diagnoses: Active Problems:    Colitis    Abnormal CT scan    Vomiting and diarrhea  Resolved Problems:    * No resolved hospital problems. *      Significant Diagnostic Studies:   Ct Head Wo Contrast    Result Date: 2019  CT HEAD WO CONTRAST 2019 8:00 AM HISTORY: Syncope, fall COMPARISON: CT scan dated 10/15/2018 DOSE LENGTH PRODUCT: 834 mGy cm TECHNIQUE: Helical tomographic images of the brain were obtained without the use of intravenous contrast. FINDINGS: There is no evidence of evolving large vascular territory infarct. No visualized intra-axial or extra-axial hemorrhage. No mass lesion is identified. Incidentally noted cavum septum pellucidum. Ventricles are otherwise unremarkable. The basal cisterns are symmetric. Posterior fossa structures are unremarkable. The included orbits and their contents are unremarkable. The visualized paranasal sinuses, mastoid air cells and middle ear cavities are clear. The visualized osseous structures and overlying soft tissues of the skull and face are unremarkable. 1. No acute intracranial process. Signed by Dr Kelly Nicholson on 2019 9:32 AM    Cta Chest W Wo Contrast    Result Date: 2019  CTA CHEST W WO CONTRAST 2019 8:00 AM HISTORY: Chest pain COMPARISON: CT scan dated 2018. DLP: 1075 mGy cm TECHNIQUE: Helical tomographic images of the chest were obtained after the administration of intravenous contrast following angiogram protocol. Additionally, 3D MIP reconstructions in the coronal and sagittal planes were provided. FINDINGS:  Pulmonary arteries:  There is adequate enhancement of the pulmonary arteries to evaluate for central and segmental pulmonary emboli. There are no filling defects within the main, lobar, segmental or visualized subsegmental pulmonary arteries. The pulmonary vessels are within normal limits for size. Aorta and great vessels: The aorta is well opacified and demonstrates no aneurysm, stenosis or dissection. The great vessel origins are normal in appearance. Advanced coronary artery calcifications are visualized. Neck base: The imaged portion of the base of the neck appears unremarkable. Lungs: Underlying centrilobular emphysema. Mild peribronchial thickening along the airways. No mass lesion or consolidation. No pleural effusion. No endobronchial lesion. Heart: The heart is normal in size. There is no pericardial effusion. Lymph nodes: No pathologically enlarged mediastinal, hilar, or axillary lymph nodes are present. Bones and soft tissues: The osseous structures of the thorax and surrounding soft tissues demonstrate no acute process. Upper abdomen: The imaged portion of the upper abdomen demonstrates no acute process. 1. No evidence of pulmonary embolus or other acute cardiopulmonary process. 2. Thoracic aorta is normal in caliber. No dissection. 3. Centrilobular emphysema. 4. Coronary artery atheromatous calcification. Signed by Dr Scarlet Walls on 5/18/2019 9:39 AM    Xr Chest Portable    Result Date: 5/18/2019  XR CHEST PORTABLE 5/18/2019 8:00 AM HISTORY: Chest pain COMPARISON: Chest exam dated 10/15/2018. FINDINGS: The lungs are clear. No pleural effusion or pneumothorax. The cardiomediastinal silhouette and pulmonary vascularity are within normal limits. The osseous structures and surrounding soft tissues demonstrate no acute abnormality. 1. Stable chest exam without acute process.  Signed by Dr Scarlet Walls on 5/18/2019 9:07 AM    Vl Dup Lower Extremity Venous Bilateral    Result Date: 5/19/2019  Vascular Lower Extremities DVT Study Procedure +------------------------------------+----------+---------------+----------+ ! Dist Femoral                        !Yes       ! Yes            ! None      ! +------------------------------------+----------+---------------+----------+ ! Deep Femoral                        !Yes       ! Yes            ! None      ! +------------------------------------+----------+---------------+----------+ ! Popliteal                           !Yes       ! Yes            ! None      ! +------------------------------------+----------+---------------+----------+ ! SSV                                 ! Yes       ! Yes            ! None      ! +------------------------------------+----------+---------------+----------+ ! Gastroc                             ! Yes       ! Yes            ! None      ! +------------------------------------+----------+---------------+----------+ ! PTV                                 ! Yes       ! Yes            ! None      ! +------------------------------------+----------+---------------+----------+ ! GSV                                 ! Yes       ! Yes            ! None      ! +------------------------------------+----------+---------------+----------+ ! ATV                                 ! Yes       ! Yes            ! None      ! +------------------------------------+----------+---------------+----------+ ! Peroneal                            !Yes       ! Yes            ! None      ! +------------------------------------+----------+---------------+----------+ ! Soleal                              !Yes       ! Yes            ! None      ! +------------------------------------+----------+---------------+----------+ Left Lower Extremities DVT Study Measurements Left 2D Measurements +------------------------------------+----------+---------------+----------+ ! Location                            ! Visualized! Compressibility! Thrombosis! +------------------------------------+----------+---------------+----------+ ! Sapheno Femoral Junction !Yes       !Yes            ! None      ! +------------------------------------+----------+---------------+----------+ ! Common Femoral                      !Yes       ! Yes            ! None      ! +------------------------------------+----------+---------------+----------+ ! Prox Femoral                        !Yes       ! Yes            ! None      ! +------------------------------------+----------+---------------+----------+ ! Mid Femoral                         !Yes       ! Yes            ! None      ! +------------------------------------+----------+---------------+----------+ ! Dist Femoral                        !Yes       ! Yes            ! None      ! +------------------------------------+----------+---------------+----------+ ! Deep Femoral                        !Yes       ! Yes            ! None      ! +------------------------------------+----------+---------------+----------+ ! Popliteal                           !Yes       ! Yes            ! None      ! +------------------------------------+----------+---------------+----------+ ! SSV                                 ! Yes       ! Yes            ! None      ! +------------------------------------+----------+---------------+----------+ ! Gastroc                             ! Yes       ! Yes            ! None      ! +------------------------------------+----------+---------------+----------+ ! PTV                                 ! Yes       ! Yes            ! None      ! +------------------------------------+----------+---------------+----------+ ! GSV                                 ! Yes       ! Yes            ! None      ! +------------------------------------+----------+---------------+----------+ ! ATV                                 ! Yes       ! Yes            ! None      ! +------------------------------------+----------+---------------+----------+ ! Peroneal                            !Yes       ! Yes            ! None      ! +------------------------------------+----------+---------------+----------+ ! Soleal                              !Yes       ! Yes            ! None      ! +------------------------------------+----------+---------------+----------+    Cta Abdomen Pelvis W Wo Contrast    Result Date: 5/18/2019  CTA ABDOMEN PELVIS W WO CONTRAST 5/18/2019 8:00 AM HISTORY: Abdominal pain, history of AAA COMPARISON: CT scan dated 9/17/2018 DLP: 1075 mGy centimeters TECHNIQUE: Helical tomographic images of the abdomen and pelvis utilizing angiographic protocol were obtained before and after the intravenous infusion of contrast. Multiplanar and 3 D reformatted images were provided for review. FINDINGS: Angiogram: The suprarenal aorta is normal in caliber. 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 The graft appears widely patent. The aneurysm sac is decreased in size from the prior exam, measuring up to 3.3 cm. No contrast extravasation identified into the aneurysm sac. Bilateral common, internal and external iliac arteries are nondilated and without flow-limiting stenosis. The celiac artery and its major branches are normal in appearance. The superior mesenteric artery and its proximal branches are normal in appearance. The CASEY is occluded at its origin but is reconstituted distally through collaterals. No flow-limiting stenosis at the right renal artery origin. There is a short segment moderate to severe narrowing in the left renal artery approximately 2.2 cm from its origin (series 6-image 48). Other findings: Centrilobular emphysema in the lungs. Liver is grossly normal. Gallbladder and biliary tree are unremarkable. Pancreas and spleen are unremarkable. Normal adrenal glands. Asymmetric left renal cortical atrophy. There is a 2.4 cm heterogeneously enhancing solid mass at the right inferior renal pole (series 5-image 89 and series 6-image 56). No other renal lesion identified.  The ureters are non-tender; bowel sounds normal; no masses,  no organomegaly  Extremities:atraumatic, no cyanosis no edema no calf tenderness   Neurologic:non focal    Skin: no rashes, nodules. Discharge Medications:       Annette Pyle   Home Medication Instructions [id number]    Printed on:05/24/19 1042   Medication Information                      benazepril (LOTENSIN) 20 MG tablet  Take 1 tablet by mouth daily             bromfenac (PROLENSA) 0.07 % SOLN  Place 1 drop into the left eye daily             cloNIDine (CATAPRES) 0.1 MG tablet  Take 1 tablet by mouth daily as needed (if blood pressure is 160/90 or greater)             clopidogrel (PLAVIX) 75 MG tablet  Take 1 tablet by mouth daily             DiazePAM (VALIUM PO)  Take by mouth Hosp gave her 10. PT using PRN for Vertigo             dicyclomine (BENTYL) 20 MG tablet  TAKE 1 TABLET BY MOUTH FOUR TIMES DAILY BEFORE MEALS AND AT NIGHT             FLUoxetine (PROZAC) 20 MG capsule  TAKE ONE CAPSULE BY MOUTH DAILY             metoprolol succinate (TOPROL XL) 25 MG extended release tablet  TAKE 1/2 TABLET BY MOUTH EVERY DAY             Multiple Vitamins-Minerals (COMPLETE MULTIVITAMIN/MINERAL PO)  Take 1 tablet by mouth daily             ondansetron (ZOFRAN ODT) 8 MG TBDP disintegrating tablet  Take 1 tablet by mouth every 12 hours as needed for Nausea or Vomiting             pantoprazole (PROTONIX) 40 MG tablet  TAKE 1 TABLET BY MOUTH EVERY DAY             prednisoLONE acetate (PRED FORTE) 1 % ophthalmic suspension  Place 1 drop into the left eye 4 times daily             simvastatin (ZOCOR) 40 MG tablet  TAKE ONE TABLET BY MOUTH EVERY EVENING             tobramycin (TOBREX) 0.3 % ophthalmic solution  Place 1 drop into the left eye 3 times daily                 Discharge Instructions: Follow up with Akiko Barahona MD in 7 days. Take medications as directed. Resume activity as tolerated.     Diet: Dietary Nutrition Supplements: Clear Liquid Oral

## 2019-05-25 LAB
CULTURE, STOOL: NORMAL
E COLI SHIGA TOXIN ASSAY: NORMAL

## 2019-05-28 ENCOUNTER — TELEPHONE (OUTPATIENT)
Dept: PRIMARY CARE CLINIC | Age: 69
End: 2019-05-28

## 2019-05-30 ENCOUNTER — TELEPHONE (OUTPATIENT)
Dept: GASTROENTEROLOGY | Age: 69
End: 2019-05-30

## 2019-05-30 NOTE — TELEPHONE ENCOUNTER
1 Follow up with Gabriela Barnes DO (Gastroenterology); As needed;  However, will need outpatient colonscopy regarding polyps in 3 wks

## 2019-05-31 ENCOUNTER — OFFICE VISIT (OUTPATIENT)
Dept: PRIMARY CARE CLINIC | Age: 69
End: 2019-05-31
Payer: MEDICARE

## 2019-05-31 VITALS
RESPIRATION RATE: 16 BRPM | SYSTOLIC BLOOD PRESSURE: 134 MMHG | OXYGEN SATURATION: 97 % | TEMPERATURE: 97.6 F | BODY MASS INDEX: 25.19 KG/M2 | HEART RATE: 71 BPM | WEIGHT: 142.2 LBS | DIASTOLIC BLOOD PRESSURE: 82 MMHG

## 2019-05-31 DIAGNOSIS — N28.89 RENAL MASS, LEFT: ICD-10-CM

## 2019-05-31 DIAGNOSIS — K58.0 IRRITABLE BOWEL SYNDROME WITH DIARRHEA: Primary | ICD-10-CM

## 2019-05-31 PROCEDURE — 96372 THER/PROPH/DIAG INJ SC/IM: CPT | Performed by: NURSE PRACTITIONER

## 2019-05-31 PROCEDURE — 1111F DSCHRG MED/CURRENT MED MERGE: CPT | Performed by: NURSE PRACTITIONER

## 2019-05-31 PROCEDURE — 99495 TRANSJ CARE MGMT MOD F2F 14D: CPT | Performed by: NURSE PRACTITIONER

## 2019-05-31 RX ORDER — PREDNISONE 1 MG/1
TABLET ORAL
Qty: 18 TABLET | Refills: 0 | Status: SHIPPED | OUTPATIENT
Start: 2019-05-31 | End: 2019-06-10

## 2019-05-31 RX ORDER — TRIAMCINOLONE ACETONIDE 40 MG/ML
40 INJECTION, SUSPENSION INTRA-ARTICULAR; INTRAMUSCULAR ONCE
Status: COMPLETED | OUTPATIENT
Start: 2019-05-31 | End: 2019-05-31

## 2019-05-31 RX ORDER — KETOROLAC TROMETHAMINE 10 MG/1
10 TABLET, FILM COATED ORAL EVERY 6 HOURS PRN
Qty: 20 TABLET | Refills: 0 | Status: SHIPPED | OUTPATIENT
Start: 2019-05-31 | End: 2019-07-01

## 2019-05-31 RX ADMIN — TRIAMCINOLONE ACETONIDE 40 MG: 40 INJECTION, SUSPENSION INTRA-ARTICULAR; INTRAMUSCULAR at 14:23

## 2019-05-31 NOTE — PROGRESS NOTES
bromfenac (PROLENSA) 0.07 % SOLN  Place 1 drop into the left eye daily             ciprofloxacin (CIPRO) 500 MG tablet  Take 1 tablet by mouth 2 times daily for 7 days             cloNIDine (CATAPRES) 0.1 MG tablet  Take 1 tablet by mouth daily as needed (if blood pressure is 160/90 or greater)             clopidogrel (PLAVIX) 75 MG tablet  Take 1 tablet by mouth daily             DiazePAM (VALIUM PO)  Take by mouth Hosp gave her 10.  PT using PRN for Vertigo             dicyclomine (BENTYL) 20 MG tablet  TAKE 1 TABLET BY MOUTH FOUR TIMES DAILY BEFORE MEALS AND AT NIGHT             FLUoxetine (PROZAC) 20 MG capsule  TAKE ONE CAPSULE BY MOUTH DAILY             metoprolol succinate (TOPROL XL) 25 MG extended release tablet  TAKE 1/2 TABLET BY MOUTH EVERY DAY             metroNIDAZOLE (FLAGYL) 500 MG tablet  Take 1 tablet by mouth 3 times daily for 10 days             Multiple Vitamins-Minerals (COMPLETE MULTIVITAMIN/MINERAL PO)  Take 1 tablet by mouth daily             ondansetron (ZOFRAN ODT) 8 MG TBDP disintegrating tablet  Take 1 tablet by mouth every 12 hours as needed for Nausea or Vomiting             ondansetron (ZOFRAN) 4 MG tablet  Take 1 tablet by mouth every 8 hours as needed for Nausea or Vomiting             pantoprazole (PROTONIX) 40 MG tablet  TAKE 1 TABLET BY MOUTH EVERY DAY             prednisoLONE acetate (PRED FORTE) 1 % ophthalmic suspension  Place 1 drop into the left eye 4 times daily             simvastatin (ZOCOR) 40 MG tablet  TAKE ONE TABLET BY MOUTH EVERY EVENING             tobramycin (TOBREX) 0.3 % ophthalmic solution  Place 1 drop into the left eye 3 times daily                   Medications marked \"taking\" at this time  Outpatient Medications Marked as Taking for the 5/31/19 encounter (Office Visit) with BRAYDON Thao CNP   Medication Sig Dispense Refill    ciprofloxacin (CIPRO) 500 MG tablet Take 1 tablet by mouth 2 times daily for 7 days 14 tablet 0    metroNIDAZOLE colitis. Incidentally there was a mass found on her kidney as well as some renal stenosis. The patient had no idea that these things were going on. She does see a vascular doctor. She does not have a urologist.  She is having some intermittent flank pain. Interval history/Current status: stable    Review of Systems Positive for  IBS   Flank pain  Anxiety    Vitals:    05/31/19 1345   BP: 134/82   Site: Left Upper Arm   Position: Sitting   Cuff Size: Medium Adult   Pulse: 71   Resp: 16   Temp: 97.6 °F (36.4 °C)   TempSrc: Temporal   SpO2: 97%   Weight: 142 lb 3.2 oz (64.5 kg)     Body mass index is 25.19 kg/m². Wt Readings from Last 3 Encounters:   05/31/19 142 lb 3.2 oz (64.5 kg)   05/18/19 130 lb (59 kg)   02/07/19 138 lb (62.6 kg)     BP Readings from Last 3 Encounters:   05/31/19 134/82   05/24/19 (!) 112/54   02/07/19 138/68       Physical Exam   Constitutional: She is oriented to person, place, and time. She appears well-developed and well-nourished. HENT:   Head: Normocephalic. Right Ear: External ear normal.   Left Ear: External ear normal.   Eyes: Pupils are equal, round, and reactive to light. Neck: Normal range of motion. Cardiovascular: Normal rate, regular rhythm, normal heart sounds and intact distal pulses. Pulmonary/Chest: Effort normal and breath sounds normal.   Neurological: She is alert and oriented to person, place, and time. Skin: Skin is warm and dry. Psychiatric: She has a normal mood and affect. Her behavior is normal. Judgment and thought content normal.   Nursing note and vitals reviewed. Assessment/Plan:  There are no diagnoses linked to this encounter. Medical Decision Making: moderate complexity    MEDICATIONS:  Orders Placed This Encounter   Medications    predniSONE (DELTASONE) 5 MG tablet     Sig: Days 1-3 take 1 PO TID, days 4-6 take 1 PO BID, days 7-9 take 1 PO daily.      Dispense:  18 tablet     Refill:  0    DISCONTD: ketorolac (TORADOL) 10 MG professional. [address] any warranty or liability for your use of this information.

## 2019-05-31 NOTE — TELEPHONE ENCOUNTER
Per Jesusita Beltran CNP, pt is having issues since being seen in the hospital and needs an appt with GI; When I reviewed the patients progress note from Dr. Oj Sevilla- He stated pt needs a Colon in 3 months not 3 weeks as PSC as entered in this note; Vladimir Esparza- please disregard this message and we will get pt scheduled for an office visit prior to setting up a procedure.  AA

## 2019-06-03 ENCOUNTER — TELEPHONE (OUTPATIENT)
Dept: VASCULAR SURGERY | Age: 69
End: 2019-06-03

## 2019-06-03 DIAGNOSIS — I70.1 RENAL ARTERY STENOSIS (HCC): Primary | ICD-10-CM

## 2019-06-03 NOTE — TELEPHONE ENCOUNTER
----- Message from BRAYDON Herrera CNP sent at 5/31/2019  2:19 PM CDT -----  dont know if you saw her in hosp but she has stenosis of renal artery on one kidney and mass on the other. Going to see Dr Nicholas Bagley for mass. Can you get scheduled for the stenosis with you      Left message for ms. Kingsley to call us. Dr. Kim Che reviewed her images. He tied a large graft into a small artery. He feels everything looks okay. We should check a renal u/s with doppler in 6 months with her other studies.   She should continue with urology about the renal mass

## 2019-06-03 NOTE — TELEPHONE ENCOUNTER
Called and spoke with Norma and provided her with the following information  Dr. Ethel Escobedo reviewed her images. He tied a large graft into a small artery. He feels everything looks okay. We should check a renal u/s with doppler in 6 months with her other studies. She should continue with urology about the renal mass. Norma voiced understanding of this information.

## 2019-06-05 ENCOUNTER — OFFICE VISIT (OUTPATIENT)
Dept: GASTROENTEROLOGY | Age: 69
End: 2019-06-05
Payer: MEDICARE

## 2019-06-05 ENCOUNTER — OFFICE VISIT (OUTPATIENT)
Dept: UROLOGY | Facility: CLINIC | Age: 69
End: 2019-06-05

## 2019-06-05 VITALS — BODY MASS INDEX: 24.98 KG/M2 | HEIGHT: 63 IN | WEIGHT: 141 LBS | TEMPERATURE: 98 F

## 2019-06-05 VITALS
OXYGEN SATURATION: 93 % | DIASTOLIC BLOOD PRESSURE: 80 MMHG | SYSTOLIC BLOOD PRESSURE: 133 MMHG | WEIGHT: 140 LBS | BODY MASS INDEX: 24.8 KG/M2 | HEART RATE: 63 BPM | HEIGHT: 63 IN

## 2019-06-05 DIAGNOSIS — K58.0 IRRITABLE BOWEL SYNDROME WITH DIARRHEA: ICD-10-CM

## 2019-06-05 DIAGNOSIS — Z09 HOSPITAL DISCHARGE FOLLOW-UP: Primary | ICD-10-CM

## 2019-06-05 DIAGNOSIS — Z86.010 PERSONAL HISTORY OF COLONIC POLYPS: ICD-10-CM

## 2019-06-05 DIAGNOSIS — N28.89 RENAL MASS: Primary | ICD-10-CM

## 2019-06-05 DIAGNOSIS — R19.5 DARK STOOLS: ICD-10-CM

## 2019-06-05 DIAGNOSIS — R93.3 ABNORMAL CT SCAN, COLON: ICD-10-CM

## 2019-06-05 PROBLEM — Z86.0100 PERSONAL HISTORY OF COLONIC POLYPS: Status: ACTIVE | Noted: 2019-06-05

## 2019-06-05 LAB
BILIRUB BLD-MCNC: NEGATIVE MG/DL
CLARITY, POC: CLEAR
COLOR UR: YELLOW
GLUCOSE UR STRIP-MCNC: NEGATIVE MG/DL
KETONES UR QL: NEGATIVE
LEUKOCYTE EST, POC: ABNORMAL
NITRITE UR-MCNC: NEGATIVE MG/ML
PH UR: 7 [PH] (ref 5–8)
PROT UR STRIP-MCNC: NEGATIVE MG/DL
RBC # UR STRIP: NEGATIVE /UL
SP GR UR: 1.01 (ref 1–1.03)
UROBILINOGEN UR QL: NORMAL

## 2019-06-05 PROCEDURE — G8420 CALC BMI NORM PARAMETERS: HCPCS | Performed by: NURSE PRACTITIONER

## 2019-06-05 PROCEDURE — 1123F ACP DISCUSS/DSCN MKR DOCD: CPT | Performed by: NURSE PRACTITIONER

## 2019-06-05 PROCEDURE — G8598 ASA/ANTIPLAT THER USED: HCPCS | Performed by: NURSE PRACTITIONER

## 2019-06-05 PROCEDURE — G8400 PT W/DXA NO RESULTS DOC: HCPCS | Performed by: NURSE PRACTITIONER

## 2019-06-05 PROCEDURE — 3017F COLORECTAL CA SCREEN DOC REV: CPT | Performed by: NURSE PRACTITIONER

## 2019-06-05 PROCEDURE — 1090F PRES/ABSN URINE INCON ASSESS: CPT | Performed by: NURSE PRACTITIONER

## 2019-06-05 PROCEDURE — 1111F DSCHRG MED/CURRENT MED MERGE: CPT | Performed by: NURSE PRACTITIONER

## 2019-06-05 PROCEDURE — G8427 DOCREV CUR MEDS BY ELIG CLIN: HCPCS | Performed by: NURSE PRACTITIONER

## 2019-06-05 PROCEDURE — 99215 OFFICE O/P EST HI 40 MIN: CPT | Performed by: NURSE PRACTITIONER

## 2019-06-05 PROCEDURE — 1036F TOBACCO NON-USER: CPT | Performed by: NURSE PRACTITIONER

## 2019-06-05 PROCEDURE — 81001 URINALYSIS AUTO W/SCOPE: CPT | Performed by: UROLOGY

## 2019-06-05 PROCEDURE — 99205 OFFICE O/P NEW HI 60 MIN: CPT | Performed by: UROLOGY

## 2019-06-05 PROCEDURE — 4040F PNEUMOC VAC/ADMIN/RCVD: CPT | Performed by: NURSE PRACTITIONER

## 2019-06-05 RX ORDER — ONDANSETRON 4 MG/1
4 TABLET, FILM COATED ORAL EVERY 8 HOURS PRN
COMMUNITY
Start: 2019-05-24

## 2019-06-05 RX ORDER — FLUOXETINE HYDROCHLORIDE 20 MG/1
CAPSULE ORAL
COMMUNITY
Start: 2019-02-25

## 2019-06-05 RX ORDER — CLONIDINE HYDROCHLORIDE 0.1 MG/1
0.1 TABLET ORAL DAILY
COMMUNITY
Start: 2019-03-07

## 2019-06-05 ASSESSMENT — ENCOUNTER SYMPTOMS
ABDOMINAL PAIN: 0
ANAL BLEEDING: 0
SORE THROAT: 0
PHOTOPHOBIA: 0
DIARRHEA: 0
TROUBLE SWALLOWING: 0
SHORTNESS OF BREATH: 1
VOICE CHANGE: 0
BACK PAIN: 0
NAUSEA: 0
RECTAL PAIN: 0
CONSTIPATION: 0
VOMITING: 0
ABDOMINAL DISTENTION: 0
COUGH: 1
BLOOD IN STOOL: 0

## 2019-06-05 NOTE — PATIENT INSTRUCTIONS
You are going to have a colonoscopy and here are some instructions: You will be given specific directions regarding restrictions to diet and bowel prep instructions including laxatives. Please read these instructions one week prior to your scheduled procedure to ensure that you are prepared. Follow prep instructions provided for bowel prep. Take all of the bowel prep as directed. If you are having problems with nausea, stop your prep for 30-45 min to allow the nausea to subside before resuming your prep. Nothing to eat or drink after midnight the day of the procedure EXCEPT:  PLEASE TAKE MEDICATION(S) FOR HIGH BLOOD PRESSURE, THYROID, SEIZURES, AND HEART THE MORNING OF THE PROCEDURE WITH A SIP OF WATER  AT LEAST 2 HOURS PRIOR TO ARRIVAL TIME.   YOU MAY ALSO TAKE ANY INHALERS YOU ARE PRESCRIBED. You will not be able to drive for 24 hours after the procedure due to sedation. Bring a  with you the day of the procedure. No aspirin, ibuprofen, naproxen, fish oil or vitamin E for 5 days before procedure. If you are on blood thinners, clearance from the prescribing physician will be obtained before your procedure is scheduled. Increased [email or web address] may be associated with discontinuation of your blood thinner and include, but not limited to, stroke, TIA, or cardiac event. If polyps are removed during the procedure they will be sent to a pathologist for analysis. You will be notified by mail of the pathology results in 2-3 weeks. Your physician may also schedule a follow up appointment with the nurse practitioner to discuss pathology, symptoms or to check if you have had any problems related to your procedure. If you prefer not to return to the office after your procedure please discuss this with your physician on the day of your colonoscopy. Final recommendations are based on the pathologist report.      Your diet before a colonoscopy bowel preparation is very important to ensure a successful colon exam. It is recommended to consider certain changes to your diet three to four days prior to the procedure. Remember that your bowels need to be empty for the exam.    What foods are good to eat? Cut down on heavy solid foods three to four days before the procedure and start introducing lighter meals to your diet. The following food suggestions are a good part of your diet before a colonoscopy bowel preparation. Light meat that is easily digestible such as chicken (without the skin)   Potatoes without skin   Cheese   Eggs   A light meal of steamed white fish   Light clear soups    Foods and drinks to avoid  Avoid foods that contain too much fiber. Stay clear of dark colored beverages. They can stick to the walls of the digestive tract and make it difficult to differentiate from blood. Some of these foods are:  Red meat, rice, nuts and vegetables   Milk, other milk based fluids and cream   Most fruit and puddings   Whole grain pasta   Cereals, bran and seeds   Colored beverages, especially those that are red or purple in color   Red colored Jell-O   On the day before the colonoscopy, continue to drink plenty of clear fluids. It is important   to keep yourself hydrated before the exam.     Please follow all instructions as provided for cleansing the bowel. Failure to have an adequately prepped colon may cause you to have incomplete exam with further testing required. You are going to have an Endoscopy and here are some basic instructions:    Nothing to eat or drink after midnight EXCEPT:  PLEASE TAKE MEDICATION(S) FOR HIGH BLOOD PRESSURE, SEIZURES, HEART, AND THYROID WITH A SIP OF WATER AT LEAST 2 HOURS PRIOR TO ARRIVAL TIME.   YOU MAY ALSO TAKE ANY INHALERS YOU ARE PRESCRIBED. You will not be able to drive for 24 hours after the procedure due to sedation. Bring a  with you the day of the procedure. No aspirin, ibuprofen, naproxen, fish oil or vitamin E for 5 days before procedure. Continue current medications. If you are on blood thinners, clearance from the prescribing physician will be obtained before your procedure is scheduled. Increased [email or web address] may be associated with discontinuation of your blood thinner and include, but not limited to, stroke, TIA, or cardiac event. If biopsies are taken during the procedure they will be sent to a pathologist for analysis. You will be notified by mail of the pathology results in 2-3 weeks. Your physician may also schedule a follow up appointment with the nurse practitioner to discuss pathology, symptoms or to check if you have had any problems related to your procedure. If you prefer not to return to the office after your procedure please discuss this with your physician on the day of your procedure.
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
## 2019-06-10 ENCOUNTER — TELEPHONE (OUTPATIENT)
Dept: PRIMARY CARE CLINIC | Age: 69
End: 2019-06-10

## 2019-06-10 NOTE — TELEPHONE ENCOUNTER
----- Message from Uyen Al MD sent at 6/10/2019  9:20 AM CDT -----  They talked with the vascular surgeon. She is fine to stop her Plavix and she does not need Lovenox. Please call the GI group.  ----- Message -----  From: BRAYDON Burr  Sent: 6/10/2019   9:12 AM  To: Uyen Al MD    I talked with Dr. Pillo Hinton this morning. He thinks it is fine to hold the plavix for 5 days or 7, whatever they are requesting. No bridge needed. ----- Message -----  From: Uyen Al MD  Sent: 6/7/2019   5:47 PM  To: BRAYDON Burr -- this patient has a lot of stuff going on with her arteries. She is scheduled for a colonoscopy in October along with an endoscopy. is on Plavix. Can we safely stop this? Do we need Lovenox? Usually I can decide these things but she has so many problems I wanted to run it by you.  Thanks, Shari Wu

## 2019-06-10 NOTE — TELEPHONE ENCOUNTER
Israel at [facility] NIELS was notified of Plavix instructions, she made a verbal note in the patients chart

## 2019-06-12 ENCOUNTER — OFFICE VISIT (OUTPATIENT)
Dept: UROLOGY | Age: 69
End: 2019-06-12
Payer: MEDICARE

## 2019-06-12 VITALS — BODY MASS INDEX: 24.63 KG/M2 | TEMPERATURE: 97.4 F | WEIGHT: 139 LBS | HEIGHT: 63 IN

## 2019-06-12 DIAGNOSIS — N28.89 RIGHT RENAL MASS: Primary | ICD-10-CM

## 2019-06-12 LAB
BILIRUBIN, POC: 0
BLOOD URINE, POC: NORMAL
CLARITY, POC: CLEAR
COLOR, POC: YELLOW
GLUCOSE URINE, POC: NORMAL
KETONES, POC: NORMAL
LEUKOCYTE EST, POC: NORMAL
NITRITE, POC: NORMAL
PH, POC: 7
PROTEIN, POC: NORMAL
SPECIFIC GRAVITY, POC: 1.01
UROBILINOGEN, POC: 0.2

## 2019-06-12 PROCEDURE — 99204 OFFICE O/P NEW MOD 45 MIN: CPT | Performed by: UROLOGY

## 2019-06-12 PROCEDURE — 1036F TOBACCO NON-USER: CPT | Performed by: UROLOGY

## 2019-06-12 PROCEDURE — G8427 DOCREV CUR MEDS BY ELIG CLIN: HCPCS | Performed by: UROLOGY

## 2019-06-12 PROCEDURE — 4040F PNEUMOC VAC/ADMIN/RCVD: CPT | Performed by: UROLOGY

## 2019-06-12 PROCEDURE — G8598 ASA/ANTIPLAT THER USED: HCPCS | Performed by: UROLOGY

## 2019-06-12 PROCEDURE — G8420 CALC BMI NORM PARAMETERS: HCPCS | Performed by: UROLOGY

## 2019-06-12 PROCEDURE — 3017F COLORECTAL CA SCREEN DOC REV: CPT | Performed by: UROLOGY

## 2019-06-12 PROCEDURE — 1123F ACP DISCUSS/DSCN MKR DOCD: CPT | Performed by: UROLOGY

## 2019-06-12 PROCEDURE — 81003 URINALYSIS AUTO W/O SCOPE: CPT | Performed by: UROLOGY

## 2019-06-12 PROCEDURE — 1111F DSCHRG MED/CURRENT MED MERGE: CPT | Performed by: UROLOGY

## 2019-06-12 PROCEDURE — 1090F PRES/ABSN URINE INCON ASSESS: CPT | Performed by: UROLOGY

## 2019-06-12 PROCEDURE — G8400 PT W/DXA NO RESULTS DOC: HCPCS | Performed by: UROLOGY

## 2019-06-12 RX ORDER — METOPROLOL SUCCINATE 25 MG/1
TABLET, EXTENDED RELEASE ORAL
Qty: 30 TABLET | Refills: 0 | Status: SHIPPED | OUTPATIENT
Start: 2019-06-12 | End: 2019-10-07 | Stop reason: SDUPTHER

## 2019-06-12 ASSESSMENT — ENCOUNTER SYMPTOMS
ABDOMINAL PAIN: 0
BACK PAIN: 0
COLOR CHANGE: 0
SINUS PRESSURE: 0
WHEEZING: 0
BLOOD IN STOOL: 0
ABDOMINAL DISTENTION: 0
RHINORRHEA: 0
EYE PAIN: 0
FACIAL SWELLING: 0
DIARRHEA: 0
EYE REDNESS: 0
VOMITING: 0
EYE DISCHARGE: 0
CONSTIPATION: 0
NAUSEA: 0
SORE THROAT: 0
COUGH: 0
SHORTNESS OF BREATH: 0
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
## 2019-06-13 ENCOUNTER — TELEPHONE (OUTPATIENT)
Dept: GASTROENTEROLOGY | Age: 69
End: 2019-06-13

## 2019-06-13 NOTE — TELEPHONE ENCOUNTER
Patient: Michael Melissa    YOB: 1950      Clearance for Endoscopy / Colonoscopy was received on June 13, 2019 . May discontinue the use of PLAVIX  for five days prior to the procedure. There  is NOT a Lovenox requirement.      CLEARANCE SCANNED INTO CHART     PATIENT NOTIFIED 6/13/19 - VOICED UNDERSTANDING

## 2019-06-24 ENCOUNTER — OFFICE VISIT (OUTPATIENT)
Dept: CARDIOLOGY | Age: 69
End: 2019-06-24
Payer: MEDICARE

## 2019-06-24 VITALS
BODY MASS INDEX: 24.98 KG/M2 | SYSTOLIC BLOOD PRESSURE: 138 MMHG | WEIGHT: 141 LBS | HEART RATE: 68 BPM | DIASTOLIC BLOOD PRESSURE: 78 MMHG | HEIGHT: 63 IN

## 2019-06-24 DIAGNOSIS — I48.0 PAF (PAROXYSMAL ATRIAL FIBRILLATION) (HCC): Primary | ICD-10-CM

## 2019-06-24 DIAGNOSIS — I10 ESSENTIAL HYPERTENSION: ICD-10-CM

## 2019-06-24 DIAGNOSIS — N18.30 CKD (CHRONIC KIDNEY DISEASE), STAGE III (HCC): ICD-10-CM

## 2019-06-24 DIAGNOSIS — N28.89 RENAL MASS: ICD-10-CM

## 2019-06-24 PROCEDURE — 1090F PRES/ABSN URINE INCON ASSESS: CPT | Performed by: INTERNAL MEDICINE

## 2019-06-24 PROCEDURE — G8427 DOCREV CUR MEDS BY ELIG CLIN: HCPCS | Performed by: INTERNAL MEDICINE

## 2019-06-24 PROCEDURE — 4040F PNEUMOC VAC/ADMIN/RCVD: CPT | Performed by: INTERNAL MEDICINE

## 2019-06-24 PROCEDURE — 3017F COLORECTAL CA SCREEN DOC REV: CPT | Performed by: INTERNAL MEDICINE

## 2019-06-24 PROCEDURE — 93000 ELECTROCARDIOGRAM COMPLETE: CPT | Performed by: INTERNAL MEDICINE

## 2019-06-24 PROCEDURE — 1123F ACP DISCUSS/DSCN MKR DOCD: CPT | Performed by: INTERNAL MEDICINE

## 2019-06-24 PROCEDURE — G8598 ASA/ANTIPLAT THER USED: HCPCS | Performed by: INTERNAL MEDICINE

## 2019-06-24 PROCEDURE — G8400 PT W/DXA NO RESULTS DOC: HCPCS | Performed by: INTERNAL MEDICINE

## 2019-06-24 PROCEDURE — G8420 CALC BMI NORM PARAMETERS: HCPCS | Performed by: INTERNAL MEDICINE

## 2019-06-24 PROCEDURE — 1036F TOBACCO NON-USER: CPT | Performed by: INTERNAL MEDICINE

## 2019-06-24 PROCEDURE — 99214 OFFICE O/P EST MOD 30 MIN: CPT | Performed by: INTERNAL MEDICINE

## 2019-06-24 ASSESSMENT — ENCOUNTER SYMPTOMS
GASTROINTESTINAL NEGATIVE: 1
EYES NEGATIVE: 1
DIARRHEA: 0
SHORTNESS OF BREATH: 0
NAUSEA: 0
VOMITING: 0
RESPIRATORY NEGATIVE: 1
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
## 2019-07-01 ENCOUNTER — OFFICE VISIT (OUTPATIENT)
Dept: PRIMARY CARE CLINIC | Age: 69
End: 2019-07-01
Payer: MEDICARE

## 2019-07-01 VITALS
HEIGHT: 63 IN | HEART RATE: 95 BPM | OXYGEN SATURATION: 96 % | BODY MASS INDEX: 24.59 KG/M2 | TEMPERATURE: 96.6 F | RESPIRATION RATE: 18 BRPM | WEIGHT: 138.8 LBS | DIASTOLIC BLOOD PRESSURE: 80 MMHG | SYSTOLIC BLOOD PRESSURE: 122 MMHG

## 2019-07-01 DIAGNOSIS — Z12.31 ENCOUNTER FOR SCREENING MAMMOGRAM FOR BREAST CANCER: ICD-10-CM

## 2019-07-01 DIAGNOSIS — Z78.0 POST-MENOPAUSAL: ICD-10-CM

## 2019-07-01 DIAGNOSIS — F43.23 SITUATIONAL MIXED ANXIETY AND DEPRESSIVE DISORDER: ICD-10-CM

## 2019-07-01 DIAGNOSIS — I10 ESSENTIAL HYPERTENSION: Primary | ICD-10-CM

## 2019-07-01 PROBLEM — K52.9 COLITIS: Status: RESOLVED | Noted: 2019-05-18 | Resolved: 2019-07-01

## 2019-07-01 PROCEDURE — G8598 ASA/ANTIPLAT THER USED: HCPCS | Performed by: FAMILY MEDICINE

## 2019-07-01 PROCEDURE — 3017F COLORECTAL CA SCREEN DOC REV: CPT | Performed by: FAMILY MEDICINE

## 2019-07-01 PROCEDURE — 1036F TOBACCO NON-USER: CPT | Performed by: FAMILY MEDICINE

## 2019-07-01 PROCEDURE — G8420 CALC BMI NORM PARAMETERS: HCPCS | Performed by: FAMILY MEDICINE

## 2019-07-01 PROCEDURE — 1123F ACP DISCUSS/DSCN MKR DOCD: CPT | Performed by: FAMILY MEDICINE

## 2019-07-01 PROCEDURE — G8427 DOCREV CUR MEDS BY ELIG CLIN: HCPCS | Performed by: FAMILY MEDICINE

## 2019-07-01 PROCEDURE — 99213 OFFICE O/P EST LOW 20 MIN: CPT | Performed by: FAMILY MEDICINE

## 2019-07-01 PROCEDURE — G8400 PT W/DXA NO RESULTS DOC: HCPCS | Performed by: FAMILY MEDICINE

## 2019-07-01 PROCEDURE — 4040F PNEUMOC VAC/ADMIN/RCVD: CPT | Performed by: FAMILY MEDICINE

## 2019-07-01 PROCEDURE — 1090F PRES/ABSN URINE INCON ASSESS: CPT | Performed by: FAMILY MEDICINE

## 2019-07-01 RX ORDER — DOXEPIN HYDROCHLORIDE 50 MG/1
CAPSULE ORAL
Qty: 30 CAPSULE | Refills: 3 | Status: SHIPPED | OUTPATIENT
Start: 2019-07-01 | End: 2020-01-10

## 2019-07-01 ASSESSMENT — ENCOUNTER SYMPTOMS
RESPIRATORY NEGATIVE: 1
GASTROINTESTINAL NEGATIVE: 1

## 2019-07-01 NOTE — PROGRESS NOTES
Subjective:      Patient ID: Sherri Bennett is a 80 y.o. female comes in today for recheck of her blood pressure. HPI. It is actually doing very well however she is under constant anxiety and worry. She's been diagnosed with a renal cancer stage III and she is decided to wait 6 months instead of having surgery. She says she just can't undergo another surgery. She almost  last year from her aortic aneurysm repair. She was in the hospital for over a month. She still having paroxysmal atrial fibrillation at times. She is on blood thinner. They have stopped all of her blood pressure medications and blood pressure is doing great. It's ranging anywhere from 116//63. However she says she can't sleep. She worries constantly. She can't think about what she needs to do. She denies any suicidal thoughts or ideation.   Social History     Socioeconomic History    Marital status: Legally      Spouse name: None    Number of children: 2    Years of education: None    Highest education level: None   Occupational History    Occupation: [employer]   Social [employer]    Financial resource strain: None    Food insecurity:     Worry: None     Inability: None    Transportation needs:     Medical: None     Non-medical: None   Tobacco Use    Smoking status: Former Smoker     Packs/day: 1.00     Years: 20.00     Pack years: 20.00     Types: Cigarettes     Last attempt to quit: 2014     Years since quittin.4    Smokeless tobacco: Never Used   Substance and Sexual Activity    Alcohol use: No    Drug use: No    Sexual activity: Never   Lifestyle    Physical activity:     Days per week: None     Minutes per session: None    Stress: None   Relationships    Social connections:     Talks on phone: None     Gets together: None     Attends Episcopalian service: None     Active member of club or organization: None     Attends meetings of clubs or organizations: None     Relationship status: None    Intimate

## 2019-07-02 ENCOUNTER — TELEPHONE (OUTPATIENT)
Dept: UROLOGY | Facility: CLINIC | Age: 69
End: 2019-07-02

## 2019-07-02 DIAGNOSIS — N28.89 RENAL MASS: Primary | ICD-10-CM

## 2019-07-11 ENCOUNTER — HOSPITAL ENCOUNTER (OUTPATIENT)
Dept: WOMENS IMAGING | Age: 69
Discharge: HOME | End: 2019-07-11
Payer: MEDICARE

## 2019-07-11 DIAGNOSIS — Z12.31 ENCOUNTER FOR SCREENING MAMMOGRAM FOR BREAST CANCER: ICD-10-CM

## 2019-07-11 DIAGNOSIS — Z78.0 POST-MENOPAUSAL: ICD-10-CM

## 2019-07-11 PROCEDURE — 77063 BREAST TOMOSYNTHESIS BI: CPT

## 2019-07-11 PROCEDURE — 77080 DXA BONE DENSITY AXIAL: CPT

## 2019-07-15 ENCOUNTER — HOSPITAL ENCOUNTER (OUTPATIENT)
Dept: INFUSION THERAPY | Age: 69
Discharge: HOME | End: 2019-07-15
Payer: MEDICARE

## 2019-07-15 PROCEDURE — 85025 COMPLETE CBC W/AUTO DIFF WBC: CPT

## 2019-08-27 DIAGNOSIS — R15.9 INCONTINENCE OF FECES WITH FECAL URGENCY: ICD-10-CM

## 2019-08-27 DIAGNOSIS — K58.0 IRRITABLE BOWEL SYNDROME WITH DIARRHEA: ICD-10-CM

## 2019-08-27 DIAGNOSIS — R15.2 INCONTINENCE OF FECES WITH FECAL URGENCY: ICD-10-CM

## 2019-08-27 RX ORDER — DICYCLOMINE HCL 20 MG
TABLET ORAL
Qty: 120 TABLET | Refills: 0 | Status: SHIPPED | OUTPATIENT
Start: 2019-08-27 | End: 2019-11-14 | Stop reason: SDUPTHER

## 2019-09-11 ENCOUNTER — OFFICE VISIT (OUTPATIENT)
Dept: PRIMARY CARE CLINIC | Age: 69
End: 2019-09-11
Payer: MEDICARE

## 2019-09-11 VITALS
DIASTOLIC BLOOD PRESSURE: 78 MMHG | RESPIRATION RATE: 18 BRPM | BODY MASS INDEX: 24.24 KG/M2 | HEIGHT: 63 IN | OXYGEN SATURATION: 98 % | TEMPERATURE: 97.8 F | WEIGHT: 136.8 LBS | SYSTOLIC BLOOD PRESSURE: 122 MMHG | HEART RATE: 77 BPM

## 2019-09-11 DIAGNOSIS — K42.9 UMBILICAL HERNIA WITHOUT OBSTRUCTION AND WITHOUT GANGRENE: ICD-10-CM

## 2019-09-11 DIAGNOSIS — Z00.00 ROUTINE GENERAL MEDICAL EXAMINATION AT A HEALTH CARE FACILITY: Primary | ICD-10-CM

## 2019-09-11 DIAGNOSIS — N18.30 CKD (CHRONIC KIDNEY DISEASE), STAGE III (HCC): ICD-10-CM

## 2019-09-11 DIAGNOSIS — I10 ESSENTIAL HYPERTENSION: ICD-10-CM

## 2019-09-11 DIAGNOSIS — Z13.220 LIPID SCREENING: ICD-10-CM

## 2019-09-11 DIAGNOSIS — H81.10 BENIGN PAROXYSMAL POSITIONAL VERTIGO, UNSPECIFIED LATERALITY: ICD-10-CM

## 2019-09-11 LAB
ALBUMIN SERPL-MCNC: 4.4 G/DL (ref 3.5–5.2)
ALP BLD-CCNC: 163 U/L (ref 35–104)
ALT SERPL-CCNC: 15 U/L (ref 5–33)
ANION GAP SERPL CALCULATED.3IONS-SCNC: 28 MMOL/L (ref 7–19)
AST SERPL-CCNC: 18 U/L (ref 5–32)
BILIRUB SERPL-MCNC: <0.2 MG/DL (ref 0.2–1.2)
BUN BLDV-MCNC: 11 MG/DL (ref 8–23)
CALCIUM SERPL-MCNC: 9.7 MG/DL (ref 8.8–10.2)
CHLORIDE BLD-SCNC: 102 MMOL/L (ref 98–111)
CHOLESTEROL, TOTAL: 188 MG/DL (ref 160–199)
CO2: 13 MMOL/L (ref 22–29)
CREAT SERPL-MCNC: 1.3 MG/DL (ref 0.5–0.9)
GFR NON-AFRICAN AMERICAN: 41
GLUCOSE BLD-MCNC: 98 MG/DL (ref 74–109)
HCT VFR BLD CALC: 41.5 % (ref 37–47)
HDLC SERPL-MCNC: 46 MG/DL (ref 65–121)
HEMOGLOBIN: 13.5 G/DL (ref 12–16)
LDL CHOLESTEROL CALCULATED: 113 MG/DL
MCH RBC QN AUTO: 29.7 PG (ref 27–31)
MCHC RBC AUTO-ENTMCNC: 32.5 G/DL (ref 33–37)
MCV RBC AUTO: 91.4 FL (ref 81–99)
PDW BLD-RTO: 14.1 % (ref 11.5–14.5)
PLATELET # BLD: 340 K/UL (ref 130–400)
PMV BLD AUTO: 10.7 FL (ref 9.4–12.3)
POTASSIUM SERPL-SCNC: 4.5 MMOL/L (ref 3.5–5)
RBC # BLD: 4.54 M/UL (ref 4.2–5.4)
SODIUM BLD-SCNC: 143 MMOL/L (ref 136–145)
TOTAL PROTEIN: 7.4 G/DL (ref 6.6–8.7)
TRIGL SERPL-MCNC: 145 MG/DL (ref 0–149)
WBC # BLD: 8.6 K/UL (ref 4.8–10.8)

## 2019-09-11 PROCEDURE — 99214 OFFICE O/P EST MOD 30 MIN: CPT | Performed by: FAMILY MEDICINE

## 2019-09-11 PROCEDURE — 36415 COLL VENOUS BLD VENIPUNCTURE: CPT | Performed by: FAMILY MEDICINE

## 2019-09-11 PROCEDURE — G8427 DOCREV CUR MEDS BY ELIG CLIN: HCPCS | Performed by: FAMILY MEDICINE

## 2019-09-11 PROCEDURE — G8598 ASA/ANTIPLAT THER USED: HCPCS | Performed by: FAMILY MEDICINE

## 2019-09-11 PROCEDURE — 1123F ACP DISCUSS/DSCN MKR DOCD: CPT | Performed by: FAMILY MEDICINE

## 2019-09-11 PROCEDURE — 3017F COLORECTAL CA SCREEN DOC REV: CPT | Performed by: FAMILY MEDICINE

## 2019-09-11 PROCEDURE — 1036F TOBACCO NON-USER: CPT | Performed by: FAMILY MEDICINE

## 2019-09-11 PROCEDURE — G8399 PT W/DXA RESULTS DOCUMENT: HCPCS | Performed by: FAMILY MEDICINE

## 2019-09-11 PROCEDURE — 1090F PRES/ABSN URINE INCON ASSESS: CPT | Performed by: FAMILY MEDICINE

## 2019-09-11 PROCEDURE — G0438 PPPS, INITIAL VISIT: HCPCS | Performed by: FAMILY MEDICINE

## 2019-09-11 PROCEDURE — G8420 CALC BMI NORM PARAMETERS: HCPCS | Performed by: FAMILY MEDICINE

## 2019-09-11 PROCEDURE — 4040F PNEUMOC VAC/ADMIN/RCVD: CPT | Performed by: FAMILY MEDICINE

## 2019-09-11 RX ORDER — MECLIZINE HYDROCHLORIDE 25 MG/1
TABLET ORAL
Qty: 30 TABLET | Refills: 1 | Status: SHIPPED | OUTPATIENT
Start: 2019-09-11 | End: 2020-01-03

## 2019-09-11 ASSESSMENT — PATIENT HEALTH QUESTIONNAIRE - PHQ9
SUM OF ALL RESPONSES TO PHQ QUESTIONS 1-9: 0
SUM OF ALL RESPONSES TO PHQ QUESTIONS 1-9: 0

## 2019-09-11 ASSESSMENT — LIFESTYLE VARIABLES: HOW OFTEN DO YOU HAVE A DRINK CONTAINING ALCOHOL: 0
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
## 2019-09-11 NOTE — PATIENT INSTRUCTIONS
Personalized Preventive Plan for Evon Abdalla - 9/11/2019  Medicare offers a range of preventive health benefits. Some of the tests and screenings are paid in full while other may be subject to a deductible, co-insurance, and/or copay. Some of these benefits include a comprehensive review of your medical history including lifestyle, illnesses that may run in your family, and various assessments and screenings as appropriate. After reviewing your medical record and screening and assessments performed today your provider may have ordered immunizations, labs, imaging, and/or referrals for you. A list of these orders (if applicable) as well as your Preventive Care list are included within your After Visit Summary for your review. Other Preventive Recommendations:    · A preventive eye exam performed by an eye specialist is recommended every 1-2 years to screen for glaucoma; cataracts, macular degeneration, and other eye disorders. · A preventive dental visit is recommended every 6 months. · Try to get at least 150 minutes of exercise per week or 10,000 steps per day on a pedometer . · Order or download the FREE \"Exercise & Physical Activity: Your Everyday Guide\" from [employer]. Call 1-206.228.8460 or search [employer] online. · You need 5800-1248 mg of calcium and 8859-8799 IU of vitamin D per day. It is possible to meet your calcium requirement with diet alone, but a vitamin D supplement is usually necessary to meet this goal.  · When exposed to the sun, use a sunscreen that protects against both UVA and UVB radiation with an SPF of 30 or greater. Reapply every 2 to 3 hours or after sweating, drying off with a towel, or swimming. · Always wear a seat belt when traveling in a car. Always wear a helmet when riding a bicycle or motorcycle. Heart-Healthy Diet   Sodium, Fat, and Cholesterol Controlled Diet       What Is a Heart Healthy Diet?    A heart-healthy and passages between rooms well lit? Can you reach a lamp without getting out of bed? Are floor surfaces well maintained? Shag rugs, high-pile carpeting, tile floors, and polished wood floors can be particularly slippery. Stairs should always have handrails and be carpeted or fitted with a non-skid tread. Is your telephone easily reachable. Is the cord safely tucked away? Room by Room   According to the Association of Aging, bathrooms and antione are the two most potentially hazardous rooms in your home. In the Kitchen    Be sure your stove is in proper working order and always make sure burners and the oven are off before you go out or go to sleep. Keep pots on the back burners, turn handles away from the front of the stove, and keep stove clean and free of grease build-up. Kitchen ventilation systems and range exhausts should be working properly. Keep flammable objects such as towels and pot holders away from the cooking area except when in use. Make sure kitchen curtains are tied back. Move cords and appliances away from the sink and hot surfaces. If extension cords are needed, install wiring guides so they do not hang over the sink, range, or working areas. Look for coffee pots, kettles and toaster ovens with automatic shut-offs. Keep a mop handy in the kitchen so you can wipe up spills instantly. You should also have a small fire extinguisher. Arrange your kitchen with frequently used items on lower shelves to avoid the need to stand on a stepstool to reach them. Make sure countertops are well-lit to avoid injuries while cutting and preparing food. In the Bathroom    Use a non-slip mat or decals in the tub and shower, since wet, soapy tile or porcelain surfaces are extremely slippery. Make sure bathroom rugs are non-skid or tape them firmly to the floor. Bathtubs should have at least one, preferably two, grab bars, firmly attached to structural supports in the wall.  (Do under license by [facility] ([facility]). If you have questions about a medical condition or this instruction, always ask your healthcare professional. [address] any warranty or liability for your use of this information.

## 2019-09-11 NOTE — PROGRESS NOTES
SUBJECTIVE:   80 y.o. female for annual routine Pap and checkup. She actually came in for Medicare annual wellness which was done. She does not have a uterus or ovaries so she does not need a Pap smear. She still sees vascular yearly. She is complaining of a \"knot\" in her stomach. This comes and goes. She noticed it about 2 months ago. She says she can push in on it and it may go away. She's had multiple surgeries on her abdomen. The osteoarthritis is getting worse in her hands and right knee. She does have chronic kidney disease no one has ever told her to avoid NSAIDs. IBS is doing okay. She does complain of occasional positional vertigo. She saw an ENT years ago. She occasionally will have tinnitus but no change in hearing. She denies any bright red blood per rectum or dark tarry stools.   Patient Active Problem List    Diagnosis Date Noted    Irritable bowel syndrome with diarrhea 06/05/2019    Personal history of colonic polyps 06/05/2019    PVD (peripheral vascular disease) ([facility] [address] 01/04/2019    PAF (paroxysmal atrial fibrillation) ([facility] [address] 11/12/2018    CKD (chronic kidney disease), stage III ([address] 09/02/2018    Positive sputum culture for Pseudomonas 09/02/2018    Bilateral carotid artery stenosis 08/27/2018    Essential hypertension 08/22/2018    Renal artery stenosis (HCC) 04/28/2017    Celiac artery stenosis (HCC) 04/28/2017    Colon polyps     History of colon polyps 05/18/2016    Chronic heartburn 05/18/2016    Antiplatelet or antithrombotic long-term use 05/18/2016    Fibromuscular dysplasia ([facility] [address] 06/24/2014    TIA (transient ischemic attack) 04/25/2014    AAA (abdominal aortic aneurysm) (HCC) 03/19/2013    Carotid artery stenosis 03/19/2013    Irritable bowel syndrome     Osteoarthritis     Labyrinthitis      Current Outpatient Medications   Medication Sig Dispense Refill    meclizine (ANTIVERT) 25 MG tablet 1 tablet by mouth up to 3 times a day if needed for screening and assessments performed today your provider may have ordered immunizations, labs, imaging, and/or referrals for you. A list of these orders (if applicable) as well as your Preventive Care list are included within your After Visit Summary for your review. Other Preventive Recommendations:    · A preventive eye exam performed by an eye specialist is recommended every 1-2 years to screen for glaucoma; cataracts, macular degeneration, and other eye disorders. · A preventive dental visit is recommended every 6 months. · Try to get at least 150 minutes of exercise per week or 10,000 steps per day on a pedometer . · Order or download the FREE \"Exercise & Physical Activity: Your Everyday Guide\" from [employer]. Call 4-791.214.3199 or search [employer] online. · You need 6030-8301 mg of calcium and 6743-5686 IU of vitamin D per day. It is possible to meet your calcium requirement with diet alone, but a vitamin D supplement is usually necessary to meet this goal.  · When exposed to the sun, use a sunscreen that protects against both UVA and UVB radiation with an SPF of 30 or greater. Reapply every 2 to 3 hours or after sweating, drying off with a towel, or swimming. · Always wear a seat belt when traveling in a car. Always wear a helmet when riding a bicycle or motorcycle. Heart-Healthy Diet   Sodium, Fat, and Cholesterol Controlled Diet       What Is a Heart Healthy Diet? A heart-healthy diet is one that limits sodium , certain types of fat , and cholesterol .  This type of diet is recommended for:   People with any form of cardiovascular disease (eg, coronary heart disease , peripheral vascular disease , previous heart attack , previous stroke )   People with risk factors for cardiovascular disease, such as high blood pressure , high cholesterol , or diabetes   Anyone who wants to lower their risk of developing cardiovascular disease   Sodium    Sodium is a should be limited due to their high calorie content, not all fats are bad. In fact, some fats are quite healthful. Fat can be broken down into four main types. The good-for-you fats are:   Monounsaturated fat  found in oils such as olive and canola, avocados, and nuts and natural nut butters; can decrease cholesterol levels, while keeping levels of HDL cholesterol high   Polyunsaturated fat  found in oils such as safflower, sunflower, soybean, corn, and sesame; can decrease total cholesterol and LDL cholesterol   Omega-3 fatty acids  particularly those found in fatty fish (such as salmon, trout, tuna, mackerel, herring, and sardines); can decrease risk of arrhythmias, decrease triglyceride levels, and slightly lower blood pressure   The fats that you want to limit are:   Saturated fat  found in animal products, many fast foods, and a few vegetables; increases total blood cholesterol, including LDL levels   Animal fats that are saturated include: butter, lard, whole-milk dairy products, meat fat, and poultry skin   Vegetable fats that are saturated include: hydrogenated shortening, palm oil, coconut oil, cocoa butter   Hydrogenated or trans fat  found in margarine and vegetable shortening, most shelf stable snack foods, and fried foods; increases LDL and decreases HDL     It is generally recommended that you limit your total fat for the day to less than 30% of your total calories. If you follow an 1800-calorie heart healthy diet, for example, this would mean 60 grams of fat or less per day. Saturated fat and trans fat in your diet raises your blood cholesterol the most, much more than dietary cholesterol does. For this reason, on a heart-healthy diet, less than 7% of your calories should come from saturated fat and ideally 0% from trans fat. On an 1800-calorie diet, this translates into less than 14 grams of saturated fat per day, leaving 46 grams of fat to come from mono- and polyunsaturated fats.    Food Choices slipping on the snow and ice. Exercise regularly to help maintain muscle tone, agility, and balance. Always hold the banister when going up or down stairs. Also, use  bars when getting in or out of the bath or shower, or using the toilet. To avoid dizziness, get up slowly from a lying down position. Sit up first, dangling your legs for a minute or two before rising to a standing position. Overall Home Safety Check   According to the Consumer Product Safety Commision's \"Older Consumer Home Safety Checklist,\" it is important to check for potential hazards in each room. And remember, proper lighting is an essential factor in home safety. If you cannot see clearly, you are more likely to fall. Important questions to ask yourself include:   Are lamp, electric, extension, and telephone cords placed out of the flow of traffic and maintained in good condition? Have frayed cords been replaced? Are all small rugs and runners slip resistant? If not, you can secure them to the floor with a special double-sided carpet tape. Are smoke detectors properly locatedone on every floor of your home and one outside of every sleeping area? Are they in good working order? Are batteries replaced at least once a year? Do you have a well-maintained carbon monoxide detector outside every sleeping are in your home? Does your furniture layout leave plenty of space to maneuver between and around chairs, tables, beds, and sofas? Are hallways, stairs and passages between rooms well lit? Can you reach a lamp without getting out of bed? Are floor surfaces well maintained? Shag rugs, high-pile carpeting, tile floors, and polished wood floors can be particularly slippery. Stairs should always have handrails and be carpeted or fitted with a non-skid tread. Is your telephone easily reachable. Is the cord safely tucked away?    Room by Room   According to the Association of Aging, bathrooms and antione are the two most

## 2019-09-21 DIAGNOSIS — I71.40 ABDOMINAL AORTIC ANEURYSM (AAA) WITHOUT RUPTURE: ICD-10-CM

## 2019-09-23 RX ORDER — FLUOXETINE HYDROCHLORIDE 20 MG/1
CAPSULE ORAL
Qty: 30 CAPSULE | Refills: 3 | Status: SHIPPED | OUTPATIENT
Start: 2019-09-23 | End: 2020-02-06

## 2019-09-27 ENCOUNTER — ANESTHESIA EVENT (OUTPATIENT)
Dept: ENDOSCOPY | Age: 69
End: 2019-09-27
Payer: MEDICARE

## 2019-10-01 ENCOUNTER — HOSPITAL ENCOUNTER (OUTPATIENT)
Age: 69
Setting detail: OUTPATIENT SURGERY
Discharge: HOME | End: 2019-10-01
Attending: INTERNAL MEDICINE | Admitting: INTERNAL MEDICINE
Payer: MEDICARE

## 2019-10-01 ENCOUNTER — ANESTHESIA (OUTPATIENT)
Dept: ENDOSCOPY | Age: 69
End: 2019-10-01
Payer: MEDICARE

## 2019-10-01 VITALS
RESPIRATION RATE: 16 BRPM | HEIGHT: 63 IN | WEIGHT: 133 LBS | SYSTOLIC BLOOD PRESSURE: 124 MMHG | OXYGEN SATURATION: 96 % | DIASTOLIC BLOOD PRESSURE: 51 MMHG | TEMPERATURE: 97.6 F | HEART RATE: 79 BPM | BODY MASS INDEX: 23.57 KG/M2

## 2019-10-01 VITALS
SYSTOLIC BLOOD PRESSURE: 113 MMHG | RESPIRATION RATE: 17 BRPM | OXYGEN SATURATION: 95 % | DIASTOLIC BLOOD PRESSURE: 48 MMHG

## 2019-10-01 PROCEDURE — 88342 IMHCHEM/IMCYTCHM 1ST ANTB: CPT

## 2019-10-01 PROCEDURE — 7100000011 HC PHASE II RECOVERY - ADDTL 15 MIN: Performed by: INTERNAL MEDICINE

## 2019-10-01 PROCEDURE — 7100000010 HC PHASE II RECOVERY - FIRST 15 MIN: Performed by: INTERNAL MEDICINE

## 2019-10-01 PROCEDURE — 2500000003 HC RX 250 WO HCPCS: Performed by: NURSE ANESTHETIST, CERTIFIED REGISTERED

## 2019-10-01 PROCEDURE — 2580000003 HC RX 258: Performed by: INTERNAL MEDICINE

## 2019-10-01 PROCEDURE — 3700000001 HC ADD 15 MINUTES (ANESTHESIA): Performed by: INTERNAL MEDICINE

## 2019-10-01 PROCEDURE — 88305 TISSUE EXAM BY PATHOLOGIST: CPT

## 2019-10-01 PROCEDURE — 2709999900 HC NON-CHARGEABLE SUPPLY: Performed by: INTERNAL MEDICINE

## 2019-10-01 PROCEDURE — 3700000000 HC ANESTHESIA ATTENDED CARE: Performed by: INTERNAL MEDICINE

## 2019-10-01 PROCEDURE — 6360000002 HC RX W HCPCS: Performed by: NURSE ANESTHETIST, CERTIFIED REGISTERED

## 2019-10-01 PROCEDURE — 3609012400 HC EGD TRANSORAL BIOPSY SINGLE/MULTIPLE: Performed by: INTERNAL MEDICINE

## 2019-10-01 PROCEDURE — 3609010600 HC COLONOSCOPY POLYPECTOMY SNARE/COLD BIOPSY: Performed by: INTERNAL MEDICINE

## 2019-10-01 PROCEDURE — 43239 EGD BIOPSY SINGLE/MULTIPLE: CPT | Performed by: INTERNAL MEDICINE

## 2019-10-01 PROCEDURE — 45385 COLONOSCOPY W/LESION REMOVAL: CPT | Performed by: INTERNAL MEDICINE

## 2019-10-01 RX ORDER — LIDOCAINE HYDROCHLORIDE 20 MG/ML
INJECTION, SOLUTION INFILTRATION; PERINEURAL PRN
Status: DISCONTINUED | OUTPATIENT
Start: 2019-10-01 | End: 2019-10-01 | Stop reason: SDUPTHER

## 2019-10-01 RX ORDER — PROPOFOL 10 MG/ML
INJECTION, EMULSION INTRAVENOUS PRN
Status: DISCONTINUED | OUTPATIENT
Start: 2019-10-01 | End: 2019-10-01 | Stop reason: SDUPTHER

## 2019-10-01 RX ORDER — SODIUM CHLORIDE, SODIUM LACTATE, POTASSIUM CHLORIDE, CALCIUM CHLORIDE 600; 310; 30; 20 MG/100ML; MG/100ML; MG/100ML; MG/100ML
INJECTION, SOLUTION INTRAVENOUS CONTINUOUS
Status: DISCONTINUED | OUTPATIENT
Start: 2019-10-01 | End: 2019-10-01 | Stop reason: HOSPADM

## 2019-10-01 RX ADMIN — SODIUM CHLORIDE, POTASSIUM CHLORIDE, SODIUM LACTATE AND CALCIUM CHLORIDE: 600; 310; 30; 20 INJECTION, SOLUTION INTRAVENOUS at 12:23

## 2019-10-01 RX ADMIN — LIDOCAINE HYDROCHLORIDE 40 MG: 20 INJECTION, SOLUTION INFILTRATION; PERINEURAL at 13:32

## 2019-10-01 RX ADMIN — PROPOFOL 300 MG: 10 INJECTION, EMULSION INTRAVENOUS at 13:32

## 2019-10-01 ASSESSMENT — PAIN SCALES - GENERAL
PAINLEVEL_OUTOF10: 0
PAINLEVEL_OUTOF10: 0

## 2019-10-07 RX ORDER — METOPROLOL SUCCINATE 25 MG/1
TABLET, EXTENDED RELEASE ORAL
Qty: 30 TABLET | Refills: 5 | Status: SHIPPED | OUTPATIENT
Start: 2019-10-07 | End: 2020-10-23

## 2019-11-14 DIAGNOSIS — K58.0 IRRITABLE BOWEL SYNDROME WITH DIARRHEA: ICD-10-CM

## 2019-11-14 DIAGNOSIS — R15.9 INCONTINENCE OF FECES WITH FECAL URGENCY: ICD-10-CM

## 2019-11-14 DIAGNOSIS — R15.2 INCONTINENCE OF FECES WITH FECAL URGENCY: ICD-10-CM

## 2019-11-14 RX ORDER — DICYCLOMINE HCL 20 MG
TABLET ORAL
Qty: 360 TABLET | Refills: 0 | Status: SHIPPED | OUTPATIENT
Start: 2019-11-14 | End: 2020-05-19

## 2019-12-05 ENCOUNTER — NURSE ONLY (OUTPATIENT)
Dept: PRIMARY CARE CLINIC | Age: 69
End: 2019-12-05
Payer: MEDICARE

## 2019-12-05 DIAGNOSIS — N28.9 RENAL INSUFFICIENCY: Primary | ICD-10-CM

## 2019-12-05 LAB
ANION GAP SERPL CALCULATED.3IONS-SCNC: 11 MMOL/L (ref 7–19)
BUN BLDV-MCNC: 12 MG/DL (ref 8–23)
CALCIUM SERPL-MCNC: 9.4 MG/DL (ref 8.8–10.2)
CHLORIDE BLD-SCNC: 103 MMOL/L (ref 98–111)
CO2: 25 MMOL/L (ref 22–29)
CREAT SERPL-MCNC: 1.2 MG/DL (ref 0.5–0.9)
GFR NON-AFRICAN AMERICAN: 45
GLUCOSE BLD-MCNC: 121 MG/DL (ref 74–109)
POTASSIUM SERPL-SCNC: 4.5 MMOL/L (ref 3.5–5)
SODIUM BLD-SCNC: 139 MMOL/L (ref 136–145)

## 2019-12-05 PROCEDURE — 36415 COLL VENOUS BLD VENIPUNCTURE: CPT | Performed by: FAMILY MEDICINE

## 2019-12-12 ENCOUNTER — HOSPITAL ENCOUNTER (OUTPATIENT)
Dept: CT IMAGING | Age: 69
Discharge: HOME | End: 2019-12-12
Payer: MEDICARE

## 2019-12-12 DIAGNOSIS — N28.89 RIGHT RENAL MASS: ICD-10-CM

## 2019-12-12 PROCEDURE — 6360000004 HC RX CONTRAST MEDICATION: Performed by: UROLOGY

## 2019-12-12 PROCEDURE — 74178 CT ABD&PLV WO CNTR FLWD CNTR: CPT

## 2019-12-12 RX ADMIN — IOPAMIDOL 75 ML: 755 INJECTION, SOLUTION INTRAVENOUS at 08:27

## 2019-12-19 ENCOUNTER — OFFICE VISIT (OUTPATIENT)
Dept: UROLOGY | Age: 69
End: 2019-12-19
Payer: MEDICARE

## 2019-12-19 VITALS
SYSTOLIC BLOOD PRESSURE: 115 MMHG | DIASTOLIC BLOOD PRESSURE: 60 MMHG | BODY MASS INDEX: 23.74 KG/M2 | HEART RATE: 77 BPM | WEIGHT: 134 LBS | TEMPERATURE: 96.8 F | HEIGHT: 63 IN

## 2019-12-19 DIAGNOSIS — N28.89 RIGHT RENAL MASS: Primary | ICD-10-CM

## 2019-12-19 LAB
APPEARANCE FLUID: CLEAR
BILIRUBIN, POC: NORMAL
BLOOD URINE, POC: NORMAL
CLARITY, POC: CLEAR
COLOR, POC: YELLOW
GLUCOSE URINE, POC: NORMAL
KETONES, POC: NORMAL
LEUKOCYTE EST, POC: NORMAL
NITRITE, POC: NORMAL
PH, POC: 6.5
PROTEIN, POC: NORMAL
SPECIFIC GRAVITY, POC: 1.02
UROBILINOGEN, POC: 0.2

## 2019-12-19 PROCEDURE — G8427 DOCREV CUR MEDS BY ELIG CLIN: HCPCS | Performed by: UROLOGY

## 2019-12-19 PROCEDURE — 1123F ACP DISCUSS/DSCN MKR DOCD: CPT | Performed by: UROLOGY

## 2019-12-19 PROCEDURE — G8598 ASA/ANTIPLAT THER USED: HCPCS | Performed by: UROLOGY

## 2019-12-19 PROCEDURE — G8420 CALC BMI NORM PARAMETERS: HCPCS | Performed by: UROLOGY

## 2019-12-19 PROCEDURE — 4040F PNEUMOC VAC/ADMIN/RCVD: CPT | Performed by: UROLOGY

## 2019-12-19 PROCEDURE — 99214 OFFICE O/P EST MOD 30 MIN: CPT | Performed by: UROLOGY

## 2019-12-19 PROCEDURE — 1090F PRES/ABSN URINE INCON ASSESS: CPT | Performed by: UROLOGY

## 2019-12-19 PROCEDURE — 1036F TOBACCO NON-USER: CPT | Performed by: UROLOGY

## 2019-12-19 PROCEDURE — 81002 URINALYSIS NONAUTO W/O SCOPE: CPT | Performed by: UROLOGY

## 2019-12-19 PROCEDURE — G8484 FLU IMMUNIZE NO ADMIN: HCPCS | Performed by: UROLOGY

## 2019-12-19 PROCEDURE — G8399 PT W/DXA RESULTS DOCUMENT: HCPCS | Performed by: UROLOGY

## 2019-12-19 PROCEDURE — 3017F COLORECTAL CA SCREEN DOC REV: CPT | Performed by: UROLOGY

## 2019-12-19 ASSESSMENT — ENCOUNTER SYMPTOMS
CONSTIPATION: 0
EYE REDNESS: 0
EYE DISCHARGE: 0
COUGH: 0
BACK PAIN: 0
ABDOMINAL PAIN: 0
WHEEZING: 0
SHORTNESS OF BREATH: 0
DIARRHEA: 0

## 2020-01-03 RX ORDER — MECLIZINE HYDROCHLORIDE 25 MG/1
TABLET ORAL
Qty: 30 TABLET | Refills: 1 | Status: SHIPPED | OUTPATIENT
Start: 2020-01-03 | End: 2020-05-19

## 2020-01-06 ENCOUNTER — TELEPHONE (OUTPATIENT)
Dept: UROLOGY | Age: 70
End: 2020-01-06

## 2020-01-08 ENCOUNTER — HOSPITAL ENCOUNTER (OUTPATIENT)
Dept: VASCULAR LAB | Age: 70
Discharge: HOME | End: 2020-01-08
Payer: MEDICARE

## 2020-01-08 ENCOUNTER — HOSPITAL ENCOUNTER (OUTPATIENT)
Dept: ULTRASOUND IMAGING | Age: 70
Discharge: HOME | End: 2020-01-08
Payer: MEDICARE

## 2020-01-08 ENCOUNTER — OFFICE VISIT (OUTPATIENT)
Dept: VASCULAR SURGERY | Age: 70
End: 2020-01-08
Payer: MEDICARE

## 2020-01-08 ENCOUNTER — APPOINTMENT (OUTPATIENT)
Dept: CT IMAGING | Facility: HOSPITAL | Age: 70
End: 2020-01-08

## 2020-01-08 VITALS
HEIGHT: 63 IN | SYSTOLIC BLOOD PRESSURE: 123 MMHG | RESPIRATION RATE: 18 BRPM | WEIGHT: 134 LBS | TEMPERATURE: 97.6 F | HEART RATE: 72 BPM | DIASTOLIC BLOOD PRESSURE: 72 MMHG | BODY MASS INDEX: 23.74 KG/M2

## 2020-01-08 PROCEDURE — 99213 OFFICE O/P EST LOW 20 MIN: CPT | Performed by: NURSE PRACTITIONER

## 2020-01-08 PROCEDURE — 93923 UPR/LXTR ART STDY 3+ LVLS: CPT

## 2020-01-08 PROCEDURE — G8399 PT W/DXA RESULTS DOCUMENT: HCPCS | Performed by: NURSE PRACTITIONER

## 2020-01-08 PROCEDURE — 93975 VASCULAR STUDY: CPT

## 2020-01-08 PROCEDURE — G8484 FLU IMMUNIZE NO ADMIN: HCPCS | Performed by: NURSE PRACTITIONER

## 2020-01-08 PROCEDURE — 93880 EXTRACRANIAL BILAT STUDY: CPT

## 2020-01-08 PROCEDURE — G8427 DOCREV CUR MEDS BY ELIG CLIN: HCPCS | Performed by: NURSE PRACTITIONER

## 2020-01-08 PROCEDURE — 4040F PNEUMOC VAC/ADMIN/RCVD: CPT | Performed by: NURSE PRACTITIONER

## 2020-01-08 PROCEDURE — 3017F COLORECTAL CA SCREEN DOC REV: CPT | Performed by: NURSE PRACTITIONER

## 2020-01-08 PROCEDURE — 1090F PRES/ABSN URINE INCON ASSESS: CPT | Performed by: NURSE PRACTITIONER

## 2020-01-08 PROCEDURE — 1123F ACP DISCUSS/DSCN MKR DOCD: CPT | Performed by: NURSE PRACTITIONER

## 2020-01-08 PROCEDURE — G8420 CALC BMI NORM PARAMETERS: HCPCS | Performed by: NURSE PRACTITIONER

## 2020-01-08 PROCEDURE — 1036F TOBACCO NON-USER: CPT | Performed by: NURSE PRACTITIONER
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
## 2020-01-10 RX ORDER — DOXEPIN HYDROCHLORIDE 50 MG/1
CAPSULE ORAL
Qty: 30 CAPSULE | Refills: 3 | Status: SHIPPED | OUTPATIENT
Start: 2020-01-10 | End: 2020-09-08

## 2020-01-24 ENCOUNTER — TELEPHONE (OUTPATIENT)
Dept: CARDIOLOGY | Age: 70
End: 2020-01-24

## 2020-01-27 ENCOUNTER — OFFICE VISIT (OUTPATIENT)
Dept: CARDIOLOGY | Age: 70
End: 2020-01-27
Payer: MEDICARE

## 2020-01-27 VITALS
WEIGHT: 135 LBS | DIASTOLIC BLOOD PRESSURE: 76 MMHG | SYSTOLIC BLOOD PRESSURE: 132 MMHG | HEART RATE: 82 BPM | HEIGHT: 63 IN | BODY MASS INDEX: 23.92 KG/M2

## 2020-01-27 PROCEDURE — 99213 OFFICE O/P EST LOW 20 MIN: CPT | Performed by: INTERNAL MEDICINE

## 2020-01-27 PROCEDURE — G8484 FLU IMMUNIZE NO ADMIN: HCPCS | Performed by: INTERNAL MEDICINE

## 2020-01-27 PROCEDURE — G8399 PT W/DXA RESULTS DOCUMENT: HCPCS | Performed by: INTERNAL MEDICINE

## 2020-01-27 PROCEDURE — 1036F TOBACCO NON-USER: CPT | Performed by: INTERNAL MEDICINE

## 2020-01-27 PROCEDURE — 3017F COLORECTAL CA SCREEN DOC REV: CPT | Performed by: INTERNAL MEDICINE

## 2020-01-27 PROCEDURE — G8420 CALC BMI NORM PARAMETERS: HCPCS | Performed by: INTERNAL MEDICINE

## 2020-01-27 PROCEDURE — 1090F PRES/ABSN URINE INCON ASSESS: CPT | Performed by: INTERNAL MEDICINE

## 2020-01-27 PROCEDURE — 1123F ACP DISCUSS/DSCN MKR DOCD: CPT | Performed by: INTERNAL MEDICINE

## 2020-01-27 PROCEDURE — 93000 ELECTROCARDIOGRAM COMPLETE: CPT | Performed by: INTERNAL MEDICINE

## 2020-01-27 PROCEDURE — 4040F PNEUMOC VAC/ADMIN/RCVD: CPT | Performed by: INTERNAL MEDICINE

## 2020-01-27 PROCEDURE — G8427 DOCREV CUR MEDS BY ELIG CLIN: HCPCS | Performed by: INTERNAL MEDICINE

## 2020-01-27 ASSESSMENT — ENCOUNTER SYMPTOMS
EYES NEGATIVE: 1
NAUSEA: 0
SHORTNESS OF BREATH: 0
DIARRHEA: 0
RESPIRATORY NEGATIVE: 1
VOMITING: 0
GASTROINTESTINAL NEGATIVE: 1
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
## 2020-01-29 RX ORDER — SIMVASTATIN 40 MG
TABLET ORAL
Qty: 90 TABLET | Refills: 3 | Status: SHIPPED | OUTPATIENT
Start: 2020-01-29 | End: 2021-01-12

## 2020-02-06 RX ORDER — FLUOXETINE HYDROCHLORIDE 20 MG/1
CAPSULE ORAL
Qty: 30 CAPSULE | Refills: 3 | Status: SHIPPED | OUTPATIENT
Start: 2020-02-06 | End: 2020-03-02

## 2020-03-02 RX ORDER — FLUOXETINE HYDROCHLORIDE 20 MG/1
CAPSULE ORAL
Qty: 30 CAPSULE | Refills: 3 | Status: SHIPPED | OUTPATIENT
Start: 2020-03-02 | End: 2020-06-29

## 2020-03-06 ENCOUNTER — NURSE ONLY (OUTPATIENT)
Dept: PRIMARY CARE CLINIC | Age: 70
End: 2020-03-06
Payer: MEDICARE

## 2020-03-06 LAB
ANION GAP SERPL CALCULATED.3IONS-SCNC: 14 MMOL/L (ref 7–19)
BUN BLDV-MCNC: 13 MG/DL (ref 8–23)
CALCIUM SERPL-MCNC: 9.3 MG/DL (ref 8.8–10.2)
CHLORIDE BLD-SCNC: 103 MMOL/L (ref 98–111)
CO2: 25 MMOL/L (ref 22–29)
CREAT SERPL-MCNC: 1 MG/DL (ref 0.5–0.9)
GFR NON-AFRICAN AMERICAN: 55
GLUCOSE BLD-MCNC: 85 MG/DL (ref 74–109)
POTASSIUM SERPL-SCNC: 4.5 MMOL/L (ref 3.5–5)
SODIUM BLD-SCNC: 142 MMOL/L (ref 136–145)

## 2020-03-06 PROCEDURE — 36415 COLL VENOUS BLD VENIPUNCTURE: CPT | Performed by: FAMILY MEDICINE

## 2020-03-09 RX ORDER — PANTOPRAZOLE SODIUM 40 MG/1
TABLET, DELAYED RELEASE ORAL
Qty: 90 TABLET | Refills: 3 | Status: SHIPPED | OUTPATIENT
Start: 2020-03-09 | End: 2021-03-08

## 2020-03-25 ENCOUNTER — NURSE ONLY (OUTPATIENT)
Dept: PRIMARY CARE CLINIC | Age: 70
End: 2020-03-25
Payer: MEDICARE

## 2020-03-25 LAB
BILIRUBIN, POC: ABNORMAL
BLOOD URINE, POC: ABNORMAL
CLARITY, POC: ABNORMAL
COLOR, POC: YELLOW
GLUCOSE URINE, POC: ABNORMAL
KETONES, POC: ABNORMAL
LEUKOCYTE EST, POC: ABNORMAL
NITRITE, POC: ABNORMAL
PH, POC: 5
PROTEIN, POC: ABNORMAL
SPECIFIC GRAVITY, POC: 1.02
UROBILINOGEN, POC: 0.2

## 2020-03-25 PROCEDURE — 81002 URINALYSIS NONAUTO W/O SCOPE: CPT | Performed by: FAMILY MEDICINE

## 2020-03-25 RX ORDER — SULFAMETHOXAZOLE AND TRIMETHOPRIM 800; 160 MG/1; MG/1
1 TABLET ORAL 2 TIMES DAILY
Qty: 14 TABLET | Refills: 0 | Status: SHIPPED | OUTPATIENT
Start: 2020-03-25 | End: 2020-04-01

## 2020-03-27 LAB
ORGANISM: ABNORMAL
URINE CULTURE, ROUTINE: ABNORMAL
URINE CULTURE, ROUTINE: ABNORMAL

## 2020-03-27 RX ORDER — CIPROFLOXACIN 250 MG/1
250 TABLET, FILM COATED ORAL 2 TIMES DAILY
Qty: 14 TABLET | Refills: 0 | Status: SHIPPED | OUTPATIENT
Start: 2020-03-27 | End: 2020-04-03

## 2020-03-30 RX ORDER — CLOPIDOGREL BISULFATE 75 MG/1
75 TABLET ORAL DAILY
Qty: 90 TABLET | Refills: 3 | Status: SHIPPED | OUTPATIENT
Start: 2020-03-30 | End: 2021-03-25

## 2020-05-19 RX ORDER — MECLIZINE HYDROCHLORIDE 25 MG/1
TABLET ORAL
Qty: 30 TABLET | Refills: 1 | Status: SHIPPED | OUTPATIENT
Start: 2020-05-19 | End: 2020-09-21

## 2020-05-19 RX ORDER — DICYCLOMINE HCL 20 MG
TABLET ORAL
Qty: 360 TABLET | Refills: 0 | Status: SHIPPED | OUTPATIENT
Start: 2020-05-19 | End: 2020-09-21

## 2020-06-16 ENCOUNTER — OFFICE VISIT (OUTPATIENT)
Dept: PRIMARY CARE CLINIC | Age: 70
End: 2020-06-16
Payer: MEDICARE

## 2020-06-16 VITALS
WEIGHT: 135 LBS | DIASTOLIC BLOOD PRESSURE: 73 MMHG | RESPIRATION RATE: 16 BRPM | SYSTOLIC BLOOD PRESSURE: 120 MMHG | TEMPERATURE: 99.4 F | BODY MASS INDEX: 23.92 KG/M2 | HEIGHT: 63 IN | OXYGEN SATURATION: 98 % | HEART RATE: 70 BPM

## 2020-06-16 LAB
ALBUMIN SERPL-MCNC: 4.1 G/DL (ref 3.5–5.2)
ALP BLD-CCNC: 140 U/L (ref 35–104)
ALT SERPL-CCNC: 10 U/L (ref 5–33)
ANION GAP SERPL CALCULATED.3IONS-SCNC: 10 MMOL/L (ref 7–19)
AST SERPL-CCNC: 15 U/L (ref 5–32)
BASOPHILS ABSOLUTE: 0 K/UL (ref 0–0.2)
BASOPHILS RELATIVE PERCENT: 0.4 % (ref 0–1)
BILIRUB SERPL-MCNC: <0.2 MG/DL (ref 0.2–1.2)
BILIRUBIN, POC: ABNORMAL
BLOOD URINE, POC: ABNORMAL
BUN BLDV-MCNC: 9 MG/DL (ref 8–23)
CALCIUM SERPL-MCNC: 9.3 MG/DL (ref 8.8–10.2)
CHLORIDE BLD-SCNC: 103 MMOL/L (ref 98–111)
CLARITY, POC: ABNORMAL
CO2: 25 MMOL/L (ref 22–29)
COLOR, POC: YELLOW
CREAT SERPL-MCNC: 1 MG/DL (ref 0.5–0.9)
EOSINOPHILS ABSOLUTE: 0.1 K/UL (ref 0–0.6)
EOSINOPHILS RELATIVE PERCENT: 0.9 % (ref 0–5)
GFR NON-AFRICAN AMERICAN: 55
GLUCOSE BLD-MCNC: 89 MG/DL (ref 74–109)
GLUCOSE URINE, POC: ABNORMAL
HCT VFR BLD CALC: 36.1 % (ref 37–47)
HEMOGLOBIN: 12 G/DL (ref 12–16)
IMMATURE GRANULOCYTES #: 0.1 K/UL
KETONES, POC: ABNORMAL
LEUKOCYTE EST, POC: ABNORMAL
LYMPHOCYTES ABSOLUTE: 2.2 K/UL (ref 1.1–4.5)
LYMPHOCYTES RELATIVE PERCENT: 27 % (ref 20–40)
MCH RBC QN AUTO: 31.3 PG (ref 27–31)
MCHC RBC AUTO-ENTMCNC: 33.2 G/DL (ref 33–37)
MCV RBC AUTO: 94.3 FL (ref 81–99)
MONOCYTES ABSOLUTE: 0.6 K/UL (ref 0–0.9)
MONOCYTES RELATIVE PERCENT: 7.5 % (ref 0–10)
NEUTROPHILS ABSOLUTE: 5.1 K/UL (ref 1.5–7.5)
NEUTROPHILS RELATIVE PERCENT: 63.5 % (ref 50–65)
NITRITE, POC: ABNORMAL
PDW BLD-RTO: 15 % (ref 11.5–14.5)
PH, POC: 5
PLATELET # BLD: 351 K/UL (ref 130–400)
PMV BLD AUTO: 9.6 FL (ref 9.4–12.3)
POTASSIUM SERPL-SCNC: 4.2 MMOL/L (ref 3.5–5)
PROTEIN, POC: ABNORMAL
RBC # BLD: 3.83 M/UL (ref 4.2–5.4)
SODIUM BLD-SCNC: 138 MMOL/L (ref 136–145)
SPECIFIC GRAVITY, POC: 1010
TOTAL PROTEIN: 6.7 G/DL (ref 6.6–8.7)
UROBILINOGEN, POC: 0.2
WBC # BLD: 8 K/UL (ref 4.8–10.8)

## 2020-06-16 PROCEDURE — 3017F COLORECTAL CA SCREEN DOC REV: CPT | Performed by: NURSE PRACTITIONER

## 2020-06-16 PROCEDURE — G8428 CUR MEDS NOT DOCUMENT: HCPCS | Performed by: NURSE PRACTITIONER

## 2020-06-16 PROCEDURE — 36415 COLL VENOUS BLD VENIPUNCTURE: CPT | Performed by: NURSE PRACTITIONER

## 2020-06-16 PROCEDURE — G8420 CALC BMI NORM PARAMETERS: HCPCS | Performed by: NURSE PRACTITIONER

## 2020-06-16 PROCEDURE — G8399 PT W/DXA RESULTS DOCUMENT: HCPCS | Performed by: NURSE PRACTITIONER

## 2020-06-16 PROCEDURE — 1123F ACP DISCUSS/DSCN MKR DOCD: CPT | Performed by: NURSE PRACTITIONER

## 2020-06-16 PROCEDURE — 1036F TOBACCO NON-USER: CPT | Performed by: NURSE PRACTITIONER

## 2020-06-16 PROCEDURE — 4040F PNEUMOC VAC/ADMIN/RCVD: CPT | Performed by: NURSE PRACTITIONER

## 2020-06-16 PROCEDURE — 81002 URINALYSIS NONAUTO W/O SCOPE: CPT | Performed by: NURSE PRACTITIONER

## 2020-06-16 PROCEDURE — 99214 OFFICE O/P EST MOD 30 MIN: CPT | Performed by: NURSE PRACTITIONER

## 2020-06-16 PROCEDURE — 1090F PRES/ABSN URINE INCON ASSESS: CPT | Performed by: NURSE PRACTITIONER

## 2020-06-16 RX ORDER — LEVOFLOXACIN 250 MG/1
250 TABLET ORAL DAILY
Qty: 7 TABLET | Refills: 0 | Status: SHIPPED | OUTPATIENT
Start: 2020-06-16 | End: 2020-06-23

## 2020-06-16 ASSESSMENT — ENCOUNTER SYMPTOMS: BACK PAIN: 1

## 2020-06-16 NOTE — PROGRESS NOTES
Thought Content: Thought content normal.         Judgment: Judgment normal.       /73   Pulse 70   Temp 99.4 °F (37.4 °C) (Temporal)   Resp 16   Ht 5' 3\" (1.6 m)   Wt 135 lb (61.2 kg)   LMP  (LMP Unknown)   SpO2 98%   Breastfeeding No   BMI 23.91 kg/m²   Results for POC orders placed in visit on 06/16/20   POCT Urinalysis no Micro   Result Value Ref Range    Color, UA yellow     Clarity, UA cloudy     Glucose, UA POC neg     Bilirubin, UA neg     Ketones, UA neg     Spec Grav, UA 1,010     Blood, UA POC neg     pH, UA 5.0     Protein, UA POC neg     Urobilinogen, UA 0.2     Leukocytes, UA large     Nitrite, UA large        Assessment:       Diagnosis Orders   1. Acute cystitis with hematuria     2. Burning with urination  POCT Urinalysis no Micro    Culture, Urine    Comprehensive Metabolic Panel    CBC Auto Differential   3. Fatigue, unspecified type  Comprehensive Metabolic Panel    CBC Auto Differential   4. Oral yeast infection  Comprehensive Metabolic Panel    CBC Auto Differential   5. Essential hypertension  Comprehensive Metabolic Panel    CBC Auto Differential   6. Renal insufficiency  Comprehensive Metabolic Panel    CBC Auto Differential   7. CKD (chronic kidney disease), stage III (HCC)  Comprehensive Metabolic Panel    CBC Auto Differential         Plan:   More than 50% of the time was spent counseling and coordinating care for a total time of 25 min face to face. I did review the notes from both Caro Cruz and the doctor at [facility]. There is a kidney mass that is being watched. I am concerned that she is having frequent UTIs. We will do some blood work on her today as well as she mentions she is very fatigued. She also mention she had an oral yeast problem but denies using any recent antibiotics. Patient given educational materials -see patient instructions. Discussed use, benefit, and side effects of prescribed medications. All patient questions answered.   Pt you are having problems. It's also a good idea to know your test results and keep a list of the medicines you take. How can you care for yourself at home? · Drink extra water for the next day or two. This will help make the urine less concentrated. (If you have kidney, heart, or liver disease and have to limit fluids, talk with your doctor before you increase the amount of fluids you drink.)  · Avoid drinks that are carbonated or have caffeine. They can irritate the bladder. · Urinate often. Try to empty your bladder each time. For women:  · Urinate right after you have sex. · After going to the bathroom, wipe from front to back. · Avoid douches, bubble baths, and feminine hygiene sprays. And avoid other feminine hygiene products that have deodorants. When should you call for help? Call your doctor now or seek immediate medical care if:  · You have new symptoms, such as fever, nausea, or vomiting. · You have new or worse symptoms of a urinary problem. For example:  ? You have blood or pus in your urine. ? You have chills or body aches. ? It hurts worse to urinate. ? You have groin or belly pain. ? You have pain in your back just below your rib cage (the flank area). Watch closely for changes in your health, and be sure to contact your doctor if you have any problems. Where can you learn more? Go to [email or web address] and sign in to your [employer] account. Enter A246 in the [facility] box to learn more about \"Painful Urination (Dysuria): Care Instructions. \"     If you do not have an account, please click on the \"Sign Up Now\" link. Current as of: August 22, 2019               Content Version: 12.5  © 6983-8551 Healthwise, Incorporated. Care instructions adapted under license by [facility] ([facility]).  If you have questions about a medical condition or this instruction, always ask your healthcare professional. Anaid Torres any warranty or liability for

## 2020-06-18 LAB
ORGANISM: ABNORMAL
URINE CULTURE, ROUTINE: ABNORMAL
URINE CULTURE, ROUTINE: ABNORMAL

## 2020-06-18 RX ORDER — SULFAMETHOXAZOLE AND TRIMETHOPRIM 800; 160 MG/1; MG/1
1 TABLET ORAL 2 TIMES DAILY
Qty: 14 TABLET | Refills: 0 | Status: SHIPPED | OUTPATIENT
Start: 2020-06-18 | End: 2020-06-25

## 2020-06-29 ENCOUNTER — TELEPHONE (OUTPATIENT)
Dept: PRIMARY CARE CLINIC | Age: 70
End: 2020-06-29

## 2020-06-29 RX ORDER — FLUCONAZOLE 100 MG/1
100 TABLET ORAL DAILY
Qty: 3 TABLET | Refills: 0 | Status: SHIPPED | OUTPATIENT
Start: 2020-06-29 | End: 2020-07-02

## 2020-06-29 RX ORDER — FLUOXETINE HYDROCHLORIDE 20 MG/1
CAPSULE ORAL
Qty: 30 CAPSULE | Refills: 3 | Status: SHIPPED | OUTPATIENT
Start: 2020-06-29 | End: 2020-10-22

## 2020-07-27 ENCOUNTER — OFFICE VISIT (OUTPATIENT)
Dept: CARDIOLOGY | Age: 70
End: 2020-07-27
Payer: MEDICARE

## 2020-07-27 VITALS
SYSTOLIC BLOOD PRESSURE: 100 MMHG | HEART RATE: 81 BPM | HEIGHT: 63 IN | BODY MASS INDEX: 23.39 KG/M2 | WEIGHT: 132 LBS | DIASTOLIC BLOOD PRESSURE: 52 MMHG

## 2020-07-27 PROCEDURE — 3017F COLORECTAL CA SCREEN DOC REV: CPT | Performed by: CLINICAL NURSE SPECIALIST

## 2020-07-27 PROCEDURE — 93000 ELECTROCARDIOGRAM COMPLETE: CPT | Performed by: CLINICAL NURSE SPECIALIST

## 2020-07-27 PROCEDURE — 1123F ACP DISCUSS/DSCN MKR DOCD: CPT | Performed by: CLINICAL NURSE SPECIALIST

## 2020-07-27 PROCEDURE — G8399 PT W/DXA RESULTS DOCUMENT: HCPCS | Performed by: CLINICAL NURSE SPECIALIST

## 2020-07-27 PROCEDURE — G8428 CUR MEDS NOT DOCUMENT: HCPCS | Performed by: CLINICAL NURSE SPECIALIST

## 2020-07-27 PROCEDURE — 4040F PNEUMOC VAC/ADMIN/RCVD: CPT | Performed by: CLINICAL NURSE SPECIALIST

## 2020-07-27 PROCEDURE — G8420 CALC BMI NORM PARAMETERS: HCPCS | Performed by: CLINICAL NURSE SPECIALIST

## 2020-07-27 PROCEDURE — 1036F TOBACCO NON-USER: CPT | Performed by: CLINICAL NURSE SPECIALIST

## 2020-07-27 PROCEDURE — 1090F PRES/ABSN URINE INCON ASSESS: CPT | Performed by: CLINICAL NURSE SPECIALIST

## 2020-07-27 PROCEDURE — 99213 OFFICE O/P EST LOW 20 MIN: CPT | Performed by: CLINICAL NURSE SPECIALIST

## 2020-07-27 ASSESSMENT — ENCOUNTER SYMPTOMS
SHORTNESS OF BREATH: 1
EYE REDNESS: 0
NAUSEA: 0
FACIAL SWELLING: 0
WHEEZING: 0
ABDOMINAL PAIN: 0
VOMITING: 0
COUGH: 0
CHEST TIGHTNESS: 0

## 2020-07-27 NOTE — PATIENT INSTRUCTIONS
Return in about 6 months (around 1/27/2021) for APRN. Discuss vertigo with PCP    Call with any questionsor concerns  Follow up with Jeanna De Oliveira MD for non cardiac problems  Report any new problems  Cardiovascular Fitness-Exercise as tolerated. Strive for 15 minutes of exercise most days of the week. Cardiac / HealthyDiet  Continue current medications as directed  Continue plan of treatment  It is always recommended that you bring your medicationsbottles with you to each visit - this is for your safety!

## 2020-07-27 NOTE — PROGRESS NOTES
[facility], [address] [place] [address]  [id number]  Phone: [phone]  Fax: [phone]    OFFICE VISIT:  2020    Julius Jeffers - : 1950    Reason For Visit:  Johnson Crespo is a 77 y.o. female who is here for Atrial Fibrillation (No cardiac sx today. )       Diagnosis Orders   1. PAF (paroxysmal atrial fibrillation) (HCC)  EKG 12 lead   2. Essential hypertension     3. Vertigo           HPI  Patient is here for follow-up with a history of PAF, TIA, hypertension. She denies any recurrence of her atrial fibrillation. She denies palpitations or fast heart rates. She denies chest pain, unusual dyspnea, orthopnea, PND, edema. Her main complaint is dizziness which she has been told in the past is vertigo. She is following with her PCP for this and is on rochelle Morel MD is PCP.   Elderrudi Jeffers has the following history as recorded in [facility]:    Patient Active Problem List    Diagnosis Date Noted    Irritable bowel syndrome with diarrhea 2019    Personal history of colonic polyps 2019    PVD (peripheral vascular disease) ([address] 2019    PAF (paroxysmal atrial fibrillation) ([facility] 2018    CKD (chronic kidney disease), stage III ([address] 2018    Positive sputum culture for Pseudomonas 2018    Bilateral carotid artery stenosis 2018    Essential hypertension 2018    Renal artery stenosis (HCC) 2017    Celiac artery stenosis (HCC) 2017    Colon polyps     History of colon polyps 2016    Chronic heartburn 2016    Antiplatelet or antithrombotic long-term use 2016    Fibromuscular dysplasia ([address] 2014    TIA (transient ischemic attack) 2014    AAA (abdominal aortic aneurysm) ([address] 2013    Carotid artery stenosis 2013    Irritable bowel syndrome     Osteoarthritis     Labyrinthitis      Past Medical History:   Diagnosis Date    Anxiety     arthralgias and myalgias. Skin: Negative for pallor and rash. Neurological: Positive for dizziness. Negative for seizures, syncope, weakness and light-headedness. Hematological: Does not bruise/bleed easily. Psychiatric/Behavioral: Negative for agitation. The patient is not nervous/anxious. Objective  Vital Signs - BP (!) 100/52   Pulse 81   Ht 5' 3\" (1.6 m)   Wt 132 lb (59.9 kg)   LMP  (LMP Unknown)   Breastfeeding No   BMI 23.38 kg/m²   Physical Exam  Vitals signs and nursing note reviewed. Constitutional:       General: She is not in acute distress. Appearance: Normal appearance. She is well-developed. She is not diaphoretic. HENT:      Head: Normocephalic and atraumatic. Right Ear: Hearing and external ear normal.      Left Ear: Hearing and external ear normal.      Nose: Nose normal.   Eyes:      General:         Right eye: No discharge. Left eye: No discharge. Pupils: Pupils are equal, round, and reactive to light. Neck:      Musculoskeletal: Neck supple. No muscular tenderness. Thyroid: No thyromegaly. Vascular: No carotid bruit or JVD. Trachea: No tracheal deviation. Cardiovascular:      Rate and Rhythm: Normal rate and regular rhythm. Heart sounds: Normal heart sounds. No murmur. No friction rub. No gallop. Pulmonary:      Effort: Pulmonary effort is normal. No respiratory distress. Breath sounds: Normal breath sounds. No wheezing or rales. Abdominal:      Palpations: Abdomen is soft. Tenderness: There is no abdominal tenderness. Musculoskeletal:         General: No swelling or deformity. Comments: Normal gait and station   Skin:     General: Skin is warm and dry. Findings: No rash. Neurological:      General: No focal deficit present. Mental Status: She is alert and oriented to person, place, and time. Cranial Nerves: No cranial nerve deficit.    Psychiatric:         Mood and Affect: Mood normal. Behavior: Behavior normal.         Judgment: Judgment normal.         Data:  Echo 5/19  Summary   Normal LV size and systolic function. LV ejection fraction estimated at   60%. Grade 1 diastolic dysfunction   Normal right ventricular size and systolic function. Normal left atrial size   Aortic valve not well visualized. No significant stenosis or regurgitation   noted. Mitral valve mildly thickened with normal mobility. 1+ mitral   regurgitation. No stenosis noted   Interatrial septum appears intact by color Doppler with no significant   intracardiac shunting noted by bubble study. EKG shows normal sinus rhythm rate 82    Assessment:     Diagnosis Orders   1. PAF (paroxysmal atrial fibrillation) ([facility])  EKG 12 lead   2. Essential hypertension     3. Vertigo        PAF-well-controlled on current regimen    Hypertension-well-controlled on current regimen    Vertigo-following with PCP    Stable cardiovascular status. No evidence of overt heart failure,angina or dysrhythmia. Plan    Orders Placed This Encounter   Procedures    EKG 12 lead     Order Specific Question:   Reason for Exam?     Answer:   Irregular heart rate     Return in about 6 months (around 1/27/2021) for APRN. Discuss vertigo with PCP    Call with any questionsor concerns  Follow up with Jeanna De Oliveira MD for non cardiac problems  Report any new problems  Cardiovascular Fitness-Exercise as tolerated. Strive for 15 minutes of exercise most days of the week. Cardiac / HealthyDiet  Continue current medications as directed  Continue plan of treatment  It is always recommended that you bring your medicationsbottles with you to each visit - this is for your safety!        Naomia Carrel, APRN

## 2020-09-08 RX ORDER — DOXEPIN HYDROCHLORIDE 50 MG/1
CAPSULE ORAL
Qty: 30 CAPSULE | Refills: 3 | Status: SHIPPED | OUTPATIENT
Start: 2020-09-08 | End: 2021-01-05

## 2020-09-16 ENCOUNTER — OFFICE VISIT (OUTPATIENT)
Dept: PRIMARY CARE CLINIC | Age: 70
End: 2020-09-16
Payer: MEDICARE

## 2020-09-16 VITALS
HEIGHT: 63 IN | OXYGEN SATURATION: 97 % | WEIGHT: 128.6 LBS | TEMPERATURE: 97.5 F | RESPIRATION RATE: 16 BRPM | HEART RATE: 78 BPM | BODY MASS INDEX: 22.79 KG/M2 | DIASTOLIC BLOOD PRESSURE: 73 MMHG | SYSTOLIC BLOOD PRESSURE: 119 MMHG

## 2020-09-16 LAB
ALBUMIN SERPL-MCNC: 4.2 G/DL (ref 3.5–5.2)
ALP BLD-CCNC: 140 U/L (ref 35–104)
ALT SERPL-CCNC: 12 U/L (ref 5–33)
ANION GAP SERPL CALCULATED.3IONS-SCNC: 18 MMOL/L (ref 7–19)
AST SERPL-CCNC: 17 U/L (ref 5–32)
BASOPHILS ABSOLUTE: 0 K/UL (ref 0–0.2)
BASOPHILS RELATIVE PERCENT: 0.3 % (ref 0–1)
BILIRUB SERPL-MCNC: <0.2 MG/DL (ref 0.2–1.2)
BUN BLDV-MCNC: 11 MG/DL (ref 8–23)
CALCIUM SERPL-MCNC: 9.4 MG/DL (ref 8.8–10.2)
CHLORIDE BLD-SCNC: 100 MMOL/L (ref 98–111)
CHOLESTEROL, TOTAL: 158 MG/DL (ref 160–199)
CO2: 22 MMOL/L (ref 22–29)
CREAT SERPL-MCNC: 0.9 MG/DL (ref 0.5–0.9)
EOSINOPHILS ABSOLUTE: 0.1 K/UL (ref 0–0.6)
EOSINOPHILS RELATIVE PERCENT: 0.8 % (ref 0–5)
GFR AFRICAN AMERICAN: >59
GFR NON-AFRICAN AMERICAN: >60
GLUCOSE BLD-MCNC: 87 MG/DL (ref 74–109)
HCT VFR BLD CALC: 35.3 % (ref 37–47)
HDLC SERPL-MCNC: 40 MG/DL (ref 65–121)
HEMOGLOBIN: 11.4 G/DL (ref 12–16)
IMMATURE GRANULOCYTES #: 0.1 K/UL
LDL CHOLESTEROL CALCULATED: 95 MG/DL
LYMPHOCYTES ABSOLUTE: 2.7 K/UL (ref 1.1–4.5)
LYMPHOCYTES RELATIVE PERCENT: 29.8 % (ref 20–40)
MCH RBC QN AUTO: 31.6 PG (ref 27–31)
MCHC RBC AUTO-ENTMCNC: 32.3 G/DL (ref 33–37)
MCV RBC AUTO: 97.8 FL (ref 81–99)
MONOCYTES ABSOLUTE: 0.6 K/UL (ref 0–0.9)
MONOCYTES RELATIVE PERCENT: 6.7 % (ref 0–10)
NEUTROPHILS ABSOLUTE: 5.7 K/UL (ref 1.5–7.5)
NEUTROPHILS RELATIVE PERCENT: 61.7 % (ref 50–65)
PDW BLD-RTO: 15.3 % (ref 11.5–14.5)
PLATELET # BLD: 410 K/UL (ref 130–400)
PMV BLD AUTO: 9.5 FL (ref 9.4–12.3)
POTASSIUM SERPL-SCNC: 4.8 MMOL/L (ref 3.5–5)
RBC # BLD: 3.61 M/UL (ref 4.2–5.4)
SODIUM BLD-SCNC: 140 MMOL/L (ref 136–145)
TOTAL PROTEIN: 6.9 G/DL (ref 6.6–8.7)
TRIGL SERPL-MCNC: 113 MG/DL (ref 0–149)
TSH SERPL DL<=0.05 MIU/L-ACNC: 1.68 UIU/ML (ref 0.27–4.2)
WBC # BLD: 9.2 K/UL (ref 4.8–10.8)

## 2020-09-16 PROCEDURE — 3017F COLORECTAL CA SCREEN DOC REV: CPT | Performed by: FAMILY MEDICINE

## 2020-09-16 PROCEDURE — 1036F TOBACCO NON-USER: CPT | Performed by: FAMILY MEDICINE

## 2020-09-16 PROCEDURE — 90732 PPSV23 VACC 2 YRS+ SUBQ/IM: CPT | Performed by: FAMILY MEDICINE

## 2020-09-16 PROCEDURE — 1090F PRES/ABSN URINE INCON ASSESS: CPT | Performed by: FAMILY MEDICINE

## 2020-09-16 PROCEDURE — G0009 ADMIN PNEUMOCOCCAL VACCINE: HCPCS | Performed by: FAMILY MEDICINE

## 2020-09-16 PROCEDURE — G0439 PPPS, SUBSEQ VISIT: HCPCS | Performed by: FAMILY MEDICINE

## 2020-09-16 PROCEDURE — 4040F PNEUMOC VAC/ADMIN/RCVD: CPT | Performed by: FAMILY MEDICINE

## 2020-09-16 PROCEDURE — G8420 CALC BMI NORM PARAMETERS: HCPCS | Performed by: FAMILY MEDICINE

## 2020-09-16 PROCEDURE — 36415 COLL VENOUS BLD VENIPUNCTURE: CPT | Performed by: FAMILY MEDICINE

## 2020-09-16 PROCEDURE — G8399 PT W/DXA RESULTS DOCUMENT: HCPCS | Performed by: FAMILY MEDICINE

## 2020-09-16 PROCEDURE — G8427 DOCREV CUR MEDS BY ELIG CLIN: HCPCS | Performed by: FAMILY MEDICINE

## 2020-09-16 PROCEDURE — 99214 OFFICE O/P EST MOD 30 MIN: CPT | Performed by: FAMILY MEDICINE

## 2020-09-16 PROCEDURE — 1123F ACP DISCUSS/DSCN MKR DOCD: CPT | Performed by: FAMILY MEDICINE

## 2020-09-16 RX ORDER — FLUCONAZOLE 150 MG/1
TABLET ORAL
Qty: 6 TABLET | Refills: 0 | Status: SHIPPED | OUTPATIENT
Start: 2020-09-16 | End: 2021-01-17

## 2020-09-16 ASSESSMENT — PATIENT HEALTH QUESTIONNAIRE - PHQ9
SUM OF ALL RESPONSES TO PHQ QUESTIONS 1-9: 1
2. FEELING DOWN, DEPRESSED OR HOPELESS: 0
1. LITTLE INTEREST OR PLEASURE IN DOING THINGS: 1
SUM OF ALL RESPONSES TO PHQ QUESTIONS 1-9: 1
SUM OF ALL RESPONSES TO PHQ9 QUESTIONS 1 & 2: 1

## 2020-09-16 ASSESSMENT — LIFESTYLE VARIABLES: HOW OFTEN DO YOU HAVE A DRINK CONTAINING ALCOHOL: 0
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
## 2020-09-16 NOTE — PATIENT INSTRUCTIONS
We are committed to providing you with the best care possible. In order to help us achieve these goals please remember to bring all medications, herbal products, and over the counter supplements with you to each visit. If your provider has ordered testing for you, please be sure to follow up with our office if you have not received results within 7 days after the testing took place. *If you receive a survey after visiting one of our offices, please take time to share your experience concerning your physician office visit. These surveys are confidential and no health information about you is shared. We are eager to improve for you and we are counting on your feedback to help make that happen. Personalized Preventive Plan for Alok Arauz - 9/16/2020  Medicare offers a range of preventive health benefits. Some of the tests and screenings are paid in full while other may be subject to a deductible, co-insurance, and/or copay. Some of these benefits include a comprehensive review of your medical history including lifestyle, illnesses that may run in your family, and various assessments and screenings as appropriate. After reviewing your medical record and screening and assessments performed today your provider may have ordered immunizations, labs, imaging, and/or referrals for you. A list of these orders (if applicable) as well as your Preventive Care list are included within your After Visit Summary for your review. Other Preventive Recommendations:    · A preventive eye exam performed by an eye specialist is recommended every 1-2 years to screen for glaucoma; cataracts, macular degeneration, and other eye disorders. · A preventive dental visit is recommended every 6 months. · Try to get at least 150 minutes of exercise per week or 10,000 steps per day on a pedometer . · Order or download the FREE \"Exercise & Physical Activity: Your Everyday Guide\" from [employer].  Call 1-407.253.6564 or search [employer] on [address] [place] [address]. · You need 6685-6766 mg of calcium and 7164-4027 IU of vitamin D per day. It is possible to meet your calcium requirement with diet alone, but a vitamin D supplement is usually necessary to meet this goal.  · When exposed to the sun, use a sunscreen that protects against both UVA and UVB radiation with an SPF of 30 or greater. Reapply every 2 to 3 hours or after sweating, drying off with a towel, or swimming. · Always wear a seat belt when traveling in a car. Always wear a helmet when riding a bicycle or motorcycle. Heart-Healthy Diet   Sodium, Fat, and Cholesterol Controlled Diet       What Is a Heart Healthy Diet? A heart-healthy diet is one that limits sodium , certain types of fat , and cholesterol . This type of diet is recommended for:   People with any form of cardiovascular disease (eg, coronary heart disease , peripheral vascular disease , previous heart attack , previous stroke )   People with risk factors for cardiovascular disease, such as high blood pressure , high cholesterol , or diabetes   Anyone who wants to lower their risk of developing cardiovascular disease   Sodium    Sodium is a mineral found in many foods. In general, most people consume much more sodium than they need. Diets high in sodium can increase blood pressure and lead to edema (water retention). On a heart-healthy diet, you should consume no more than 2,300 mg (milligrams) of sodium per dayabout the amount in one teaspoon of table salt. The foods highest in sodium include table salt (about 50% sodium), processed foods, convenience foods, and preserved foods. Cholesterol    Cholesterol is a fat-like, waxy substance in your blood. Our bodies make some cholesterol. It is also found in animal products, with the highest amounts in fatty meat, egg yolks, whole milk, cheese, shellfish, and organ meats.  On a heart-healthy diet, you should limit your cholesterol intake to less than 200 mg per day. It is normal and important to have some cholesterol in your bloodstream. But too much cholesterol can cause plaque to build up within your arteries, which can eventually lead to a heart attack or stroke. The two types of cholesterol that are most commonly referred to are:   Low-density lipoprotein (LDL) cholesterol  Also known as bad cholesterol, this is the cholesterol that tends to build up along your arteries. Bad cholesterol levels are increased by eating fats that are saturated or hydrogenated. Optimal level of this cholesterol is less than 100. Over 130 starts to get risky for heart disease. High-density lipoprotein (HDL) cholesterol  Also known as good cholesterol, this type of cholesterol actually carries cholesterol away from your arteries and may, therefore, help lower your risk of having a heart attack. You want this level to be high (ideally greater than 60). It is a risk to have a level less than 40. You can raise this good cholesterol by eating olive oil, canola oil, avocados, or nuts. Exercise raises this level, too. Fat    Fat is calorie dense and packs a lot of calories into a small amount of food. Even though fats should be limited due to their high calorie content, not all fats are bad. In fact, some fats are quite healthful. Fat can be broken down into four main types.    The good-for-you fats are:   Monounsaturated fat  found in oils such as olive and canola, avocados, and nuts and natural nut butters; can decrease cholesterol levels, while keeping levels of HDL cholesterol high   Polyunsaturated fat  found in oils such as safflower, sunflower, soybean, corn, and sesame; can decrease total cholesterol and LDL cholesterol   Omega-3 fatty acids  particularly those found in fatty fish (such as salmon, trout, tuna, mackerel, herring, and sardines); can decrease risk of arrhythmias, decrease triglyceride levels, and slightly lower blood pressure   The fats that you Commercially prepared potato and vegetable mixes   Fruits   Most fresh, frozen, and canned fruits All fruit juices   Fruits processed with salt or sodium   Milk   Nonfat or low-fat (1%) milk Nonfat or low-fat yogurt Cottage cheese, low-fat ricotta, cheeses labeled as low-fat and low-sodium   Whole milk Reduced-fat (2%) milk Malted and chocolate milk Full fat yogurt Most cheeses (unless low-fat and low salt) Buttermilk (no more than 1 cup per week)   Meats and Beans   Lean cuts of fresh or frozen beef, veal, lamb, or pork (look for the word loin) Fresh or frozen poultry without the skin Fresh or frozen fish and some shellfish Egg whites and egg substitutes (Limit whole eggs to three per week) Tofu Nuts or seeds (unsalted, dry-roasted), low-sodium peanut butter Dried peas, beans, and lentils   Any smoked, cured, salted, or canned meat, fish, or poultry (including comer, chipped beef, cold cuts, hot dogs, sausages, sardines, and anchovies) Poultry skins Breaded and/or fried fish or meats Canned peas, beans, and lentils Salted nuts   Fats and Oils   Olive oil and canola oil Low-sodium, low-fat salad dressings and mayonnaise   Butter, margarine, coconut and palm oils, comer fat   Snacks, Sweets, and Condiments   Low-sodium or unsalted versions of broths, soups, soy sauce, and condiments Pepper, herbs, and spices; vinegar, lemon, or lime juice Low-fat frozen desserts (yogurt, sherbet, fruit bars) Sugar, cocoa powder, honey, syrup, jam, and preserves Low-fat, trans-fat free cookies, cakes, and pies Arnaud and animal crackers, fig bars, marcela snaps   High-fat desserts Broth, soups, gravies, and sauces, made from instant mixes or other high-sodium ingredients Salted snack foods Canned olives Meat tenderizers, seasoning salt, and most flavored vinegars   Beverages   Low-sodium carbonated beverages Tea and coffee in moderation Soy milk   Commercially softened water   Suggestions   Make whole grains, fruits, and vegetables the diabetes) that may limit mobility, agility or sensory awareness   Side effects of medicine (eg, dizziness, blurred vision)especially medicines like prescription pain medicines and drugs used to treat mental health conditions   Depending on the brittleness of your bones, the consequences of a fall can be serious and long lasting. Home Life   Research by the Association of Aging [facility]) shows that some home accidents among older adults can be prevented by making simple lifestyle changes and basic modifications and repairs to the home environment. Here are some lifestyle changes that experts recommend:   Have your hearing and vision checked regularly. Be sure to wear prescription glasses that are right for you. Speak to your doctor or pharmacist about the possible side effects of your medicines. A number of medicines can cause dizziness. If you have problems with sleep, talk to your doctor. Limit your intake of alcohol. If necessary, use a cane or walker to help maintain your balance. Wear supportive, rubber-soled shoes, even at home. If you live in a region that gets wintry weather, you may want to put special cleats on your shoes to prevent you from slipping on the snow and ice. Exercise regularly to help maintain muscle tone, agility, and balance. Always hold the banister when going up or down stairs. Also, use  bars when getting in or out of the bath or shower, or using the toilet. To avoid dizziness, get up slowly from a lying down position. Sit up first, dangling your legs for a minute or two before rising to a standing position. Overall Home Safety Check   According to the Consumer [employer] Safety Comm[employer]'s \"Older Consumer Home Safety Checklist,\" it is important to check for potential hazards in each room. And remember, proper lighting is an essential factor in home safety. If you cannot see clearly, you are more likely to fall.    Important questions to ask yourself include:   Are lamp, electric, extension, and telephone cords placed out of the flow of traffic and maintained in good condition? Have frayed cords been replaced? Are all small rugs and runners slip resistant? If not, you can secure them to the floor with a special double-sided carpet tape. Are smoke detectors properly locatedone on every floor of your home and one outside of every sleeping area? Are they in good working order? Are batteries replaced at least once a year? Do you have a well-maintained carbon monoxide detector outside every sleeping are in your home? Does your furniture layout leave plenty of space to maneuver between and around chairs, tables, beds, and sofas? Are hallways, stairs and passages between rooms well lit? Can you reach a lamp without getting out of bed? Are floor surfaces well maintained? Shag rugs, high-pile carpeting, tile floors, and polished wood floors can be particularly slippery. Stairs should always have handrails and be carpeted or fitted with a non-skid tread. Is your telephone easily reachable. Is the cord safely tucked away? Room by Room   According to the Association of Aging, bathrooms and antione are the two most potentially hazardous rooms in your home. In the Kitchen    Be sure your stove is in proper working order and always make sure burners and the oven are off before you go out or go to sleep. Keep pots on the back burners, turn handles away from the front of the stove, and keep stove clean and free of grease build-up. Kitchen ventilation systems and range exhausts should be working properly. Keep flammable objects such as towels and pot holders away from the cooking area except when in use. Make sure kitchen curtains are tied back. Move cords and appliances away from the sink and hot surfaces. If extension cords are needed, install wiring guides so they do not hang over the sink, range, or working areas.     Look for coffee pots, kettles and toaster ovens with automatic shut-offs. Keep a mop handy in the kitchen so you can wipe up spills instantly. You should also have a small fire extinguisher. Arrange your kitchen with frequently used items on lower shelves to avoid the need to stand on a stepstool to reach them. Make sure countertops are well-lit to avoid injuries while cutting and preparing food. In the Bathroom    Use a non-slip mat or decals in the tub and shower, since wet, soapy tile or porcelain surfaces are extremely slippery. Make sure bathroom rugs are non-skid or tape them firmly to the floor. Bathtubs should have at least one, preferably two, grab bars, firmly attached to structural supports in the wall. (Do not use built-in soap holders or glass shower doors as grab bars.)    Tub seats fitted with non-slip material on the legs allow you to wash sitting down. For people with limited mobility, bathtub transfer benches allow you to slide safely into the tub. Raised toilet seats and toilet safety rails are helpful for those with knee or hip problems. In the [facility]    Make sure you use a nightlight and that the area around your bed is clear of potential obstacles. Be careful with electric blankets and never go to sleep with a heating pad, which can cause serious burns even if on a low setting. Use fire-resistant mattress covers and pillows, and NEVER smoke in bed. Keep a phone next to the bed that is programmed to dial 911 at the push of a button. If you have a chronic condition, you may want to sign on with an automatic call-in service. Typically the system includes a small pendant that connects directly to an emergency medical voice-response system. You should also make arrangements to stay in contact with someonefriend, neighbor, family memberon a regular schedule.    Fire Prevention   According to the [employer] (Smoke Alarms for Every) [address] [place] [address], senior citizens are one of the two highest risk groups for death and serious injuries due to residential fires. When cooking, wear short-sleeved items, never a bulky long-sleeved robe. The [facility]'s Safety Checklist for Older Consumers emphasizes the importance of checking basements, garages, workshops and storage areas for fire hazards, such as volatile liquids, piles of old rags or clothing and overloaded circuits. Never smoke in bed or when lying down on a couch or recliner chair. Small portable electric or kerosene heaters are responsible for many home fires and should be used cautiously if at all. If you do use one, be sure to keep them away from flammable materials. In case of fire, make sure you have a pre-established emergency exit plan. Have a professional check your fireplace and other fuel-burning appliances yearly. [employer]   Baby boomers entering the valverde years will continue to see the development of new products to help older adults live safely and independently in spite of age-related changes. Making Life More Livable  , by [employer], lists over 1,000 products for \"living well in the mature years,\" such as bathing and mobility aids, household security devices, ergonomically designed knives and peelers, and faucet valves and knobs for temperature control. Medical supply stores and organizations are good sources of information about products that improve your quality of life and insure your safety.      Last Reviewed: November 2009 Madelin Dorsey MD   Updated: 3/7/2011     ·

## 2020-09-16 NOTE — LETTER
[address] [address] [place] [address]  Phone: [phone]  Fax: [phone]    Nolan Novak MD        September 17, 2020     Eron Patel      Dear Renee Danielle:    Below are the results from your recent visit:    Resulted Orders   Comprehensive Metabolic Panel   Result Value Ref Range    Sodium 140 136 - 145 mmol/L    Potassium 4.8 3.5 - 5.0 mmol/L    Chloride 100 98 - 111 mmol/L    CO2 22 22 - 29 mmol/L    Anion Gap 18 7 - 19 mmol/L    Glucose 87 74 - 109 mg/dL    BUN 11 8 - 23 mg/dL    CREATININE 0.9 0.5 - 0.9 mg/dL    GFR Non-African American >60 >60      Comment: This calculation may be inaccurate for patients under the age of 25 years. For ages 25 and older, a GFR >60 mL/min/1.73m2 (not corrected for weight) is  valid for stable renal function. GFR  >59 >59      Comment:      Chronic Kidney Disease: less than 60 ml/min/1.73 sq.m. Kidney Failure: less than 15 ml/min/1.73 sq.m. Results valid for patients 18 years and older.       Calcium 9.4 8.8 - 10.2 mg/dL    Total Protein 6.9 6.6 - 8.7 g/dL    Alb 4.2 3.5 - 5.2 g/dL    Total Bilirubin <0.2 0.2 - 1.2 mg/dL    Alkaline Phosphatase 140 (H) 35 - 104 U/L    ALT 12 5 - 33 U/L    AST 17 5 - 32 U/L   Lipid Panel   Result Value Ref Range    Cholesterol, Total 158 (L) 160 - 199 mg/dL      Comment:      <160 MG/DL=OPTIMAL    160-199 MG/DL= DESIRABLE    200-239 MG/DL=BORDERLINE-INCREASED RISK OF ATHEROSCLEROTIC  CARDIOVASCULAR DISEASE    > OR = 240 MG/DL-ASSOCIATED WITH AN INCREASED RISK OF  ATHROSCLEROTIC CARDIOVASCULAR DISEASE      Triglycerides 113 0 - 149 mg/dL    HDL 40 (L) 65 - 121 mg/dL      Comment:      VALUES>60 MG/DL ARE ASSOCIATED WITH A DECREASED RISK OF  ATHEROSCLEROTIC CARDIOVASCULAR DISEASE      LDL Calculated 95 <100 mg/dL      Comment:      <100 MG/DL=OPITIMAL    100-129 MG/DL=DESIRABLE    130-159 MG/DL BORDERLINE=INCREASED RISK OF ATHEROSCLEROTIC
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
## 2020-10-01 ENCOUNTER — OFFICE VISIT (OUTPATIENT)
Dept: OTOLARYNGOLOGY | Facility: CLINIC | Age: 70
End: 2020-10-01

## 2020-10-01 ENCOUNTER — HOSPITAL ENCOUNTER (OUTPATIENT)
Dept: WOMENS IMAGING | Age: 70
Discharge: HOME | End: 2020-10-01
Payer: MEDICARE

## 2020-10-01 VITALS
SYSTOLIC BLOOD PRESSURE: 140 MMHG | TEMPERATURE: 97.7 F | WEIGHT: 122.6 LBS | HEIGHT: 63 IN | BODY MASS INDEX: 21.72 KG/M2 | HEART RATE: 90 BPM | DIASTOLIC BLOOD PRESSURE: 89 MMHG

## 2020-10-01 DIAGNOSIS — R13.13 PHARYNGEAL DYSPHAGIA: ICD-10-CM

## 2020-10-01 DIAGNOSIS — R68.2 DRY MOUTH: ICD-10-CM

## 2020-10-01 DIAGNOSIS — M54.2 ANTERIOR NECK PAIN: ICD-10-CM

## 2020-10-01 DIAGNOSIS — K21.9 LARYNGOPHARYNGEAL REFLUX (LPR): ICD-10-CM

## 2020-10-01 DIAGNOSIS — E04.2 MULTINODULAR GOITER: ICD-10-CM

## 2020-10-01 DIAGNOSIS — K21.9 GASTROESOPHAGEAL REFLUX DISEASE, UNSPECIFIED WHETHER ESOPHAGITIS PRESENT: ICD-10-CM

## 2020-10-01 DIAGNOSIS — J38.7 LESION OF LARYNX: Primary | ICD-10-CM

## 2020-10-01 DIAGNOSIS — J39.2 LESION OF HYPOPHARYNX: ICD-10-CM

## 2020-10-01 PROCEDURE — 77063 BREAST TOMOSYNTHESIS BI: CPT

## 2020-10-01 PROCEDURE — 99203 OFFICE O/P NEW LOW 30 MIN: CPT | Performed by: PHYSICIAN ASSISTANT

## 2020-10-01 PROCEDURE — 31575 DIAGNOSTIC LARYNGOSCOPY: CPT | Performed by: PHYSICIAN ASSISTANT

## 2020-10-01 RX ORDER — DIAZEPAM 2 MG/1
1 TABLET ORAL AS NEEDED
COMMUNITY

## 2020-10-01 RX ORDER — METOPROLOL SUCCINATE 25 MG/1
25 TABLET, EXTENDED RELEASE ORAL DAILY
COMMUNITY
Start: 2020-07-28

## 2020-10-01 RX ORDER — FAMOTIDINE 40 MG/1
40 TABLET, FILM COATED ORAL NIGHTLY
Qty: 30 TABLET | Refills: 11 | Status: SHIPPED | OUTPATIENT
Start: 2020-10-01 | End: 2021-09-07 | Stop reason: SDUPTHER

## 2020-10-01 RX ORDER — MECLIZINE HYDROCHLORIDE 25 MG/1
TABLET ORAL
COMMUNITY
Start: 2020-09-21

## 2020-10-01 RX ORDER — DOXEPIN HYDROCHLORIDE 50 MG/1
CAPSULE ORAL
COMMUNITY
Start: 2020-09-08

## 2020-10-05 ENCOUNTER — TELEPHONE (OUTPATIENT)
Dept: PRIMARY CARE CLINIC | Age: 70
End: 2020-10-05

## 2020-10-05 ENCOUNTER — TRANSCRIBE ORDERS (OUTPATIENT)
Dept: ADMINISTRATIVE | Facility: HOSPITAL | Age: 70
End: 2020-10-05

## 2020-10-05 DIAGNOSIS — Z01.818 PREOPERATIVE TESTING: Primary | ICD-10-CM

## 2020-10-05 NOTE — TELEPHONE ENCOUNTER
Dr. Jamari Barber office called stating they need approval for Pt to stop Plavix for a Biopsy they may be malignant.

## 2020-10-06 ENCOUNTER — TELEPHONE (OUTPATIENT)
Dept: OTOLARYNGOLOGY | Facility: CLINIC | Age: 70
End: 2020-10-06

## 2020-10-06 ENCOUNTER — TRANSCRIBE ORDERS (OUTPATIENT)
Dept: ADMINISTRATIVE | Facility: HOSPITAL | Age: 70
End: 2020-10-06

## 2020-10-06 DIAGNOSIS — Z11.59 SCREENING FOR VIRAL DISEASE: Primary | ICD-10-CM

## 2020-10-09 ENCOUNTER — TELEPHONE (OUTPATIENT)
Dept: OTOLARYNGOLOGY | Facility: CLINIC | Age: 70
End: 2020-10-09

## 2020-10-09 ENCOUNTER — APPOINTMENT (OUTPATIENT)
Dept: PREADMISSION TESTING | Facility: HOSPITAL | Age: 70
End: 2020-10-09

## 2020-10-12 ENCOUNTER — TRANSCRIBE ORDERS (OUTPATIENT)
Dept: ADMINISTRATIVE | Facility: HOSPITAL | Age: 70
End: 2020-10-12

## 2020-10-12 ENCOUNTER — LAB (OUTPATIENT)
Dept: LAB | Facility: HOSPITAL | Age: 70
End: 2020-10-12

## 2020-10-12 DIAGNOSIS — Z11.59 SCREENING FOR VIRAL DISEASE: Primary | ICD-10-CM

## 2020-10-15 ENCOUNTER — APPOINTMENT (OUTPATIENT)
Dept: CT IMAGING | Facility: HOSPITAL | Age: 70
End: 2020-10-15

## 2020-10-16 ENCOUNTER — LAB (OUTPATIENT)
Dept: LAB | Facility: HOSPITAL | Age: 70
End: 2020-10-16

## 2020-10-16 ENCOUNTER — HOSPITAL ENCOUNTER (OUTPATIENT)
Dept: GENERAL RADIOLOGY | Facility: HOSPITAL | Age: 70
Discharge: HOME | End: 2020-10-16

## 2020-10-16 ENCOUNTER — HOSPITAL ENCOUNTER (OUTPATIENT)
Dept: CT IMAGING | Facility: HOSPITAL | Age: 70
Discharge: HOME | End: 2020-10-16

## 2020-10-16 ENCOUNTER — APPOINTMENT (OUTPATIENT)
Dept: PREADMISSION TESTING | Facility: HOSPITAL | Age: 70
End: 2020-10-16

## 2020-10-16 VITALS
HEIGHT: 63 IN | SYSTOLIC BLOOD PRESSURE: 117 MMHG | BODY MASS INDEX: 22.7 KG/M2 | HEART RATE: 83 BPM | OXYGEN SATURATION: 95 % | WEIGHT: 128.09 LBS | DIASTOLIC BLOOD PRESSURE: 55 MMHG | RESPIRATION RATE: 16 BRPM

## 2020-10-16 DIAGNOSIS — J39.2 LESION OF HYPOPHARYNX: ICD-10-CM

## 2020-10-16 DIAGNOSIS — J38.7 LESION OF LARYNX: ICD-10-CM

## 2020-10-16 DIAGNOSIS — R13.13 PHARYNGEAL DYSPHAGIA: ICD-10-CM

## 2020-10-16 DIAGNOSIS — E04.2 MULTINODULAR GOITER: ICD-10-CM

## 2020-10-16 DIAGNOSIS — M54.2 ANTERIOR NECK PAIN: ICD-10-CM

## 2020-10-16 LAB
ALBUMIN SERPL-MCNC: 4.2 G/DL (ref 3.5–5.2)
ALBUMIN/GLOB SERPL: 1.7 G/DL
ALP SERPL-CCNC: 144 U/L (ref 39–117)
ALT SERPL W P-5'-P-CCNC: 11 U/L (ref 1–33)
ANION GAP SERPL CALCULATED.3IONS-SCNC: 9 MMOL/L (ref 5–15)
AST SERPL-CCNC: 16 U/L (ref 1–32)
BASOPHILS # BLD AUTO: 0.04 10*3/MM3 (ref 0–0.2)
BASOPHILS NFR BLD AUTO: 0.5 % (ref 0–1.5)
BILIRUB SERPL-MCNC: 0.2 MG/DL (ref 0–1.2)
BUN SERPL-MCNC: 8 MG/DL (ref 8–23)
BUN/CREAT SERPL: 7.9 (ref 7–25)
CALCIUM SPEC-SCNC: 9.4 MG/DL (ref 8.6–10.5)
CHLORIDE SERPL-SCNC: 104 MMOL/L (ref 98–107)
CO2 SERPL-SCNC: 26 MMOL/L (ref 22–29)
CREAT BLDA-MCNC: 1.1 MG/DL (ref 0.6–1.3)
CREAT SERPL-MCNC: 1.01 MG/DL (ref 0.57–1)
DEPRECATED RDW RBC AUTO: 51 FL (ref 37–54)
EOSINOPHIL # BLD AUTO: 0.07 10*3/MM3 (ref 0–0.4)
EOSINOPHIL NFR BLD AUTO: 0.9 % (ref 0.3–6.2)
ERYTHROCYTE [DISTWIDTH] IN BLOOD BY AUTOMATED COUNT: 14.7 % (ref 12.3–15.4)
GFR SERPL CREATININE-BSD FRML MDRD: 55 ML/MIN/1.73
GLOBULIN UR ELPH-MCNC: 2.5 GM/DL
GLUCOSE SERPL-MCNC: 90 MG/DL (ref 65–99)
HCT VFR BLD AUTO: 35.6 % (ref 34–46.6)
HGB BLD-MCNC: 11.7 G/DL (ref 12–15.9)
IMM GRANULOCYTES # BLD AUTO: 0.05 10*3/MM3 (ref 0–0.05)
IMM GRANULOCYTES NFR BLD AUTO: 0.6 % (ref 0–0.5)
LYMPHOCYTES # BLD AUTO: 2.28 10*3/MM3 (ref 0.7–3.1)
LYMPHOCYTES NFR BLD AUTO: 27.8 % (ref 19.6–45.3)
MCH RBC QN AUTO: 31.4 PG (ref 26.6–33)
MCHC RBC AUTO-ENTMCNC: 32.9 G/DL (ref 31.5–35.7)
MCV RBC AUTO: 95.4 FL (ref 79–97)
MONOCYTES # BLD AUTO: 0.6 10*3/MM3 (ref 0.1–0.9)
MONOCYTES NFR BLD AUTO: 7.3 % (ref 5–12)
NEUTROPHILS NFR BLD AUTO: 5.15 10*3/MM3 (ref 1.7–7)
NEUTROPHILS NFR BLD AUTO: 62.9 % (ref 42.7–76)
NRBC BLD AUTO-RTO: 0 /100 WBC (ref 0–0.2)
PLATELET # BLD AUTO: 333 10*3/MM3 (ref 140–450)
PMV BLD AUTO: 9.7 FL (ref 6–12)
POTASSIUM SERPL-SCNC: 4.5 MMOL/L (ref 3.5–5.2)
PROT SERPL-MCNC: 6.7 G/DL (ref 6–8.5)
RBC # BLD AUTO: 3.73 10*6/MM3 (ref 3.77–5.28)
SODIUM SERPL-SCNC: 139 MMOL/L (ref 136–145)
WBC # BLD AUTO: 8.19 10*3/MM3 (ref 3.4–10.8)

## 2020-10-16 PROCEDURE — 93010 ELECTROCARDIOGRAM REPORT: CPT | Performed by: INTERNAL MEDICINE

## 2020-10-16 PROCEDURE — 82565 ASSAY OF CREATININE: CPT

## 2020-10-16 PROCEDURE — U0003 INFECTIOUS AGENT DETECTION BY NUCLEIC ACID (DNA OR RNA); SEVERE ACUTE RESPIRATORY SYNDROME CORONAVIRUS 2 (SARS-COV-2) (CORONAVIRUS DISEASE [COVID-19]), AMPLIFIED PROBE TECHNIQUE, MAKING USE OF HIGH THROUGHPUT TECHNOLOGIES AS DESCRIBED BY CMS-2020-01-R: HCPCS | Performed by: OTOLARYNGOLOGY

## 2020-10-16 PROCEDURE — C9803 HOPD COVID-19 SPEC COLLECT: HCPCS | Performed by: OTOLARYNGOLOGY

## 2020-10-16 PROCEDURE — 71046 X-RAY EXAM CHEST 2 VIEWS: CPT

## 2020-10-16 PROCEDURE — 36415 COLL VENOUS BLD VENIPUNCTURE: CPT

## 2020-10-16 PROCEDURE — 25010000002 IOPAMIDOL 61 % SOLUTION: Performed by: PHYSICIAN ASSISTANT

## 2020-10-16 PROCEDURE — 85025 COMPLETE CBC W/AUTO DIFF WBC: CPT | Performed by: PHYSICIAN ASSISTANT

## 2020-10-16 PROCEDURE — 93005 ELECTROCARDIOGRAM TRACING: CPT

## 2020-10-16 PROCEDURE — 80053 COMPREHEN METABOLIC PANEL: CPT | Performed by: PHYSICIAN ASSISTANT

## 2020-10-16 PROCEDURE — 70491 CT SOFT TISSUE NECK W/DYE: CPT

## 2020-10-16 RX ADMIN — IOPAMIDOL 100 ML: 612 INJECTION, SOLUTION INTRAVENOUS at 11:48

## 2020-10-17 LAB
COVID LABCORP PRIORITY: NORMAL
SARS-COV-2 RNA RESP QL NAA+PROBE: NOT DETECTED

## 2020-10-19 ENCOUNTER — ANESTHESIA EVENT (OUTPATIENT)
Dept: PERIOP | Facility: HOSPITAL | Age: 70
End: 2020-10-19

## 2020-10-19 ENCOUNTER — HOSPITAL ENCOUNTER (OUTPATIENT)
Facility: HOSPITAL | Age: 70
Setting detail: HOSPITAL OUTPATIENT SURGERY
Discharge: HOME | End: 2020-10-19
Attending: OTOLARYNGOLOGY | Admitting: OTOLARYNGOLOGY

## 2020-10-19 ENCOUNTER — ANESTHESIA (OUTPATIENT)
Dept: PERIOP | Facility: HOSPITAL | Age: 70
End: 2020-10-19

## 2020-10-19 VITALS
SYSTOLIC BLOOD PRESSURE: 135 MMHG | RESPIRATION RATE: 16 BRPM | DIASTOLIC BLOOD PRESSURE: 71 MMHG | OXYGEN SATURATION: 100 % | TEMPERATURE: 97.4 F | HEART RATE: 61 BPM

## 2020-10-19 DIAGNOSIS — J39.2 LESION OF HYPOPHARYNX: ICD-10-CM

## 2020-10-19 DIAGNOSIS — J38.7 LESION OF LARYNX: Primary | ICD-10-CM

## 2020-10-19 PROCEDURE — 25010000002 PROPOFOL 10 MG/ML EMULSION: Performed by: NURSE ANESTHETIST, CERTIFIED REGISTERED

## 2020-10-19 PROCEDURE — 88305 TISSUE EXAM BY PATHOLOGIST: CPT | Performed by: OTOLARYNGOLOGY

## 2020-10-19 PROCEDURE — 25010000002 EPINEPHRINE PER 0.1 MG: Performed by: OTOLARYNGOLOGY

## 2020-10-19 PROCEDURE — 31536 LARYNGOSCOPY W/BX & OP SCOPE: CPT | Performed by: OTOLARYNGOLOGY

## 2020-10-19 PROCEDURE — 25010000002 ONDANSETRON PER 1 MG: Performed by: NURSE ANESTHETIST, CERTIFIED REGISTERED

## 2020-10-19 RX ORDER — SODIUM CHLORIDE 0.9 % (FLUSH) 0.9 %
3 SYRINGE (ML) INJECTION AS NEEDED
Status: DISCONTINUED | OUTPATIENT
Start: 2020-10-19 | End: 2020-10-19 | Stop reason: HOSPADM

## 2020-10-19 RX ORDER — SODIUM CHLORIDE, SODIUM LACTATE, POTASSIUM CHLORIDE, CALCIUM CHLORIDE 600; 310; 30; 20 MG/100ML; MG/100ML; MG/100ML; MG/100ML
1000 INJECTION, SOLUTION INTRAVENOUS CONTINUOUS
Status: DISCONTINUED | OUTPATIENT
Start: 2020-10-19 | End: 2020-10-19 | Stop reason: HOSPADM

## 2020-10-19 RX ORDER — LIDOCAINE HYDROCHLORIDE 40 MG/ML
SOLUTION TOPICAL AS NEEDED
Status: DISCONTINUED | OUTPATIENT
Start: 2020-10-19 | End: 2020-10-19 | Stop reason: SURG

## 2020-10-19 RX ORDER — ONDANSETRON 4 MG/1
4 TABLET, FILM COATED ORAL ONCE AS NEEDED
Status: DISCONTINUED | OUTPATIENT
Start: 2020-10-19 | End: 2020-10-19 | Stop reason: HOSPADM

## 2020-10-19 RX ORDER — LIDOCAINE HYDROCHLORIDE 10 MG/ML
0.5 INJECTION, SOLUTION EPIDURAL; INFILTRATION; INTRACAUDAL; PERINEURAL ONCE AS NEEDED
Status: DISCONTINUED | OUTPATIENT
Start: 2020-10-19 | End: 2020-10-19 | Stop reason: HOSPADM

## 2020-10-19 RX ORDER — ROCURONIUM BROMIDE 10 MG/ML
INJECTION, SOLUTION INTRAVENOUS AS NEEDED
Status: DISCONTINUED | OUTPATIENT
Start: 2020-10-19 | End: 2020-10-19 | Stop reason: SURG

## 2020-10-19 RX ORDER — SODIUM CHLORIDE, SODIUM LACTATE, POTASSIUM CHLORIDE, CALCIUM CHLORIDE 600; 310; 30; 20 MG/100ML; MG/100ML; MG/100ML; MG/100ML
100 INJECTION, SOLUTION INTRAVENOUS CONTINUOUS
Status: DISCONTINUED | OUTPATIENT
Start: 2020-10-19 | End: 2020-10-19 | Stop reason: HOSPADM

## 2020-10-19 RX ORDER — PROPOFOL 10 MG/ML
VIAL (ML) INTRAVENOUS AS NEEDED
Status: DISCONTINUED | OUTPATIENT
Start: 2020-10-19 | End: 2020-10-19 | Stop reason: SURG

## 2020-10-19 RX ORDER — NEOSTIGMINE METHYLSULFATE 5 MG/5 ML
SYRINGE (ML) INTRAVENOUS AS NEEDED
Status: DISCONTINUED | OUTPATIENT
Start: 2020-10-19 | End: 2020-10-19 | Stop reason: SURG

## 2020-10-19 RX ORDER — SODIUM CHLORIDE 0.9 % (FLUSH) 0.9 %
3-10 SYRINGE (ML) INJECTION AS NEEDED
Status: DISCONTINUED | OUTPATIENT
Start: 2020-10-19 | End: 2020-10-19 | Stop reason: HOSPADM

## 2020-10-19 RX ORDER — EPINEPHRINE 1 MG/ML
INJECTION, SOLUTION, CONCENTRATE INTRAVENOUS AS NEEDED
Status: DISCONTINUED | OUTPATIENT
Start: 2020-10-19 | End: 2020-10-19 | Stop reason: HOSPADM

## 2020-10-19 RX ORDER — ONDANSETRON 2 MG/ML
4 INJECTION INTRAMUSCULAR; INTRAVENOUS ONCE AS NEEDED
Status: DISCONTINUED | OUTPATIENT
Start: 2020-10-19 | End: 2020-10-19 | Stop reason: HOSPADM

## 2020-10-19 RX ORDER — OXYCODONE AND ACETAMINOPHEN 10; 325 MG/1; MG/1
1 TABLET ORAL ONCE AS NEEDED
Status: DISCONTINUED | OUTPATIENT
Start: 2020-10-19 | End: 2020-10-19 | Stop reason: HOSPADM

## 2020-10-19 RX ORDER — MAGNESIUM HYDROXIDE 1200 MG/15ML
LIQUID ORAL AS NEEDED
Status: DISCONTINUED | OUTPATIENT
Start: 2020-10-19 | End: 2020-10-19 | Stop reason: HOSPADM

## 2020-10-19 RX ORDER — ONDANSETRON 2 MG/ML
INJECTION INTRAMUSCULAR; INTRAVENOUS AS NEEDED
Status: DISCONTINUED | OUTPATIENT
Start: 2020-10-19 | End: 2020-10-19 | Stop reason: SURG

## 2020-10-19 RX ORDER — LIDOCAINE HYDROCHLORIDE AND EPINEPHRINE 10; 10 MG/ML; UG/ML
INJECTION, SOLUTION INFILTRATION; PERINEURAL AS NEEDED
Status: DISCONTINUED | OUTPATIENT
Start: 2020-10-19 | End: 2020-10-19 | Stop reason: HOSPADM

## 2020-10-19 RX ORDER — FLUMAZENIL 0.1 MG/ML
0.2 INJECTION INTRAVENOUS AS NEEDED
Status: DISCONTINUED | OUTPATIENT
Start: 2020-10-19 | End: 2020-10-19 | Stop reason: HOSPADM

## 2020-10-19 RX ORDER — NALOXONE HCL 0.4 MG/ML
0.4 VIAL (ML) INJECTION AS NEEDED
Status: DISCONTINUED | OUTPATIENT
Start: 2020-10-19 | End: 2020-10-19 | Stop reason: HOSPADM

## 2020-10-19 RX ORDER — HYDROCODONE BITARTRATE AND ACETAMINOPHEN 5; 325 MG/1; MG/1
1 TABLET ORAL EVERY 4 HOURS PRN
Qty: 8 TABLET | Refills: 0 | Status: SHIPPED | OUTPATIENT
Start: 2020-10-19 | End: 2020-11-19

## 2020-10-19 RX ORDER — HYDROCODONE BITARTRATE AND ACETAMINOPHEN 5; 325 MG/1; MG/1
1 TABLET ORAL ONCE AS NEEDED
Status: DISCONTINUED | OUTPATIENT
Start: 2020-10-19 | End: 2020-10-19 | Stop reason: SDUPTHER

## 2020-10-19 RX ORDER — SODIUM CHLORIDE 0.9 % (FLUSH) 0.9 %
3 SYRINGE (ML) INJECTION EVERY 12 HOURS SCHEDULED
Status: DISCONTINUED | OUTPATIENT
Start: 2020-10-19 | End: 2020-10-19 | Stop reason: HOSPADM

## 2020-10-19 RX ORDER — LABETALOL HYDROCHLORIDE 5 MG/ML
5 INJECTION, SOLUTION INTRAVENOUS
Status: DISCONTINUED | OUTPATIENT
Start: 2020-10-19 | End: 2020-10-19 | Stop reason: HOSPADM

## 2020-10-19 RX ORDER — FENTANYL CITRATE 50 UG/ML
25 INJECTION, SOLUTION INTRAMUSCULAR; INTRAVENOUS
Status: DISCONTINUED | OUTPATIENT
Start: 2020-10-19 | End: 2020-10-19 | Stop reason: HOSPADM

## 2020-10-19 RX ORDER — MIDAZOLAM HYDROCHLORIDE 1 MG/ML
1 INJECTION INTRAMUSCULAR; INTRAVENOUS
Status: DISCONTINUED | OUTPATIENT
Start: 2020-10-19 | End: 2020-10-19 | Stop reason: HOSPADM

## 2020-10-19 RX ORDER — OXYCODONE AND ACETAMINOPHEN 7.5; 325 MG/1; MG/1
2 TABLET ORAL EVERY 4 HOURS PRN
Status: DISCONTINUED | OUTPATIENT
Start: 2020-10-19 | End: 2020-10-19 | Stop reason: HOSPADM

## 2020-10-19 RX ORDER — ACETAMINOPHEN 500 MG
1000 TABLET ORAL ONCE
Status: COMPLETED | OUTPATIENT
Start: 2020-10-19 | End: 2020-10-19

## 2020-10-19 RX ADMIN — ONDANSETRON HYDROCHLORIDE 4 MG: 2 SOLUTION INTRAMUSCULAR; INTRAVENOUS at 09:59

## 2020-10-19 RX ADMIN — ROCURONIUM BROMIDE 25 MG: 10 INJECTION INTRAVENOUS at 09:49

## 2020-10-19 RX ADMIN — GLYCOPYRROLATE 0.4 MG: 0.2 INJECTION, SOLUTION INTRAMUSCULAR; INTRAVENOUS at 10:12

## 2020-10-19 RX ADMIN — LIDOCAINE HYDROCHLORIDE 1 EACH: 40 SOLUTION TOPICAL at 09:49

## 2020-10-19 RX ADMIN — Medication 3 MG: at 10:12

## 2020-10-19 RX ADMIN — LIDOCAINE HYDROCHLORIDE 60 MG: 20 INJECTION, SOLUTION INTRAVENOUS at 09:49

## 2020-10-19 RX ADMIN — ACETAMINOPHEN 1000 MG: 500 TABLET, FILM COATED ORAL at 09:09

## 2020-10-19 RX ADMIN — PROPOFOL 150 MG: 10 INJECTION, EMULSION INTRAVENOUS at 09:49

## 2020-10-19 RX ADMIN — SODIUM CHLORIDE, POTASSIUM CHLORIDE, SODIUM LACTATE AND CALCIUM CHLORIDE 1000 ML: 600; 310; 30; 20 INJECTION, SOLUTION INTRAVENOUS at 08:40

## 2020-10-19 RX ADMIN — PROPOFOL 50 MG: 10 INJECTION, EMULSION INTRAVENOUS at 09:58

## 2020-10-21 ENCOUNTER — TELEPHONE (OUTPATIENT)
Dept: OTOLARYNGOLOGY | Facility: CLINIC | Age: 70
End: 2020-10-21

## 2020-10-21 LAB
LAB AP CASE REPORT: NORMAL
PATH REPORT.FINAL DX SPEC: NORMAL
PATH REPORT.GROSS SPEC: NORMAL

## 2020-10-23 RX ORDER — METOPROLOL SUCCINATE 25 MG/1
TABLET, EXTENDED RELEASE ORAL
Qty: 15 TABLET | Refills: 5 | Status: SHIPPED | OUTPATIENT
Start: 2020-10-23 | End: 2021-01-27 | Stop reason: DRUGHIGH

## 2020-11-19 ENCOUNTER — OFFICE VISIT (OUTPATIENT)
Dept: OTOLARYNGOLOGY | Facility: CLINIC | Age: 70
End: 2020-11-19

## 2020-11-19 VITALS
WEIGHT: 128 LBS | SYSTOLIC BLOOD PRESSURE: 104 MMHG | HEIGHT: 63 IN | HEART RATE: 89 BPM | DIASTOLIC BLOOD PRESSURE: 74 MMHG | TEMPERATURE: 97.5 F | BODY MASS INDEX: 22.68 KG/M2

## 2020-11-19 DIAGNOSIS — Z98.890 S/P EXCISION OF VOCAL CORD NODULE: ICD-10-CM

## 2020-11-19 DIAGNOSIS — K21.9 LARYNGOPHARYNGEAL REFLUX (LPR): Primary | ICD-10-CM

## 2020-11-19 DIAGNOSIS — R13.10 DYSPHAGIA, UNSPECIFIED TYPE: ICD-10-CM

## 2020-11-19 PROCEDURE — 99213 OFFICE O/P EST LOW 20 MIN: CPT | Performed by: OTOLARYNGOLOGY

## 2020-11-19 RX ORDER — FLUOXETINE HYDROCHLORIDE 20 MG/1
20 CAPSULE ORAL DAILY
Qty: 90 CAPSULE | Refills: 3 | Status: SHIPPED | OUTPATIENT
Start: 2020-11-19 | End: 2021-11-08

## 2020-12-18 ENCOUNTER — TELEPHONE (OUTPATIENT)
Dept: PRIMARY CARE CLINIC | Age: 70
End: 2020-12-18

## 2020-12-18 RX ORDER — FLUCONAZOLE 150 MG/1
TABLET ORAL
Qty: 2 TABLET | Refills: 0 | Status: SHIPPED | OUTPATIENT
Start: 2020-12-18 | End: 2021-01-17

## 2020-12-18 NOTE — TELEPHONE ENCOUNTER
Her both the pill Diflucan and the liquid medicine nystatin.   I would do both should she is having trouble getting rid of it

## 2021-01-05 RX ORDER — DOXEPIN HYDROCHLORIDE 50 MG/1
CAPSULE ORAL
Qty: 30 CAPSULE | Refills: 3 | Status: SHIPPED | OUTPATIENT
Start: 2021-01-05 | End: 2021-05-03

## 2021-01-12 RX ORDER — SIMVASTATIN 40 MG
TABLET ORAL
Qty: 90 TABLET | Refills: 3 | Status: SHIPPED | OUTPATIENT
Start: 2021-01-12 | End: 2022-01-13

## 2021-01-15 ENCOUNTER — HOSPITAL ENCOUNTER (OUTPATIENT)
Dept: VASCULAR LAB | Age: 71
Discharge: HOME | DRG: 193 | End: 2021-01-15
Payer: MEDICARE

## 2021-01-15 ENCOUNTER — HOSPITAL ENCOUNTER (OUTPATIENT)
Dept: ULTRASOUND IMAGING | Age: 71
Discharge: HOME | DRG: 193 | End: 2021-01-15
Payer: MEDICARE

## 2021-01-15 DIAGNOSIS — I70.1 RENAL ARTERY STENOSIS (HCC): ICD-10-CM

## 2021-01-15 DIAGNOSIS — I73.9 PVD (PERIPHERAL VASCULAR DISEASE) (HCC): ICD-10-CM

## 2021-01-15 DIAGNOSIS — I65.23 BILATERAL CAROTID ARTERY STENOSIS: ICD-10-CM

## 2021-01-15 PROCEDURE — 93975 VASCULAR STUDY: CPT

## 2021-01-15 PROCEDURE — 93923 UPR/LXTR ART STDY 3+ LVLS: CPT

## 2021-01-15 PROCEDURE — 93880 EXTRACRANIAL BILAT STUDY: CPT

## 2021-01-16 ENCOUNTER — APPOINTMENT (OUTPATIENT)
Dept: GENERAL RADIOLOGY | Age: 71
DRG: 193 | End: 2021-01-16
Payer: MEDICARE

## 2021-01-16 ENCOUNTER — HOSPITAL ENCOUNTER (INPATIENT)
Age: 71
LOS: 3 days | Discharge: HOME | DRG: 193 | End: 2021-01-20
Attending: EMERGENCY MEDICINE
Payer: MEDICARE

## 2021-01-16 ENCOUNTER — APPOINTMENT (OUTPATIENT)
Dept: CT IMAGING | Age: 71
DRG: 193 | End: 2021-01-16
Payer: MEDICARE

## 2021-01-16 DIAGNOSIS — J18.9 PNEUMONIA OF LEFT LOWER LOBE DUE TO INFECTIOUS ORGANISM: ICD-10-CM

## 2021-01-16 DIAGNOSIS — J44.1 COPD WITH ACUTE EXACERBATION (HCC): ICD-10-CM

## 2021-01-16 DIAGNOSIS — J96.01 ACUTE RESPIRATORY FAILURE WITH HYPOXIA (HCC): Primary | ICD-10-CM

## 2021-01-16 DIAGNOSIS — Z98.890 HISTORY OF ABDOMINAL AORTIC ANEURYSM (AAA) REPAIR: ICD-10-CM

## 2021-01-16 LAB
ADENOVIRUS BY PCR: NOT DETECTED
ALBUMIN SERPL-MCNC: 3.9 G/DL (ref 3.5–5.2)
ALP BLD-CCNC: 156 U/L (ref 35–104)
ALT SERPL-CCNC: 11 U/L (ref 5–33)
ANION GAP SERPL CALCULATED.3IONS-SCNC: 10 MMOL/L (ref 7–19)
AST SERPL-CCNC: 17 U/L (ref 5–32)
BASE EXCESS ARTERIAL: -1.8 MMOL/L (ref -2–2)
BASOPHILS ABSOLUTE: 0 K/UL (ref 0–0.2)
BASOPHILS RELATIVE PERCENT: 0.2 % (ref 0–1)
BILIRUB SERPL-MCNC: <0.2 MG/DL (ref 0.2–1.2)
BORDETELLA PARAPERTUSSIS BY PCR: NOT DETECTED
BORDETELLA PERTUSSIS BY PCR: NOT DETECTED
BUN BLDV-MCNC: 9 MG/DL (ref 8–23)
CALCIUM SERPL-MCNC: 9 MG/DL (ref 8.8–10.2)
CARBOXYHEMOGLOBIN ARTERIAL: 2.2 % (ref 0–5)
CHLAMYDOPHILIA PNEUMONIAE BY PCR: NOT DETECTED
CHLORIDE BLD-SCNC: 107 MMOL/L (ref 98–111)
CO2: 22 MMOL/L (ref 22–29)
CORONAVIRUS 229E BY PCR: NOT DETECTED
CORONAVIRUS HKU1 BY PCR: NOT DETECTED
CORONAVIRUS NL63 BY PCR: NOT DETECTED
CORONAVIRUS OC43 BY PCR: NOT DETECTED
CREAT SERPL-MCNC: 0.9 MG/DL (ref 0.5–0.9)
EOSINOPHILS ABSOLUTE: 0 K/UL (ref 0–0.6)
EOSINOPHILS RELATIVE PERCENT: 0 % (ref 0–5)
GFR AFRICAN AMERICAN: >59
GFR NON-AFRICAN AMERICAN: >60
GLUCOSE BLD-MCNC: 160 MG/DL (ref 74–109)
HCO3 ARTERIAL: 20.3 MMOL/L (ref 22–26)
HCT VFR BLD CALC: 35.8 % (ref 37–47)
HEMOGLOBIN, ART, EXTENDED: 11.3 G/DL (ref 12–16)
HEMOGLOBIN: 11.5 G/DL (ref 12–16)
HUMAN METAPNEUMOVIRUS BY PCR: NOT DETECTED
HUMAN RHINOVIRUS/ENTEROVIRUS BY PCR: NOT DETECTED
IMMATURE GRANULOCYTES #: 0.1 K/UL
INFLUENZA A BY PCR: NOT DETECTED
INFLUENZA B BY PCR: NOT DETECTED
LACTIC ACID: 3.8 MMOL/L (ref 0.5–1.9)
LYMPHOCYTES ABSOLUTE: 1.9 K/UL (ref 1.1–4.5)
LYMPHOCYTES RELATIVE PERCENT: 12.4 % (ref 20–40)
MAGNESIUM: 1.9 MG/DL (ref 1.6–2.4)
MCH RBC QN AUTO: 31.5 PG (ref 27–31)
MCHC RBC AUTO-ENTMCNC: 32.1 G/DL (ref 33–37)
MCV RBC AUTO: 98.1 FL (ref 81–99)
METHEMOGLOBIN ARTERIAL: 1 %
MONOCYTES ABSOLUTE: 0.3 K/UL (ref 0–0.9)
MONOCYTES RELATIVE PERCENT: 2.1 % (ref 0–10)
MYCOPLASMA PNEUMONIAE BY PCR: NOT DETECTED
NEUTROPHILS ABSOLUTE: 13 K/UL (ref 1.5–7.5)
NEUTROPHILS RELATIVE PERCENT: 84.9 % (ref 50–65)
O2 CONTENT ARTERIAL: 14.5 ML/DL
O2 SAT, ARTERIAL: 91.1 %
O2 THERAPY: ABNORMAL
PARAINFLUENZA VIRUS 1 BY PCR: NOT DETECTED
PARAINFLUENZA VIRUS 2 BY PCR: NOT DETECTED
PARAINFLUENZA VIRUS 3 BY PCR: NOT DETECTED
PARAINFLUENZA VIRUS 4 BY PCR: NOT DETECTED
PCO2 ARTERIAL: 26 MMHG (ref 35–45)
PDW BLD-RTO: 15.2 % (ref 11.5–14.5)
PH ARTERIAL: 7.5 (ref 7.35–7.45)
PLATELET # BLD: 380 K/UL (ref 130–400)
PMV BLD AUTO: 10.4 FL (ref 9.4–12.3)
PO2 ARTERIAL: 56 MMHG (ref 80–100)
POTASSIUM REFLEX MAGNESIUM: 3.4 MMOL/L (ref 3.5–5)
POTASSIUM, WHOLE BLOOD: 3.4
PRO-BNP: 288 PG/ML (ref 0–900)
RBC # BLD: 3.65 M/UL (ref 4.2–5.4)
RESPIRATORY SYNCYTIAL VIRUS BY PCR: NOT DETECTED
SARS-COV-2, PCR: NOT DETECTED
SODIUM BLD-SCNC: 139 MMOL/L (ref 136–145)
TOTAL PROTEIN: 6.6 G/DL (ref 6.6–8.7)
TROPONIN: <0.01 NG/ML (ref 0–0.03)
WBC # BLD: 15.4 K/UL (ref 4.8–10.8)

## 2021-01-16 PROCEDURE — 82803 BLOOD GASES ANY COMBINATION: CPT

## 2021-01-16 PROCEDURE — 2700000000 HC OXYGEN THERAPY PER DAY

## 2021-01-16 PROCEDURE — 6360000002 HC RX W HCPCS: Performed by: EMERGENCY MEDICINE

## 2021-01-16 PROCEDURE — 94640 AIRWAY INHALATION TREATMENT: CPT

## 2021-01-16 PROCEDURE — 84484 ASSAY OF TROPONIN QUANT: CPT

## 2021-01-16 PROCEDURE — 83735 ASSAY OF MAGNESIUM: CPT

## 2021-01-16 PROCEDURE — 83880 ASSAY OF NATRIURETIC PEPTIDE: CPT

## 2021-01-16 PROCEDURE — 71275 CT ANGIOGRAPHY CHEST: CPT

## 2021-01-16 PROCEDURE — 6370000000 HC RX 637 (ALT 250 FOR IP): Performed by: EMERGENCY MEDICINE

## 2021-01-16 PROCEDURE — 85025 COMPLETE CBC W/AUTO DIFF WBC: CPT

## 2021-01-16 PROCEDURE — 80053 COMPREHEN METABOLIC PANEL: CPT

## 2021-01-16 PROCEDURE — 6360000004 HC RX CONTRAST MEDICATION: Performed by: EMERGENCY MEDICINE

## 2021-01-16 PROCEDURE — 96365 THER/PROPH/DIAG IV INF INIT: CPT

## 2021-01-16 PROCEDURE — 84132 ASSAY OF SERUM POTASSIUM: CPT

## 2021-01-16 PROCEDURE — 96375 TX/PRO/DX INJ NEW DRUG ADDON: CPT

## 2021-01-16 PROCEDURE — 93005 ELECTROCARDIOGRAM TRACING: CPT | Performed by: NURSE PRACTITIONER

## 2021-01-16 PROCEDURE — 36415 COLL VENOUS BLD VENIPUNCTURE: CPT

## 2021-01-16 PROCEDURE — 2580000003 HC RX 258: Performed by: EMERGENCY MEDICINE

## 2021-01-16 PROCEDURE — 36600 WITHDRAWAL OF ARTERIAL BLOOD: CPT

## 2021-01-16 PROCEDURE — 71045 X-RAY EXAM CHEST 1 VIEW: CPT

## 2021-01-16 PROCEDURE — 0202U NFCT DS 22 TRGT SARS-COV-2: CPT

## 2021-01-16 PROCEDURE — 99285 EMERGENCY DEPT VISIT HI MDM: CPT

## 2021-01-16 RX ORDER — IPRATROPIUM BROMIDE AND ALBUTEROL SULFATE 2.5; .5 MG/3ML; MG/3ML
1 SOLUTION RESPIRATORY (INHALATION) ONCE
Status: COMPLETED | OUTPATIENT
Start: 2021-01-16 | End: 2021-01-16

## 2021-01-16 RX ORDER — SODIUM CHLORIDE, SODIUM LACTATE, POTASSIUM CHLORIDE, CALCIUM CHLORIDE 600; 310; 30; 20 MG/100ML; MG/100ML; MG/100ML; MG/100ML
1000 INJECTION, SOLUTION INTRAVENOUS ONCE
Status: COMPLETED | OUTPATIENT
Start: 2021-01-17 | End: 2021-01-17

## 2021-01-16 RX ORDER — DEXAMETHASONE SODIUM PHOSPHATE 10 MG/ML
8 INJECTION, SOLUTION INTRAMUSCULAR; INTRAVENOUS ONCE
Status: COMPLETED | OUTPATIENT
Start: 2021-01-16 | End: 2021-01-16

## 2021-01-16 RX ADMIN — Medication 500 MG: at 23:51

## 2021-01-16 RX ADMIN — IOPAMIDOL 90 ML: 755 INJECTION, SOLUTION INTRAVENOUS at 22:43

## 2021-01-16 RX ADMIN — DEXAMETHASONE SODIUM PHOSPHATE 8 MG: 10 INJECTION, SOLUTION INTRAMUSCULAR; INTRAVENOUS at 22:08

## 2021-01-16 RX ADMIN — IPRATROPIUM BROMIDE AND ALBUTEROL SULFATE 1 AMPULE: .5; 3 SOLUTION RESPIRATORY (INHALATION) at 23:43

## 2021-01-16 RX ADMIN — CEFTRIAXONE 1 G: 1 INJECTION, POWDER, FOR SOLUTION INTRAMUSCULAR; INTRAVENOUS at 23:51

## 2021-01-16 ASSESSMENT — ENCOUNTER SYMPTOMS
EYE REDNESS: 0
COUGH: 0
RHINORRHEA: 0
VOMITING: 0
VOICE CHANGE: 0
DIARRHEA: 0
SHORTNESS OF BREATH: 1
ABDOMINAL PAIN: 0
EYE PAIN: 0
CHEST TIGHTNESS: 1

## 2021-01-16 ASSESSMENT — PAIN DESCRIPTION - PAIN TYPE: TYPE: ACUTE PAIN

## 2021-01-16 ASSESSMENT — PAIN DESCRIPTION - FREQUENCY: FREQUENCY: CONTINUOUS

## 2021-01-16 NOTE — LETTER
Dasia Wilkinson,  at [facility]  [phone] phone  [phone] fax  [phone] CELL (MAGDALENA Fraser 106)    Dasia Wilkinson  at [place] [phone] phone  [phone] fax  [phone] CELL (MAGDALENA Fraser 106)

## 2021-01-17 ENCOUNTER — APPOINTMENT (OUTPATIENT)
Dept: GENERAL RADIOLOGY | Age: 71
DRG: 193 | End: 2021-01-17
Payer: MEDICARE

## 2021-01-17 PROBLEM — J96.20 ACUTE ON CHRONIC RESPIRATORY FAILURE (HCC): Status: ACTIVE | Noted: 2021-01-17

## 2021-01-17 PROBLEM — J44.9 CHRONIC OBSTRUCTIVE PULMONARY DISEASE (COPD) (HCC): Status: ACTIVE | Noted: 2021-01-17

## 2021-01-17 PROBLEM — J18.9 CAP (COMMUNITY ACQUIRED PNEUMONIA): Status: ACTIVE | Noted: 2021-01-17

## 2021-01-17 PROBLEM — R06.02 SHORTNESS OF BREATH: Status: ACTIVE | Noted: 2021-01-17

## 2021-01-17 PROCEDURE — 94640 AIRWAY INHALATION TREATMENT: CPT

## 2021-01-17 PROCEDURE — 1210000000 HC MED SURG R&B

## 2021-01-17 PROCEDURE — 72100 X-RAY EXAM L-S SPINE 2/3 VWS: CPT

## 2021-01-17 PROCEDURE — 6360000002 HC RX W HCPCS: Performed by: INTERNAL MEDICINE

## 2021-01-17 PROCEDURE — 2500000003 HC RX 250 WO HCPCS: Performed by: INTERNAL MEDICINE

## 2021-01-17 PROCEDURE — 6370000000 HC RX 637 (ALT 250 FOR IP): Performed by: INTERNAL MEDICINE

## 2021-01-17 PROCEDURE — 2700000000 HC OXYGEN THERAPY PER DAY

## 2021-01-17 PROCEDURE — 51702 INSERT TEMP BLADDER CATH: CPT

## 2021-01-17 PROCEDURE — 87040 BLOOD CULTURE FOR BACTERIA: CPT

## 2021-01-17 PROCEDURE — 2580000003 HC RX 258: Performed by: INTERNAL MEDICINE

## 2021-01-17 PROCEDURE — 83605 ASSAY OF LACTIC ACID: CPT

## 2021-01-17 PROCEDURE — 2580000003 HC RX 258: Performed by: EMERGENCY MEDICINE

## 2021-01-17 PROCEDURE — 51798 US URINE CAPACITY MEASURE: CPT

## 2021-01-17 PROCEDURE — 36415 COLL VENOUS BLD VENIPUNCTURE: CPT

## 2021-01-17 RX ORDER — MAGNESIUM SULFATE IN WATER 40 MG/ML
2 INJECTION, SOLUTION INTRAVENOUS ONCE
Status: COMPLETED | OUTPATIENT
Start: 2021-01-17 | End: 2021-01-17

## 2021-01-17 RX ORDER — CLOPIDOGREL BISULFATE 75 MG/1
75 TABLET ORAL DAILY
Status: DISCONTINUED | OUTPATIENT
Start: 2021-01-17 | End: 2021-01-20 | Stop reason: HOSPADM

## 2021-01-17 RX ORDER — BUDESONIDE AND FORMOTEROL FUMARATE DIHYDRATE 160; 4.5 UG/1; UG/1
2 AEROSOL RESPIRATORY (INHALATION) 2 TIMES DAILY
Status: DISCONTINUED | OUTPATIENT
Start: 2021-01-17 | End: 2021-01-17 | Stop reason: CLARIF

## 2021-01-17 RX ORDER — ACETAMINOPHEN 325 MG/1
650 TABLET ORAL EVERY 4 HOURS PRN
Status: DISCONTINUED | OUTPATIENT
Start: 2021-01-17 | End: 2021-01-20 | Stop reason: HOSPADM

## 2021-01-17 RX ORDER — FLUOXETINE HYDROCHLORIDE 20 MG/1
20 CAPSULE ORAL DAILY
Status: DISCONTINUED | OUTPATIENT
Start: 2021-01-17 | End: 2021-01-20 | Stop reason: HOSPADM

## 2021-01-17 RX ORDER — METOPROLOL SUCCINATE 25 MG/1
25 TABLET, EXTENDED RELEASE ORAL DAILY
Status: DISCONTINUED | OUTPATIENT
Start: 2021-01-17 | End: 2021-01-20 | Stop reason: HOSPADM

## 2021-01-17 RX ORDER — NICOTINE 21 MG/24HR
1 PATCH, TRANSDERMAL 24 HOURS TRANSDERMAL DAILY
Status: DISCONTINUED | OUTPATIENT
Start: 2021-01-17 | End: 2021-01-20 | Stop reason: HOSPADM

## 2021-01-17 RX ORDER — DOXEPIN HYDROCHLORIDE 50 MG/1
50 CAPSULE ORAL NIGHTLY
Status: DISCONTINUED | OUTPATIENT
Start: 2021-01-17 | End: 2021-01-20 | Stop reason: HOSPADM

## 2021-01-17 RX ORDER — BUDESONIDE 0.5 MG/2ML
0.5 INHALANT ORAL 2 TIMES DAILY
Status: DISCONTINUED | OUTPATIENT
Start: 2021-01-17 | End: 2021-01-20 | Stop reason: HOSPADM

## 2021-01-17 RX ORDER — ARFORMOTEROL TARTRATE 15 UG/2ML
15 SOLUTION RESPIRATORY (INHALATION) 2 TIMES DAILY
Status: DISCONTINUED | OUTPATIENT
Start: 2021-01-17 | End: 2021-01-20 | Stop reason: HOSPADM

## 2021-01-17 RX ORDER — IPRATROPIUM BROMIDE AND ALBUTEROL SULFATE 2.5; .5 MG/3ML; MG/3ML
1 SOLUTION RESPIRATORY (INHALATION)
Status: DISCONTINUED | OUTPATIENT
Start: 2021-01-17 | End: 2021-01-20 | Stop reason: HOSPADM

## 2021-01-17 RX ORDER — POTASSIUM CHLORIDE 7.45 MG/ML
10 INJECTION INTRAVENOUS ONCE
Status: COMPLETED | OUTPATIENT
Start: 2021-01-17 | End: 2021-01-17

## 2021-01-17 RX ORDER — LORAZEPAM 2 MG/ML
0.5 INJECTION INTRAMUSCULAR EVERY 4 HOURS PRN
Status: DISCONTINUED | OUTPATIENT
Start: 2021-01-17 | End: 2021-01-20 | Stop reason: HOSPADM

## 2021-01-17 RX ADMIN — METOPROLOL SUCCINATE 25 MG: 25 TABLET, EXTENDED RELEASE ORAL at 09:01

## 2021-01-17 RX ADMIN — LORAZEPAM 0.5 MG: 2 INJECTION INTRAMUSCULAR; INTRAVENOUS at 00:23

## 2021-01-17 RX ADMIN — IPRATROPIUM BROMIDE AND ALBUTEROL SULFATE 1 AMPULE: .5; 3 SOLUTION RESPIRATORY (INHALATION) at 18:43

## 2021-01-17 RX ADMIN — FAMOTIDINE 20 MG: 10 INJECTION, SOLUTION INTRAVENOUS at 02:27

## 2021-01-17 RX ADMIN — CLOPIDOGREL BISULFATE 75 MG: 75 TABLET ORAL at 09:00

## 2021-01-17 RX ADMIN — MAGNESIUM SULFATE HEPTAHYDRATE 2 G: 40 INJECTION, SOLUTION INTRAVENOUS at 02:38

## 2021-01-17 RX ADMIN — IPRATROPIUM BROMIDE AND ALBUTEROL SULFATE 1 AMPULE: .5; 3 SOLUTION RESPIRATORY (INHALATION) at 15:28

## 2021-01-17 RX ADMIN — ARFORMOTEROL TARTRATE 15 MCG: 15 SOLUTION RESPIRATORY (INHALATION) at 06:35

## 2021-01-17 RX ADMIN — ACETAMINOPHEN 650 MG: 325 TABLET ORAL at 21:29

## 2021-01-17 RX ADMIN — SODIUM CHLORIDE, PRESERVATIVE FREE 1 G: 5 INJECTION INTRAVENOUS at 22:51

## 2021-01-17 RX ADMIN — IPRATROPIUM BROMIDE AND ALBUTEROL SULFATE 1 AMPULE: .5; 3 SOLUTION RESPIRATORY (INHALATION) at 23:12

## 2021-01-17 RX ADMIN — LORAZEPAM 0.5 MG: 2 INJECTION INTRAMUSCULAR; INTRAVENOUS at 22:55

## 2021-01-17 RX ADMIN — BUDESONIDE 500 MCG: 0.5 SUSPENSION RESPIRATORY (INHALATION) at 06:35

## 2021-01-17 RX ADMIN — BUDESONIDE 500 MCG: 0.5 SUSPENSION RESPIRATORY (INHALATION) at 18:43

## 2021-01-17 RX ADMIN — POTASSIUM CHLORIDE 10 MEQ: 7.46 INJECTION, SOLUTION INTRAVENOUS at 02:28

## 2021-01-17 RX ADMIN — IPRATROPIUM BROMIDE AND ALBUTEROL SULFATE 1 AMPULE: .5; 3 SOLUTION RESPIRATORY (INHALATION) at 06:26

## 2021-01-17 RX ADMIN — FAMOTIDINE 20 MG: 10 INJECTION, SOLUTION INTRAVENOUS at 09:02

## 2021-01-17 RX ADMIN — AZITHROMYCIN MONOHYDRATE 500 MG: 500 INJECTION, POWDER, LYOPHILIZED, FOR SOLUTION INTRAVENOUS at 22:51

## 2021-01-17 RX ADMIN — FLUOXETINE HYDROCHLORIDE 20 MG: 20 CAPSULE ORAL at 09:00

## 2021-01-17 RX ADMIN — FAMOTIDINE 20 MG: 10 INJECTION, SOLUTION INTRAVENOUS at 20:08

## 2021-01-17 RX ADMIN — ARFORMOTEROL TARTRATE 15 MCG: 15 SOLUTION RESPIRATORY (INHALATION) at 18:43

## 2021-01-17 RX ADMIN — LORAZEPAM 0.5 MG: 2 INJECTION INTRAMUSCULAR; INTRAVENOUS at 09:07

## 2021-01-17 RX ADMIN — IPRATROPIUM BROMIDE AND ALBUTEROL SULFATE 1 AMPULE: .5; 3 SOLUTION RESPIRATORY (INHALATION) at 10:56

## 2021-01-17 RX ADMIN — SODIUM CHLORIDE, POTASSIUM CHLORIDE, SODIUM LACTATE AND CALCIUM CHLORIDE 1000 ML: 600; 310; 30; 20 INJECTION, SOLUTION INTRAVENOUS at 00:26

## 2021-01-17 RX ADMIN — METOPROLOL SUCCINATE 25 MG: 25 TABLET, EXTENDED RELEASE ORAL at 02:45

## 2021-01-17 RX ADMIN — ACETAMINOPHEN 650 MG: 325 TABLET ORAL at 15:29

## 2021-01-17 ASSESSMENT — ENCOUNTER SYMPTOMS
ALLERGIC/IMMUNOLOGIC NEGATIVE: 1
WHEEZING: 1
EYES NEGATIVE: 1
COUGH: 1
GASTROINTESTINAL NEGATIVE: 1
SHORTNESS OF BREATH: 1

## 2021-01-17 ASSESSMENT — PAIN DESCRIPTION - ORIENTATION: ORIENTATION: MID

## 2021-01-17 ASSESSMENT — PAIN DESCRIPTION - PAIN TYPE: TYPE: ACUTE PAIN

## 2021-01-17 ASSESSMENT — PAIN SCALES - GENERAL: PAINLEVEL_OUTOF10: 3

## 2021-01-17 NOTE — PROGRESS NOTES
Pt has not voided since she got here about 0100 am. Denies any need to void. Bladder scan indicate 896ml.

## 2021-01-17 NOTE — PROGRESS NOTES
Follow-up note from this morning. Her breathing is significantly better. However, she is complaining of mid lumbar back pain. I have reviewed her history. She had an open AAA in August 2018. She also has a history of a solid renal mass. I will get plain films of her lumbar spine to look for any gross pathology. She should be stable for transfer out of ICU later today.

## 2021-01-17 NOTE — ED NOTES
Bed: 06  Expected date:   Expected time:   Means of arrival:   Comments:  [facility] [address] [facility] Reeves [facility]  01/16/21 2034

## 2021-01-17 NOTE — PROGRESS NOTES
Evelia Blake arrived to room # 149. Presented with: SOB  Mental Status: Patient is oriented, alert, coherent and logical.   Vitals:    01/17/21 0130   BP: 119/69   Pulse: 94   Resp: (!) 38   Temp: 97.7 °F (36.5 °C)   SpO2: 91%     Patient safety contract and falls prevention contract reviewed with patient Yes. Oriented Patient to room. Call light within reach. Yes.   Needs, issues or concerns expressed at this time: no.      Electronically signed by Felix Gardner RN on 1/17/2021 at 1:39 AM

## 2021-01-17 NOTE — ED NOTES
Notified Vernette Goldberg of patients symptoms and hx of AAA. EKG was also obtained and seen. Cardiac work-up protocol advised.       Priti Negrete RN  01/16/21 8358

## 2021-01-17 NOTE — PLAN OF CARE
Problem: Falls - Risk of:  Goal: Will remain free from falls  Description: Will remain free from falls  1/17/2021 1015 by Yanelis Branham RN  Outcome: Ongoing  1/17/2021 0646 by Jakob Sanchez RN  Outcome: Ongoing  Goal: Absence of physical injury  Description: Absence of physical injury  1/17/2021 1015 by Yanelis Branham RN  Outcome: Ongoing  1/17/2021 0646 by Jakob Sanchez RN  Outcome: Ongoing     Problem: Breathing Pattern - Ineffective:  Goal: Ability to achieve and maintain a regular respiratory rate will improve  Description: Ability to achieve and maintain a regular respiratory rate will improve  1/17/2021 1015 by Yanelis Branham RN  Outcome: Ongoing  1/17/2021 0646 by Jakob Sanchez RN  Outcome: Ongoing     Problem: Tobacco Use:  Goal: Inpatient tobacco use cessation counseling participation  Description: Inpatient tobacco use cessation counseling participation  1/17/2021 1015 by Yanelis Branham RN  Outcome: Ongoing  1/17/2021 0646 by Jakob Sanchze RN  Outcome: Ongoing     Problem: Urinary Elimination:  Goal: Ability to achieve a balanced intake and output will improve  Description: Ability to achieve a balanced intake and output will improve  1/17/2021 1015 by Yanelis Branham RN  Outcome: Ongoing  1/17/2021 0646 by Jakob Sanchez RN  Outcome: Ongoing  Goal: Ability to recognize the need to void and respond appropriately will improve  Description: Ability to recognize the need to void and respond appropriately will improve  1/17/2021 1015 by Yanelis Branham RN  Outcome: Ongoing  1/17/2021 0646 by Jakob Sanchez RN  Outcome: Ongoing  Goal: Will remain free from infection  Description: Will remain free from infection  1/17/2021 1015 by Yanelis Branham RN  Outcome: Ongoing  1/17/2021 0646 by Jakob Sanchez RN  Outcome: Ongoing

## 2021-01-17 NOTE — ED NOTES
Pt states she is beginning to have middle back pain, between shoulder blades.       Yonatan Ojeda RN  01/16/21 2056

## 2021-01-17 NOTE — ED TRIAGE NOTES
Pt to ER via [facility] with complaints of left sided chest pain and shortness of breath. SOA started this morning. Pt had one nitro in route and two nebulizer treatments. Nitro did help chest pain some. Pt denies hx of COPD or respiratory disease. PT has hx of AAA 2-3 yrs ago.

## 2021-01-17 NOTE — ED NOTES
Called pt's  and updated him that pt is being admitted.       Casandra Serrano, JACQUES  01/17/21 9838

## 2021-01-17 NOTE — PROGRESS NOTES
Receive call back from pt's daughter [facility] Updated on pt's status. Questions answered. Ms Regina Liang indicated that she in the POA for pt and has paper work in our system. Also indicates that pt smokes 1 PPD but will deny it to medical providers. Also indicates that pt is DNR. Pt asked about DNR status, Pt indicate do not what to be put on the vent or have compressions if heart stops beating. But pt denies smoking indicating she did quit a year ago. Pt currently with no complains of SOB or chest pain. On 2L oxygen with sat in the mid 90s.  Will continue to monitor

## 2021-01-17 NOTE — PLAN OF CARE
Problem: Falls - Risk of:  Goal: Will remain free from falls  Description: Will remain free from falls  Outcome: Ongoing  Goal: Absence of physical injury  Description: Absence of physical injury  Outcome: Ongoing     Problem: Breathing Pattern - Ineffective:  Goal: Ability to achieve and maintain a regular respiratory rate will improve  Description: Ability to achieve and maintain a regular respiratory rate will improve  Outcome: Ongoing     Problem: Tobacco Use:  Goal: Inpatient tobacco use cessation counseling participation  Description: Inpatient tobacco use cessation counseling participation  Outcome: Ongoing     Problem: Urinary Elimination:  Goal: Ability to achieve a balanced intake and output will improve  Description: Ability to achieve a balanced intake and output will improve  Outcome: Ongoing  Goal: Ability to recognize the need to void and respond appropriately will improve  Description: Ability to recognize the need to void and respond appropriately will improve  Outcome: Ongoing  Goal: Will remain free from infection  Description: Will remain free from infection  Outcome: Ongoing

## 2021-01-17 NOTE — PROGRESS NOTES
Call made to pt's daughter Yao Balderas with no answer.  Will call again later to update on pt's status and answer questions
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
## 2021-01-17 NOTE — PROGRESS NOTES
1/16/2021  9:41 PM - Steven, Kyin Incoming Lab Results From [facility]    Component Value Ref Range & Units Status Collected Lab   pH, Arterial 7.500High   7.350 - 7.450 Final 01/16/2021  9:40 PM [facility]   pCO2, Arterial 26. 0Low   35.0 - 45.0 mmHg Final 01/16/2021  9:40 PM [facility]   pO2, Arterial 56. 0Low   80.0 - 100.0 mmHg Final 01/16/2021  9:40 PM [facility]   HCO3, Arterial 20.3Low   22.0 - 26.0 mmol/L Final 01/16/2021  9:40 PM [facility] Excess, Arterial -1.8  -2.0 - 2.0 mmol/L Final 01/16/2021  9:40 PM [facility]   Hemoglobin, Art, Extended 11.3Low   12.0 - 16.0 g/dL Final 01/16/2021  9:40 PM [facility]   O2 Sat, Arterial 91.1  >92 % Final 01/16/2021  9:40 PM [facility]   Carboxyhgb, Arterial 2.2  0.0 - 5.0 % Final 01/16/2021  9:40 PM [facility]        0.0-1.5   (Smokers 1.5-5.0)    Methemoglobin, Arterial 1.0  <1.5 % Final 01/16/2021  9:40 PM [facility]   O2 Content, Arterial 14.5  Not Established mL/dL Final 01/16/2021  9:40 PM [address] [facility]     Pt on 2 lpm nasal cannula  resp rate 28  Right radial puncture AT+

## 2021-01-17 NOTE — PROGRESS NOTES
Report called to nurse - Srikanth Joyce. Patient belongings sent with patient. Patient left via wheelchair with transport to room 539 on 2L [address]. Daughter - Debora Arango called to update of transfer and patient status.

## 2021-01-18 LAB
EKG P AXIS: 70 DEGREES
EKG P-R INTERVAL: 146 MS
EKG Q-T INTERVAL: 404 MS
EKG QRS DURATION: 84 MS
EKG QTC CALCULATION (BAZETT): 442 MS
EKG T AXIS: 85 DEGREES

## 2021-01-18 PROCEDURE — 1210000000 HC MED SURG R&B

## 2021-01-18 PROCEDURE — 94640 AIRWAY INHALATION TREATMENT: CPT

## 2021-01-18 PROCEDURE — 93010 ELECTROCARDIOGRAM REPORT: CPT | Performed by: INTERNAL MEDICINE

## 2021-01-18 PROCEDURE — 2700000000 HC OXYGEN THERAPY PER DAY

## 2021-01-18 PROCEDURE — 2580000003 HC RX 258: Performed by: INTERNAL MEDICINE

## 2021-01-18 PROCEDURE — 6360000002 HC RX W HCPCS: Performed by: INTERNAL MEDICINE

## 2021-01-18 PROCEDURE — 6370000000 HC RX 637 (ALT 250 FOR IP): Performed by: INTERNAL MEDICINE

## 2021-01-18 PROCEDURE — 2500000003 HC RX 250 WO HCPCS: Performed by: INTERNAL MEDICINE

## 2021-01-18 RX ADMIN — METOPROLOL SUCCINATE 25 MG: 25 TABLET, EXTENDED RELEASE ORAL at 09:04

## 2021-01-18 RX ADMIN — DOXEPIN HYDROCHLORIDE 50 MG: 50 CAPSULE ORAL at 20:08

## 2021-01-18 RX ADMIN — ACETAMINOPHEN 650 MG: 325 TABLET ORAL at 15:29

## 2021-01-18 RX ADMIN — FAMOTIDINE 20 MG: 10 INJECTION, SOLUTION INTRAVENOUS at 20:08

## 2021-01-18 RX ADMIN — IPRATROPIUM BROMIDE AND ALBUTEROL SULFATE 1 AMPULE: .5; 3 SOLUTION RESPIRATORY (INHALATION) at 07:13

## 2021-01-18 RX ADMIN — IPRATROPIUM BROMIDE AND ALBUTEROL SULFATE 1 AMPULE: .5; 3 SOLUTION RESPIRATORY (INHALATION) at 10:50

## 2021-01-18 RX ADMIN — CLOPIDOGREL BISULFATE 75 MG: 75 TABLET ORAL at 09:04

## 2021-01-18 RX ADMIN — ARFORMOTEROL TARTRATE 15 MCG: 15 SOLUTION RESPIRATORY (INHALATION) at 07:19

## 2021-01-18 RX ADMIN — AZITHROMYCIN MONOHYDRATE 500 MG: 500 INJECTION, POWDER, LYOPHILIZED, FOR SOLUTION INTRAVENOUS at 22:29

## 2021-01-18 RX ADMIN — ARFORMOTEROL TARTRATE 15 MCG: 15 SOLUTION RESPIRATORY (INHALATION) at 18:38

## 2021-01-18 RX ADMIN — IPRATROPIUM BROMIDE AND ALBUTEROL SULFATE 1 AMPULE: .5; 3 SOLUTION RESPIRATORY (INHALATION) at 15:05

## 2021-01-18 RX ADMIN — FLUOXETINE HYDROCHLORIDE 20 MG: 20 CAPSULE ORAL at 09:03

## 2021-01-18 RX ADMIN — BUDESONIDE 500 MCG: 0.5 SUSPENSION RESPIRATORY (INHALATION) at 18:38

## 2021-01-18 RX ADMIN — SODIUM CHLORIDE, PRESERVATIVE FREE 1 G: 5 INJECTION INTRAVENOUS at 22:28

## 2021-01-18 RX ADMIN — LORAZEPAM 0.5 MG: 2 INJECTION INTRAMUSCULAR; INTRAVENOUS at 20:13

## 2021-01-18 RX ADMIN — BUDESONIDE 500 MCG: 0.5 SUSPENSION RESPIRATORY (INHALATION) at 07:20

## 2021-01-18 RX ADMIN — IPRATROPIUM BROMIDE AND ALBUTEROL SULFATE 1 AMPULE: .5; 3 SOLUTION RESPIRATORY (INHALATION) at 18:38

## 2021-01-18 ASSESSMENT — ENCOUNTER SYMPTOMS
DIARRHEA: 0
BACK PAIN: 0
COUGH: 1
VOICE CHANGE: 0
NAUSEA: 0
CONSTIPATION: 0
RHINORRHEA: 0
COLOR CHANGE: 0
VOMITING: 0
SHORTNESS OF BREATH: 0
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
## 2021-01-18 NOTE — PROGRESS NOTES
Physician Progress Note      PATIENTAbbialexx Juárez  CSN #:                  [id number]  :                       1950  ADMIT DATE:       2021 8:34 PM  [address] [place] DATE:  RESPONDING  PROVIDER #:        Katerine Hsu  [facility] DO          QUERY TEXT:    Pt admitted with acute respiratory failure with hypoxia. Pt noted to have   pneumonia of left lower lobe due to infectious organism. If possible, please   document in the progress notes and discharge summary if you are evaluating and   /or treating any of the following: The medical record reflects the following:  Risk Factors: pneumonia and acute hypoxic respiratory failure  Clinical Indicators: WBC 15.4, glucose 160, t 96.2, p 104, rr 28, abg's with   pO2 56,  Treatment: Rocephin IV, Zithromax IV,    Thank you,  Donia Felty RN, CDS  [phone]  Options provided:  -- Sepsis, present on admission  -- Sepsis, present on admission, now resolved  -- No Sepsis, pneumonia only  -- Sepsis was ruled out  -- Other - I will add my own diagnosis  -- Disagree - Not applicable / Not valid  -- Disagree - Clinically unable to determine / Unknown  -- Refer to Clinical Documentation Reviewer    PROVIDER RESPONSE TEXT:    After further study, sepsis was ruled out for this patient.     Query created by: Hedy Long on 2021 11:48 AM      Electronically signed by:  Althea Gifford DO 2021 1:21 PM

## 2021-01-18 NOTE — CARE COORDINATION
DME at this time. She states that she does not use O2 at home. No needs identified at this time. Will continue to follow. Quigley Officer and/or her family were provided with choice of provider.         Leslie Appiah RN, BSN  [facility]  Care Management Department  Ph:  [phone] Fax: [phone]    Electronically signed by Leslie Appiah RN, BSN on 1/18/2021 at 1:52 PM

## 2021-01-19 PROBLEM — J96.20 ACUTE ON CHRONIC RESPIRATORY FAILURE (HCC): Status: RESOLVED | Noted: 2021-01-17 | Resolved: 2021-01-19

## 2021-01-19 PROBLEM — J18.9 CAP (COMMUNITY ACQUIRED PNEUMONIA): Status: RESOLVED | Noted: 2021-01-17 | Resolved: 2021-01-19

## 2021-01-19 PROBLEM — I73.9 PVD (PERIPHERAL VASCULAR DISEASE) (HCC): Status: RESOLVED | Noted: 2019-01-04 | Resolved: 2021-01-19

## 2021-01-19 PROBLEM — I70.1 RENAL ARTERY STENOSIS (HCC): Status: RESOLVED | Noted: 2017-04-28 | Resolved: 2021-01-19

## 2021-01-19 PROBLEM — N18.30 CKD (CHRONIC KIDNEY DISEASE), STAGE III (HCC): Status: RESOLVED | Noted: 2018-09-02 | Resolved: 2021-01-19

## 2021-01-19 PROBLEM — R06.02 SHORTNESS OF BREATH: Status: RESOLVED | Noted: 2021-01-17 | Resolved: 2021-01-19

## 2021-01-19 PROBLEM — I77.4 CELIAC ARTERY STENOSIS (HCC): Status: RESOLVED | Noted: 2017-04-28 | Resolved: 2021-01-19

## 2021-01-19 PROBLEM — I48.0 PAF (PAROXYSMAL ATRIAL FIBRILLATION) (HCC): Status: RESOLVED | Noted: 2018-11-12 | Resolved: 2021-01-19

## 2021-01-19 PROBLEM — I77.1 CELIAC ARTERY STENOSIS (HCC): Status: RESOLVED | Noted: 2017-04-28 | Resolved: 2021-01-19

## 2021-01-19 PROBLEM — J44.9 CHRONIC OBSTRUCTIVE PULMONARY DISEASE (COPD) (HCC): Status: RESOLVED | Noted: 2021-01-17 | Resolved: 2021-01-19

## 2021-01-19 PROCEDURE — 6370000000 HC RX 637 (ALT 250 FOR IP): Performed by: INTERNAL MEDICINE

## 2021-01-19 PROCEDURE — 94640 AIRWAY INHALATION TREATMENT: CPT

## 2021-01-19 PROCEDURE — 6360000002 HC RX W HCPCS: Performed by: INTERNAL MEDICINE

## 2021-01-19 PROCEDURE — 2700000000 HC OXYGEN THERAPY PER DAY

## 2021-01-19 PROCEDURE — 94761 N-INVAS EAR/PLS OXIMETRY MLT: CPT

## 2021-01-19 PROCEDURE — 2580000003 HC RX 258: Performed by: INTERNAL MEDICINE

## 2021-01-19 PROCEDURE — 1210000000 HC MED SURG R&B

## 2021-01-19 RX ORDER — BUDESONIDE 0.5 MG/2ML
0.5 INHALANT ORAL 2 TIMES DAILY
Qty: 60 AMPULE | Refills: 3 | Status: SHIPPED | OUTPATIENT
Start: 2021-01-19 | End: 2021-12-07 | Stop reason: SDUPTHER

## 2021-01-19 RX ORDER — ARFORMOTEROL TARTRATE 15 UG/2ML
15 SOLUTION RESPIRATORY (INHALATION) 2 TIMES DAILY
Qty: 120 ML | Refills: 3 | Status: SHIPPED | OUTPATIENT
Start: 2021-01-19 | End: 2022-10-06

## 2021-01-19 RX ORDER — IPRATROPIUM BROMIDE AND ALBUTEROL SULFATE 2.5; .5 MG/3ML; MG/3ML
3 SOLUTION RESPIRATORY (INHALATION)
Qty: 360 ML | Refills: 1 | Status: SHIPPED | OUTPATIENT
Start: 2021-01-19 | End: 2022-09-29

## 2021-01-19 RX ADMIN — IPRATROPIUM BROMIDE AND ALBUTEROL SULFATE 1 AMPULE: .5; 3 SOLUTION RESPIRATORY (INHALATION) at 15:38

## 2021-01-19 RX ADMIN — ARFORMOTEROL TARTRATE 15 MCG: 15 SOLUTION RESPIRATORY (INHALATION) at 07:30

## 2021-01-19 RX ADMIN — ARFORMOTEROL TARTRATE 15 MCG: 15 SOLUTION RESPIRATORY (INHALATION) at 19:13

## 2021-01-19 RX ADMIN — SODIUM CHLORIDE, PRESERVATIVE FREE 1 G: 5 INJECTION INTRAVENOUS at 22:09

## 2021-01-19 RX ADMIN — FLUOXETINE HYDROCHLORIDE 20 MG: 20 CAPSULE ORAL at 08:52

## 2021-01-19 RX ADMIN — IPRATROPIUM BROMIDE AND ALBUTEROL SULFATE 1 AMPULE: .5; 3 SOLUTION RESPIRATORY (INHALATION) at 19:03

## 2021-01-19 RX ADMIN — BUDESONIDE 500 MCG: 0.5 SUSPENSION RESPIRATORY (INHALATION) at 19:13

## 2021-01-19 RX ADMIN — BUDESONIDE 500 MCG: 0.5 SUSPENSION RESPIRATORY (INHALATION) at 07:30

## 2021-01-19 RX ADMIN — DOXEPIN HYDROCHLORIDE 50 MG: 50 CAPSULE ORAL at 21:05

## 2021-01-19 RX ADMIN — AZITHROMYCIN MONOHYDRATE 500 MG: 500 INJECTION, POWDER, LYOPHILIZED, FOR SOLUTION INTRAVENOUS at 22:09

## 2021-01-19 RX ADMIN — CLOPIDOGREL BISULFATE 75 MG: 75 TABLET ORAL at 08:52

## 2021-01-19 RX ADMIN — IPRATROPIUM BROMIDE AND ALBUTEROL SULFATE 1 AMPULE: .5; 3 SOLUTION RESPIRATORY (INHALATION) at 07:15

## 2021-01-19 RX ADMIN — METOPROLOL SUCCINATE 25 MG: 25 TABLET, EXTENDED RELEASE ORAL at 08:52

## 2021-01-19 RX ADMIN — IPRATROPIUM BROMIDE AND ALBUTEROL SULFATE 1 AMPULE: .5; 3 SOLUTION RESPIRATORY (INHALATION) at 11:23

## 2021-01-19 NOTE — PROGRESS NOTES
SpO2 92% on 2 l/m nasal cannula at rest.  Took patient off O2, SpO2 dropped to 87% after 15 minutes. Placed patient back on nasal cannula.

## 2021-01-19 NOTE — CARE COORDINATION
I spoke with Mrs. Kingsley about the [employer] offered from [employer]. I explained to her the program and that it is free of charge.  She states understanding and is very interested in program. I faxed her information to the [facility] program.  Electronically signed by Caleb Chance RN, BSN on 1/19/2021 at 10:56 AM

## 2021-01-19 NOTE — PROGRESS NOTES
Physician Progress Note      PATIENTAbbialexx Juárez  CSN #:                  [id number]  :                       1950  ADMIT DATE:       2021 8:34 PM  [address] [place] DATE:  RESPONDING  PROVIDER #:        Katerine Hsu  [facility] DO          QUERY TEXT:    Patient admitted with acute copd exacerbation with hypoxemia. Documentation   reflects early pneumonia of left lower lobe due to infectious organism per ED   physician and community acquired pneumonia in h/p on 21. If possible,   please document in the progress notes and discharge summary if CAP was: The medical record reflects the following:  Risk Factors: hx of smoking and stroke  Clinical Indicators: chest pain, WBC 15.4, LA 3.8, cxr-no acute   cardiopulmonary finding with emphysematous changes, pulmonary CTA-complete   collapse left lower lobe, ABG's pH 7.5, pCO2 26, p02 56  Treatment: Rocephin IV, Zithromax IV, Duo-nebs,    Thank you,  Donia Felty RN, CDS  [phone]  Options provided:  -- community acquired pneumonia ruled out after study  -- community acquired pneumonia treated and resolved  -- community acquired pneumonia confirmed after study  -- Other - I will add my own diagnosis  -- Disagree - Not applicable / Not valid  -- Disagree - Clinically unable to determine / Unknown  -- Refer to Clinical Documentation Reviewer    PROVIDER RESPONSE TEXT:    community acquired pneumonia confirmed after study.     Query created by: Hedy Long on 2021 7:42 AM      Electronically signed by:  Althea Gifford DO 2021 8:59 AM

## 2021-01-19 NOTE — DISCHARGE SUMMARY
Discharge Summary    Mia Lennox  :  1950  MRN:  [id number]    Admit date:  2021  Discharge date: 2021     Admitting Physician:  Chinedu Young DO    Advance Directive: DNR-CC    Consults: none    Primary Care Physician:  Guerda Lomeli MD    Discharge Diagnoses:  Principal Problem (Resolved):    Acute on chronic respiratory failure [facility])  Active Problems:    * No active hospital problems. *  Resolved Problems:    AAA (abdominal aortic aneurysm) (formerly [facility])    Carotid artery stenosis    TIA (transient ischemic attack)    Renal artery stenosis (HCC)    Celiac artery stenosis (HCC)    CKD (chronic kidney disease), stage III    PAF (paroxysmal atrial fibrillation) (formerly [facility])    PVD (peripheral vascular disease) (formerly [facility])    Chronic obstructive pulmonary disease (COPD) (formerly [facility])    Shortness of breath    CAP (community acquired pneumonia)      Significant Diagnostic Studies:   Xr Lumbar Spine (2-3 Views)    Result Date: 2021  EXAMINATION: Lumbar spine 3 views 2020 HISTORY low back pain. Kidney mass FINDINGS: Today's exam is compared to previous study of 3/7/2013. Three-view exam of the lumbar spine demonstrates normal lumbar lordosis. Degenerative disc disease is noted throughout the lumbar spine with mild narrowing of the disc space height and endplate spurring at several levels. There is no fracture or listhesis. There is also facet overgrowth. 1.. Arthritic changes throughout the lumbar spine including degenerative disc disease with endplate spurring as well as facet overgrowth. 2. No evidence of fracture or listhesis. Signed by Dr Vic Liu on 2021 10:55 AM    Xr Chest Portable    Result Date: 2021  EXAM: XR CHEST PORTABLE -- 2021 8:01 PM HISTORY: 70 years, Female, chest pain COMPARISON: 2019 TECHNIQUE:  1 images. Frontal view of the chest. FINDINGS:  Hyperexpanded lucent lungs suggest chronic lung disease. No pneumothorax, pleural effusion or focal consolidation.  Cardiac mediastinal silhouette appear within normal limits. Epigastric surgical clips. No acute bony finding. 1. No acute cardiopulmonary finding. 2. Emphysematous changes. Signed by Dr Paul Juan on 1/16/2021 9:35 PM    Cta Pulmonary W Contrast    Result Date: 1/17/2021  CTA PULMONARY W CONTRAST 1/16/2021 9:07 PM HISTORY: Shortness of air with hypoxia COMPARISON: 5/18/2019. DLP: 490 mGy cm. Automated exposure control was utilized to diminish patient radiation dose. TECHNIQUE: Helical tomographic images of the chest were obtained after the administration of intravenous contrast following angiogram protocol. Additionally, 3D MIP reconstructions in the coronal and sagittal planes were provided. The study is degraded by motion artifact. FINDINGS:  Pulmonary arteries: There is adequate enhancement of the pulmonary arteries for assessment for pulmonary embolus but the study is significantly degraded by motion with some segments of the more distal segmental and subsegmental branches obscured. . There are no filling defects within the main, lobar, segmental or visualized subsegmental pulmonary arteries. The pulmonary vessels are within normal limits. Aorta and great vessels: The aorta is well opacified and demonstrates no aneurysm, stenosis or dissection. The great vessels are normal in appearance. Mild coronary calcifications are present. . Neck base: The imaged portion of the base of the neck appears unremarkable. Lungs: There is complete collapse of the left lower lobe. The left lower lobe bronchus is not well-defined and it is difficult to ascertain whether this is related to a mucous plug or potentially an endobronchial lesion. Follow-up is warranted. There is atelectasis within the lingular segment left upper lobe inferiorly. The right lung is fully expanded and clear. . The trachea and bronchial tree are otherwise unremarkable. Theodorele Guronnying Heart: The heart is normal in size. There is no pericardial effusion.  Lymph nodes: No pathologically enlarged mediastinal, hilar, or axillary lymph nodes are present. Bones and soft tissues: The osseous structures of the thorax and surrounding soft tissues demonstrate no acute process. Upper abdomen: Postoperative clips are noted within the upper retroperitoneum likely related to previous vascular intervention. There is what appears to be a endovascular graft. 1. The study is significantly degraded by motion. I do not see gross evidence of pulmonary thromboembolic disease within those portions of the pulmonary arteries which are visualized. 2. There is complete collapse of the left lower lobe. The left lower lobe bronchus is not well delineated and it is difficult to ascertain whether this may represent an endobronchial lesion versus mucous plugging. Follow-up is recommended. The right lung is fully expanded and clear. There is left lingular atelectasis present. 3. Mild coronary artery calcifications are present. No evidence of cardiomegaly or pericardial effusion. Thoracic aorta is normal in caliber. . Signed by Dr Chyna Cisneros on 1/17/2021 8:29 AM      CBC:   Recent Labs     01/16/21 2044   WBC 15.4*   HGB 11.5*        BMP:    Recent Labs     01/16/21 2044 01/16/21  2140     --    K 3.4* 3.4     --    CO2 22  --    BUN 9  --    CREATININE 0.9  --    GLUCOSE 160*  --      INR: No results for input(s): INR in the last 72 hours. Lipids: No results for input(s): CHOL, HDL in the last 72 hours. Invalid input(s): LDLCALCU  ABGs:  Recent Labs     01/16/21  2140   PHART 7.500*   SHX6MXW 26.0*   PO2ART 56.0*   JGP4LDS 20.3*   BEART -1.8   HGBAE 11.3*   N3UMRWHW 91.1   CARBOXHGBART 2.2   02THERAPY Unknown     HgBA1c:  No results for input(s): LABA1C in the last 72 hours. Procedures: None  Hospital Course: Ms. Soraida Young was admitted on 1/17 for exacerbation of COPD. She was admitted initially to the ICU. She was treated and stabilized there.   She was transferred to the [place] [address] floor.  She continued to make clinical progress. On the afternoon of 1/19 she was stable and ready for discharge. On home O2 assessment, she did desaturate with exercise and therefore qualified for home O2. Arrangements have been made with or medical.    Physical Exam:  Vital Signs: /70   Pulse 84   Temp 98.7 °F (37.1 °C) (Temporal)   Resp 16   Ht 5' 3\" (1.6 m)   Wt 126 lb 8 oz (57.4 kg)   LMP  (LMP Unknown)   SpO2 96%   BMI 22.41 kg/m²   General appearance:. Alert and Cooperative   HEENT: Normocephalic. Chest: clear to auscultation bilaterally without wheezes or rhonchi. Cardiac: Normal heart tones with regular rate and rhythm, S1, S2 normal. No murmurs, gallops, or rubs auscultated. Abdomen:soft, non-tender; normal bowel sounds, no masses, no organomegaly. Extremities: No clubbing or cyanosis. No peripheral edema. Peripheral pulses palpable. Neurologic: Grossly intact.     Discharge Medications:       Emily Jacob   Home Medication Instructions RRZ:[id number]    Printed on:01/19/21 4715   Medication Information                      Arformoterol Tartrate (BROVANA) 15 MCG/2ML NEBU  Take 2 mLs by nebulization 2 times daily             budesonide (PULMICORT) 0.5 MG/2ML nebulizer suspension  Take 2 mLs by nebulization 2 times daily             cloNIDine (CATAPRES) 0.1 MG tablet  Take 1 tablet by mouth daily as needed (if blood pressure is 160/90 or greater)             clopidogrel (PLAVIX) 75 MG tablet  TAKE 1 TABLET BY MOUTH DAILY             dicyclomine (BENTYL) 20 MG tablet  TAKE 1 TABLET BY MOUTH FOUR TIMES DAILY BEFORE MEALS AND AT NIGHT             doxepin (SINEQUAN) 50 MG capsule  TAKE 1 CAPSULE BY MOUTH AT BEDTIME AS NEEDED FOR SLEEP             FLUoxetine (PROZAC) 20 MG capsule  Take 1 capsule by mouth daily             ipratropium-albuterol (DUONEB) 0.5-2.5 (3) MG/3ML SOLN nebulizer solution  Inhale 3 mLs into the lungs every 4 hours (while awake)             meclizine (ANTIVERT) 25 MG tablet  TAKE ONE TABLET BY MOUTH THREE TIMES DAILY AS NEEDED FOR SEVERE DIZZINESS             metoprolol succinate (TOPROL XL) 25 MG extended release tablet  TAKE 1/2 TABLET BY MOUTH EVERY DAY             Multiple Vitamins-Minerals (COMPLETE MULTIVITAMIN/MINERAL PO)  Take 1 tablet by mouth daily             ondansetron (ZOFRAN) 4 MG tablet  Take 1 tablet by mouth every 8 hours as needed for Nausea or Vomiting             pantoprazole (PROTONIX) 40 MG tablet  TAKE 1 TABLET BY MOUTH EVERY DAY             simvastatin (ZOCOR) 40 MG tablet  TAKE ONE TABLET BY MOUTH EVERY EVENING                 Discharge Instructions: Follow up with Kvng Bowers MD in 3-5 days. Take medications as directed. Resume activity as tolerated. Diet: DIET CARDIAC; Disposition: Patient is medically stable and will be discharged to home. Time spent on discharge greater than 30 minutes.     Signed:  Lico Davison DO

## 2021-01-19 NOTE — CARE COORDINATION
SW sent Pt Home O2 orders to [facility]   Q   F    [id number]        CSN                                    Req/Control # [Problem retrieving Specimen ID]                                   Order Date:  2021  [id number]                                          Patient Information      Name:  Anabel Shultz  :  1950  Age:  72 y.o. Address:   [facility], [address] [place] [address], Counts include 234 beds at the [facility] [address]   Zip:  [address]  PCP: Celsa Graff MD Sex:  F  SSN: [id number]  Home Phone: [phone]  Work Phone:    Patient MRN:  [id number]    Alt Patient ID:  [id number]  PCP Phone: [phone]       Authorizing Provider Information       AUTHORIZING PROVIDER: Fernando Ruiz DO  Physician ID: [id number]  NPI:  [id number]  Site:   Address: [address] [place] [address],[address]  [address] [address] [facility]   Phone: [phone]  Fax: [address]  DME Order for Home Oxygen as OP [id number] (ORD   #:   [id number]) Priority  Routine Class  Hospital Performed        Associated Diagnosis:          Comments: You must complete the order parameters below and add the medical necessity documentation for this DME in a separate note.      Stationary Oxygen Concentrator at 2 lpm via Nasal Cannula     Stationary Prescribed at:  Continuous     Portable Gaseous O2 System and contents at 2 lpm via Nasal Cannula     30-60min/day     Diagnosis: Respiratory Failure  Length of need: Lifetime            Scheduling Instructions:                                 Specimen Source             Collection Date    Collection Time    Order Status    Expected Date                Electronically Signed By  Fernando Ruiz DO Date  2021           Responsible Party Ebony Ivory-ActID   Relationship Account Type Home Phone   Doris Richardson [phone] [facility] [facility] Self [facility]F [phone]   Employer   Work [address] [employer] [place] Information     Primary Insurance  Insurance/Subscriber ID:  [id number]  Subscriber Name:  Mahin Berry              Relationship to Patient: Self     Secondary Insurance  Insurance/Subscriber ID: [id number]  Subscriber Name: Mahin Berry  Relationship to Patient: Self  Signed ABN: N    Payor Name:  Tenzin Reyes   Plan:  MEDICARE PART A AND B   Group:         Payor Name: [employer]  Plan:  [facility]  Group: PLAN G  Worker's Comp Date of Injury:

## 2021-01-20 VITALS
WEIGHT: 126.5 LBS | RESPIRATION RATE: 16 BRPM | BODY MASS INDEX: 22.41 KG/M2 | DIASTOLIC BLOOD PRESSURE: 59 MMHG | TEMPERATURE: 98 F | OXYGEN SATURATION: 94 % | HEIGHT: 63 IN | SYSTOLIC BLOOD PRESSURE: 91 MMHG | HEART RATE: 82 BPM

## 2021-01-20 PROCEDURE — 97530 THERAPEUTIC ACTIVITIES: CPT

## 2021-01-20 PROCEDURE — 6370000000 HC RX 637 (ALT 250 FOR IP): Performed by: INTERNAL MEDICINE

## 2021-01-20 PROCEDURE — 2500000003 HC RX 250 WO HCPCS: Performed by: INTERNAL MEDICINE

## 2021-01-20 PROCEDURE — 2700000000 HC OXYGEN THERAPY PER DAY

## 2021-01-20 PROCEDURE — 6360000002 HC RX W HCPCS: Performed by: INTERNAL MEDICINE

## 2021-01-20 PROCEDURE — 94640 AIRWAY INHALATION TREATMENT: CPT

## 2021-01-20 PROCEDURE — 97161 PT EVAL LOW COMPLEX 20 MIN: CPT

## 2021-01-20 RX ADMIN — IPRATROPIUM BROMIDE AND ALBUTEROL SULFATE 1 AMPULE: .5; 3 SOLUTION RESPIRATORY (INHALATION) at 07:04

## 2021-01-20 RX ADMIN — CLOPIDOGREL BISULFATE 75 MG: 75 TABLET ORAL at 08:23

## 2021-01-20 RX ADMIN — IPRATROPIUM BROMIDE AND ALBUTEROL SULFATE 1 AMPULE: .5; 3 SOLUTION RESPIRATORY (INHALATION) at 15:50

## 2021-01-20 RX ADMIN — BUDESONIDE 500 MCG: 0.5 SUSPENSION RESPIRATORY (INHALATION) at 07:10

## 2021-01-20 RX ADMIN — FAMOTIDINE 20 MG: 10 INJECTION, SOLUTION INTRAVENOUS at 08:23

## 2021-01-20 RX ADMIN — FLUOXETINE HYDROCHLORIDE 20 MG: 20 CAPSULE ORAL at 08:23

## 2021-01-20 RX ADMIN — IPRATROPIUM BROMIDE AND ALBUTEROL SULFATE 1 AMPULE: .5; 3 SOLUTION RESPIRATORY (INHALATION) at 11:11

## 2021-01-20 RX ADMIN — ARFORMOTEROL TARTRATE 15 MCG: 15 SOLUTION RESPIRATORY (INHALATION) at 07:10

## 2021-01-20 NOTE — PROGRESS NOTES
Physical Therapy    Facility/Department: [facility] SURG SERVICES  Initial Assessment    NAME: Anthony Harris  : 1950  MRN: [id number]    Date of Service: 2021    Discharge Recommendations:  Continue to assess pending progress, 24 hour supervision or assist        Assessment   Body structures, Functions, Activity limitations: Decreased functional mobility ; Decreased ADL status; Decreased strength;Decreased endurance;Decreased balance; Increased pain  Assessment: Pt DECONDITIONED OVERALL. ABLE TO STAND AND STEP TO RECLINER WITHOUT DIFFICULTY. WILL PROGRESS WITH GT DISTANCE. MAY BENEFIT FROM USE OF AD INITIALLY. Pt WAS RECEIVING BREAKFAST AT THIS TIME. PT Education: PT Role;Plan of Care  REQUIRES PT FOLLOW UP: Yes  Activity Tolerance  Activity Tolerance: Patient Tolerated treatment well       Patient Diagnosis(es): The primary encounter diagnosis was Acute respiratory failure with hypoxia ([address] Diagnoses of History of abdominal aortic aneurysm (AAA) repair, COPD with acute exacerbation ([address] and Pneumonia of left lower lobe due to infectious organism were also pertinent to this visit. has a past medical history of Anxiety, Chronic back pain, Chronic kidney disease, COPD (chronic obstructive pulmonary disease) ([address] Depression, Fibromuscular dysplasia ([address] History of AAA (abdominal aortic aneurysm) repair, Hyperlipemia, Hypertension, Irritable bowel syndrome, Kidney mass, Labyrinthitis, Migraine, Osteoarthritis, Post concussive syndrome, Smoker, Stroke ([address] and Vertigo. has a past surgical history that includes Cataract removal (16); Colonoscopy (); Upper gastrointestinal endoscopy (N/A, 2016); Colonoscopy (N/A, 2016); Hysterectomy, total abdominal; pr repr aneurysm/grft ins,abdominal aorta (N/A, 2018); Upper gastrointestinal endoscopy (N/A, 10/1/2019); and Colonoscopy (N/A, 10/1/2019).     Restrictions  Restrictions/Precautions  Restrictions/Precautions: Fall Risk  Vision/Hearing        Subjective  General  Diagnosis: COPD EXAC  Subjective  Subjective: Pt READY TO GET OOB  Pain Screening  Patient Currently in Pain: Yes  Pain Assessment  Pain Assessment: Faces  Pain Type: Chronic pain  Pain Location: Neck  Non-Pharmaceutical Pain Intervention(s): Ambulation/Increased Activity;Repositioned  Vital Signs  Patient Currently in Pain: Yes  Oxygen Therapy  O2 Device: Nasal cannula  O2 Flow Rate (L/min): 2 L/min       Orientation     Social/Functional History  Social/Functional History  Lives With: Family  ADL Assistance: Independent  Homemaking Assistance: Independent  Homemaking Responsibilities: Yes  Ambulation Assistance: Independent  Transfer Assistance: Independent  Active : Yes  Cognition        Objective          AROM RLE (degrees)  RLE AROM: WNL  AROM LLE (degrees)  LLE AROM : WNL  Strength RLE  Comment: GROSSLY +4/5  Strength LLE  Comment: GROSSLY +4/5        Bed mobility  Supine to Sit: Independent  Sit to Supine: Independent  Transfers  Sit to Stand: Stand by assistance;Contact guard assistance  Stand to sit: Stand by assistance;Contact guard assistance  Bed to Chair: Stand by assistance;Contact guard assistance  Stand Pivot Transfers: Stand by assistance;Contact guard assistance        Balance  Sitting - Dynamic: Good  Standing - Dynamic: Fair;+        Plan   Plan  Times per week: AT LEAST 4-6  Current Treatment Recommendations: Strengthening, Balance Training, Functional Mobility Training, Gait Training, Transfer Training, Patient/Caregiver Education & Training, Safety Education & Training  Safety Devices  Type of devices: Call light within reach    G-Code       OutComes Score                                                  AM-PAC Score             Goals  Short term goals  Time Frame for Short term goals: 14 DAYS  Short term goal 1: TRANSFERS INDEPENDENT  Short term goal 2: ' AAD (if needed) SUP-IND       Therapy Time   Individual Concurrent Group Co-treatment   Time In           Time Out           Minutes                   Beckie William, PT

## 2021-01-21 ENCOUNTER — TELEPHONE (OUTPATIENT)
Dept: PRIMARY CARE CLINIC | Age: 71
End: 2021-01-21

## 2021-01-22 ENCOUNTER — TELEPHONE (OUTPATIENT)
Dept: PRIMARY CARE CLINIC | Age: 71
End: 2021-01-22

## 2021-01-22 DIAGNOSIS — J44.1 CHRONIC OBSTRUCTIVE PULMONARY DISEASE WITH ACUTE EXACERBATION (HCC): ICD-10-CM

## 2021-01-22 DIAGNOSIS — R06.2 WHEEZING: Primary | ICD-10-CM

## 2021-01-22 LAB
BLOOD CULTURE, ROUTINE: NORMAL
CULTURE, BLOOD 2: NORMAL

## 2021-01-22 NOTE — TELEPHONE ENCOUNTER
[facility] Transitions Initial Follow Up Call    Outreach made within 2 business days of discharge: Yes    Patient: Anthony Harris Patient : 1950   MRN: [id number]  Reason for Admission: acute on chronic respiratory failure  Discharge Date: 21       Spoke with:  No one answered the phone. Message left. Discharge department/facility: [facility]). Will continue to call and see if we can reach her.  Tiffanie Lombardi LPN    Scheduled appointment with PCP within 7-14 days    Follow Up  Future Appointments   Date Time Provider Alicia Howard   2021 11:15 AM BRAYDON Rondon Cardio [facility]   2021  2:15 PM BRAYDON Mata - CNP [facility]   2021  9:00 AM BRAYDON Conway Vasc Spec [facility]   2021  8:00 AM Kirby Chandler MD [facility]       Tiffanie Lombardi LPN

## 2021-01-27 ENCOUNTER — OFFICE VISIT (OUTPATIENT)
Dept: CARDIOLOGY CLINIC | Age: 71
End: 2021-01-27
Payer: MEDICARE

## 2021-01-27 ENCOUNTER — OFFICE VISIT (OUTPATIENT)
Dept: PRIMARY CARE CLINIC | Age: 71
End: 2021-01-27
Payer: MEDICARE

## 2021-01-27 VITALS
RESPIRATION RATE: 16 BRPM | TEMPERATURE: 98.6 F | HEIGHT: 63 IN | WEIGHT: 120.6 LBS | BODY MASS INDEX: 21.37 KG/M2 | HEART RATE: 70 BPM | OXYGEN SATURATION: 98 % | SYSTOLIC BLOOD PRESSURE: 132 MMHG | DIASTOLIC BLOOD PRESSURE: 86 MMHG

## 2021-01-27 VITALS
WEIGHT: 120 LBS | HEIGHT: 63 IN | DIASTOLIC BLOOD PRESSURE: 76 MMHG | HEART RATE: 73 BPM | SYSTOLIC BLOOD PRESSURE: 122 MMHG | BODY MASS INDEX: 21.26 KG/M2

## 2021-01-27 DIAGNOSIS — J44.1 CHRONIC OBSTRUCTIVE PULMONARY DISEASE WITH ACUTE EXACERBATION (HCC): Primary | ICD-10-CM

## 2021-01-27 DIAGNOSIS — K58.0 IRRITABLE BOWEL SYNDROME WITH DIARRHEA: ICD-10-CM

## 2021-01-27 DIAGNOSIS — J44.1 CHRONIC OBSTRUCTIVE PULMONARY DISEASE WITH ACUTE EXACERBATION (HCC): ICD-10-CM

## 2021-01-27 DIAGNOSIS — R68.89 FORGETFULNESS: ICD-10-CM

## 2021-01-27 DIAGNOSIS — I48.0 PAF (PAROXYSMAL ATRIAL FIBRILLATION) (HCC): Primary | ICD-10-CM

## 2021-01-27 DIAGNOSIS — I10 ESSENTIAL HYPERTENSION: ICD-10-CM

## 2021-01-27 DIAGNOSIS — R00.2 PALPITATIONS: ICD-10-CM

## 2021-01-27 DIAGNOSIS — R15.2 INCONTINENCE OF FECES WITH FECAL URGENCY: ICD-10-CM

## 2021-01-27 DIAGNOSIS — R15.9 INCONTINENCE OF FECES WITH FECAL URGENCY: ICD-10-CM

## 2021-01-27 LAB
ALBUMIN SERPL-MCNC: 4 G/DL (ref 3.5–5.2)
ALP BLD-CCNC: 142 U/L (ref 35–104)
ALT SERPL-CCNC: 13 U/L (ref 5–33)
ANION GAP SERPL CALCULATED.3IONS-SCNC: 9 MMOL/L (ref 7–19)
AST SERPL-CCNC: 19 U/L (ref 5–32)
BILIRUB SERPL-MCNC: <0.2 MG/DL (ref 0.2–1.2)
BUN BLDV-MCNC: 9 MG/DL (ref 8–23)
CALCIUM SERPL-MCNC: 9 MG/DL (ref 8.8–10.2)
CHLORIDE BLD-SCNC: 104 MMOL/L (ref 98–111)
CO2: 24 MMOL/L (ref 22–29)
CREAT SERPL-MCNC: 1 MG/DL (ref 0.5–0.9)
GFR AFRICAN AMERICAN: >59
GFR NON-AFRICAN AMERICAN: 55
GLUCOSE BLD-MCNC: 97 MG/DL (ref 74–109)
POTASSIUM SERPL-SCNC: 4.6 MMOL/L (ref 3.5–5)
SODIUM BLD-SCNC: 137 MMOL/L (ref 136–145)
TOTAL PROTEIN: 6.5 G/DL (ref 6.6–8.7)

## 2021-01-27 PROCEDURE — G8420 CALC BMI NORM PARAMETERS: HCPCS | Performed by: CLINICAL NURSE SPECIALIST

## 2021-01-27 PROCEDURE — 1111F DSCHRG MED/CURRENT MED MERGE: CPT | Performed by: NURSE PRACTITIONER

## 2021-01-27 PROCEDURE — 93000 ELECTROCARDIOGRAM COMPLETE: CPT | Performed by: CLINICAL NURSE SPECIALIST

## 2021-01-27 PROCEDURE — 4004F PT TOBACCO SCREEN RCVD TLK: CPT | Performed by: CLINICAL NURSE SPECIALIST

## 2021-01-27 PROCEDURE — G8482 FLU IMMUNIZE ORDER/ADMIN: HCPCS | Performed by: CLINICAL NURSE SPECIALIST

## 2021-01-27 PROCEDURE — 3023F SPIROM DOC REV: CPT | Performed by: CLINICAL NURSE SPECIALIST

## 2021-01-27 PROCEDURE — 3017F COLORECTAL CA SCREEN DOC REV: CPT | Performed by: CLINICAL NURSE SPECIALIST

## 2021-01-27 PROCEDURE — G8926 SPIRO NO PERF OR DOC: HCPCS | Performed by: CLINICAL NURSE SPECIALIST

## 2021-01-27 PROCEDURE — 99495 TRANSJ CARE MGMT MOD F2F 14D: CPT | Performed by: NURSE PRACTITIONER

## 2021-01-27 PROCEDURE — 36415 COLL VENOUS BLD VENIPUNCTURE: CPT | Performed by: NURSE PRACTITIONER

## 2021-01-27 PROCEDURE — G8427 DOCREV CUR MEDS BY ELIG CLIN: HCPCS | Performed by: CLINICAL NURSE SPECIALIST

## 2021-01-27 PROCEDURE — 1111F DSCHRG MED/CURRENT MED MERGE: CPT | Performed by: CLINICAL NURSE SPECIALIST

## 2021-01-27 PROCEDURE — G8399 PT W/DXA RESULTS DOCUMENT: HCPCS | Performed by: CLINICAL NURSE SPECIALIST

## 2021-01-27 PROCEDURE — 1090F PRES/ABSN URINE INCON ASSESS: CPT | Performed by: CLINICAL NURSE SPECIALIST

## 2021-01-27 PROCEDURE — 4040F PNEUMOC VAC/ADMIN/RCVD: CPT | Performed by: CLINICAL NURSE SPECIALIST

## 2021-01-27 PROCEDURE — 1123F ACP DISCUSS/DSCN MKR DOCD: CPT | Performed by: CLINICAL NURSE SPECIALIST

## 2021-01-27 PROCEDURE — 99214 OFFICE O/P EST MOD 30 MIN: CPT | Performed by: CLINICAL NURSE SPECIALIST

## 2021-01-27 RX ORDER — FAMOTIDINE 40 MG/1
40 TABLET, FILM COATED ORAL NIGHTLY
COMMUNITY
Start: 2020-10-01

## 2021-01-27 RX ORDER — METOPROLOL SUCCINATE 25 MG/1
25 TABLET, EXTENDED RELEASE ORAL DAILY
Qty: 90 TABLET | Refills: 3 | Status: SHIPPED | OUTPATIENT
Start: 2021-01-27 | End: 2021-03-24 | Stop reason: SDUPTHER

## 2021-01-27 RX ORDER — FLUCONAZOLE 100 MG/1
100 TABLET ORAL DAILY
Qty: 7 TABLET | Refills: 0 | Status: SHIPPED | OUTPATIENT
Start: 2021-01-27 | End: 2021-02-03

## 2021-01-27 RX ORDER — ONDANSETRON 4 MG/1
4 TABLET, FILM COATED ORAL EVERY 8 HOURS PRN
Qty: 30 TABLET | Refills: 0 | Status: SHIPPED | OUTPATIENT
Start: 2021-01-27 | End: 2021-02-24

## 2021-01-27 RX ORDER — DICYCLOMINE HCL 20 MG
20 TABLET ORAL EVERY 6 HOURS
Qty: 360 TABLET | Refills: 3 | Status: SHIPPED | OUTPATIENT
Start: 2021-01-27 | End: 2021-02-24 | Stop reason: ALTCHOICE

## 2021-01-27 ASSESSMENT — PATIENT HEALTH QUESTIONNAIRE - PHQ9
SUM OF ALL RESPONSES TO PHQ QUESTIONS 1-9: 7
4. FEELING TIRED OR HAVING LITTLE ENERGY: 0
6. FEELING BAD ABOUT YOURSELF - OR THAT YOU ARE A FAILURE OR HAVE LET YOURSELF OR YOUR FAMILY DOWN: 0
SUM OF ALL RESPONSES TO PHQ QUESTIONS 1-9: 7
5. POOR APPETITE OR OVEREATING: 3
1. LITTLE INTEREST OR PLEASURE IN DOING THINGS: 0
2. FEELING DOWN, DEPRESSED OR HOPELESS: 3
8. MOVING OR SPEAKING SO SLOWLY THAT OTHER PEOPLE COULD HAVE NOTICED. OR THE OPPOSITE, BEING SO FIGETY OR RESTLESS THAT YOU HAVE BEEN MOVING AROUND A LOT MORE THAN USUAL: 0

## 2021-01-27 ASSESSMENT — ENCOUNTER SYMPTOMS
EYES NEGATIVE: 1
CHEST TIGHTNESS: 0
VOMITING: 0
WHEEZING: 0
NAUSEA: 0
CHEST TIGHTNESS: 0
WHEEZING: 0
ABDOMINAL PAIN: 0
COUGH: 0
RESPIRATORY NEGATIVE: 1
FACIAL SWELLING: 0
NAUSEA: 1
SHORTNESS OF BREATH: 0
SHORTNESS OF BREATH: 1
EYE REDNESS: 0
ALLERGIC/IMMUNOLOGIC NEGATIVE: 1

## 2021-01-27 NOTE — PROGRESS NOTES
Post-Discharge Transitional Care Management Services or Hospital Follow Up      Jennifer Viveros   YOB: 1950    Date of Office Visit:  1/27/2021  Date of Hospital Admission: 1/16/21  Date of Hospital Discharge: 1/20/21  Readmission Risk Score(high >=14%.  Medium >=10%):Readmission Risk Score: 9      Care management risk score Rising risk (score 2-5) and Complex Care (Scores >=6): 6     Non face to face  following discharge, date last encounter closed (first attempt may have been earlier): 1/22/2021  1:45 PM 1/22/2021  1:45 PM    Call initiated 2 business days of discharge: Yes     Patient Active Problem List   Diagnosis    Irritable bowel syndrome    Osteoarthritis    Labyrinthitis    [facility]rom[facility] dysplasia [facility]    History of colon polyps    Chronic heartburn    Antiplatelet or antithrombotic long-term use    Colon polyps    Essential hypertension    Bilateral carotid artery stenosis    Positive sputum culture for Pseudomonas    Irritable bowel syndrome with diarrhea    Personal history of colonic polyps    Chronic obstructive pulmonary disease with acute exacerbation (HCC)       Allergies   Allergen Reactions    Colestipol Shortness Of Breath and Other (See Comments)     Heart races    Codeine      Blocks her kidneys     Lac Bovis Diarrhea    Prednisone Other (See Comments)     Increased heart rate and insomnia    Aspirin Nausea And Vomiting     Makes her stomach bleed       Medications listed as ordered at the time of discharge from hospital   Marta CASANOVA   Home Medication Instructions MARIANNA:    Printed on:01/27/21 9742   Medication Information                      Arformoterol Tartrate (BROVANA) 15 MCG/2ML NEBU  Take 2 mLs by nebulization 2 times daily             budesonide (PULMICORT) 0.5 MG/2ML nebulizer suspension  Take 2 mLs by nebulization 2 times daily             CITALOPRAM HYDROBROMIDE PO  Take by mouth daily             cloNIDine (CATAPRES) 0.1 MG tablet  Take 1 tablet by mouth daily as needed (if blood pressure is 160/90 or greater)             clopidogrel (PLAVIX) 75 MG tablet  TAKE 1 TABLET BY MOUTH DAILY             dicyclomine (BENTYL) 20 MG tablet  Take 1 tablet by mouth every 6 hours             doxepin (SINEQUAN) 50 MG capsule  TAKE 1 CAPSULE BY MOUTH AT BEDTIME AS NEEDED FOR SLEEP             famotidine (PEPCID) 40 MG tablet  Take 40 mg by mouth nightly             fluconazole (DIFLUCAN) 100 MG tablet  Take 1 tablet by mouth daily for 7 days             FLUoxetine (PROZAC) 20 MG capsule  Take 1 capsule by mouth daily             ipratropium-albuterol (DUONEB) 0.5-2.5 (3) MG/3ML SOLN nebulizer solution  Inhale 3 mLs into the lungs every 4 hours (while awake)             meclizine (ANTIVERT) 25 MG tablet  TAKE ONE TABLET BY MOUTH THREE TIMES DAILY AS NEEDED FOR SEVERE DIZZINESS             metoprolol succinate (TOPROL XL) 25 MG extended release tablet  Take 1 tablet by mouth daily             Multiple Vitamins-Minerals (COMPLETE MULTIVITAMIN/MINERAL PO)  Take 1 tablet by mouth daily             ondansetron (ZOFRAN) 4 MG tablet  Take 1 tablet by mouth every 8 hours as needed for Nausea or Vomiting             pantoprazole (PROTONIX) 40 MG tablet  TAKE 1 TABLET BY MOUTH EVERY DAY             simvastatin (ZOCOR) 40 MG tablet  TAKE ONE TABLET BY MOUTH EVERY EVENING                   Medications marked \"taking\" at this time  Outpatient Medications Marked as Taking for the 1/27/21 encounter (Office Visit) with BRAYDON Mata - CNP   Medication Sig Dispense Refill    metoprolol succinate (TOPROL XL) 25 MG extended release tablet Take 1 tablet by mouth daily 90 tablet 3    famotidine (PEPCID) 40 MG tablet Take 40 mg by mouth nightly      ondansetron (ZOFRAN) 4 MG tablet Take 1 tablet by mouth every 8 hours as needed for Nausea or Vomiting 30 tablet 0    dicyclomine (BENTYL) 20 MG tablet Take 1 tablet by mouth every 6 hours 360 tablet 3    fluconazole (DIFLUCAN) 100 MG tablet Take 1 tablet by mouth daily for 7 days 7 tablet 0    budesonide (PULMICORT) 0.5 MG/2ML nebulizer suspension Take 2 mLs by nebulization 2 times daily 60 ampule 3    ipratropium-albuterol (DUONEB) 0.5-2.5 (3) MG/3ML SOLN nebulizer solution Inhale 3 mLs into the lungs every 4 hours (while awake) 360 mL 1    Arformoterol Tartrate (BROVANA) 15 MCG/2ML NEBU Take 2 mLs by nebulization 2 times daily 120 mL 3    simvastatin (ZOCOR) 40 MG tablet TAKE ONE TABLET BY MOUTH EVERY EVENING 90 tablet 3    doxepin (SINEQUAN) 50 MG capsule TAKE 1 CAPSULE BY MOUTH AT BEDTIME AS NEEDED FOR SLEEP 30 capsule 3    FLUoxetine (PROZAC) 20 MG capsule Take 1 capsule by mouth daily 90 capsule 3    meclizine (ANTIVERT) 25 MG tablet TAKE ONE TABLET BY MOUTH THREE TIMES DAILY AS NEEDED FOR SEVERE DIZZINESS 30 tablet 3    clopidogrel (PLAVIX) 75 MG tablet TAKE 1 TABLET BY MOUTH DAILY 90 tablet 3    pantoprazole (PROTONIX) 40 MG tablet TAKE 1 TABLET BY MOUTH EVERY DAY 90 tablet 3    Multiple Vitamins-Minerals (COMPLETE MULTIVITAMIN/MINERAL PO) Take 1 tablet by mouth daily      cloNIDine (CATAPRES) 0.1 MG tablet Take 1 tablet by mouth daily as needed (if blood pressure is 160/90 or greater) 90 tablet 3        Medications patient taking as of now reconciled against medications ordered at time of hospital discharge: Yes    Chief Complaint   Patient presents with    Follow-Up from Hospital      Pt was admitted into [facility] 1/16 and discharged on 1/19 for acute respiratory failure with hypoxia. Pt states she is feeling better. Pt cites no concerns. Pt granddaughter states that pt needs a handicap sticker. Pt states se was told she had COPD and is on oxygen. Pt is off her zoloft. HPI   The patient presents for follow up from her hospitalization for acute respiratory failure. She is accompanied by her daughter that has lived with her for the last 7 years. The patient was discharged on home oxygen at 2L /min per NC.  The nausea. Endocrine: Negative. Genitourinary: Negative. Musculoskeletal: Negative. Skin: Negative. Allergic/Immunologic: Negative. Neurological: Negative. Hematological: Negative. Psychiatric/Behavioral: Positive for confusion and hallucinations. Vitals:    01/27/21 1435   BP: 132/86   Site: Left Upper Arm   Position: Sitting   Cuff Size: Medium Adult   Pulse: 70   Resp: 16   Temp: 98.6 °F (37 °C)   TempSrc: Temporal   SpO2: 98%   Weight: 120 lb 9.6 oz (54.7 kg)   Height: 5' 3\" (1.6 m)     Body mass index is 21.36 kg/m². Wt Readings from Last 3 Encounters:   01/27/21 120 lb 9.6 oz (54.7 kg)   01/27/21 120 lb (54.4 kg)   01/17/21 126 lb 8 oz (57.4 kg)     BP Readings from Last 3 Encounters:   01/27/21 132/86   01/27/21 122/76   01/20/21 (!) 91/59       Physical Exam  Vitals signs and nursing note reviewed. Constitutional:       General: She is awake. Appearance: She is well-developed. HENT:      Head: Normocephalic. Right Ear: External ear normal.      Left Ear: External ear normal.      Mouth/Throat:      Comments: Small areas of thrush to inner cheeks at corners of mouth  Eyes:      Pupils: Pupils are equal, round, and reactive to light. Neck:      Musculoskeletal: Normal range of motion. Cardiovascular:      Rate and Rhythm: Normal rate and regular rhythm. Heart sounds: Normal heart sounds. Pulmonary:      Effort: Pulmonary effort is normal.      Breath sounds: Normal breath sounds. Decreased air movement (lower lobes) present. Skin:     General: Skin is warm and dry. Neurological:      Mental Status: She is alert and oriented to person, place, and time. Sensory: Sensation is intact. Motor: Motor function is intact. Gait: Gait is intact. Comments: Mini MSE performed and patient scored 30/30. Psychiatric:         Behavior: Behavior normal.         Thought Content:  Thought content normal.         Judgment: Judgment normal. Assessment/Plan:  1. Chronic obstructive pulmonary disease with acute exacerbation (HCC)    - ME DISCHARGE MEDS RECONCILED W/ CURRENT OUTPATIENT MED LIST  - Comprehensive Metabolic Panel    2. Irritable bowel syndrome with diarrhea    - dicyclomine (BENTYL) 20 MG tablet; Take 1 tablet by mouth every 6 hours  Dispense: 360 tablet; Refill: 3    3. Incontinence of feces with fecal urgency    - dicyclomine (BENTYL) 20 MG tablet; Take 1 tablet by mouth every 6 hours  Dispense: 360 tablet; Refill: 3    4. Forgetfulness    - Comprehensive Metabolic Panel      Patient wants to stop wearing oxygen, removed oxygen while interviewing patient and she could not consistently maintain an oxygen saturation above 90% while seated. Discussed importance of continuing to use oxygen, especially at night. Hypoxia likely cause of patient confusion, along with age-related changes. Offered MRI and referral to neuro-psychiatrist and patient declines. Zofran and bentyl refilled per patient request. Discussed eating high protein foods and getting adequate water intake. Ensure samples provided for patient. 1 month f/u on COPD symptoms, confusion and weight loss.        Medical Decision Making: moderate complexity
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
## 2021-02-01 ENCOUNTER — VIRTUAL VISIT (OUTPATIENT)
Dept: VASCULAR SURGERY | Age: 71
End: 2021-02-01
Payer: MEDICARE

## 2021-02-01 DIAGNOSIS — I70.1 RENAL ARTERY STENOSIS (HCC): Primary | ICD-10-CM

## 2021-02-01 DIAGNOSIS — I70.213 ATHEROSCLER OF NATIVE ARTERY OF BOTH LEGS WITH INTERMIT CLAUDICATION (HCC): ICD-10-CM

## 2021-02-01 DIAGNOSIS — I65.23 BILATERAL CAROTID ARTERY STENOSIS: ICD-10-CM

## 2021-02-01 PROCEDURE — G8420 CALC BMI NORM PARAMETERS: HCPCS | Performed by: NURSE PRACTITIONER

## 2021-02-01 PROCEDURE — 1123F ACP DISCUSS/DSCN MKR DOCD: CPT | Performed by: NURSE PRACTITIONER

## 2021-02-01 PROCEDURE — 1111F DSCHRG MED/CURRENT MED MERGE: CPT | Performed by: NURSE PRACTITIONER

## 2021-02-01 PROCEDURE — 99214 OFFICE O/P EST MOD 30 MIN: CPT | Performed by: NURSE PRACTITIONER

## 2021-02-01 PROCEDURE — G8427 DOCREV CUR MEDS BY ELIG CLIN: HCPCS | Performed by: NURSE PRACTITIONER

## 2021-02-01 PROCEDURE — 4040F PNEUMOC VAC/ADMIN/RCVD: CPT | Performed by: NURSE PRACTITIONER

## 2021-02-01 PROCEDURE — 1090F PRES/ABSN URINE INCON ASSESS: CPT | Performed by: NURSE PRACTITIONER

## 2021-02-01 PROCEDURE — G8482 FLU IMMUNIZE ORDER/ADMIN: HCPCS | Performed by: NURSE PRACTITIONER

## 2021-02-01 PROCEDURE — 4004F PT TOBACCO SCREEN RCVD TLK: CPT | Performed by: NURSE PRACTITIONER

## 2021-02-01 PROCEDURE — 3017F COLORECTAL CA SCREEN DOC REV: CPT | Performed by: NURSE PRACTITIONER

## 2021-02-01 PROCEDURE — G8399 PT W/DXA RESULTS DOCUMENT: HCPCS | Performed by: NURSE PRACTITIONER
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
## 2021-02-23 ENCOUNTER — VIRTUAL VISIT (OUTPATIENT)
Dept: CARDIOLOGY CLINIC | Age: 71
End: 2021-02-23
Payer: MEDICARE

## 2021-02-23 DIAGNOSIS — F17.210 CIGARETTE NICOTINE DEPENDENCE WITHOUT COMPLICATION: ICD-10-CM

## 2021-02-23 DIAGNOSIS — J44.1 CHRONIC OBSTRUCTIVE PULMONARY DISEASE WITH ACUTE EXACERBATION (HCC): ICD-10-CM

## 2021-02-23 DIAGNOSIS — I10 ESSENTIAL HYPERTENSION: ICD-10-CM

## 2021-02-23 DIAGNOSIS — G45.9 TIA (TRANSIENT ISCHEMIC ATTACK): ICD-10-CM

## 2021-02-23 DIAGNOSIS — I48.0 PAF (PAROXYSMAL ATRIAL FIBRILLATION) (HCC): Primary | ICD-10-CM

## 2021-02-23 PROCEDURE — 1090F PRES/ABSN URINE INCON ASSESS: CPT | Performed by: CLINICAL NURSE SPECIALIST

## 2021-02-23 PROCEDURE — 99213 OFFICE O/P EST LOW 20 MIN: CPT | Performed by: CLINICAL NURSE SPECIALIST

## 2021-02-23 PROCEDURE — 3017F COLORECTAL CA SCREEN DOC REV: CPT | Performed by: CLINICAL NURSE SPECIALIST

## 2021-02-23 PROCEDURE — G8399 PT W/DXA RESULTS DOCUMENT: HCPCS | Performed by: CLINICAL NURSE SPECIALIST

## 2021-02-23 PROCEDURE — G8427 DOCREV CUR MEDS BY ELIG CLIN: HCPCS | Performed by: CLINICAL NURSE SPECIALIST

## 2021-02-23 PROCEDURE — 4040F PNEUMOC VAC/ADMIN/RCVD: CPT | Performed by: CLINICAL NURSE SPECIALIST

## 2021-02-23 PROCEDURE — 1123F ACP DISCUSS/DSCN MKR DOCD: CPT | Performed by: CLINICAL NURSE SPECIALIST

## 2021-02-23 RX ORDER — CLONIDINE HYDROCHLORIDE 0.1 MG/1
0.1 TABLET ORAL 2 TIMES DAILY
Qty: 180 TABLET | Refills: 3
Start: 2021-02-23 | End: 2021-02-24 | Stop reason: ALTCHOICE

## 2021-02-23 RX ORDER — AMLODIPINE BESYLATE 5 MG/1
5 TABLET ORAL NIGHTLY
Qty: 30 TABLET | Refills: 5 | Status: SHIPPED | OUTPATIENT
Start: 2021-02-23 | End: 2021-08-09

## 2021-02-23 ASSESSMENT — ENCOUNTER SYMPTOMS
WHEEZING: 0
CHEST TIGHTNESS: 0
VOMITING: 0
FACIAL SWELLING: 0
ABDOMINAL PAIN: 0
EYE REDNESS: 0
COUGH: 0
NAUSEA: 0
SHORTNESS OF BREATH: 1

## 2021-02-23 NOTE — PROGRESS NOTES
Nedra Holt is a 72 y.o. female evaluated via telephone on 2021. Consent:  She and/or health care decision maker is aware that that she may receive a bill for this telephone service, depending on her insurance coverage, and has provided verbal consent to proceed: Yes      Documentation:  I communicated with the patient and/or health care decision maker about PAF, HTN  Details of this discussion including any medical advice provided      I affirm this is a Patient Initiated Episode with an Established Patient who has not had a related appointment within my department in the past 7 days or scheduled within the next 24 hours. Total Time: minutes: 11-20 minutes    Note: not billable if this call serves to triage the patient into an appointment for the relevant concern      Belgica Hutchison      2021    TELEHEALTH EVALUATION -- Audio/Telephone (During SHEUS-98 public health emergency)  This service was provided through telehealth, by BRAYDON Viveros at [facility] in [facility] and the patient in his/her home via telephone call. Medical Assistant reviewed current medications, pertinent history, and reason for visit. Diagnosis Orders   1. PAF (paroxysmal atrial fibrillation) ([facility] [facility]     2. Essential hypertension     3. Chronic obstructive pulmonary disease with acute exacerbation ([facility] [address]     4. TIA (transient ischemic attack)     5. Cigarette nicotine dependence without complication        HPI:    Nedra Holt (:  1950) has requested an telephone evaluation for the following concern(s):    Patient is here for follow-up with a history of PAF, TIA, hypertension. She has severe COPD and wears continuous oxygen.      At last visit, she felt she was having more episodes of atrial fibrillation and she wore a Zio patch heart monitor. Today's visit is to discuss results. She continues to have sensations of palpitations from time to time. Her main complaint today is that her blood pressure has been elevated for several weeks. Today she is 165/90. At last visit we also increased her metoprolol     Review of Systems   Constitutional: Negative for activity change, diaphoresis, fatigue, fever and unexpected weight change. HENT: Negative for facial swelling and nosebleeds. Eyes: Negative for redness and visual disturbance. Respiratory: Positive for shortness of breath (chronic, unchanged, on oxygen). Negative for cough, chest tightness and wheezing. Cardiovascular: Negative for chest pain, palpitations and leg swelling. C/o elevated BP   Gastrointestinal: Negative for abdominal pain, nausea and vomiting. Endocrine: Negative for cold intolerance and heat intolerance. Genitourinary: Negative for dysuria and hematuria. Musculoskeletal: Negative for arthralgias and myalgias. Skin: Negative for pallor and rash. Neurological: Negative for dizziness, seizures, syncope, weakness and light-headedness. Hematological: Does not bruise/bleed easily. Psychiatric/Behavioral: Negative for agitation. The patient is not nervous/anxious. Prior to Visit Medications    Medication Sig Taking?  Authorizing Provider   cloNIDine (CATAPRES) 0.1 MG tablet Take 1 tablet by mouth 2 times daily Yes BRAYDON Berry   amLODIPine (NORVASC) 5 MG tablet Take 1 tablet by mouth nightly Yes BRAYDON Berry   metoprolol succinate (TOPROL XL) 25 MG extended release tablet Take 1 tablet by mouth daily Yes BRAYDON Berry   famotidine (PEPCID) 40 MG tablet Take 40 mg by mouth nightly Yes Historical Provider, MD   ondansetron (ZOFRAN) 4 MG tablet Take 1 tablet by mouth every 8 hours as needed for Nausea or Vomiting Yes BRAYDON Pizano CNP   dicyclomine (BENTYL) 20 MG tablet Take 1 tablet by mouth every 6 hours Yes BRAYDON Pizano CNP budesonide (PULMICORT) 0.5 MG/2ML nebulizer suspension Take 2 mLs by nebulization 2 times daily Yes Jose Macias DO   ipratropium-albuterol (DUONEB) 0.5-2.5 (3) MG/3ML SOLN nebulizer solution Inhale 3 mLs into the lungs every 4 hours (while awake) Yes Jose Macias DO   Arformoterol Tartrate (BROVANA) 15 MCG/2ML NEBU Take 2 mLs by nebulization 2 times daily Yes Jose Macias DO   simvastatin (ZOCOR) 40 MG tablet TAKE ONE TABLET BY MOUTH EVERY EVENING Yes Sheela Narvaez MD   doxepin (SINEQUAN) 50 MG capsule TAKE 1 CAPSULE BY MOUTH AT BEDTIME AS NEEDED FOR SLEEP Yes Serena Vazquez MD   FLUoxetine (PROZAC) 20 MG capsule Take 1 capsule by mouth daily Yes Sheela Narvaez MD   meclizine (ANTIVERT) 25 MG tablet TAKE ONE TABLET BY MOUTH THREE TIMES DAILY AS NEEDED FOR SEVERE DIZZINESS Yes Sheela Narvaez MD   clopidogrel (PLAVIX) 75 MG tablet TAKE 1 TABLET BY MOUTH DAILY Yes Sheela Narvaez MD   pantoprazole (PROTONIX) 40 MG tablet TAKE 1 TABLET BY MOUTH EVERY DAY Yes Sheela Narvaez MD   Multiple Vitamins-Minerals (COMPLETE MULTIVITAMIN/MINERAL PO) Take 1 tablet by mouth daily  Historical Provider, MD       Social History     Tobacco Use    Smoking status: Current Every Day Smoker     Packs/day: 0.00     Years: 20.00     Pack years: 0.00     Types: Cigarettes    Smokeless tobacco: Never Used    Tobacco comment: Pt denies smoking but POA indicates pt smokes daily and refuses to tell medical personel   Substance Use Topics    Alcohol use: No    Drug use: No        Allergies   Allergen Reactions    Colestipol Shortness Of Breath and Other (See Comments)     Heart races    Codeine      Blocks her kidneys     Lac Bovis Diarrhea    Prednisone Other (See Comments)     Increased heart rate and insomnia    Aspirin Nausea And Vomiting     Makes her stomach bleed       Past Medical History:   Diagnosis Date    Anxiety     Chronic back pain     Chronic kidney disease  Normal left atrial size   Aortic valve not well visualized. No significant stenosis or regurgitation   noted.   Mitral valve mildly thickened with normal mobility. 1+ mitral   regurgitation. No stenosis noted   Interatrial septum appears intact by color Doppler with no significant   intracardiac shunting noted by bubble study. ASSESSMENT/PLAN:  1. PAF (paroxysmal atrial fibrillation) (HCC)  No recurrence of atrial fibrillation per recent Zio patch heart monitor. She was not anticoagulated during recent hospitalization when A. fib occurred. She does take aspirin and Plavix with history of TIA. Continue present treatment    2. Essential hypertension  Uncontrolled in recent weeks. Plan will be to add amlodipine 5 mg daily. Monitor BP at home daily and record with close follow-up again in a few weeks    3. Chronic obstructive pulmonary disease with acute exacerbation (HCC)  On continuous oxygen, managed by PCP and pulmonologist    4. TIA (transient ischemic attack)  History of, on Plavix    5. Cigarette nicotine dependence without complication  Instructed patient in smoking cessation rationales, strategies, and available resources x 1 minutes. Return in about 3 weeks (around 3/16/2021) for APRN. Add Amlodipine 5mg daily  Monitor BP at home    An  electronic signature was used to authenticate this note. --BRAYDON Hoffman on 2/23/2021 at 2:00 PM        Pursuant to the emergency declaration under the [facility] waiver authority and the [employer] and [employer] Act, this telephone Visit was conducted, with patient's consent, to reduce the patient's risk of exposure to COVID-19 and provide continuity of care for an established patient. Services were provided through a telephone discussion virtually to substitute for in-person clinic visit.

## 2021-02-24 ENCOUNTER — OFFICE VISIT (OUTPATIENT)
Dept: PRIMARY CARE CLINIC | Age: 71
End: 2021-02-24
Payer: MEDICARE

## 2021-02-24 VITALS
BODY MASS INDEX: 22.15 KG/M2 | SYSTOLIC BLOOD PRESSURE: 152 MMHG | OXYGEN SATURATION: 98 % | HEART RATE: 67 BPM | HEIGHT: 63 IN | WEIGHT: 125 LBS | DIASTOLIC BLOOD PRESSURE: 86 MMHG | RESPIRATION RATE: 16 BRPM | TEMPERATURE: 97.3 F

## 2021-02-24 DIAGNOSIS — F41.9 ANXIETY: ICD-10-CM

## 2021-02-24 DIAGNOSIS — I10 ESSENTIAL HYPERTENSION: Primary | ICD-10-CM

## 2021-02-24 LAB
ANION GAP SERPL CALCULATED.3IONS-SCNC: 14 MMOL/L (ref 7–19)
BUN BLDV-MCNC: 8 MG/DL (ref 8–23)
CALCIUM SERPL-MCNC: 9.1 MG/DL (ref 8.8–10.2)
CHLORIDE BLD-SCNC: 103 MMOL/L (ref 98–111)
CO2: 24 MMOL/L (ref 22–29)
CREAT SERPL-MCNC: 0.9 MG/DL (ref 0.5–0.9)
GFR AFRICAN AMERICAN: >59
GFR NON-AFRICAN AMERICAN: >60
GLUCOSE BLD-MCNC: 90 MG/DL (ref 74–109)
POTASSIUM SERPL-SCNC: 3.6 MMOL/L (ref 3.5–5)
SODIUM BLD-SCNC: 141 MMOL/L (ref 136–145)

## 2021-02-24 PROCEDURE — 36415 COLL VENOUS BLD VENIPUNCTURE: CPT | Performed by: FAMILY MEDICINE

## 2021-02-24 PROCEDURE — G8420 CALC BMI NORM PARAMETERS: HCPCS | Performed by: FAMILY MEDICINE

## 2021-02-24 PROCEDURE — G8482 FLU IMMUNIZE ORDER/ADMIN: HCPCS | Performed by: FAMILY MEDICINE

## 2021-02-24 PROCEDURE — G8399 PT W/DXA RESULTS DOCUMENT: HCPCS | Performed by: FAMILY MEDICINE

## 2021-02-24 PROCEDURE — G8427 DOCREV CUR MEDS BY ELIG CLIN: HCPCS | Performed by: FAMILY MEDICINE

## 2021-02-24 PROCEDURE — 4040F PNEUMOC VAC/ADMIN/RCVD: CPT | Performed by: FAMILY MEDICINE

## 2021-02-24 PROCEDURE — 1123F ACP DISCUSS/DSCN MKR DOCD: CPT | Performed by: FAMILY MEDICINE

## 2021-02-24 PROCEDURE — 1090F PRES/ABSN URINE INCON ASSESS: CPT | Performed by: FAMILY MEDICINE

## 2021-02-24 PROCEDURE — 99214 OFFICE O/P EST MOD 30 MIN: CPT | Performed by: FAMILY MEDICINE

## 2021-02-24 PROCEDURE — 4004F PT TOBACCO SCREEN RCVD TLK: CPT | Performed by: FAMILY MEDICINE

## 2021-02-24 PROCEDURE — 3017F COLORECTAL CA SCREEN DOC REV: CPT | Performed by: FAMILY MEDICINE

## 2021-02-24 RX ORDER — BUSPIRONE HYDROCHLORIDE 5 MG/1
5 TABLET ORAL 2 TIMES DAILY
Qty: 60 TABLET | Refills: 0 | Status: SHIPPED | OUTPATIENT
Start: 2021-02-24 | End: 2021-03-22

## 2021-02-24 RX ORDER — INDAPAMIDE 1.25 MG/1
1.25 TABLET, FILM COATED ORAL EVERY MORNING
Qty: 30 TABLET | Refills: 2 | Status: SHIPPED | OUTPATIENT
Start: 2021-02-24 | End: 2021-03-22

## 2021-02-24 ASSESSMENT — ENCOUNTER SYMPTOMS: RESPIRATORY NEGATIVE: 1

## 2021-02-24 NOTE — PATIENT INSTRUCTIONS
We are committed to providing you with the best care possible. In order to help us achieve these goals please remember to bring all medications, herbal products, and over the counter supplements with you to each visit. If your provider has ordered testing for you, please be sure to follow up with our office if you have not received results within 7 days after the testing took place. *If you receive a survey after visiting one of our offices, please take time to share your experience concerning your physician office visit. These surveys are confidential and no health information about you is shared. We are eager to improve for you and we are counting on your feedback to help make that happen. Continue amlodipine 5 mg 1 tablet at bedtime      Continue metoprolol succinate 25 mg 1 tablet in the morning. Do not use the clonidine. Add indapamide 1.25 mg 1 tablet once a day in the morning for blood pressure and fluid. Recheck blood pressure here in 1 month. Continue Prozac 20 mg 1 tablet once a day for depression.       I added buspirone 5 mg 1 tablet twice a day

## 2021-02-24 NOTE — LETTER
[address] [address] [place] [address]  Phone: [phone]  Fax: [phone]    Cindy Kidd MD      February 25, 2021     Jennifer Patel    Dear Ashley Ureña:    Below are the results from your recent visit:    Resulted Orders   Basic Metabolic Panel   Result Value Ref Range    Sodium 141 136 - 145 mmol/L    Potassium 3.6 3.5 - 5.0 mmol/L    Chloride 103 98 - 111 mmol/L    CO2 24 22 - 29 mmol/L    Anion Gap 14 7 - 19 mmol/L    Glucose 90 74 - 109 mg/dL    BUN 8 8 - 23 mg/dL    CREATININE 0.9 0.5 - 0.9 mg/dL    GFR Non-African American >60 >60      Comment: This calculation may be inaccurate for patients under the age of 25 years. For ages 25 and older, a GFR >60 mL/min/1.73m2 (not corrected for weight) is  valid for stable renal function. GFR  >59 >59      Comment:      Chronic Kidney Disease: less than 60 ml/min/1.73 sq.m. Kidney Failure: less than 15 ml/min/1.73 sq.m. Results valid for patients 18 years and older. Calcium 9.1 8.8 - 10.2 mg/dL   Results are normal     If you have any questions or concerns, please don't hesitate to call.     Sincerely,      Cindy Kidd MD   [facility]

## 2021-02-24 NOTE — PROGRESS NOTES
SUBJECTIVE:    Hemal Dillard is 72 y. o.female who comes in complaining of 1 Month Follow-Up (Pt is here for 1 month follow. Pt states she is feeling much better. ) and Hypertension (PT states she is having trouble wiht her BP going up. )   . HPI: Keesha Suresh comes in today for recheck of her blood pressure. Blood pressure still running high. She says she just feels like she is losing her mind. She had a Mini-Mental status exam done at her last visit and she scored a 30 out of 30. When questioned she said she just feels anxious and on edge. She is already on Prozac 20 mg 1 tablet once a day. She has not been taking the clonidine. She is currently on amlodipine 5 mg and metoprolol 25 mg. She has not increased the salt in her diet.       Allergies   Allergen Reactions    Colestipol Shortness Of Breath and Other (See Comments)     Heart races    Codeine      Blocks her kidneys     Lac Bovis Diarrhea    Prednisone Other (See Comments)     Increased heart rate and insomnia    Aspirin Nausea And Vomiting     Makes her stomach bleed       Social History     Socioeconomic History    Marital status:      Spouse name: None    Number of children: 2    Years of education: None    Highest education level: None   Occupational History    Occupation: [employer]   Social Needs    Financial resource strain: None    Food insecurity     Worry: None     Inability: None    Transportation needs     Medical: None     Non-medical: None   Tobacco Use    Smoking status: Current Every Day Smoker     Packs/day: 0.00     Years: 20.00     Pack years: 0.00     Types: Cigarettes    Smokeless tobacco: Never Used    Tobacco comment: Pt denies smoking but POA indicates pt smokes daily and refuses to tell medical personel   Substance and Sexual Activity    Alcohol use: No    Drug use: No    Sexual activity: Not Currently     Partners: Male   Lifestyle    Physical activity     Days per week: None     Minutes per session: Nose normal. No rhinorrhea. Mouth/Throat:      Pharynx: No posterior oropharyngeal erythema. Eyes:      Extraocular Movements: Extraocular movements intact. Conjunctiva/sclera: Conjunctivae normal.      Pupils: Pupils are equal, round, and reactive to light. Neck:      Musculoskeletal: Normal range of motion and neck supple. Vascular: No carotid bruit. Cardiovascular:      Rate and Rhythm: Normal rate and regular rhythm. Heart sounds: Normal heart sounds. No murmur. Pulmonary:      Effort: Pulmonary effort is normal. No respiratory distress. Breath sounds: Normal breath sounds. Abdominal:      General: Bowel sounds are normal. There is no distension. Palpations: Abdomen is soft. Tenderness: There is no abdominal tenderness. There is no guarding. Comments: No abdominal bruits   Musculoskeletal: Normal range of motion. Right lower leg: No edema. Left lower leg: No edema. Lymphadenopathy:      Cervical: No cervical adenopathy. Skin:     General: Skin is warm and dry. Neurological:      Mental Status: She is alert and oriented to person, place, and time. Psychiatric:         Mood and Affect: Mood normal.         Speech: Speech normal.         Behavior: Behavior normal.         Thought Content: Thought content normal.         Judgment: Judgment normal.      Comments: Somewhat anxious         ASSESSMENT:    1. Essential hypertension    2. Anxiety          PLAN:    MEDICATIONS:  Orders Placed This Encounter   Medications    busPIRone (BUSPAR) 5 MG tablet     Sig: Take 1 tablet by mouth 2 times daily For anxiety     Dispense:  60 tablet     Refill:  0    indapamide (LOZOL) 1.25 MG tablet     Sig: Take 1 tablet by mouth every morning For high blood pressure     Dispense:  30 tablet     Refill:  2     Continue Prozac. ORDERS:  Orders Placed This Encounter   Procedures    Basic Metabolic Panel     Stop clonidine.   She really feels like she is retaining some fluid. Add indapamide 1.25 mg 1 tablet once a day. Continue metoprolol succinate 25 mg in the morning and amlodipine 5 mg in the evening. Hopefully the BuSpar will help her anxiety. I reviewed her Mini-Mental status exam done on 1/27/2021. She scored a 30 out of 30. Her Zio only showed normal sinus rhythm with a rare PAC or PVC. When she triggered the event it was just normal sinus rhythm. I also reviewed her labs done on 27 2021. Creatinine was 1.0 with a GFR of 55. I need to make sure that this is not getting worse since I am going to start her on a diuretic. I really feel that the diuretic will help. I reviewed her blood pressure tracker. Blood pressure overall has been fairly good. I think it is higher when she is here in the office. Pulse rate is good. We may be able to discontinue the amlodipine or decrease it once I see her. She has had some values in the 150s. Blood pressure Is higher here. Follow-up:  Return in about 4 weeks (around 3/24/2021) for Recheck high blood pressure. PATIENT INSTRUCTIONS:  Patient Instructions   We are committed to providing you with the best care possible. In order to help us achieve these goals please remember to bring all medications, herbal products, and over the counter supplements with you to each visit. If your provider has ordered testing for you, please be sure to follow up with our office if you have not received results within 7 days after the testing took place. *If you receive a survey after visiting one of our offices, please take time to share your experience concerning your physician office visit. These surveys are confidential and no health information about you is shared. We are eager to improve for you and we are counting on your feedback to help make that happen. Continue amlodipine 5 mg 1 tablet at bedtime      Continue metoprolol succinate 25 mg 1 tablet in the morning. Do not use the clonidine.       Add indapamide 1.25 mg 1 tablet once a day in the morning for blood pressure and fluid. Recheck blood pressure here in 1 month. Continue Prozac 20 mg 1 tablet once a day for depression. I added buspirone 5 mg 1 tablet twice a day      EMR Dragon/transcription disclaimer:  Much of this encounter note is electronic transcription/translation of spoken language to printed texts. The electronic translation of spoken language may be erroneous, or at times, nonsensical words or phrases may beinadvertently transcribed.   Although I have reviewed the note for such errors, some may still exist.

## 2021-03-08 RX ORDER — PANTOPRAZOLE SODIUM 40 MG/1
TABLET, DELAYED RELEASE ORAL
Qty: 90 TABLET | Refills: 3 | Status: SHIPPED | OUTPATIENT
Start: 2021-03-08 | End: 2022-03-07

## 2021-03-16 ENCOUNTER — TELEPHONE (OUTPATIENT)
Dept: PRIMARY CARE CLINIC | Age: 71
End: 2021-03-16

## 2021-03-16 NOTE — TELEPHONE ENCOUNTER
Patient called pharmacy to request a refill on her meclizine but was told that you discontinued it. She was not sure why because that is what she takes for her vertigo.

## 2021-03-17 RX ORDER — MECLIZINE HYDROCHLORIDE 25 MG/1
TABLET ORAL
Qty: 30 TABLET | Refills: 3 | Status: SHIPPED | OUTPATIENT
Start: 2021-03-17 | End: 2021-09-23 | Stop reason: SDUPTHER

## 2021-03-18 ENCOUNTER — TRANSCRIBE ORDERS (OUTPATIENT)
Dept: ADMINISTRATIVE | Facility: HOSPITAL | Age: 71
End: 2021-03-18

## 2021-03-18 DIAGNOSIS — Z11.59 SCREENING FOR VIRAL DISEASE: Primary | ICD-10-CM

## 2021-03-19 ENCOUNTER — IMMUNIZATION (OUTPATIENT)
Dept: PRIMARY CARE CLINIC | Age: 71
End: 2021-03-19
Payer: MEDICARE

## 2021-03-19 ENCOUNTER — LAB (OUTPATIENT)
Dept: LAB | Facility: HOSPITAL | Age: 71
End: 2021-03-19

## 2021-03-19 LAB — SARS-COV-2 ORF1AB RESP QL NAA+PROBE: NOT DETECTED

## 2021-03-19 PROCEDURE — C9803 HOPD COVID-19 SPEC COLLECT: HCPCS | Performed by: OTOLARYNGOLOGY

## 2021-03-19 PROCEDURE — U0004 COV-19 TEST NON-CDC HGH THRU: HCPCS | Performed by: OTOLARYNGOLOGY

## 2021-03-19 PROCEDURE — 91303 COVID-19, J&J VACCINE, PF, 0.5 ML DOSE: CPT | Performed by: FAMILY MEDICINE

## 2021-03-19 PROCEDURE — U0005 INFEC AGEN DETEC AMPLI PROBE: HCPCS | Performed by: OTOLARYNGOLOGY

## 2021-03-22 ENCOUNTER — OFFICE VISIT (OUTPATIENT)
Dept: CARDIOLOGY CLINIC | Age: 71
End: 2021-03-22
Payer: MEDICARE

## 2021-03-22 VITALS
DIASTOLIC BLOOD PRESSURE: 74 MMHG | HEIGHT: 63 IN | WEIGHT: 120 LBS | HEART RATE: 70 BPM | BODY MASS INDEX: 21.26 KG/M2 | SYSTOLIC BLOOD PRESSURE: 122 MMHG

## 2021-03-22 DIAGNOSIS — I10 ESSENTIAL HYPERTENSION: ICD-10-CM

## 2021-03-22 DIAGNOSIS — R00.2 PALPITATIONS: ICD-10-CM

## 2021-03-22 DIAGNOSIS — I48.0 PAF (PAROXYSMAL ATRIAL FIBRILLATION) (HCC): Primary | ICD-10-CM

## 2021-03-22 DIAGNOSIS — J44.1 CHRONIC OBSTRUCTIVE PULMONARY DISEASE WITH ACUTE EXACERBATION (HCC): ICD-10-CM

## 2021-03-22 PROCEDURE — 1123F ACP DISCUSS/DSCN MKR DOCD: CPT | Performed by: CLINICAL NURSE SPECIALIST

## 2021-03-22 PROCEDURE — 3023F SPIROM DOC REV: CPT | Performed by: CLINICAL NURSE SPECIALIST

## 2021-03-22 PROCEDURE — 1090F PRES/ABSN URINE INCON ASSESS: CPT | Performed by: CLINICAL NURSE SPECIALIST

## 2021-03-22 PROCEDURE — G8420 CALC BMI NORM PARAMETERS: HCPCS | Performed by: CLINICAL NURSE SPECIALIST

## 2021-03-22 PROCEDURE — G8399 PT W/DXA RESULTS DOCUMENT: HCPCS | Performed by: CLINICAL NURSE SPECIALIST

## 2021-03-22 PROCEDURE — G8926 SPIRO NO PERF OR DOC: HCPCS | Performed by: CLINICAL NURSE SPECIALIST

## 2021-03-22 PROCEDURE — 93000 ELECTROCARDIOGRAM COMPLETE: CPT | Performed by: CLINICAL NURSE SPECIALIST

## 2021-03-22 PROCEDURE — 3017F COLORECTAL CA SCREEN DOC REV: CPT | Performed by: CLINICAL NURSE SPECIALIST

## 2021-03-22 PROCEDURE — G8482 FLU IMMUNIZE ORDER/ADMIN: HCPCS | Performed by: CLINICAL NURSE SPECIALIST

## 2021-03-22 PROCEDURE — 99213 OFFICE O/P EST LOW 20 MIN: CPT | Performed by: CLINICAL NURSE SPECIALIST

## 2021-03-22 PROCEDURE — G8427 DOCREV CUR MEDS BY ELIG CLIN: HCPCS | Performed by: CLINICAL NURSE SPECIALIST

## 2021-03-22 PROCEDURE — 4004F PT TOBACCO SCREEN RCVD TLK: CPT | Performed by: CLINICAL NURSE SPECIALIST

## 2021-03-22 PROCEDURE — 4040F PNEUMOC VAC/ADMIN/RCVD: CPT | Performed by: CLINICAL NURSE SPECIALIST

## 2021-03-22 RX ORDER — INDAPAMIDE 1.25 MG/1
1.25 TABLET, FILM COATED ORAL EVERY MORNING
Qty: 90 TABLET | Refills: 1 | Status: SHIPPED | OUTPATIENT
Start: 2021-03-22 | End: 2021-09-07

## 2021-03-22 RX ORDER — BUSPIRONE HYDROCHLORIDE 5 MG/1
TABLET ORAL
Qty: 60 TABLET | Refills: 2 | Status: SHIPPED | OUTPATIENT
Start: 2021-03-22 | End: 2021-05-10

## 2021-03-22 ASSESSMENT — ENCOUNTER SYMPTOMS
EYE REDNESS: 0
CHEST TIGHTNESS: 0
FACIAL SWELLING: 0
COUGH: 0
VOMITING: 0
ABDOMINAL PAIN: 0
WHEEZING: 0
SHORTNESS OF BREATH: 1
NAUSEA: 0

## 2021-03-22 NOTE — PATIENT INSTRUCTIONS
Return in about 6 months (around 9/22/2021) for APRN. Discuss humidiification of oxygen with lung doctor  Call for palpitations, fast heart rates, unusual shortness of breath, or chest pain.

## 2021-03-22 NOTE — PROGRESS NOTES
[facility], [address] [address], [address] [id number]  Phone: [phone]  Fax: [phone]    OFFICE VISIT:  3/22/2021    Anthony Bergman - : 1950    Reason For Visit:  William Young is a 72 y.o. female who is here for Follow-up (Patient is here for zio results.)       Diagnosis Orders   1. PAF (paroxysmal atrial fibrillation) (HCC)  EKG 12 lead   2. Essential hypertension  EKG 12 lead   3. Palpitations     4. Chronic obstructive pulmonary disease with acute exacerbation (HCC)           HPI  Patient is here for follow-up with a history of PAF, TIA, hypertension. She has severe COPD and wears oxygen intermittently now. At last visit, she was complaining of palpitations and a Zio patch heart monitor was worn. She had never been anticoagulated. She states she does not feel the palpitations as much any longer. She is chronically short of breath with her COPD, but is needing her oxygen less often now. She denies chest pain, dizziness, lightheadedness, syncope. Kendra Wilhelm MD is PCP.   Anthony Bergman has the following history as recorded in [facility]:    Patient Active Problem List    Diagnosis Date Noted    Chronic obstructive pulmonary disease with acute exacerbation ([facility] [address] 2021    Irritable bowel syndrome with diarrhea 2019    Personal history of colonic polyps 2019    Positive sputum culture for Pseudomonas 2018    Bilateral carotid artery stenosis 2018    Essential hypertension 2018    Colon polyps     History of colon polyps 2016    Chronic heartburn 2016    Antiplatelet or antithrombotic long-term use 2016    Fibromuscular dysplasia ([address] 2014    Irritable bowel syndrome     Osteoarthritis     Labyrinthitis      Past Medical History:   Diagnosis Date    Anxiety     Chronic back pain     Chronic kidney disease     COPD (chronic obstructive pulmonary disease) ([address]     Depression     (ANTIVERT) 25 MG tablet TAKE ONE TABLET BY MOUTH THREE TIMES DAILY AS NEEDED FOR SEVERE DIZZINESS 30 tablet 3    pantoprazole (PROTONIX) 40 MG tablet TAKE 1 TABLET BY MOUTH EVERY DAY 90 tablet 3    busPIRone (BUSPAR) 5 MG tablet Take 1 tablet by mouth 2 times daily For anxiety 60 tablet 0    indapamide (LOZOL) 1.25 MG tablet Take 1 tablet by mouth every morning For high blood pressure 30 tablet 2    amLODIPine (NORVASC) 5 MG tablet Take 1 tablet by mouth nightly 30 tablet 5    metoprolol succinate (TOPROL XL) 25 MG extended release tablet Take 1 tablet by mouth daily 90 tablet 3    famotidine (PEPCID) 40 MG tablet Take 40 mg by mouth nightly      budesonide (PULMICORT) 0.5 MG/2ML nebulizer suspension Take 2 mLs by nebulization 2 times daily 60 ampule 3    ipratropium-albuterol (DUONEB) 0.5-2.5 (3) MG/3ML SOLN nebulizer solution Inhale 3 mLs into the lungs every 4 hours (while awake) 360 mL 1    Arformoterol Tartrate (BROVANA) 15 MCG/2ML NEBU Take 2 mLs by nebulization 2 times daily 120 mL 3    simvastatin (ZOCOR) 40 MG tablet TAKE ONE TABLET BY MOUTH EVERY EVENING 90 tablet 3    doxepin (SINEQUAN) 50 MG capsule TAKE 1 CAPSULE BY MOUTH AT BEDTIME AS NEEDED FOR SLEEP 30 capsule 3    FLUoxetine (PROZAC) 20 MG capsule Take 1 capsule by mouth daily 90 capsule 3    clopidogrel (PLAVIX) 75 MG tablet TAKE 1 TABLET BY MOUTH DAILY 90 tablet 3    Multiple Vitamins-Minerals (COMPLETE MULTIVITAMIN/MINERAL PO) Take 1 tablet by mouth daily       No current facility-administered medications for this visit. Allergies: Colestipol, Codeine, Lac bovis, Prednisone, and Aspirin    Review of Systems  Review of Systems   Constitutional: Negative for activity change, diaphoresis, fatigue, fever and unexpected weight change. HENT: Negative for facial swelling and nosebleeds. Eyes: Negative for redness and visual disturbance. Respiratory: Positive for shortness of breath (Chronic).  Negative for cough, chest tightness and wheezing. Cardiovascular: Positive for palpitations (better). Negative for chest pain and leg swelling. Gastrointestinal: Negative for abdominal pain, nausea and vomiting. Endocrine: Negative for cold intolerance and heat intolerance. Genitourinary: Negative for dysuria and hematuria. Musculoskeletal: Negative for arthralgias and myalgias. Skin: Negative for pallor and rash. Neurological: Negative for dizziness, seizures, syncope, weakness and light-headedness. Hematological: Does not bruise/bleed easily. Psychiatric/Behavioral: Negative for agitation. The patient is not nervous/anxious. Objective  Vital Signs - /74   Pulse 70   Ht 5' 3\" (1.6 m)   Wt 120 lb (54.4 kg)   LMP  (LMP Unknown)   BMI 21.26 kg/m²   Physical Exam  Vitals signs and nursing note reviewed. Constitutional:       General: She is not in acute distress. Appearance: She is well-developed. She is not diaphoretic. Comments: Deconditioned   HENT:      Head: Normocephalic and atraumatic. Right Ear: Hearing and external ear normal.      Left Ear: Hearing and external ear normal.      Nose: Nose normal.   Eyes:      General:         Right eye: No discharge. Left eye: No discharge. Pupils: Pupils are equal, round, and reactive to light. Neck:      Musculoskeletal: Neck supple. No muscular tenderness. Thyroid: No thyromegaly. Vascular: Carotid bruit (bilateral) present. No JVD. Trachea: No tracheal deviation. Cardiovascular:      Rate and Rhythm: Normal rate and regular rhythm. Heart sounds: Normal heart sounds. No murmur. No friction rub. No gallop. Comments: On continuous oxygen  Pulmonary:      Effort: Pulmonary effort is normal. No respiratory distress. Breath sounds: Normal breath sounds. No wheezing or rales. Abdominal:      Palpations: Abdomen is soft. Tenderness: There is no abdominal tenderness.    Musculoskeletal:         General: No

## 2021-03-23 ENCOUNTER — OFFICE VISIT (OUTPATIENT)
Dept: OTOLARYNGOLOGY | Facility: CLINIC | Age: 71
End: 2021-03-23

## 2021-03-23 VITALS
WEIGHT: 121.4 LBS | DIASTOLIC BLOOD PRESSURE: 86 MMHG | HEIGHT: 63 IN | SYSTOLIC BLOOD PRESSURE: 122 MMHG | BODY MASS INDEX: 21.51 KG/M2 | HEART RATE: 76 BPM | TEMPERATURE: 97.1 F

## 2021-03-23 DIAGNOSIS — K21.9 GASTROESOPHAGEAL REFLUX DISEASE, UNSPECIFIED WHETHER ESOPHAGITIS PRESENT: ICD-10-CM

## 2021-03-23 DIAGNOSIS — K21.9 LARYNGOPHARYNGEAL REFLUX (LPR): ICD-10-CM

## 2021-03-23 DIAGNOSIS — J34.89 NASAL VESTIBULITIS: ICD-10-CM

## 2021-03-23 DIAGNOSIS — R68.2 DRY MOUTH: ICD-10-CM

## 2021-03-23 DIAGNOSIS — Z98.890 S/P EXCISION OF VOCAL CORD NODULE: Primary | ICD-10-CM

## 2021-03-23 PROCEDURE — 99214 OFFICE O/P EST MOD 30 MIN: CPT | Performed by: PHYSICIAN ASSISTANT

## 2021-03-23 RX ORDER — BUDESONIDE 0.5 MG/2ML
INHALANT ORAL
COMMUNITY
Start: 2021-01-20

## 2021-03-23 RX ORDER — CLOPIDOGREL BISULFATE 75 MG/1
75 TABLET ORAL DAILY
COMMUNITY
Start: 2020-12-22

## 2021-03-23 RX ORDER — PILOCARPINE HYDROCHLORIDE 5 MG/1
5 TABLET, FILM COATED ORAL 3 TIMES DAILY
Qty: 90 TABLET | Refills: 11 | Status: SHIPPED | OUTPATIENT
Start: 2021-03-23 | End: 2021-04-22

## 2021-03-23 RX ORDER — DICYCLOMINE HCL 20 MG
20 TABLET ORAL EVERY 6 HOURS
COMMUNITY
Start: 2021-01-27

## 2021-03-23 RX ORDER — AMLODIPINE BESYLATE 5 MG/1
5 TABLET ORAL NIGHTLY
COMMUNITY
Start: 2021-02-23

## 2021-03-23 RX ORDER — INDAPAMIDE 1.25 MG/1
1.25 TABLET, FILM COATED ORAL EVERY MORNING
COMMUNITY
Start: 2021-02-24

## 2021-03-23 RX ORDER — ARFORMOTEROL TARTRATE 15 UG/2ML
SOLUTION RESPIRATORY (INHALATION)
COMMUNITY
Start: 2021-01-21

## 2021-03-23 RX ORDER — BUSPIRONE HYDROCHLORIDE 5 MG/1
5 TABLET ORAL 2 TIMES DAILY
COMMUNITY
Start: 2021-02-24

## 2021-03-23 RX ORDER — IPRATROPIUM BROMIDE AND ALBUTEROL SULFATE 2.5; .5 MG/3ML; MG/3ML
3 SOLUTION RESPIRATORY (INHALATION)
COMMUNITY
Start: 2021-01-19

## 2021-03-24 ENCOUNTER — OFFICE VISIT (OUTPATIENT)
Dept: PRIMARY CARE CLINIC | Age: 71
End: 2021-03-24
Payer: MEDICARE

## 2021-03-24 VITALS
HEIGHT: 63 IN | HEART RATE: 57 BPM | SYSTOLIC BLOOD PRESSURE: 119 MMHG | RESPIRATION RATE: 18 BRPM | BODY MASS INDEX: 21.33 KG/M2 | DIASTOLIC BLOOD PRESSURE: 74 MMHG | WEIGHT: 120.4 LBS | TEMPERATURE: 98.7 F | OXYGEN SATURATION: 95 %

## 2021-03-24 DIAGNOSIS — I10 ESSENTIAL HYPERTENSION: Primary | ICD-10-CM

## 2021-03-24 DIAGNOSIS — F51.01 PRIMARY INSOMNIA: ICD-10-CM

## 2021-03-24 PROCEDURE — 1090F PRES/ABSN URINE INCON ASSESS: CPT | Performed by: FAMILY MEDICINE

## 2021-03-24 PROCEDURE — 3017F COLORECTAL CA SCREEN DOC REV: CPT | Performed by: FAMILY MEDICINE

## 2021-03-24 PROCEDURE — G8420 CALC BMI NORM PARAMETERS: HCPCS | Performed by: FAMILY MEDICINE

## 2021-03-24 PROCEDURE — G8482 FLU IMMUNIZE ORDER/ADMIN: HCPCS | Performed by: FAMILY MEDICINE

## 2021-03-24 PROCEDURE — 4040F PNEUMOC VAC/ADMIN/RCVD: CPT | Performed by: FAMILY MEDICINE

## 2021-03-24 PROCEDURE — G8399 PT W/DXA RESULTS DOCUMENT: HCPCS | Performed by: FAMILY MEDICINE

## 2021-03-24 PROCEDURE — 1123F ACP DISCUSS/DSCN MKR DOCD: CPT | Performed by: FAMILY MEDICINE

## 2021-03-24 PROCEDURE — G8427 DOCREV CUR MEDS BY ELIG CLIN: HCPCS | Performed by: FAMILY MEDICINE

## 2021-03-24 PROCEDURE — 99213 OFFICE O/P EST LOW 20 MIN: CPT | Performed by: FAMILY MEDICINE

## 2021-03-24 PROCEDURE — 4004F PT TOBACCO SCREEN RCVD TLK: CPT | Performed by: FAMILY MEDICINE

## 2021-03-24 RX ORDER — METOPROLOL SUCCINATE 25 MG/1
25 TABLET, EXTENDED RELEASE ORAL DAILY
Qty: 90 TABLET | Refills: 3 | Status: SHIPPED | OUTPATIENT
Start: 2021-03-24 | End: 2022-06-28

## 2021-03-25 RX ORDER — CLOPIDOGREL BISULFATE 75 MG/1
75 TABLET ORAL DAILY
Qty: 90 TABLET | Refills: 3 | Status: SHIPPED | OUTPATIENT
Start: 2021-03-25 | End: 2022-03-07

## 2021-04-11 ASSESSMENT — ENCOUNTER SYMPTOMS
CHEST TIGHTNESS: 0
ABDOMINAL PAIN: 0
COUGH: 0
SHORTNESS OF BREATH: 0

## 2021-04-12 NOTE — PROGRESS NOTES
SUBJECTIVE:    Cy Blandon is 72 y. o.female who comes in complaining of 1 Month Follow-Up (B/P)   . HPI: Tamir Armendariz comes in today for recheck of her blood pressure. She is taking her medications regularly. She says she is having trouble sleeping because she worries about the news in the state of the world. She denies any chest pain shortness of breath or diaphoresis. Blood pressure is better today. She has had no episodes of low blood pressure.       Allergies   Allergen Reactions    Colestipol Shortness Of Breath and Other (See Comments)     Heart races    Codeine      Blocks her kidneys     Lac [place]is Diarrhea    Prednisone Other (See Comments)     Increased heart rate and insomnia    Aspirin Nausea And Vomiting     Makes her stomach bleed       Social History     Socioeconomic History    Marital status:      Spouse name: Not on file    Number of children: 2    Years of education: Not on file    Highest education level: Not on file   Occupational History    Occupation: [employer] resource strain: Not on file    Food insecurity     Worry: Not on file     Inability: Not on file   Night Out needs     Medical: Not on file     Non-medical: Not on file   Tobacco Use    Smoking status: Current Every Day Smoker     Packs/day: 0.00     Years: 20.00     Pack years: 0.00     Types: Cigarettes    Smokeless tobacco: Never Used    Tobacco comment: Pt denies smoking but POA indicates pt smokes daily and refuses to tell medical personel   Substance and Sexual Activity    Alcohol use: No    Drug use: No    Sexual activity: Not Currently     Partners: Male   Lifestyle    Physical activity     Days per week: Not on file     Minutes per session: Not on file    Stress: Not on file   Relationships    Social connections     Talks on phone: Not on file     Gets together: Not on file     Attends Druze service: Not on file     Active member of club or organization: Not on file     Attends meetings of clubs or organizations: Not on file     Relationship status: Not on file    Intimate partner violence     Fear of current or ex partner: Not on file     Emotionally abused: Not on file     Physically abused: Not on file     Forced sexual activity: Not on file   Other Topics Concern    Not on file   Social History Narrative     twice now     Retired  for [employer]    She has 2 sons and one daughter    Education high school    [employer] vinay none specified    Smoked in many years now quit    Denies alcohol consumption or substance abuse    Physically sedentary    Enjoys crocheting       Review of Systems   Constitutional: Negative for activity change and fatigue. HENT: Negative for congestion. Respiratory: Negative for cough, chest tightness and shortness of breath. Cardiovascular: Negative for chest pain, palpitations and leg swelling. Gastrointestinal: Negative for abdominal pain. Neurological: Negative for weakness and light-headedness. Hematological: Negative. Psychiatric/Behavioral: The patient is nervous/anxious. OBJECTIVE:    Wt Readings from Last 3 Encounters:   03/24/21 120 lb 6.4 oz (54.6 kg)   03/22/21 120 lb (54.4 kg)   02/24/21 125 lb (56.7 kg)       /74   Pulse 57   Temp 98.7 °F (37.1 °C)   Resp 18   Ht 5' 3\" (1.6 m)   Wt 120 lb 6.4 oz (54.6 kg)   LMP  (LMP Unknown)   SpO2 95%   BMI 21.33 kg/m²     Physical Exam  Vitals signs and nursing note reviewed. Constitutional:       General: She is not in acute distress. Appearance: She is well-developed. HENT:      Head: Normocephalic. Right Ear: External ear normal.      Left Ear: External ear normal.   Eyes:      Conjunctiva/sclera: Conjunctivae normal.      Pupils: Pupils are equal, round, and reactive to light. Neck:      Musculoskeletal: Normal range of motion and neck supple. Vascular: No JVD.    Cardiovascular:      Rate and Rhythm: Normal rate and regular rhythm. Heart sounds: Normal heart sounds. Pulmonary:      Effort: Pulmonary effort is normal.      Breath sounds: Normal breath sounds. Abdominal:      General: Bowel sounds are normal.      Palpations: Abdomen is soft. Musculoskeletal: Normal range of motion. Lymphadenopathy:      Cervical: No cervical adenopathy. Skin:     General: Skin is warm and dry. Neurological:      Mental Status: She is alert and oriented to person, place, and time. Psychiatric:         Behavior: Behavior normal.         Thought Content: Thought content normal.         Judgment: Judgment normal.         ASSESSMENT:    1. Essential hypertension          PLAN:    MEDICATIONS:  Orders Placed This Encounter   Medications    metoprolol succinate (TOPROL XL) 25 MG extended release tablet     Sig: Take 1 tablet by mouth daily     Dispense:  90 tablet     Refill:  3     We are going to stop her doxepin. We will increase BuSpar to 5 mg in the morning and 15 mg 1 hour before bedtime. She will also try melatonin and L-tryptophan over-the-counter. ORDERS:  No orders of the defined types were placed in this encounter. I refilled her Toprol-XL. She is to continue the indapamide and amlodipine. If she has any problems she is to let me know. She does have a follow-up with me on September 23, 2021. Follow-up:  Return if symptoms worsen or fail to improve. PATIENT INSTRUCTIONS:  Patient Instructions   Stop the doxepin. That is the pill you are taking for sleep. Take the buspirone 5 mg 1 tablet in the morning and then take 3 tablets 1 hour before bedtime to see if that will help. That means you are taking 5 mg in the morning and 15 mg in the evening. Would also get some over-the-counter L-tryptophan which is in the vitamin section. Take 1 of those mixed with melatonin 3 mg which is also over-the-counter and take them faithfully for 2 weeks to see if they will help with sleep.       EMR Dragon/transcription disclaimer:  Much of this encounter note is electronic transcription/translation of spoken language to printed texts. The electronic translation of spoken language may be erroneous, or at times, nonsensical words or phrases may beinadvertently transcribed.   Although I have reviewed the note for such errors, some may still exist.

## 2021-05-03 RX ORDER — DOXEPIN HYDROCHLORIDE 50 MG/1
CAPSULE ORAL
Qty: 30 CAPSULE | Refills: 3 | Status: SHIPPED | OUTPATIENT
Start: 2021-05-03 | End: 2021-08-09

## 2021-05-10 RX ORDER — BUSPIRONE HYDROCHLORIDE 5 MG/1
TABLET ORAL
Qty: 60 TABLET | Refills: 2 | Status: SHIPPED | OUTPATIENT
Start: 2021-05-10 | End: 2021-08-09

## 2021-06-01 ENCOUNTER — NURSE ONLY (OUTPATIENT)
Dept: PRIMARY CARE CLINIC | Age: 71
End: 2021-06-01
Payer: MEDICARE

## 2021-06-01 DIAGNOSIS — R30.0 DYSURIA: Primary | ICD-10-CM

## 2021-06-01 DIAGNOSIS — N39.0 URINARY TRACT INFECTION WITHOUT HEMATURIA, SITE UNSPECIFIED: ICD-10-CM

## 2021-06-01 LAB
BILIRUBIN, POC: ABNORMAL
BLOOD URINE, POC: ABNORMAL
CLARITY, POC: ABNORMAL
COLOR, POC: ABNORMAL
GLUCOSE URINE, POC: ABNORMAL
KETONES, POC: ABNORMAL
LEUKOCYTE EST, POC: ABNORMAL
NITRITE, POC: POSITIVE
PH, POC: 6.5
PROTEIN, POC: ABNORMAL
SPECIFIC GRAVITY, POC: 1.01
UROBILINOGEN, POC: ABNORMAL

## 2021-06-01 PROCEDURE — 81002 URINALYSIS NONAUTO W/O SCOPE: CPT | Performed by: FAMILY MEDICINE

## 2021-06-01 RX ORDER — CIPROFLOXACIN 250 MG/1
250 TABLET, FILM COATED ORAL 2 TIMES DAILY
Qty: 20 TABLET | Refills: 0 | Status: SHIPPED | OUTPATIENT
Start: 2021-06-01 | End: 2021-06-11

## 2021-06-03 LAB
ORGANISM: ABNORMAL
URINE CULTURE, ROUTINE: ABNORMAL
URINE CULTURE, ROUTINE: ABNORMAL

## 2021-08-09 RX ORDER — DOXEPIN HYDROCHLORIDE 50 MG/1
CAPSULE ORAL
Qty: 30 CAPSULE | Refills: 3 | Status: SHIPPED | OUTPATIENT
Start: 2021-08-09 | End: 2021-09-23 | Stop reason: SDUPTHER

## 2021-08-09 RX ORDER — BUSPIRONE HYDROCHLORIDE 5 MG/1
TABLET ORAL
Qty: 60 TABLET | Refills: 2 | Status: SHIPPED | OUTPATIENT
Start: 2021-08-09 | End: 2021-09-23 | Stop reason: SDUPTHER

## 2021-08-09 RX ORDER — AMLODIPINE BESYLATE 5 MG/1
TABLET ORAL
Qty: 30 TABLET | Refills: 5 | Status: SHIPPED | OUTPATIENT
Start: 2021-08-09 | End: 2021-09-23 | Stop reason: SDUPTHER

## 2021-09-07 DIAGNOSIS — K21.9 GASTROESOPHAGEAL REFLUX DISEASE, UNSPECIFIED WHETHER ESOPHAGITIS PRESENT: ICD-10-CM

## 2021-09-07 DIAGNOSIS — K21.9 LARYNGOPHARYNGEAL REFLUX (LPR): ICD-10-CM

## 2021-09-07 RX ORDER — FAMOTIDINE 40 MG/1
40 TABLET, FILM COATED ORAL NIGHTLY
Qty: 30 TABLET | Refills: 11 | Status: SHIPPED | OUTPATIENT
Start: 2021-09-07 | End: 2023-03-28

## 2021-09-07 RX ORDER — INDAPAMIDE 1.25 MG/1
1.25 TABLET, FILM COATED ORAL EVERY MORNING
Qty: 90 TABLET | Refills: 1 | Status: SHIPPED | OUTPATIENT
Start: 2021-09-07 | End: 2021-09-23 | Stop reason: SDUPTHER

## 2021-09-21 ENCOUNTER — OFFICE VISIT (OUTPATIENT)
Dept: PRIMARY CARE CLINIC | Age: 71
End: 2021-09-21

## 2021-09-21 VITALS — BODY MASS INDEX: 21.62 KG/M2 | HEIGHT: 63 IN | WEIGHT: 122 LBS

## 2021-09-21 DIAGNOSIS — Z11.52 ENCOUNTER FOR SCREENING FOR COVID-19: Primary | ICD-10-CM

## 2021-09-21 LAB — SARS-COV-2, PCR: NOT DETECTED

## 2021-09-21 PROCEDURE — 99999 PR OFFICE/OUTPT VISIT,PROCEDURE ONLY: CPT | Performed by: NURSE PRACTITIONER

## 2021-09-23 ENCOUNTER — OFFICE VISIT (OUTPATIENT)
Dept: PRIMARY CARE CLINIC | Age: 71
End: 2021-09-23
Payer: MEDICARE

## 2021-09-23 VITALS
TEMPERATURE: 96.9 F | HEART RATE: 61 BPM | SYSTOLIC BLOOD PRESSURE: 108 MMHG | OXYGEN SATURATION: 98 % | RESPIRATION RATE: 18 BRPM | HEIGHT: 63 IN | WEIGHT: 123 LBS | BODY MASS INDEX: 21.79 KG/M2 | DIASTOLIC BLOOD PRESSURE: 68 MMHG

## 2021-09-23 DIAGNOSIS — R73.09 ELEVATED GLUCOSE: ICD-10-CM

## 2021-09-23 DIAGNOSIS — Z00.00 ROUTINE GENERAL MEDICAL EXAMINATION AT A HEALTH CARE FACILITY: Primary | ICD-10-CM

## 2021-09-23 DIAGNOSIS — E04.2 MULTINODULAR GOITER: ICD-10-CM

## 2021-09-23 DIAGNOSIS — I10 ESSENTIAL HYPERTENSION: ICD-10-CM

## 2021-09-23 DIAGNOSIS — Z13.220 LIPID SCREENING: ICD-10-CM

## 2021-09-23 DIAGNOSIS — H81.10 BENIGN PAROXYSMAL POSITIONAL VERTIGO, UNSPECIFIED LATERALITY: ICD-10-CM

## 2021-09-23 DIAGNOSIS — Z13.1 SCREENING FOR DIABETES MELLITUS: ICD-10-CM

## 2021-09-23 PROBLEM — K63.5 COLON POLYPS: Status: ACTIVE | Noted: 2020-08-04

## 2021-09-23 PROBLEM — J34.89 NASAL VESTIBULITIS: Status: ACTIVE | Noted: 2021-03-23

## 2021-09-23 PROBLEM — K21.9 GASTROESOPHAGEAL REFLUX DISEASE: Status: ACTIVE | Noted: 2020-10-01

## 2021-09-23 PROBLEM — Z98.890: Status: ACTIVE | Noted: 2020-11-19

## 2021-09-23 PROBLEM — K21.9 LARYNGOPHARYNGEAL REFLUX (LPR): Status: ACTIVE | Noted: 2020-10-01

## 2021-09-23 PROBLEM — H83.09 LABYRINTHITIS: Status: ACTIVE | Noted: 2020-08-04

## 2021-09-23 PROBLEM — M19.90 OSTEOARTHROSIS: Status: ACTIVE | Noted: 2020-08-04

## 2021-09-23 PROBLEM — N28.89 RENAL MASS, RIGHT: Status: ACTIVE | Noted: 2020-01-07

## 2021-09-23 LAB
ALBUMIN SERPL-MCNC: 4.4 G/DL (ref 3.5–5.2)
ALP BLD-CCNC: 143 U/L (ref 35–104)
ALT SERPL-CCNC: 9 U/L (ref 5–33)
ANION GAP SERPL CALCULATED.3IONS-SCNC: 15 MMOL/L (ref 7–19)
AST SERPL-CCNC: 15 U/L (ref 5–32)
BILIRUB SERPL-MCNC: <0.2 MG/DL (ref 0.2–1.2)
BUN BLDV-MCNC: 14 MG/DL (ref 8–23)
CALCIUM SERPL-MCNC: 10 MG/DL (ref 8.8–10.2)
CHLORIDE BLD-SCNC: 97 MMOL/L (ref 98–111)
CHOLESTEROL, TOTAL: 192 MG/DL (ref 160–199)
CO2: 25 MMOL/L (ref 22–29)
CREAT SERPL-MCNC: 1.3 MG/DL (ref 0.5–0.9)
GFR AFRICAN AMERICAN: 49
GFR NON-AFRICAN AMERICAN: 41
GLUCOSE BLD-MCNC: 79 MG/DL (ref 74–109)
HBA1C MFR BLD: 5.6 % (ref 4–6)
HCT VFR BLD CALC: 40.7 % (ref 37–47)
HDLC SERPL-MCNC: 46 MG/DL (ref 65–121)
HEMOGLOBIN: 12.8 G/DL (ref 12–16)
LDL CHOLESTEROL CALCULATED: 117 MG/DL
MCH RBC QN AUTO: 29.7 PG (ref 27–31)
MCHC RBC AUTO-ENTMCNC: 31.4 G/DL (ref 33–37)
MCV RBC AUTO: 94.4 FL (ref 81–99)
PDW BLD-RTO: 14.3 % (ref 11.5–14.5)
PLATELET # BLD: 381 K/UL (ref 130–400)
PMV BLD AUTO: 10 FL (ref 9.4–12.3)
POTASSIUM SERPL-SCNC: 4.9 MMOL/L (ref 3.5–5)
RBC # BLD: 4.31 M/UL (ref 4.2–5.4)
SODIUM BLD-SCNC: 137 MMOL/L (ref 136–145)
TOTAL PROTEIN: 6.9 G/DL (ref 6.6–8.7)
TRIGL SERPL-MCNC: 143 MG/DL (ref 0–149)
TSH SERPL DL<=0.05 MIU/L-ACNC: 2.05 UIU/ML (ref 0.27–4.2)
WBC # BLD: 8.3 K/UL (ref 4.8–10.8)

## 2021-09-23 PROCEDURE — 1123F ACP DISCUSS/DSCN MKR DOCD: CPT | Performed by: FAMILY MEDICINE

## 2021-09-23 PROCEDURE — G8427 DOCREV CUR MEDS BY ELIG CLIN: HCPCS | Performed by: FAMILY MEDICINE

## 2021-09-23 PROCEDURE — G8420 CALC BMI NORM PARAMETERS: HCPCS | Performed by: FAMILY MEDICINE

## 2021-09-23 PROCEDURE — 3017F COLORECTAL CA SCREEN DOC REV: CPT | Performed by: FAMILY MEDICINE

## 2021-09-23 PROCEDURE — 4004F PT TOBACCO SCREEN RCVD TLK: CPT | Performed by: FAMILY MEDICINE

## 2021-09-23 PROCEDURE — 99214 OFFICE O/P EST MOD 30 MIN: CPT | Performed by: FAMILY MEDICINE

## 2021-09-23 PROCEDURE — G8399 PT W/DXA RESULTS DOCUMENT: HCPCS | Performed by: FAMILY MEDICINE

## 2021-09-23 PROCEDURE — G0439 PPPS, SUBSEQ VISIT: HCPCS | Performed by: FAMILY MEDICINE

## 2021-09-23 PROCEDURE — 4040F PNEUMOC VAC/ADMIN/RCVD: CPT | Performed by: FAMILY MEDICINE

## 2021-09-23 PROCEDURE — 1090F PRES/ABSN URINE INCON ASSESS: CPT | Performed by: FAMILY MEDICINE

## 2021-09-23 RX ORDER — BUSPIRONE HYDROCHLORIDE 5 MG/1
TABLET ORAL
Qty: 60 TABLET | Refills: 2 | Status: SHIPPED | OUTPATIENT
Start: 2021-09-23 | End: 2021-11-08

## 2021-09-23 RX ORDER — AMLODIPINE BESYLATE 5 MG/1
TABLET ORAL
Qty: 90 TABLET | Refills: 3 | Status: SHIPPED | OUTPATIENT
Start: 2021-09-23 | End: 2022-08-01

## 2021-09-23 RX ORDER — INDAPAMIDE 1.25 MG/1
1.25 TABLET, FILM COATED ORAL EVERY MORNING
Qty: 90 TABLET | Refills: 3 | Status: SHIPPED | OUTPATIENT
Start: 2021-09-23 | End: 2022-06-27

## 2021-09-23 RX ORDER — MECLIZINE HYDROCHLORIDE 25 MG/1
TABLET ORAL
Qty: 30 TABLET | Refills: 3 | Status: SHIPPED | OUTPATIENT
Start: 2021-09-23

## 2021-09-23 RX ORDER — DOXEPIN HYDROCHLORIDE 50 MG/1
CAPSULE ORAL
Qty: 30 CAPSULE | Refills: 3 | Status: SHIPPED | OUTPATIENT
Start: 2021-09-23 | End: 2022-06-01

## 2021-09-23 ASSESSMENT — PATIENT HEALTH QUESTIONNAIRE - PHQ9
SUM OF ALL RESPONSES TO PHQ QUESTIONS 1-9: 0
2. FEELING DOWN, DEPRESSED OR HOPELESS: 0
SUM OF ALL RESPONSES TO PHQ9 QUESTIONS 1 & 2: 0
1. LITTLE INTEREST OR PLEASURE IN DOING THINGS: 0

## 2021-09-23 ASSESSMENT — LIFESTYLE VARIABLES: HOW OFTEN DO YOU HAVE A DRINK CONTAINING ALCOHOL: 0
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
## 2021-09-23 NOTE — PATIENT INSTRUCTIONS
Personalized Preventive Plan for Arnel Primes - 9/23/2021  Medicare offers a range of preventive health benefits. Some of the tests and screenings are paid in full while other may be subject to a deductible, co-insurance, and/or copay. Some of these benefits include a comprehensive review of your medical history including lifestyle, illnesses that may run in your family, and various assessments and screenings as appropriate. After reviewing your medical record and screening and assessments performed today your provider may have ordered immunizations, labs, imaging, and/or referrals for you. A list of these orders (if applicable) as well as your Preventive Care list are included within your After Visit Summary for your review. Other Preventive Recommendations:    · A preventive eye exam performed by an eye specialist is recommended every 1-2 years to screen for glaucoma; cataracts, macular degeneration, and other eye disorders. · A preventive dental visit is recommended every 6 months. · Try to get at least 150 minutes of exercise per week or 10,000 steps per day on a pedometer . · Order or download the FREE \"Exercise & Physical Activity: Your Everyday Guide\" from [employer]. Call 8-493.899.3780 or search [employer] online. · You need 1228-9463 mg of calcium and 2603-7091 IU of vitamin D per day. It is possible to meet your calcium requirement with diet alone, but a vitamin D supplement is usually necessary to meet this goal.  · When exposed to the sun, use a sunscreen that protects against both UVA and UVB radiation with an SPF of 30 or greater. Reapply every 2 to 3 hours or after sweating, drying off with a towel, or swimming. · Always wear a seat belt when traveling in a car. Always wear a helmet when riding a bicycle or motorcycle. Heart-Healthy Diet   Sodium, Fat, and Cholesterol Controlled Diet       What Is a Heart Healthy Diet?    A heart-healthy diet is one that limits sodium , certain types of fat , and cholesterol . This type of diet is recommended for:   People with any form of cardiovascular disease (eg, coronary heart disease , peripheral vascular disease , previous heart attack , previous stroke )   People with risk factors for cardiovascular disease, such as high blood pressure , high cholesterol , or diabetes   Anyone who wants to lower their risk of developing cardiovascular disease   Sodium    Sodium is a mineral found in many foods. In general, most people consume much more sodium than they need. Diets high in sodium can increase blood pressure and lead to edema (water retention). On a heart-healthy diet, you should consume no more than 2,300 mg (milligrams) of sodium per dayabout the amount in one teaspoon of table salt. The foods highest in sodium include table salt (about 50% sodium), processed foods, convenience foods, and preserved foods. Cholesterol    Cholesterol is a fat-like, waxy substance in your blood. Our bodies make some cholesterol. It is also found in animal products, with the highest amounts in fatty meat, egg yolks, whole milk, cheese, shellfish, and organ meats. On a heart-healthy diet, you should limit your cholesterol intake to less than 200 mg per day. It is normal and important to have some cholesterol in your bloodstream. But too much cholesterol can cause plaque to build up within your arteries, which can eventually lead to a heart attack or stroke. The two types of cholesterol that are most commonly referred to are:   Low-density lipoprotein (LDL) cholesterol  Also known as bad cholesterol, this is the cholesterol that tends to build up along your arteries. Bad cholesterol levels are increased by eating fats that are saturated or hydrogenated. Optimal level of this cholesterol is less than 100. Over 130 starts to get risky for heart disease.    High-density lipoprotein (HDL) cholesterol  Also known as good cholesterol, this type of cholesterol actually carries cholesterol away from your arteries and may, therefore, help lower your risk of having a heart attack. You want this level to be high (ideally greater than 60). It is a risk to have a level less than 40. You can raise this good cholesterol by eating olive oil, canola oil, avocados, or nuts. Exercise raises this level, too. Fat    Fat is calorie dense and packs a lot of calories into a small amount of food. Even though fats should be limited due to their high calorie content, not all fats are bad. In fact, some fats are quite healthful. Fat can be broken down into four main types. The good-for-you fats are:   Monounsaturated fat  found in oils such as olive and canola, avocados, and nuts and natural nut butters; can decrease cholesterol levels, while keeping levels of HDL cholesterol high   Polyunsaturated fat  found in oils such as safflower, sunflower, soybean, corn, and sesame; can decrease total cholesterol and LDL cholesterol   Omega-3 fatty acids  particularly those found in fatty fish (such as salmon, trout, tuna, mackerel, herring, and sardines); can decrease risk of arrhythmias, decrease triglyceride levels, and slightly lower blood pressure   The fats that you want to limit are:   Saturated fat  found in animal products, many fast foods, and a few vegetables; increases total blood cholesterol, including LDL levels   Animal fats that are saturated include: butter, lard, whole-milk dairy products, meat fat, and poultry skin   Vegetable fats that are saturated include: hydrogenated shortening, palm oil, coconut oil, cocoa butter   Hydrogenated or trans fat  found in margarine and vegetable shortening, most shelf stable snack foods, and fried foods; increases LDL and decreases HDL     It is generally recommended that you limit your total fat for the day to less than 30% of your total calories.  If you follow an 1800-calorie heart healthy diet, for example, this would mean 60 grams of fat or less per day. Saturated fat and trans fat in your diet raises your blood cholesterol the most, much more than dietary cholesterol does. For this reason, on a heart-healthy diet, less than 7% of your calories should come from saturated fat and ideally 0% from trans fat. On an 1800-calorie diet, this translates into less than 14 grams of saturated fat per day, leaving 46 grams of fat to come from mono- and polyunsaturated fats.    Food Choices on a Heart Healthy Diet   Food Category   Foods Recommended   Foods to Avoid   Grains   Breads and rolls without salted tops Most dry and cooked cereals Unsalted crackers and breadsticks Low-sodium or homemade breadcrumbs or stuffing All rice and pastas   Breads, rolls, and crackers with salted tops High-fat baked goods (eg, muffins, donuts, pastries) Quick breads, self-rising flour, and biscuit mixes Regular bread crumbs Instant hot cereals Commercially prepared rice, pasta, or stuffing mixes   Vegetables   Most fresh, frozen, and low-sodium canned vegetables Low-sodium and salt-free vegetable juices Canned vegetables if unsalted or rinsed   Regular canned vegetables and juices, including sauerkraut and pickled vegetables Frozen vegetables with sauces Commercially prepared potato and vegetable mixes   Fruits   Most fresh, frozen, and canned fruits All fruit juices   Fruits processed with salt or sodium   Milk   Nonfat or low-fat (1%) milk Nonfat or low-fat yogurt Cottage cheese, low-fat ricotta, cheeses labeled as low-fat and low-sodium   Whole milk Reduced-fat (2%) milk Malted and chocolate milk Full fat yogurt Most cheeses (unless low-fat and low salt) Buttermilk (no more than 1 cup per week)   Meats and Beans   Lean cuts of fresh or frozen beef, veal, lamb, or pork (look for the word loin) Fresh or frozen poultry without the skin Fresh or frozen fish and some shellfish Egg whites and egg substitutes (Limit whole eggs to three per week) Tofu Nuts or seeds (unsalted, dry-roasted), low-sodium peanut butter Dried peas, beans, and lentils   Any smoked, cured, salted, or canned meat, fish, or poultry (including comer, chipped beef, cold cuts, hot dogs, sausages, sardines, and anchovies) Poultry skins Breaded and/or fried fish or meats Canned peas, beans, and lentils Salted nuts   Fats and Oils   Olive oil and canola oil Low-sodium, low-fat salad dressings and mayonnaise   Butter, margarine, coconut and palm oils, comer fat   Snacks, Sweets, and Condiments   Low-sodium or unsalted versions of broths, soups, soy sauce, and condiments Pepper, herbs, and spices; vinegar, lemon, or lime juice Low-fat frozen desserts (yogurt, sherbet, fruit bars) Sugar, cocoa powder, honey, syrup, jam, and preserves Low-fat, trans-fat free cookies, cakes, and pies Arnaud and animal crackers, fig bars, marcela snaps   High-fat desserts Broth, soups, gravies, and sauces, made from instant mixes or other high-sodium ingredients Salted snack foods Canned olives Meat tenderizers, seasoning salt, and most flavored vinegars   Beverages   Low-sodium carbonated beverages Tea and coffee in moderation Soy milk   Commercially softened water   Suggestions   Make whole grains, fruits, and vegetables the base of your diet. Choose heart-healthy fats such as canola, olive, and flaxseed oil, and foods high in heart-healthy fats, such as nuts, seeds, soybeans, tofu, and fish. Eat fish at least twice per week; the fish highest in omega-3 fatty acids and lowest in mercury include salmon, herring, mackerel, sardines, and canned chunk light tuna. If you eat fish less than twice per week or have high triglycerides, talk to your doctor about taking fish oil supplements. Read food labels.    For products low in fat and cholesterol, look for fat free, low-fat, cholesterol free, saturated fat free, and trans fat freeAlso scan the Nutrition Facts Label, which lists saturated fat, trans fat, and cholesterol amounts. For products low in sodium, look for sodium free, very low sodium, low sodium, no added salt, and unsalted   Skip the salt when cooking or at the table; if food needs more flavor, get creative and try out different herbs and spices. Garlic and onion also add substantial flavor to foods. Trim any visible fat off meat and poultry before cooking, and drain the fat off after carpenter. Use cooking methods that require little or no added fat, such as grilling, boiling, baking, poaching, broiling, roasting, steaming, stir-frying, and sauting. Avoid fast food and convenience food. They tend to be high in saturated and trans fat and have a lot of added salt. Talk to a registered dietitian for individualized diet advice. Last Reviewed: March 2011 Bridgette Leahy MS, MPH, RD   Updated: 3/29/2011   ·   Patient Education        Vertigo: Exercises  Introduction  Here are some examples of exercises for you to try. The exercises may be suggested for a condition or for rehabilitation. Start each exercise slowly. Ease off the exercises if you start to have pain. You will be told when to start these exercises and which ones will work best for you. How to do the exercises  Exercise 1    Stand with a chair in front of you and a wall behind you. If you begin to fall, you may use them for support. Stand with your feet together and your arms at your sides. Move your head up and down 10 times. Exercise 2    Move your head side to side 10 times. Exercise 3    Move your head diagonally up and down 10 times. Exercise 4    Move your head diagonally up and down 10 times on the other side. Follow-up care is a key part of your treatment and safety. Be sure to make and go to all appointments, and call your doctor if you are having problems. It's also a good idea to know your test results and keep a list of the medicines you take. Where can you learn more?   Go to [email or web address] and sign in to your [employer] account. Enter F349 in the [place] box to learn more about \"Vertigo: Exercises. \"     If you do not have an account, please click on the \"Sign Up Now\" link. Current as of: December 2, 2020               Content Version: 13.0  © 2006-2021 [employer]. Care instructions adapted under license by [facility] ([facility]). If you have questions about a medical condition or this instruction, always ask your healthcare professional. [employer] any warranty or liability for your use of this information. Patient Education         Vertigo: Head Movements That Help (01:53)  Your health professional recommends that you watch this short online health video. Learn simple head movements to help with vertigo and balance problems. Purpose:  Shows head movements for vertigo: up and down, side-to-side, and diagonal. Emphasizes starting slowly to reduce fall risk. Goal:  The user will learn how to do simple head movements that can help with vertigo and balance problems. How to watch the video    Scan the QR code   OR Visit the website    https://[email or web address] [email or web address]   Current as of: December 2, 2020               Content Version: 13.0  © 2006-2021 [employer]. Care instructions adapted under license by [facility] ([facility]). If you have questions about a medical condition or this instruction, always ask your healthcare professional. [employer] any warranty or liability for your use of this information.
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
## 2021-09-24 ENCOUNTER — OFFICE VISIT (OUTPATIENT)
Dept: OTOLARYNGOLOGY | Facility: CLINIC | Age: 71
End: 2021-09-24

## 2021-09-24 VITALS — HEART RATE: 64 BPM | TEMPERATURE: 96.4 F | DIASTOLIC BLOOD PRESSURE: 74 MMHG | SYSTOLIC BLOOD PRESSURE: 131 MMHG

## 2021-09-24 DIAGNOSIS — K21.9 GASTROESOPHAGEAL REFLUX DISEASE, UNSPECIFIED WHETHER ESOPHAGITIS PRESENT: Primary | ICD-10-CM

## 2021-09-24 DIAGNOSIS — R68.2 DRY MOUTH: ICD-10-CM

## 2021-09-24 DIAGNOSIS — K21.9 LARYNGOPHARYNGEAL REFLUX (LPR): ICD-10-CM

## 2021-09-24 DIAGNOSIS — Z98.890 S/P EXCISION OF VOCAL CORD NODULE: ICD-10-CM

## 2021-09-24 DIAGNOSIS — R42 VERTIGO: ICD-10-CM

## 2021-09-24 PROCEDURE — 99214 OFFICE O/P EST MOD 30 MIN: CPT | Performed by: NURSE PRACTITIONER

## 2021-09-26 DIAGNOSIS — N28.9 DECREASED RENAL FUNCTION: Primary | ICD-10-CM

## 2021-09-27 ENCOUNTER — OFFICE VISIT (OUTPATIENT)
Dept: CARDIOLOGY CLINIC | Age: 71
End: 2021-09-27
Payer: MEDICARE

## 2021-09-27 VITALS
SYSTOLIC BLOOD PRESSURE: 106 MMHG | HEIGHT: 63 IN | WEIGHT: 124 LBS | HEART RATE: 63 BPM | DIASTOLIC BLOOD PRESSURE: 64 MMHG | OXYGEN SATURATION: 96 % | BODY MASS INDEX: 21.97 KG/M2

## 2021-09-27 DIAGNOSIS — J44.1 CHRONIC OBSTRUCTIVE PULMONARY DISEASE WITH ACUTE EXACERBATION (HCC): ICD-10-CM

## 2021-09-27 DIAGNOSIS — I10 ESSENTIAL HYPERTENSION: ICD-10-CM

## 2021-09-27 DIAGNOSIS — I48.0 PAF (PAROXYSMAL ATRIAL FIBRILLATION) (HCC): Primary | ICD-10-CM

## 2021-09-27 DIAGNOSIS — I65.23 BILATERAL CAROTID ARTERY STENOSIS: ICD-10-CM

## 2021-09-27 PROCEDURE — 1123F ACP DISCUSS/DSCN MKR DOCD: CPT | Performed by: CLINICAL NURSE SPECIALIST

## 2021-09-27 PROCEDURE — G8926 SPIRO NO PERF OR DOC: HCPCS | Performed by: CLINICAL NURSE SPECIALIST

## 2021-09-27 PROCEDURE — 1090F PRES/ABSN URINE INCON ASSESS: CPT | Performed by: CLINICAL NURSE SPECIALIST

## 2021-09-27 PROCEDURE — 3017F COLORECTAL CA SCREEN DOC REV: CPT | Performed by: CLINICAL NURSE SPECIALIST

## 2021-09-27 PROCEDURE — G8399 PT W/DXA RESULTS DOCUMENT: HCPCS | Performed by: CLINICAL NURSE SPECIALIST

## 2021-09-27 PROCEDURE — G8420 CALC BMI NORM PARAMETERS: HCPCS | Performed by: CLINICAL NURSE SPECIALIST

## 2021-09-27 PROCEDURE — 99214 OFFICE O/P EST MOD 30 MIN: CPT | Performed by: CLINICAL NURSE SPECIALIST

## 2021-09-27 PROCEDURE — 4040F PNEUMOC VAC/ADMIN/RCVD: CPT | Performed by: CLINICAL NURSE SPECIALIST

## 2021-09-27 PROCEDURE — 4004F PT TOBACCO SCREEN RCVD TLK: CPT | Performed by: CLINICAL NURSE SPECIALIST

## 2021-09-27 PROCEDURE — 3023F SPIROM DOC REV: CPT | Performed by: CLINICAL NURSE SPECIALIST

## 2021-09-27 PROCEDURE — G8427 DOCREV CUR MEDS BY ELIG CLIN: HCPCS | Performed by: CLINICAL NURSE SPECIALIST

## 2021-09-27 ASSESSMENT — ENCOUNTER SYMPTOMS
WHEEZING: 0
CHEST TIGHTNESS: 0
FACIAL SWELLING: 0
EYE REDNESS: 0
COUGH: 0
SHORTNESS OF BREATH: 1
VOMITING: 0
ABDOMINAL PAIN: 0
NAUSEA: 0

## 2021-09-27 NOTE — PATIENT INSTRUCTIONS
Return in about 9 months (around 6/27/2022) for APRN. Call for palpitations, fast heart rates, unusual shortness of breath, or chest pain. Call with any questionsor concerns  Follow up with Leonor Monroe MD for non cardiac problems  Report any new problems  Cardiovascular Fitness-Exercise as tolerated. Strive for 15 minutes of exercise most days of the week. Cardiac / HealthyDiet  Continue current medications as directed  Continue plan of treatment  It is always recommended that you bring your medicationsbottles with you to each visit - this is for your safety! Patient Education        Atrial Fibrillation: Care Instructions  Your Care Instructions     Atrial fibrillation is an irregular and often fast heartbeat. Treating this condition is important for several reasons. It can cause blood clots, which can travel from your heart to your brain and cause a stroke. If you have a fast heartbeat, you may feel lightheaded, dizzy, and weak. An irregular heartbeat can also increase your risk for heart failure. Atrial fibrillation is often the result of another heart condition, such as high blood pressure or coronary artery disease. Making changes to improve your heart condition will help you stay healthy and active. Follow-up care is a key part of your treatment and safety. Be sure to make and go to all appointments, and call your doctor if you are having problems. It's also a good idea to know your test results and keep a list of the medicines you take. How can you care for yourself at home? Medicines    · Take your medicines exactly as prescribed. Call your doctor if you think you are having a problem with your medicine. You will get more details on the specific medicines your doctor prescribes.     · If your doctor has given you a blood thinner to prevent a stroke, be sure you get instructions about how to take your medicine safely.  Blood thinners can cause serious bleeding problems.     · Do not take any vitamins, over-the-counter drugs, or herbal products without talking to your doctor first.   Lifestyle changes    · Do not smoke. Smoking can increase your chance of a stroke and heart attack. If you need help quitting, talk to your doctor about stop-smoking programs and medicines. These can increase your chances of quitting for good.     · Eat a heart-healthy diet.     · Stay at a healthy weight. Lose weight if you need to.     · Limit alcohol to 2 drinks a day for men and 1 drink a day for women. Too much alcohol can cause health problems.     · Avoid colds and flu. Get a pneumococcal vaccine shot. If you have had one before, ask your doctor whether you need another dose. Get a flu shot every year. If you must be around people with colds or flu, wash your hands often. Activity    · If your doctor recommends it, get more exercise. Walking is a good choice. Bit by bit, increase the amount you walk every day. Try for at least 30 minutes on most days of the week. You also may want to swim, bike, or do other activities. Your doctor may suggest that you join a cardiac rehabilitation program so that you can have help increasing your physical activity safely.     · Start light exercise if your doctor says it is okay. Even a small amount will help you get stronger, have more energy, and manage stress. Walking is an easy way to get exercise. Start out by walking a little more than you did in the hospital. Gradually increase the amount you walk.     · When you exercise, watch for signs that your heart is working too hard. You are pushing too hard if you cannot talk while you are exercising. If you become short of breath or dizzy or have chest pain, sit down and rest immediately.     · Check your pulse regularly. Place two fingers on the artery at the palm side of your wrist, in line with your thumb. If your heartbeat seems uneven or fast, talk to your doctor. When should you call for help?    Call 911 anytime you think you may need emergency care. For example, call if:    · You have symptoms of a heart attack. These may include:  ? Chest pain or pressure, or a strange feeling in the chest.  ? Sweating. ? Shortness of breath. ? Nausea or vomiting. ? Pain, pressure, or a strange feeling in the back, neck, jaw, or upper belly or in one or both shoulders or arms. ? Lightheadedness or sudden weakness. ? A fast or irregular heartbeat. After you call 911, the  may tell you to chew 1 adult-strength or 2 to 4 low-dose aspirin. Wait for an ambulance. Do not try to drive yourself.     · You have symptoms of a stroke. These may include:  ? Sudden numbness, tingling, weakness, or loss of movement in your face, arm, or leg, especially on only one side of your body. ? Sudden vision changes. ? Sudden trouble speaking. ? Sudden confusion or trouble understanding simple statements. ? Sudden problems with walking or balance. ? A sudden, severe headache that is different from past headaches.     · You passed out (lost consciousness). Call your doctor now or seek immediate medical care if:    · You have new or increased shortness of breath.     · You feel dizzy or lightheaded, or you feel like you may faint.     · Your heart rate becomes irregular.     · You can feel your heart flutter in your chest or skip heartbeats. Tell your doctor if these symptoms are new or worse. Watch closely for changes in your health, and be sure to contact your doctor if you have any problems. Where can you learn more? Go to https://[employer]. org and sign in to your [employer] account. Enter U020 in the [facility] box to learn more about \"Atrial Fibrillation: Care Instructions. \"     If you do not have an account, please click on the \"Sign Up Now\" link. Current as of: April 29, 2021               Content Version: 13.0  © 1128-8413 [employer]wise, Incorporated. Care instructions adapted under license by [facility]  If you have questions about a medical condition or this instruction, always ask your healthcare professional. [place] [address] any warranty or liability for your use of this information.

## 2021-09-27 NOTE — PROGRESS NOTES
[facility], [address], [address]  [address]  Phone: [phone]  Fax: [phone]    OFFICE VISIT:  2021    Danny Bowie - : 1950    Reason For Visit:  Sally Mullins is a 72 y.o. female who is here for 6 Month Follow-Up, Atrial Fibrillation, and Hypertension       Diagnosis Orders   1. PAF (paroxysmal atrial fibrillation) ([facility] [address]     2. Essential hypertension     3. Chronic obstructive pulmonary disease with acute exacerbation (HCC)           HPI  Patient is here for follow-up with a history of PAF, TIA, hypertension. She has severe COPD and wears oxygen mainly at bedtime. She is chronically short of breath with her COPD, but is needing her oxygen less often now. She denies chest pain, dizziness, lightheadedness, syncope. She denies palpitations or fast heart rates. She reports it has been about 40 years since she had a recurrence of atrial fibrillation    Zio patch heart monitor was last  with no evidence of A. fib      Unique Munoz MD is PCP.   Danny Bowie has the following history as recorded in [facility]:    Patient Active Problem List    Diagnosis Date Noted    Nasal vestibulitis 2021    Chronic obstructive pulmonary disease with acute exacerbation ([facility] [address] 2021    S/P excision of vocal cord nodule 2020    Gastroesophageal reflux disease 10/01/2020    Laryngopharyngeal reflux (LPR) 10/01/2020    Multinodular goiter 10/01/2020    Osteoarthrosis 2020    Labyrinthitis 2020    Colon polyps 2020    Renal mass, right 2020    Irritable bowel syndrome with diarrhea 2019    Positive sputum culture for Pseudomonas 2018    Bilateral carotid artery stenosis 2018    Essential hypertension 2018    History of colon polyps 2016    Chronic heartburn 2016    Antiplatelet or antithrombotic long-term use 2016    History of colonic polyps 2016    Never Used    Tobacco comment: Pt denies smoking but POA indicates pt smokes daily and refuses to tell medical personel   Substance Use Topics    Alcohol use: No      Current Outpatient Medications   Medication Sig Dispense Refill    meclizine (ANTIVERT) 25 MG tablet TAKE ONE TABLET BY MOUTH THREE TIMES DAILY AS NEEDED FOR SEVERE DIZZINESS 30 tablet 3    indapamide (LOZOL) 1.25 MG tablet Take 1 tablet by mouth every morning For high blood pressure 90 tablet 3    amLODIPine (NORVASC) 5 MG tablet TAKE 1 TABLET BY MOUTH EVERY NIGHT for blood pressure. 90 tablet 3    doxepin (SINEQUAN) 50 MG capsule TAKE 1 CAPSULE BY MOUTH AT BEDTIME AS NEEDED FOR SLEEP 30 capsule 3    busPIRone (BUSPAR) 5 MG tablet TAKE 1 TABLET BY MOUTH TWICE DAILY FOR ANXIETY 60 tablet 2    clopidogrel (PLAVIX) 75 MG tablet TAKE 1 TABLET BY MOUTH DAILY 90 tablet 3    metoprolol succinate (TOPROL XL) 25 MG extended release tablet Take 1 tablet by mouth daily 90 tablet 3    pantoprazole (PROTONIX) 40 MG tablet TAKE 1 TABLET BY MOUTH EVERY DAY 90 tablet 3    famotidine (PEPCID) 40 MG tablet Take 40 mg by mouth nightly      budesonide (PULMICORT) 0.5 MG/2ML nebulizer suspension Take 2 mLs by nebulization 2 times daily 60 ampule 3    ipratropium-albuterol (DUONEB) 0.5-2.5 (3) MG/3ML SOLN nebulizer solution Inhale 3 mLs into the lungs every 4 hours (while awake) 360 mL 1    Arformoterol Tartrate (BROVANA) 15 MCG/2ML NEBU Take 2 mLs by nebulization 2 times daily 120 mL 3    simvastatin (ZOCOR) 40 MG tablet TAKE ONE TABLET BY MOUTH EVERY EVENING 90 tablet 3    FLUoxetine (PROZAC) 20 MG capsule Take 1 capsule by mouth daily 90 capsule 3    Multiple Vitamins-Minerals (COMPLETE MULTIVITAMIN/MINERAL PO) Take 1 tablet by mouth daily       No current facility-administered medications for this visit.      Allergies: Colestipol, Codeine, Lac bovis, Prednisone, and Aspirin    Review of Systems  Review of Systems   Constitutional: Negative for activity change, diaphoresis, fatigue, fever and unexpected weight change. HENT: Negative for facial swelling and nosebleeds. Eyes: Negative for redness and visual disturbance. Respiratory: Positive for shortness of breath (Chronic). Negative for cough, chest tightness and wheezing. Cardiovascular: Negative for chest pain, palpitations and leg swelling. Gastrointestinal: Negative for abdominal pain, nausea and vomiting. Endocrine: Negative for cold intolerance and heat intolerance. Genitourinary: Negative for dysuria and hematuria. Musculoskeletal: Negative for arthralgias and myalgias. Skin: Negative for pallor and rash. Neurological: Negative for dizziness, seizures, syncope, weakness and light-headedness. Hematological: Does not bruise/bleed easily. Psychiatric/Behavioral: Negative for agitation. The patient is not nervous/anxious. Objective  Vital Signs - /64   Pulse 63   Ht 5' 3\" (1.6 m)   Wt 124 lb (56.2 kg)   LMP  (LMP Unknown)   SpO2 96%   BMI 21.97 kg/m²   Physical Exam  Vitals and nursing note reviewed. Constitutional:       General: She is not in acute distress. Appearance: She is well-developed. She is not diaphoretic. Comments: Deconditioned   HENT:      Head: Normocephalic and atraumatic. Right Ear: Hearing and external ear normal.      Left Ear: Hearing and external ear normal.      Nose: Nose normal.   Eyes:      General:         Right eye: No discharge. Left eye: No discharge. Pupils: Pupils are equal, round, and reactive to light. Neck:      Thyroid: No thyromegaly. Vascular: Carotid bruit (bilateral) present. No JVD. Trachea: No tracheal deviation. Cardiovascular:      Rate and Rhythm: Normal rate and regular rhythm. Heart sounds: Normal heart sounds. No murmur heard. No friction rub. No gallop. Comments: On continuous oxygen  Pulmonary:      Effort: Pulmonary effort is normal. No respiratory distress. Breath sounds: Normal breath sounds. No wheezing or rales. Abdominal:      Palpations: Abdomen is soft. Tenderness: There is no abdominal tenderness. Musculoskeletal:         General: No swelling or deformity. Cervical back: Neck supple. No muscular tenderness. Right lower leg: No edema. Left lower leg: No edema. Skin:     General: Skin is warm and dry. Findings: No rash. Neurological:      General: No focal deficit present. Mental Status: She is alert and oriented to person, place, and time. Cranial Nerves: No cranial nerve deficit. Psychiatric:         Mood and Affect: Mood normal.         Behavior: Behavior normal.         Judgment: Judgment normal.       Data:  Echo 5/19  Summary   Normal LV size and systolic function. LV ejection fraction estimated at   60%. Grade 1 diastolic dysfunction   Normal right ventricular size and systolic function. Normal left atrial size   Aortic valve not well visualized. No significant stenosis or regurgitation   noted. Mitral valve mildly thickened with normal mobility. 1+ mitral   regurgitation. No stenosis noted   Interatrial septum appears intact by color Doppler with no significant   intracardiac shunting noted by bubble study. Zio Patch 2 week monitor 2/19/2021-  No atrial fib. Patient triggered events with normal sinus rhythm    Assessment:     Diagnosis Orders   1. PAF (paroxysmal atrial fibrillation) ([facility] [address]     2. Essential hypertension     3. Chronic obstructive pulmonary disease with acute exacerbation (HCC)          PAF-no atrial fibrillation per  heart monitor February 2021. Continue metoprolol. She is not anticoagulated, but is on Plavix. History of TIA. Continue to monitor.   She states she was very symptomatic when she had her A. fib in the past.  She will let us know if there are any changes    Hypertension-well-controlled on current regimen with amlodipine, indapamide, metoprolol    Carotid stenosis-follows

## 2021-10-01 ENCOUNTER — HOSPITAL ENCOUNTER (OUTPATIENT)
Dept: WOMENS IMAGING | Age: 71
Discharge: HOME | End: 2021-10-01
Payer: MEDICARE

## 2021-10-01 DIAGNOSIS — Z12.31 BREAST CANCER SCREENING BY MAMMOGRAM: ICD-10-CM

## 2021-10-01 PROCEDURE — 77067 SCR MAMMO BI INCL CAD: CPT

## 2021-10-25 DIAGNOSIS — N28.9 DECREASED RENAL FUNCTION: ICD-10-CM

## 2021-10-25 LAB
ALBUMIN SERPL-MCNC: 4 G/DL (ref 3.5–5.2)
ALP BLD-CCNC: 122 U/L (ref 35–104)
ALT SERPL-CCNC: 9 U/L (ref 5–33)
ANION GAP SERPL CALCULATED.3IONS-SCNC: 13 MMOL/L (ref 7–19)
AST SERPL-CCNC: 16 U/L (ref 5–32)
BILIRUB SERPL-MCNC: <0.2 MG/DL (ref 0.2–1.2)
BUN BLDV-MCNC: 16 MG/DL (ref 8–23)
CALCIUM SERPL-MCNC: 9.3 MG/DL (ref 8.8–10.2)
CHLORIDE BLD-SCNC: 101 MMOL/L (ref 98–111)
CO2: 23 MMOL/L (ref 22–29)
CREAT SERPL-MCNC: 1.2 MG/DL (ref 0.5–0.9)
GFR AFRICAN AMERICAN: 54
GFR NON-AFRICAN AMERICAN: 44
GLUCOSE BLD-MCNC: 115 MG/DL (ref 74–109)
POTASSIUM SERPL-SCNC: 4.7 MMOL/L (ref 3.5–5)
SODIUM BLD-SCNC: 137 MMOL/L (ref 136–145)
TOTAL PROTEIN: 6.7 G/DL (ref 6.6–8.7)

## 2021-11-06 DIAGNOSIS — I71.40 ABDOMINAL AORTIC ANEURYSM (AAA) WITHOUT RUPTURE: ICD-10-CM

## 2021-11-08 RX ORDER — BUSPIRONE HYDROCHLORIDE 5 MG/1
TABLET ORAL
Qty: 60 TABLET | Refills: 2 | Status: SHIPPED | OUTPATIENT
Start: 2021-11-08 | End: 2022-05-02

## 2021-11-08 RX ORDER — FLUOXETINE HYDROCHLORIDE 20 MG/1
20 CAPSULE ORAL DAILY
Qty: 90 CAPSULE | Refills: 3 | Status: SHIPPED | OUTPATIENT
Start: 2021-11-08 | End: 2022-10-31

## 2021-12-07 RX ORDER — BUDESONIDE 0.5 MG/2ML
INHALANT ORAL
Qty: 360 ML | Refills: 1 | Status: SHIPPED | OUTPATIENT
Start: 2021-12-07 | End: 2022-09-29

## 2021-12-07 RX ORDER — BUDESONIDE 0.5 MG/2ML
0.5 INHALANT ORAL 2 TIMES DAILY
Qty: 2 ML | Refills: 1 | Status: SHIPPED | OUTPATIENT
Start: 2021-12-07 | End: 2021-12-07

## 2022-01-13 RX ORDER — SIMVASTATIN 40 MG
TABLET ORAL
Qty: 90 TABLET | Refills: 3 | Status: SHIPPED | OUTPATIENT
Start: 2022-01-13

## 2022-02-09 ENCOUNTER — HOSPITAL ENCOUNTER (OUTPATIENT)
Dept: NON INVASIVE DIAGNOSTICS | Age: 72
Discharge: HOME | End: 2022-02-09
Payer: MEDICARE

## 2022-02-09 ENCOUNTER — OFFICE VISIT (OUTPATIENT)
Dept: VASCULAR SURGERY | Age: 72
End: 2022-02-09
Payer: MEDICARE

## 2022-02-09 ENCOUNTER — HOSPITAL ENCOUNTER (OUTPATIENT)
Dept: ULTRASOUND IMAGING | Age: 72
Discharge: HOME | End: 2022-02-09
Payer: MEDICARE

## 2022-02-09 VITALS
WEIGHT: 118 LBS | HEART RATE: 75 BPM | TEMPERATURE: 97.5 F | BODY MASS INDEX: 20.91 KG/M2 | SYSTOLIC BLOOD PRESSURE: 117 MMHG | OXYGEN SATURATION: 99 % | RESPIRATION RATE: 18 BRPM | HEIGHT: 63 IN | DIASTOLIC BLOOD PRESSURE: 64 MMHG

## 2022-02-09 DIAGNOSIS — I70.1 RENAL ARTERY STENOSIS (HCC): ICD-10-CM

## 2022-02-09 DIAGNOSIS — I70.213 ATHEROSCLER OF NATIVE ARTERY OF BOTH LEGS WITH INTERMIT CLAUDICATION (HCC): ICD-10-CM

## 2022-02-09 DIAGNOSIS — I65.23 BILATERAL CAROTID ARTERY STENOSIS: ICD-10-CM

## 2022-02-09 DIAGNOSIS — I70.1 RENAL ARTERY STENOSIS (HCC): Primary | ICD-10-CM

## 2022-02-09 PROCEDURE — 1123F ACP DISCUSS/DSCN MKR DOCD: CPT | Performed by: NURSE PRACTITIONER

## 2022-02-09 PROCEDURE — 99214 OFFICE O/P EST MOD 30 MIN: CPT | Performed by: NURSE PRACTITIONER

## 2022-02-09 PROCEDURE — 3017F COLORECTAL CA SCREEN DOC REV: CPT | Performed by: NURSE PRACTITIONER

## 2022-02-09 PROCEDURE — G8427 DOCREV CUR MEDS BY ELIG CLIN: HCPCS | Performed by: NURSE PRACTITIONER

## 2022-02-09 PROCEDURE — 93975 VASCULAR STUDY: CPT

## 2022-02-09 PROCEDURE — G8420 CALC BMI NORM PARAMETERS: HCPCS | Performed by: NURSE PRACTITIONER

## 2022-02-09 PROCEDURE — 1090F PRES/ABSN URINE INCON ASSESS: CPT | Performed by: NURSE PRACTITIONER

## 2022-02-09 PROCEDURE — 93923 UPR/LXTR ART STDY 3+ LVLS: CPT

## 2022-02-09 PROCEDURE — 93880 EXTRACRANIAL BILAT STUDY: CPT

## 2022-02-09 PROCEDURE — 4040F PNEUMOC VAC/ADMIN/RCVD: CPT | Performed by: NURSE PRACTITIONER

## 2022-02-09 PROCEDURE — 4004F PT TOBACCO SCREEN RCVD TLK: CPT | Performed by: NURSE PRACTITIONER

## 2022-02-09 PROCEDURE — G8399 PT W/DXA RESULTS DOCUMENT: HCPCS | Performed by: NURSE PRACTITIONER

## 2022-02-09 PROCEDURE — G8484 FLU IMMUNIZE NO ADMIN: HCPCS | Performed by: NURSE PRACTITIONER
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
## 2022-03-07 RX ORDER — PANTOPRAZOLE SODIUM 40 MG/1
TABLET, DELAYED RELEASE ORAL
Qty: 90 TABLET | Refills: 3 | Status: SHIPPED | OUTPATIENT
Start: 2022-03-07

## 2022-03-07 RX ORDER — CLOPIDOGREL BISULFATE 75 MG/1
75 TABLET ORAL DAILY
Qty: 90 TABLET | Refills: 3 | Status: SHIPPED | OUTPATIENT
Start: 2022-03-07

## 2022-05-02 RX ORDER — BUSPIRONE HYDROCHLORIDE 5 MG/1
TABLET ORAL
Qty: 60 TABLET | Refills: 2 | Status: SHIPPED | OUTPATIENT
Start: 2022-05-02 | End: 2022-08-01

## 2022-06-01 RX ORDER — DOXEPIN HYDROCHLORIDE 50 MG/1
CAPSULE ORAL
Qty: 30 CAPSULE | Refills: 3 | Status: SHIPPED | OUTPATIENT
Start: 2022-06-01 | End: 2022-09-29

## 2022-06-24 ENCOUNTER — TELEPHONE (OUTPATIENT)
Dept: CARDIOLOGY CLINIC | Age: 72
End: 2022-06-24

## 2022-06-27 ENCOUNTER — OFFICE VISIT (OUTPATIENT)
Dept: CARDIOLOGY CLINIC | Age: 72
End: 2022-06-27
Payer: MEDICARE

## 2022-06-27 VITALS
HEART RATE: 57 BPM | DIASTOLIC BLOOD PRESSURE: 64 MMHG | WEIGHT: 134 LBS | SYSTOLIC BLOOD PRESSURE: 112 MMHG | BODY MASS INDEX: 23.74 KG/M2 | HEIGHT: 63 IN

## 2022-06-27 DIAGNOSIS — J44.1 CHRONIC OBSTRUCTIVE PULMONARY DISEASE WITH ACUTE EXACERBATION (HCC): ICD-10-CM

## 2022-06-27 DIAGNOSIS — I65.23 BILATERAL CAROTID ARTERY STENOSIS: ICD-10-CM

## 2022-06-27 DIAGNOSIS — F17.210 CIGARETTE NICOTINE DEPENDENCE WITHOUT COMPLICATION: ICD-10-CM

## 2022-06-27 DIAGNOSIS — I48.0 PAF (PAROXYSMAL ATRIAL FIBRILLATION) (HCC): Primary | ICD-10-CM

## 2022-06-27 DIAGNOSIS — I10 ESSENTIAL HYPERTENSION: ICD-10-CM

## 2022-06-27 PROCEDURE — 99214 OFFICE O/P EST MOD 30 MIN: CPT | Performed by: CLINICAL NURSE SPECIALIST

## 2022-06-27 PROCEDURE — 1123F ACP DISCUSS/DSCN MKR DOCD: CPT | Performed by: CLINICAL NURSE SPECIALIST

## 2022-06-27 PROCEDURE — G8399 PT W/DXA RESULTS DOCUMENT: HCPCS | Performed by: CLINICAL NURSE SPECIALIST

## 2022-06-27 PROCEDURE — 1090F PRES/ABSN URINE INCON ASSESS: CPT | Performed by: CLINICAL NURSE SPECIALIST

## 2022-06-27 PROCEDURE — 4004F PT TOBACCO SCREEN RCVD TLK: CPT | Performed by: CLINICAL NURSE SPECIALIST

## 2022-06-27 PROCEDURE — 3023F SPIROM DOC REV: CPT | Performed by: CLINICAL NURSE SPECIALIST

## 2022-06-27 PROCEDURE — 93000 ELECTROCARDIOGRAM COMPLETE: CPT | Performed by: CLINICAL NURSE SPECIALIST

## 2022-06-27 PROCEDURE — G8420 CALC BMI NORM PARAMETERS: HCPCS | Performed by: CLINICAL NURSE SPECIALIST

## 2022-06-27 PROCEDURE — G8427 DOCREV CUR MEDS BY ELIG CLIN: HCPCS | Performed by: CLINICAL NURSE SPECIALIST

## 2022-06-27 PROCEDURE — 3017F COLORECTAL CA SCREEN DOC REV: CPT | Performed by: CLINICAL NURSE SPECIALIST

## 2022-06-27 ASSESSMENT — ENCOUNTER SYMPTOMS
SHORTNESS OF BREATH: 1
FACIAL SWELLING: 0
WHEEZING: 0
COUGH: 0
ABDOMINAL PAIN: 0
NAUSEA: 0
EYE REDNESS: 0
CHEST TIGHTNESS: 0
VOMITING: 0

## 2022-06-27 NOTE — PATIENT INSTRUCTIONS
Return in about 9 months (around 3/27/2023) for APRN. Call for palpitations, fast heart rates, unusual shortness of breath, or chest pain.   May stop Indapamide- monitor BP, goal < 140/90
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
## 2022-06-28 RX ORDER — METOPROLOL SUCCINATE 25 MG/1
25 TABLET, EXTENDED RELEASE ORAL DAILY
Qty: 90 TABLET | Refills: 3 | Status: SHIPPED | OUTPATIENT
Start: 2022-06-28
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
## 2022-08-01 RX ORDER — AMLODIPINE BESYLATE 5 MG/1
TABLET ORAL
Qty: 90 TABLET | Refills: 3 | Status: SHIPPED | OUTPATIENT
Start: 2022-08-01 | End: 2022-09-30

## 2022-08-01 RX ORDER — BUSPIRONE HYDROCHLORIDE 5 MG/1
TABLET ORAL
Qty: 60 TABLET | Refills: 2 | Status: SHIPPED | OUTPATIENT
Start: 2022-08-01 | End: 2022-09-30

## 2022-08-30 DIAGNOSIS — K21.9 LARYNGOPHARYNGEAL REFLUX (LPR): ICD-10-CM

## 2022-08-30 DIAGNOSIS — K21.9 GASTROESOPHAGEAL REFLUX DISEASE, UNSPECIFIED WHETHER ESOPHAGITIS PRESENT: ICD-10-CM

## 2022-08-30 RX ORDER — FAMOTIDINE 40 MG/1
40 TABLET, FILM COATED ORAL NIGHTLY
Qty: 30 TABLET | Refills: 11 | OUTPATIENT
Start: 2022-08-30

## 2022-08-31 RX ORDER — INDAPAMIDE 1.25 MG/1
1.25 TABLET, FILM COATED ORAL EVERY MORNING
Qty: 90 TABLET | Refills: 3 | Status: SHIPPED | OUTPATIENT
Start: 2022-08-31 | End: 2022-09-30

## 2022-09-29 ENCOUNTER — OFFICE VISIT (OUTPATIENT)
Dept: PRIMARY CARE CLINIC | Age: 72
End: 2022-09-29
Payer: MEDICARE

## 2022-09-29 VITALS
OXYGEN SATURATION: 99 % | WEIGHT: 139 LBS | HEART RATE: 61 BPM | HEIGHT: 63 IN | DIASTOLIC BLOOD PRESSURE: 62 MMHG | TEMPERATURE: 97.1 F | SYSTOLIC BLOOD PRESSURE: 114 MMHG | BODY MASS INDEX: 24.63 KG/M2

## 2022-09-29 DIAGNOSIS — Z13.220 LIPID SCREENING: ICD-10-CM

## 2022-09-29 DIAGNOSIS — Z00.00 MEDICARE ANNUAL WELLNESS VISIT, SUBSEQUENT: Primary | ICD-10-CM

## 2022-09-29 DIAGNOSIS — K43.9 HERNIA OF ABDOMINAL WALL: ICD-10-CM

## 2022-09-29 DIAGNOSIS — I10 ESSENTIAL HYPERTENSION: ICD-10-CM

## 2022-09-29 DIAGNOSIS — Z23 NEED FOR INFLUENZA VACCINATION: ICD-10-CM

## 2022-09-29 DIAGNOSIS — J44.9 CHRONIC OBSTRUCTIVE PULMONARY DISEASE, UNSPECIFIED COPD TYPE (HCC): ICD-10-CM

## 2022-09-29 PROBLEM — R84.5 POSITIVE SPUTUM CULTURE FOR PSEUDOMONAS: Status: RESOLVED | Noted: 2018-09-02 | Resolved: 2022-09-29

## 2022-09-29 PROBLEM — J44.1 CHRONIC OBSTRUCTIVE PULMONARY DISEASE WITH ACUTE EXACERBATION (HCC): Status: RESOLVED | Noted: 2021-01-22 | Resolved: 2022-09-29

## 2022-09-29 LAB
ALBUMIN SERPL-MCNC: 4.1 G/DL (ref 3.5–5.2)
ALP BLD-CCNC: 143 U/L (ref 35–104)
ALT SERPL-CCNC: 11 U/L (ref 5–33)
ANION GAP SERPL CALCULATED.3IONS-SCNC: 11 MMOL/L (ref 7–19)
AST SERPL-CCNC: 15 U/L (ref 5–32)
BILIRUB SERPL-MCNC: <0.2 MG/DL (ref 0.2–1.2)
BUN BLDV-MCNC: 19 MG/DL (ref 8–23)
CALCIUM SERPL-MCNC: 9.4 MG/DL (ref 8.8–10.2)
CHLORIDE BLD-SCNC: 95 MMOL/L (ref 98–111)
CHOLESTEROL, TOTAL: 229 MG/DL (ref 160–199)
CO2: 27 MMOL/L (ref 22–29)
CREAT SERPL-MCNC: 1.1 MG/DL (ref 0.5–0.9)
GFR AFRICAN AMERICAN: >59
GFR NON-AFRICAN AMERICAN: 49
GLUCOSE BLD-MCNC: 97 MG/DL (ref 74–109)
HCT VFR BLD CALC: 38.2 % (ref 37–47)
HDLC SERPL-MCNC: 50 MG/DL (ref 65–121)
HEMOGLOBIN: 12.4 G/DL (ref 12–16)
LDL CHOLESTEROL CALCULATED: 157 MG/DL
MCH RBC QN AUTO: 29.5 PG (ref 27–31)
MCHC RBC AUTO-ENTMCNC: 32.5 G/DL (ref 33–37)
MCV RBC AUTO: 91 FL (ref 81–99)
PDW BLD-RTO: 14.7 % (ref 11.5–14.5)
PLATELET # BLD: 377 K/UL (ref 130–400)
PMV BLD AUTO: 9.6 FL (ref 9.4–12.3)
POTASSIUM SERPL-SCNC: 4.3 MMOL/L (ref 3.5–5)
RBC # BLD: 4.2 M/UL (ref 4.2–5.4)
SODIUM BLD-SCNC: 133 MMOL/L (ref 136–145)
TOTAL PROTEIN: 6.7 G/DL (ref 6.6–8.7)
TRIGL SERPL-MCNC: 108 MG/DL (ref 0–149)
WBC # BLD: 9.7 K/UL (ref 4.8–10.8)

## 2022-09-29 PROCEDURE — G8399 PT W/DXA RESULTS DOCUMENT: HCPCS | Performed by: FAMILY MEDICINE

## 2022-09-29 PROCEDURE — 4004F PT TOBACCO SCREEN RCVD TLK: CPT | Performed by: FAMILY MEDICINE

## 2022-09-29 PROCEDURE — G8427 DOCREV CUR MEDS BY ELIG CLIN: HCPCS | Performed by: FAMILY MEDICINE

## 2022-09-29 PROCEDURE — 3017F COLORECTAL CA SCREEN DOC REV: CPT | Performed by: FAMILY MEDICINE

## 2022-09-29 PROCEDURE — G0008 ADMIN INFLUENZA VIRUS VAC: HCPCS | Performed by: FAMILY MEDICINE

## 2022-09-29 PROCEDURE — G0439 PPPS, SUBSEQ VISIT: HCPCS | Performed by: FAMILY MEDICINE

## 2022-09-29 PROCEDURE — 3023F SPIROM DOC REV: CPT | Performed by: FAMILY MEDICINE

## 2022-09-29 PROCEDURE — 90694 VACC AIIV4 NO PRSRV 0.5ML IM: CPT | Performed by: FAMILY MEDICINE

## 2022-09-29 PROCEDURE — 1090F PRES/ABSN URINE INCON ASSESS: CPT | Performed by: FAMILY MEDICINE

## 2022-09-29 PROCEDURE — G8420 CALC BMI NORM PARAMETERS: HCPCS | Performed by: FAMILY MEDICINE

## 2022-09-29 PROCEDURE — 1123F ACP DISCUSS/DSCN MKR DOCD: CPT | Performed by: FAMILY MEDICINE

## 2022-09-29 PROCEDURE — 99213 OFFICE O/P EST LOW 20 MIN: CPT | Performed by: FAMILY MEDICINE

## 2022-09-29 RX ORDER — DOXEPIN HYDROCHLORIDE 50 MG/1
CAPSULE ORAL
Qty: 30 CAPSULE | Refills: 11 | Status: SHIPPED | OUTPATIENT
Start: 2022-09-29 | End: 2022-09-30

## 2022-09-29 SDOH — ECONOMIC STABILITY: FOOD INSECURITY: WITHIN THE PAST 12 MONTHS, THE FOOD YOU BOUGHT JUST DIDN'T LAST AND YOU DIDN'T HAVE MONEY TO GET MORE.: NEVER TRUE

## 2022-09-29 SDOH — ECONOMIC STABILITY: FOOD INSECURITY: WITHIN THE PAST 12 MONTHS, YOU WORRIED THAT YOUR FOOD WOULD RUN OUT BEFORE YOU GOT MONEY TO BUY MORE.: NEVER TRUE

## 2022-09-29 ASSESSMENT — PATIENT HEALTH QUESTIONNAIRE - PHQ9
SUM OF ALL RESPONSES TO PHQ9 QUESTIONS 1 & 2: 1
2. FEELING DOWN, DEPRESSED OR HOPELESS: 0
SUM OF ALL RESPONSES TO PHQ QUESTIONS 1-9: 1
1. LITTLE INTEREST OR PLEASURE IN DOING THINGS: 1
SUM OF ALL RESPONSES TO PHQ QUESTIONS 1-9: 1

## 2022-09-29 ASSESSMENT — LIFESTYLE VARIABLES
HOW OFTEN DO YOU HAVE A DRINK CONTAINING ALCOHOL: NEVER
HOW MANY STANDARD DRINKS CONTAINING ALCOHOL DO YOU HAVE ON A TYPICAL DAY: PATIENT DOES NOT DRINK

## 2022-09-29 ASSESSMENT — SOCIAL DETERMINANTS OF HEALTH (SDOH): HOW HARD IS IT FOR YOU TO PAY FOR THE VERY BASICS LIKE FOOD, HOUSING, MEDICAL CARE, AND HEATING?: NOT HARD AT ALL

## 2022-09-29 NOTE — PATIENT INSTRUCTIONS
Wt Readings from Last 3 Encounters:   09/29/22 139 lb (63 kg)   06/27/22 134 lb (60.8 kg)   02/09/22 118 lb (53.5 kg)      Personalized Preventive Plan for Vincente Babinski - 9/29/2022  Medicare offers a range of preventive health benefits. Some of the tests and screenings are paid in full while other may be subject to a deductible, co-insurance, and/or copay. Some of these benefits include a comprehensive review of your medical history including lifestyle, illnesses that may run in your family, and various assessments and screenings as appropriate. After reviewing your medical record and screening and assessments performed today your provider may have ordered immunizations, labs, imaging, and/or referrals for you. A list of these orders (if applicable) as well as your Preventive Care list are included within your After Visit Summary for your review. Other Preventive Recommendations:    A preventive eye exam performed by an eye specialist is recommended every 1-2 years to screen for glaucoma; cataracts, macular degeneration, and other eye disorders. A preventive dental visit is recommended every 6 months. Try to get at least 150 minutes of exercise per week or 10,000 steps per day on a pedometer . Order or download the FREE \"Exercise & Physical Activity: Your Everyday Guide\" from [employer]. Call 5-571.489.3500 or search [employer] online. You need 7005-6876 mg of calcium and 7276-6243 IU of vitamin D per day. It is possible to meet your calcium requirement with diet alone, but a vitamin D supplement is usually necessary to meet this goal.  When exposed to the sun, use a sunscreen that protects against both UVA and UVB radiation with an SPF of 30 or greater. Reapply every 2 to 3 hours or after sweating, drying off with a towel, or swimming. Always wear a seat belt when traveling in a car. Always wear a helmet when riding a bicycle or motorcycle.   Smoking Cessation  This document explains the best ways for you to quit smoking and new treatments to help. It lists new medicines that can double or triple your chances of quitting and quitting for good. It also considers ways to avoid relapses and concerns you may have about quitting, including weight gain. NICOTINE: A POWERFUL ADDICTION  If you have tried to quit smoking, you know how hard it can be. It is hard because nicotine is a very addictive drug. For some people, it can be as addictive as heroin or cocaine. Usually, people make 2 or 3 tries, or more, before finally being able to quit. Each time you try to quit, you can learn about what helps and what hurts. Quitting takes hard work and a lot of effort, but you can quit smoking. QUITTING SMOKING IS ONE OF THE MOST IMPORTANT THINGS YOU WILL EVER DO. You will live longer, feel better, and live better. The impact on your body of quitting smoking is felt almost immediately:  Within 20 minutes, blood pressure decreases. Pulse returns to its normal level. After 8 hours, carbon monoxide levels in the blood return to normal. Oxygen level increases. After 24 hours, chance of heart attack starts to decrease. Breath, hair, and body stop smelling like smoke. After 48 hours, damaged nerve endings begin to recover. Sense of taste and smell improve. After 72 hours, the body is virtually free of nicotine. Bronchial tubes relax and breathing becomes easier. After 2 to 12 weeks, lungs can hold more air. Exercise becomes easier and circulation improves. Quitting will reduce your risk of having a heart attack, stroke, cancer, or lung disease:  After 1 year, the risk of coronary heart disease is cut in half. After 5 years, the risk of stroke falls to the same as a nonsmoker. After 10 years, the risk of lung cancer is cut in half and the risk of other cancers decreases significantly.   After 15 years, the risk of coronary heart disease drops, usually to the level of a nonsmoker. If you are pregnant, quitting smoking will improve your chances of having a healthy baby. The people you live with, especially your children, will be healthier. You will have extra money to spend on things other than cigarettes. FIVE KEYS TO QUITTING  Studies have shown that these 5 steps will help you quit smoking and quit for good. You have the best chances of quitting if you use them together:  Get ready. Get support and encouragement. Learn new skills and behaviors. Get medicine to reduce your nicotine addiction and use it correctly. Be prepared for relapse or difficult situations. Be determined to continue trying to quit, even if you do not succeed at first.  1. GET READY  Set a quit date. Change your environment. Get rid of ALL cigarettes, ashtrays, matches, and lighters in your home, car, and place of work. Do not let people smoke in your home. Review your past attempts to quit. Think about what worked and what did not. Once you quit, do not smoke. NOT EVEN A PUFF! 2. GET SUPPORT AND ENCOURAGEMENT  Studies have shown that you have a better chance of being successful if you have help. You can get support in many ways. Tell your family, friends, and coworkers that you are going to quit and need their support. Ask them not to smoke around you. Talk to your caregivers (doctor, dentist, nurse, pharmacist, psychologist, and/or smoking counselor). Get individual, group, or telephone counseling and support. The more counseling you have, the better your chances are of quitting. Programs are available at [facility]. Call your local health department for information about programs in your area. Spiritual beliefs and practices may help some smokers quit. Quit meters are small computer programs online or downloadable that keep track of quit statistics, such as amount of \"quit-time,\" cigarettes not smoked, and money saved.   Many smokers find one or more of the many self-help books available useful in helping them quit and stay off tobacco.  3. LEARN NEW SKILLS AND BEHAVIORS  Try to distract yourself from urges to smoke. Talk to someone, go for a walk, or occupy your time with a task. When you first try to quit, change your routine. Take a different route to work. Drink tea instead of coffee. Eat breakfast in a different place. Do something to reduce your stress. Take a hot bath, exercise, or read a book. Plan something enjoyable to do every day. Reward yourself for not smoking. Explore interactive web-based programs that specialize in helping you quit. 4. GET MEDICINE AND USE IT CORRECTLY  Medicines can help you stop smoking and decrease the urge to smoke. Combining medicine with the above behavioral methods and support can quadruple your chances of successfully quitting smoking. The U.S. Food and Drug Administration (FDA) has approved 7 medicines to help you quit smoking. These medicines fall into 3 categories. Nicotine replacement therapy (delivers nicotine to your body without the negative effects and risks of smoking):  Nicotine gum: Available over-the-counter. Nicotine lozenges: Available over-the-counter. Nicotine inhaler: Available by prescription. Nicotine nasal spray: Available by prescription. Nicotine skin patches (transdermal): Available by prescription and over-the-counter. Antidepressant medicine (helps people abstain from smoking, but how this works is unknown): Bupropion sustained-release (SR) tablets: Available by prescription. Nicotinic receptor partial agonist (simulates the effect of nicotine in your brain):  Varenicline tartrate tablets: Available by prescription. Ask your caregiver for advice about which medicines to use and how to use them. Carefully read the information on the package. Everyone who is trying to quit may benefit from using a medicine.  If you are pregnant or trying to become pregnant, nursing an infant, you are under age 18, or you smoke fewer than 10 cigarettes per day, talk to your caregiver before taking any nicotine replacement medicines. You should stop using a nicotine replacement product and call your caregiver if you experience nausea, dizziness, weakness, vomiting, fast or irregular heartbeat, mouth problems with the lozenge or gum, or redness or swelling of the skin around the patch that does not go away. Do not use any other product containing nicotine while using a nicotine replacement product. Talk to your caregiver before using these products if you have diabetes, heart disease, asthma, stomach ulcers, you had a recent heart attack, you have high blood pressure that is not controlled with medicine, a history of irregular heartbeat, or you have been prescribed medicine to help you quit smoking. 5. BE PREPARED FOR RELAPSE OR DIFFICULT SITUATIONS  Most relapses occur within the first 3 months after quitting. Do not be discouraged if you start smoking again. Remember, most people try several times before they finally quit. You may have symptoms of withdrawal because your body is used to nicotine. You may crave cigarettes, be irritable, feel very hungry, cough often, get headaches, or have difficulty concentrating. The withdrawal symptoms are only temporary. They are strongest when you first quit, but they will go away within 10 to 14 days. Here are some difficult situations to watch for:  Alcohol. Avoid drinking alcohol. Drinking lowers your chances of successfully quitting. Caffeine. Try to reduce the amount of caffeine you consume. It also lowers your chances of successfully quitting. Other smokers. Being around smoking can make you want to smoke. Avoid smokers. Weight gain. Many smokers will gain weight when they quit, usually less than 10 pounds. Eat a healthy diet and stay active. Do not let weight gain distract you from your main goal, quitting smoking.  Some medicines that help you quit smoking may also help delay weight gain. You can always lose the weight gained after you quit. Bad mood or depression. There are a lot of ways to improve your mood other than smoking. If you are having problems with any of these situations, talk to your caregiver. SPECIAL SITUATIONS AND CONDITIONS  Studies suggest that everyone can quit smoking. Your situation or condition can give you a special reason to quit. Pregnant women/new mothers: By quitting, you protect your baby's health and your own. Hospitalized patients: By quitting, you reduce health problems and help healing. Heart attack patients: By quitting, you reduce your risk of a second heart attack. Lung, head, and neck cancer patients: By quitting, you reduce your chance of a second cancer. Parents of children and adolescents: By quitting, you protect your children from illnesses caused by secondhand smoke. QUESTIONS TO THINK ABOUT  Think about the following questions before you try to stop smoking. You may want to talk about your answers with your caregiver. Why do you want to quit? If you tried to quit in the past, what helped and what did not? What will be the most difficult situations for you after you quit? How will you plan to handle them? Who can help you through the tough times? Your family? Friends? Caregiver? What pleasures do you get from smoking? What ways can you still get pleasure if you quit? Here are some questions to ask your caregiver: How can you help me to be successful at quitting? What medicine do you think would be best for me and how should I take it? What should I do if I need more help? What is smoking withdrawal like? How can I get information on withdrawal?  Quitting takes hard work and a lot of effort, but you can quit smoking. FOR MORE INFORMATION   Smokefree. gov ([employer]) provides free, accurate, evidence-based information and professional assistance to help support the immediate and long-term needs of people trying to quit smoking. Document Released: 12/12/2002 Document Revised: 12/06/2012 Document Reviewed: 10/04/2010  [facility] Patient Information ©2012 Carlos Ureña.

## 2022-09-29 NOTE — ASSESSMENT & PLAN NOTE
Blood pressure is controlled. Continue amlodipine, indapamide and metoprolol succinate. We will refill these when needed.   They were refilled in August.

## 2022-09-29 NOTE — PROGRESS NOTES
Medicare Annual Wellness Visit    Petty Danielle is here for Medicare AWV (Fasting. Defers mammogram.  Patient needs her abdominal hernia checked today - states getting \"large\" - )    Assessment & Plan   Medicare annual wellness visit, subsequent  Essential hypertension  Assessment & Plan:  Blood pressure is controlled. Continue amlodipine, indapamide and metoprolol succinate. We will refill these when needed. They were refilled in August.  Orders:  -     CBC  -     Comprehensive Metabolic Panel  Lipid screening  -     Lipid Panel  Chronic obstructive pulmonary disease, unspecified COPD type ([facility] [address]  Assessment & Plan:  Stable. She monitors her oxygen and CO2 every night. She is really doing very well. Unfortunately she continues to smoke. Hernia of abdominal wall  -     [facility], [place], General Surgery, [place]  Need for influenza vaccination  -     Influenza, FLUAD, (age 72 y+), IM, PF, 0.5 mL    Recommendations for Preventive Services Due: see orders and patient instructions/AVS.    The hernias are a worsening problem. She would like to see Dr. Day. Referral was put in. She is followed by [address] [place] [address] for her history of abdominal aortic aneurysm repair. She is followed at [facility] for her renal mass. Recommended screening schedule for the next 5-10 years is provided to the patient in written form: see Patient Instructions/AVS.     Return in 1 year (on 9/29/2023) for Medicare Annual Wellness Visit in 1 year. Subjective   The following acute and/or chronic problems were also addressed today: She is actually doing very well in regards to her chronic problems such as COPD. She still followed at [facility] for the mass on her kidney and she is status post abdominal aortic repair and has a stent. She is followed by [address] [place] [address]. However she is noticing a worsening of her abdominal wall hernias. These occurred after her aneurysm surgery.   They are increasing in size and she is starting to feel queasy and have increased gas especially after meals. She would like to see a surgeon. Patient's complete Health Risk Assessment and screening values have been reviewed and are found in Flowsheets. The following problems were reviewed today and where indicated follow up appointments were made and/or referrals ordered. Positive Risk Factor Screenings with Interventions:       Tobacco Use:  Tobacco Use: High Risk    Smoking Tobacco Use: Every Day    Smokeless Tobacco Use: Never     E-cigarette/Vaping       Questions Responses    E-cigarette/Vaping Use Never User    Start Date     Passive Exposure No    Quit Date     Counseling Given No    Comments           Substance Use - Tobacco Interventions:  tobacco cessation tips and resources provided          Health Habits/Nutrition:  Physical Activity: Inactive    Days of Exercise per Week: 0 days    Minutes of Exercise per Session: 10 min     Have you lost any weight without trying in the past 3 months?: No  Body mass index: 24.62  Have you seen the dentist within the past year?: (!) No  Health Habits/Nutrition Interventions:  Dental exam overdue:  patient encouraged to make appointment with his/her dentist    Hearing/Vision:  Do you or your family notice any trouble with your hearing that hasn't been managed with hearing aids?: No  Do you have difficulty driving, watching TV, or doing any of your daily activities because of your eyesight?: No  Have you had an eye exam within the past year?: (!) No  No results found. Hearing/Vision Interventions:  Vision concerns:  patient encouraged to make appointment with his/her eye specialist            Objective   Vitals:    09/29/22 0831   BP: 114/62   Pulse: 61   Temp: 97.1 °F (36.2 °C)   SpO2: 99%   Weight: 139 lb (63 kg)   Height: 5' 3\" (1.6 m)      Body mass index is 24.62 kg/m².       General Appearance: alert and oriented to person, place and time, well developed and well- nourished, in no acute distress  Skin: warm and dry, no rash or erythema  Head: normocephalic and atraumatic  Eyes: pupils equal, round, and reactive to light, extraocular eye movements intact, conjunctivae normal  ENT: tympanic membrane, external ear and ear canal normal bilaterally, nose without deformity, nasal mucosa and turbinates normal without polyps  Neck: supple and non-tender without mass, no thyromegaly or thyroid nodules, no cervical lymphadenopathy  Pulmonary/Chest: clear to auscultation bilaterally- no wheezes, rales or rhonchi, normal air movement, no respiratory distress  Cardiovascular: normal rate, regular rhythm, normal S1 and S2, no murmurs, rubs, clicks, or gallops, distal pulses intact, no carotid bruits  Abdomen: soft, non-tender, non-distended, normal bowel sounds, no masses or organomegaly. She has 2 areas that are soft but are definitely hernias midline with a well-healed midline scar. Breast: appear normal, no suspicious masses, no skin or nipple changes or axillary nodes  Extremities: no cyanosis, clubbing or edema  Musculoskeletal: normal range of motion, no joint swelling, deformity or tenderness  Neurologic: reflexes normal and symmetric, no cranial nerve deficit, gait, coordination and speech normal       Allergies   Allergen Reactions    Colestipol Shortness Of Breath and Other (See Comments)     Heart races    Codeine      Blocks her kidneys     Lac Bovis Diarrhea    Prednisone Other (See Comments)     Increased heart rate and insomnia    Aspirin Nausea And Vomiting     Makes her stomach bleed     Prior to Visit Medications    Medication Sig Taking?  Authorizing Provider   metoprolol succinate (TOPROL XL) 25 MG extended release tablet TAKE 1 TABLET BY MOUTH DAILY Yes Inna Thayer MD   pantoprazole (PROTONIX) 40 MG tablet TAKE 1 TABLET BY MOUTH EVERY DAY Yes Inna Thayer MD   clopidogrel (PLAVIX) 75 MG tablet TAKE 1 TABLET BY MOUTH DAILY Yes Inna Thayer MD   simvastatin (ZOCOR) 40 MG tablet TAKE ONE TABLET BY MOUTH EVERY EVENING Yes Prasad Andrew MD   FLUoxetine (PROZAC) 20 MG capsule TAKE 1 CAPSULE BY MOUTH DAILY Yes Prasad Andrew MD   meclizine (ANTIVERT) 25 MG tablet TAKE ONE TABLET BY MOUTH THREE TIMES DAILY AS NEEDED FOR SEVERE DIZZINESS Yes Prasad Andrew MD   famotidine (PEPCID) 40 MG tablet Take 40 mg by mouth nightly Yes Haley Panchal MD   doxepin (SINEQUAN) 50 MG capsule TAKE 1 CAPSULE BY MOUTH AT BEDTIME AS NEEDED FOR SLEEP  Prasad Andrew MD   amLODIPine (NORVASC) 5 MG tablet TAKE 1 TABLET BY MOUTH EVERY NIGHT  BRAYDON Mortensen   indapamide (LOZOL) 1.25 MG tablet TAKE 1 TABLET BY MOUTH EVERY MORNING FOR HIGH BLOOD PRESSURE  Prasad Andrew MD   busPIRone (BUSPAR) 5 MG tablet TAKE 1 TABLET BY MOUTH TWICE DAILY FOR ANXIETY  Prasad Andrew MD       [facility] (Including outside providers/suppliers regularly involved in providing care):   Patient Care Team:  Prasad Andrew MD as PCP - General (Family Medicine)  Prasad Andrew MD [facility] P[facility]  BRAYDON Birmingham (Family Nurse Practitioner)  Evangelina Kowalski MD as Consulting Physician (Interventional Cardiology)  Aarti Pastor MD as Consulting Physician (Vascular Surgery)     Reviewed and updated this visit:  Tobacco  Allergies  Meds  Problems  Med Hx  Surg Hx  Soc Hx  Fam Hx

## 2022-09-29 NOTE — ASSESSMENT & PLAN NOTE
Stable. She monitors her oxygen and CO2 every night. She is really doing very well. Unfortunately she continues to smoke.

## 2022-09-30 DIAGNOSIS — N28.9 FUNCTION KIDNEY DECREASED: Primary | ICD-10-CM

## 2022-09-30 RX ORDER — INDAPAMIDE 1.25 MG/1
1.25 TABLET, FILM COATED ORAL EVERY MORNING
Qty: 90 TABLET | Refills: 3 | Status: SHIPPED | OUTPATIENT
Start: 2022-09-30

## 2022-09-30 RX ORDER — DOXEPIN HYDROCHLORIDE 50 MG/1
CAPSULE ORAL
Qty: 30 CAPSULE | Refills: 11 | Status: SHIPPED | OUTPATIENT
Start: 2022-09-30

## 2022-09-30 RX ORDER — AMLODIPINE BESYLATE 5 MG/1
TABLET ORAL
Qty: 30 TABLET | Refills: 5 | Status: SHIPPED | OUTPATIENT
Start: 2022-09-30

## 2022-09-30 RX ORDER — BUSPIRONE HYDROCHLORIDE 5 MG/1
TABLET ORAL
Qty: 60 TABLET | Refills: 2 | Status: SHIPPED | OUTPATIENT
Start: 2022-09-30 | End: 2022-10-31

## 2022-10-06 ENCOUNTER — OFFICE VISIT (OUTPATIENT)
Dept: SURGERY | Age: 72
End: 2022-10-06
Payer: MEDICARE

## 2022-10-06 VITALS
WEIGHT: 139 LBS | TEMPERATURE: 97.3 F | HEIGHT: 63 IN | BODY MASS INDEX: 24.63 KG/M2 | SYSTOLIC BLOOD PRESSURE: 102 MMHG | DIASTOLIC BLOOD PRESSURE: 60 MMHG

## 2022-10-06 DIAGNOSIS — K43.2 INCISIONAL HERNIA, WITHOUT OBSTRUCTION OR GANGRENE: Primary | ICD-10-CM

## 2022-10-06 DIAGNOSIS — Z98.890 HISTORY OF AAA (ABDOMINAL AORTIC ANEURYSM) REPAIR: ICD-10-CM

## 2022-10-06 DIAGNOSIS — Z72.0 TOBACCO ABUSE: ICD-10-CM

## 2022-10-06 PROCEDURE — G8399 PT W/DXA RESULTS DOCUMENT: HCPCS | Performed by: SURGERY

## 2022-10-06 PROCEDURE — 1123F ACP DISCUSS/DSCN MKR DOCD: CPT | Performed by: SURGERY

## 2022-10-06 PROCEDURE — 1090F PRES/ABSN URINE INCON ASSESS: CPT | Performed by: SURGERY

## 2022-10-06 PROCEDURE — 4004F PT TOBACCO SCREEN RCVD TLK: CPT | Performed by: SURGERY

## 2022-10-06 PROCEDURE — G8484 FLU IMMUNIZE NO ADMIN: HCPCS | Performed by: SURGERY

## 2022-10-06 PROCEDURE — 99204 OFFICE O/P NEW MOD 45 MIN: CPT | Performed by: SURGERY

## 2022-10-06 PROCEDURE — 3017F COLORECTAL CA SCREEN DOC REV: CPT | Performed by: SURGERY

## 2022-10-06 PROCEDURE — G8420 CALC BMI NORM PARAMETERS: HCPCS | Performed by: SURGERY

## 2022-10-06 PROCEDURE — G8427 DOCREV CUR MEDS BY ELIG CLIN: HCPCS | Performed by: SURGERY

## 2022-10-06 ASSESSMENT — ENCOUNTER SYMPTOMS
NAUSEA: 0
ABDOMINAL DISTENTION: 0
CHEST TIGHTNESS: 0
DIARRHEA: 0
COLOR CHANGE: 0
ABDOMINAL PAIN: 0
SORE THROAT: 0
EYE PAIN: 0
CONSTIPATION: 0
BACK PAIN: 0
COUGH: 0
EYE REDNESS: 0
SHORTNESS OF BREATH: 0
VOMITING: 0
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
## 2022-10-29 DIAGNOSIS — I71.40 ABDOMINAL AORTIC ANEURYSM (AAA) WITHOUT RUPTURE: ICD-10-CM

## 2022-10-31 RX ORDER — BUSPIRONE HYDROCHLORIDE 5 MG/1
TABLET ORAL
Qty: 60 TABLET | Refills: 2 | Status: SHIPPED | OUTPATIENT
Start: 2022-10-31

## 2022-10-31 RX ORDER — FLUOXETINE HYDROCHLORIDE 20 MG/1
20 CAPSULE ORAL DAILY
Qty: 90 CAPSULE | Refills: 3 | Status: SHIPPED | OUTPATIENT
Start: 2022-10-31

## 2022-11-10 DIAGNOSIS — K21.9 LARYNGOPHARYNGEAL REFLUX (LPR): ICD-10-CM

## 2022-11-10 DIAGNOSIS — K21.9 GASTROESOPHAGEAL REFLUX DISEASE, UNSPECIFIED WHETHER ESOPHAGITIS PRESENT: ICD-10-CM

## 2022-11-10 RX ORDER — FAMOTIDINE 40 MG/1
40 TABLET, FILM COATED ORAL NIGHTLY
Qty: 30 TABLET | Refills: 11 | OUTPATIENT
Start: 2022-11-10

## 2022-11-21 RX ORDER — PANTOPRAZOLE SODIUM 40 MG/1
TABLET, DELAYED RELEASE ORAL
Qty: 90 TABLET | Refills: 1 | Status: SHIPPED | OUTPATIENT
Start: 2022-11-21

## 2023-01-18 RX ORDER — BUSPIRONE HYDROCHLORIDE 5 MG/1
TABLET ORAL
Qty: 60 TABLET | Refills: 2 | Status: SHIPPED | OUTPATIENT
Start: 2023-01-18

## 2023-02-14 ENCOUNTER — HOSPITAL ENCOUNTER (OUTPATIENT)
Dept: VASCULAR LAB | Age: 73
Discharge: HOME | End: 2023-02-14
Payer: MEDICARE

## 2023-02-14 ENCOUNTER — OFFICE VISIT (OUTPATIENT)
Dept: VASCULAR SURGERY | Age: 73
End: 2023-02-14
Payer: MEDICARE

## 2023-02-14 ENCOUNTER — HOSPITAL ENCOUNTER (OUTPATIENT)
Dept: ULTRASOUND IMAGING | Age: 73
Discharge: HOME | End: 2023-02-14
Payer: MEDICARE

## 2023-02-14 VITALS
BODY MASS INDEX: 24.63 KG/M2 | SYSTOLIC BLOOD PRESSURE: 129 MMHG | WEIGHT: 139 LBS | DIASTOLIC BLOOD PRESSURE: 76 MMHG | OXYGEN SATURATION: 99 % | TEMPERATURE: 96.1 F | HEIGHT: 63 IN | HEART RATE: 72 BPM

## 2023-02-14 DIAGNOSIS — I70.213 ATHEROSCLER OF NATIVE ARTERY OF BOTH LEGS WITH INTERMIT CLAUDICATION (HCC): ICD-10-CM

## 2023-02-14 DIAGNOSIS — I65.23 BILATERAL CAROTID ARTERY STENOSIS: Primary | ICD-10-CM

## 2023-02-14 DIAGNOSIS — I70.1 RENAL ARTERY STENOSIS (HCC): ICD-10-CM

## 2023-02-14 DIAGNOSIS — I65.23 BILATERAL CAROTID ARTERY STENOSIS: ICD-10-CM

## 2023-02-14 PROCEDURE — 99214 OFFICE O/P EST MOD 30 MIN: CPT | Performed by: NURSE PRACTITIONER

## 2023-02-14 PROCEDURE — 4004F PT TOBACCO SCREEN RCVD TLK: CPT | Performed by: NURSE PRACTITIONER

## 2023-02-14 PROCEDURE — 93975 VASCULAR STUDY: CPT

## 2023-02-14 PROCEDURE — G8420 CALC BMI NORM PARAMETERS: HCPCS | Performed by: NURSE PRACTITIONER

## 2023-02-14 PROCEDURE — G8484 FLU IMMUNIZE NO ADMIN: HCPCS | Performed by: NURSE PRACTITIONER

## 2023-02-14 PROCEDURE — G8399 PT W/DXA RESULTS DOCUMENT: HCPCS | Performed by: NURSE PRACTITIONER

## 2023-02-14 PROCEDURE — G8427 DOCREV CUR MEDS BY ELIG CLIN: HCPCS | Performed by: NURSE PRACTITIONER

## 2023-02-14 PROCEDURE — 1123F ACP DISCUSS/DSCN MKR DOCD: CPT | Performed by: NURSE PRACTITIONER

## 2023-02-14 PROCEDURE — 93923 UPR/LXTR ART STDY 3+ LVLS: CPT

## 2023-02-14 PROCEDURE — 3078F DIAST BP <80 MM HG: CPT | Performed by: NURSE PRACTITIONER

## 2023-02-14 PROCEDURE — 3017F COLORECTAL CA SCREEN DOC REV: CPT | Performed by: NURSE PRACTITIONER

## 2023-02-14 PROCEDURE — 93880 EXTRACRANIAL BILAT STUDY: CPT

## 2023-02-14 PROCEDURE — 3074F SYST BP LT 130 MM HG: CPT | Performed by: NURSE PRACTITIONER

## 2023-02-14 PROCEDURE — 1090F PRES/ABSN URINE INCON ASSESS: CPT | Performed by: NURSE PRACTITIONER

## 2023-02-14 PROCEDURE — 93975 VASCULAR STUDY: CPT | Performed by: RADIOLOGY
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
## 2023-02-16 ENCOUNTER — TELEPHONE (OUTPATIENT)
Dept: VASCULAR SURGERY | Age: 73
End: 2023-02-16

## 2023-02-16 NOTE — TELEPHONE ENCOUNTER
Left this message for the patient.           ----- Message from BRAYDON Waddell sent at 2/16/2023  7:00 AM CST -----  Please let her know there appears to be no change in renal mass that is being followed at [facility]

## 2023-02-17 RX ORDER — CLOPIDOGREL BISULFATE 75 MG/1
75 TABLET ORAL DAILY
Qty: 90 TABLET | Refills: 3 | Status: SHIPPED | OUTPATIENT
Start: 2023-02-17

## 2023-03-27 ENCOUNTER — TELEPHONE (OUTPATIENT)
Dept: PRIMARY CARE CLINIC | Age: 73
End: 2023-03-27

## 2023-03-27 ENCOUNTER — OFFICE VISIT (OUTPATIENT)
Dept: CARDIOLOGY CLINIC | Age: 73
End: 2023-03-27
Payer: MEDICARE

## 2023-03-27 VITALS
HEART RATE: 75 BPM | DIASTOLIC BLOOD PRESSURE: 72 MMHG | HEIGHT: 63 IN | SYSTOLIC BLOOD PRESSURE: 110 MMHG | WEIGHT: 149 LBS | OXYGEN SATURATION: 98 % | BODY MASS INDEX: 26.4 KG/M2

## 2023-03-27 DIAGNOSIS — I48.0 PAF (PAROXYSMAL ATRIAL FIBRILLATION) (HCC): Primary | ICD-10-CM

## 2023-03-27 DIAGNOSIS — J44.1 CHRONIC OBSTRUCTIVE PULMONARY DISEASE WITH ACUTE EXACERBATION (HCC): ICD-10-CM

## 2023-03-27 DIAGNOSIS — I65.23 BILATERAL CAROTID ARTERY STENOSIS: ICD-10-CM

## 2023-03-27 DIAGNOSIS — F17.211 CIGARETTE NICOTINE DEPENDENCE IN REMISSION: ICD-10-CM

## 2023-03-27 DIAGNOSIS — I10 ESSENTIAL HYPERTENSION: ICD-10-CM

## 2023-03-27 PROCEDURE — G8484 FLU IMMUNIZE NO ADMIN: HCPCS | Performed by: CLINICAL NURSE SPECIALIST

## 2023-03-27 PROCEDURE — G8399 PT W/DXA RESULTS DOCUMENT: HCPCS | Performed by: CLINICAL NURSE SPECIALIST

## 2023-03-27 PROCEDURE — 99214 OFFICE O/P EST MOD 30 MIN: CPT | Performed by: CLINICAL NURSE SPECIALIST

## 2023-03-27 PROCEDURE — 3078F DIAST BP <80 MM HG: CPT | Performed by: CLINICAL NURSE SPECIALIST

## 2023-03-27 PROCEDURE — 3023F SPIROM DOC REV: CPT | Performed by: CLINICAL NURSE SPECIALIST

## 2023-03-27 PROCEDURE — 1090F PRES/ABSN URINE INCON ASSESS: CPT | Performed by: CLINICAL NURSE SPECIALIST

## 2023-03-27 PROCEDURE — 1123F ACP DISCUSS/DSCN MKR DOCD: CPT | Performed by: CLINICAL NURSE SPECIALIST

## 2023-03-27 PROCEDURE — 4004F PT TOBACCO SCREEN RCVD TLK: CPT | Performed by: CLINICAL NURSE SPECIALIST

## 2023-03-27 PROCEDURE — G8427 DOCREV CUR MEDS BY ELIG CLIN: HCPCS | Performed by: CLINICAL NURSE SPECIALIST

## 2023-03-27 PROCEDURE — G8417 CALC BMI ABV UP PARAM F/U: HCPCS | Performed by: CLINICAL NURSE SPECIALIST

## 2023-03-27 PROCEDURE — 3017F COLORECTAL CA SCREEN DOC REV: CPT | Performed by: CLINICAL NURSE SPECIALIST

## 2023-03-27 PROCEDURE — 3074F SYST BP LT 130 MM HG: CPT | Performed by: CLINICAL NURSE SPECIALIST

## 2023-03-27 RX ORDER — SIMVASTATIN 40 MG
40 TABLET ORAL NIGHTLY
Qty: 90 TABLET | Refills: 3 | Status: SHIPPED | OUTPATIENT
Start: 2023-03-27

## 2023-03-27 ASSESSMENT — ENCOUNTER SYMPTOMS
ABDOMINAL PAIN: 0
CHEST TIGHTNESS: 0
VOMITING: 0
NAUSEA: 0
FACIAL SWELLING: 0
COUGH: 0
WHEEZING: 0
EYE REDNESS: 0
SHORTNESS OF BREATH: 1

## 2023-03-27 NOTE — PROGRESS NOTES
Colon Cancer Mother     Other Sister         Aneurysm    Colon Polyps Neg Hx     Esophageal Cancer Neg Hx     Liver Cancer Neg Hx     Liver Disease Neg Hx     Rectal Cancer Neg Hx     Stomach Cancer Neg Hx      Social History     Tobacco Use    Smoking status: Every Day     Packs/day: 0.25     Years: 20.00     Pack years: 5.00     Types: Cigarettes    Smokeless tobacco: Never    Tobacco comments:     Pt denies smoking but POA indicates pt smokes daily and refuses to tell medical personel   Substance Use Topics    Alcohol use: No      Current Outpatient Medications   Medication Sig Dispense Refill    clopidogrel (PLAVIX) 75 MG tablet TAKE 1 TABLET BY MOUTH DAILY 90 tablet 3    busPIRone (BUSPAR) 5 MG tablet TAKE 1 TABLET BY MOUTH TWICE DAILY FOR ANXIETY 60 tablet 2    pantoprazole (PROTONIX) 40 MG tablet TAKE 1 TABLET BY MOUTH EVERY DAY 90 tablet 1    FLUoxetine (PROZAC) 20 MG capsule TAKE 1 CAPSULE BY MOUTH DAILY 90 capsule 3    doxepin (SINEQUAN) 50 MG capsule TAKE 1 CAPSULE BY MOUTH AT BEDTIME AS NEEDED FOR SLEEP 30 capsule 11    amLODIPine (NORVASC) 5 MG tablet TAKE 1 TABLET BY MOUTH EVERY NIGHT 30 tablet 5    indapamide (LOZOL) 1.25 MG tablet TAKE 1 TABLET BY MOUTH EVERY MORNING FOR HIGH BLOOD PRESSURE 90 tablet 3    metoprolol succinate (TOPROL XL) 25 MG extended release tablet TAKE 1 TABLET BY MOUTH DAILY 90 tablet 3    simvastatin (ZOCOR) 40 MG tablet TAKE ONE TABLET BY MOUTH EVERY EVENING 90 tablet 3    meclizine (ANTIVERT) 25 MG tablet TAKE ONE TABLET BY MOUTH THREE TIMES DAILY AS NEEDED FOR SEVERE DIZZINESS 30 tablet 3    famotidine (PEPCID) 40 MG tablet Take 40 mg by mouth nightly       No current facility-administered medications for this visit. Allergies: Colestipol, Codeine, Lac bovis, Prednisone, and Aspirin    Review of Systems  Review of Systems   Constitutional:  Negative for activity change, diaphoresis, fatigue, fever and unexpected weight change.    HENT:  Negative for facial swelling and

## 2023-03-27 NOTE — PATIENT INSTRUCTIONS
Return in about 9 months (around 12/27/2023) for APRN. Call for palpitations, fast heart rates, unusual shortness of breath, or chest pain. Double check medications upon returning home- be sure you are taking simvastatin for cholesterol    Call with any questionsor concerns  Follow up with Milena Rosen MD for non cardiac problems  Report any new problems  Cardiovascular Fitness-Exercise as tolerated. Strive for 15 minutes of exercise most days of the week. Cardiac / HealthyDiet  Continue current medications as directed  Continue plan of treatment  It is always recommended that you bring your medicationsbottles with you to each visit - this is for your safety!

## 2023-03-27 NOTE — TELEPHONE ENCOUNTER
Pt went to the NP at Cardiology today for a appt and she noticed that she had been taken off of her cholesterol medication and she told the patient to let us know that because of the patient's history of Triple A.  She would like the medication sent into  [facility].

## 2023-03-28 ENCOUNTER — OFFICE VISIT (OUTPATIENT)
Dept: OTOLARYNGOLOGY | Facility: CLINIC | Age: 73
End: 2023-03-28
Payer: MEDICARE

## 2023-03-28 VITALS
TEMPERATURE: 97.1 F | HEIGHT: 63 IN | WEIGHT: 149 LBS | DIASTOLIC BLOOD PRESSURE: 61 MMHG | BODY MASS INDEX: 26.4 KG/M2 | RESPIRATION RATE: 16 BRPM | SYSTOLIC BLOOD PRESSURE: 120 MMHG | HEART RATE: 71 BPM

## 2023-03-28 DIAGNOSIS — Z98.890 S/P EXCISION OF VOCAL CORD NODULE: ICD-10-CM

## 2023-03-28 DIAGNOSIS — R49.0 HOARSENESS OF VOICE: Primary | ICD-10-CM

## 2023-03-28 DIAGNOSIS — K21.9 GASTROESOPHAGEAL REFLUX DISEASE, UNSPECIFIED WHETHER ESOPHAGITIS PRESENT: ICD-10-CM

## 2023-03-28 DIAGNOSIS — K21.9 LARYNGOPHARYNGEAL REFLUX (LPR): ICD-10-CM

## 2023-03-28 DIAGNOSIS — J38.3 LESION OF TRUE VOCAL CORD: ICD-10-CM

## 2023-03-28 PROCEDURE — 99213 OFFICE O/P EST LOW 20 MIN: CPT | Performed by: NURSE PRACTITIONER

## 2023-03-28 PROCEDURE — 1160F RVW MEDS BY RX/DR IN RCRD: CPT | Performed by: NURSE PRACTITIONER

## 2023-03-28 PROCEDURE — 1159F MED LIST DOCD IN RCRD: CPT | Performed by: NURSE PRACTITIONER

## 2023-03-28 PROCEDURE — 31575 DIAGNOSTIC LARYNGOSCOPY: CPT | Performed by: OTOLARYNGOLOGY

## 2023-03-28 RX ORDER — NICOTINE POLACRILEX 4 MG/1
20 GUM, CHEWING ORAL 2 TIMES DAILY
Qty: 60 EACH | Refills: 3 | Status: SHIPPED | OUTPATIENT
Start: 2023-03-28 | End: 2023-04-27

## 2023-03-28 RX ORDER — SIMVASTATIN 40 MG
TABLET ORAL
COMMUNITY
Start: 2023-03-27

## 2023-05-14 RX ORDER — PANTOPRAZOLE SODIUM 40 MG/1
TABLET, DELAYED RELEASE ORAL
Qty: 90 TABLET | Refills: 1 | Status: SHIPPED | OUTPATIENT
Start: 2023-05-14

## 2023-05-30 ENCOUNTER — OFFICE VISIT (OUTPATIENT)
Dept: OTOLARYNGOLOGY | Facility: CLINIC | Age: 73
End: 2023-05-30

## 2023-05-30 VITALS
TEMPERATURE: 97.5 F | HEIGHT: 63 IN | HEART RATE: 72 BPM | SYSTOLIC BLOOD PRESSURE: 111 MMHG | WEIGHT: 150 LBS | DIASTOLIC BLOOD PRESSURE: 61 MMHG | BODY MASS INDEX: 26.58 KG/M2

## 2023-05-30 DIAGNOSIS — J38.3 LESION OF TRUE VOCAL CORD: ICD-10-CM

## 2023-05-30 DIAGNOSIS — Z98.890 S/P EXCISION OF VOCAL CORD NODULE: ICD-10-CM

## 2023-05-30 DIAGNOSIS — K21.9 LARYNGOPHARYNGEAL REFLUX (LPR): ICD-10-CM

## 2023-05-30 DIAGNOSIS — R49.0 HOARSENESS OF VOICE: Primary | ICD-10-CM

## 2023-05-30 DIAGNOSIS — K45.8 RECURRENT ABDOMINAL HERNIA WITHOUT OBSTRUCTION OR GANGRENE, UNSPECIFIED HERNIA TYPE: ICD-10-CM

## 2023-05-30 DIAGNOSIS — Z87.891 FORMER SMOKER: ICD-10-CM

## 2023-07-10 RX ORDER — BUSPIRONE HYDROCHLORIDE 5 MG/1
TABLET ORAL
Qty: 60 TABLET | Refills: 2 | Status: SHIPPED | OUTPATIENT
Start: 2023-07-10

## 2023-08-01 ENCOUNTER — OFFICE VISIT (OUTPATIENT)
Dept: PRIMARY CARE CLINIC | Age: 73
End: 2023-08-01
Payer: MEDICARE

## 2023-08-01 ENCOUNTER — ANCILLARY PROCEDURE (OUTPATIENT)
Dept: PRIMARY CARE CLINIC | Age: 73
End: 2023-08-01

## 2023-08-01 VITALS
RESPIRATION RATE: 20 BRPM | HEART RATE: 79 BPM | SYSTOLIC BLOOD PRESSURE: 124 MMHG | TEMPERATURE: 97.7 F | DIASTOLIC BLOOD PRESSURE: 70 MMHG | BODY MASS INDEX: 26.4 KG/M2 | OXYGEN SATURATION: 98 % | WEIGHT: 149 LBS | HEIGHT: 63 IN

## 2023-08-01 DIAGNOSIS — R09.1 PLEURISY: Primary | ICD-10-CM

## 2023-08-01 DIAGNOSIS — R07.81 PLEURAL PAIN: ICD-10-CM

## 2023-08-01 DIAGNOSIS — F17.200 SMOKER: ICD-10-CM

## 2023-08-01 PROCEDURE — 1123F ACP DISCUSS/DSCN MKR DOCD: CPT | Performed by: NURSE PRACTITIONER

## 2023-08-01 PROCEDURE — 99214 OFFICE O/P EST MOD 30 MIN: CPT | Performed by: NURSE PRACTITIONER

## 2023-08-01 PROCEDURE — G8417 CALC BMI ABV UP PARAM F/U: HCPCS | Performed by: NURSE PRACTITIONER

## 2023-08-01 PROCEDURE — 3074F SYST BP LT 130 MM HG: CPT | Performed by: NURSE PRACTITIONER

## 2023-08-01 PROCEDURE — 3017F COLORECTAL CA SCREEN DOC REV: CPT | Performed by: NURSE PRACTITIONER

## 2023-08-01 PROCEDURE — 1090F PRES/ABSN URINE INCON ASSESS: CPT | Performed by: NURSE PRACTITIONER

## 2023-08-01 PROCEDURE — 4004F PT TOBACCO SCREEN RCVD TLK: CPT | Performed by: NURSE PRACTITIONER

## 2023-08-01 PROCEDURE — G8399 PT W/DXA RESULTS DOCUMENT: HCPCS | Performed by: NURSE PRACTITIONER

## 2023-08-01 PROCEDURE — 3078F DIAST BP <80 MM HG: CPT | Performed by: NURSE PRACTITIONER

## 2023-08-01 PROCEDURE — G8427 DOCREV CUR MEDS BY ELIG CLIN: HCPCS | Performed by: NURSE PRACTITIONER

## 2023-08-01 RX ORDER — TRIAMCINOLONE ACETONIDE 40 MG/ML
40 INJECTION, SUSPENSION INTRA-ARTICULAR; INTRAMUSCULAR ONCE
Status: COMPLETED | OUTPATIENT
Start: 2023-08-01 | End: 2023-08-01

## 2023-08-01 RX ORDER — DEXAMETHASONE 4 MG/1
4 TABLET ORAL 2 TIMES DAILY WITH MEALS
Qty: 14 TABLET | Refills: 0 | Status: SHIPPED | OUTPATIENT
Start: 2023-08-01 | End: 2023-08-08

## 2023-08-01 RX ADMIN — TRIAMCINOLONE ACETONIDE 40 MG: 40 INJECTION, SUSPENSION INTRA-ARTICULAR; INTRAMUSCULAR at 15:54

## 2023-08-01 ASSESSMENT — ENCOUNTER SYMPTOMS
WHEEZING: 0
SHORTNESS OF BREATH: 1
CHOKING: 0
COUGH: 0
APNEA: 0
STRIDOR: 0
CHEST TIGHTNESS: 0

## 2023-08-01 ASSESSMENT — PATIENT HEALTH QUESTIONNAIRE - PHQ9
SUM OF ALL RESPONSES TO PHQ QUESTIONS 1-9: 0
SUM OF ALL RESPONSES TO PHQ QUESTIONS 1-9: 0
1. LITTLE INTEREST OR PLEASURE IN DOING THINGS: 0
SUM OF ALL RESPONSES TO PHQ QUESTIONS 1-9: 0
2. FEELING DOWN, DEPRESSED OR HOPELESS: 0
SUM OF ALL RESPONSES TO PHQ9 QUESTIONS 1 & 2: 0
SUM OF ALL RESPONSES TO PHQ QUESTIONS 1-9: 0
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
## 2023-08-01 NOTE — PATIENT INSTRUCTIONS
Increase clear liquids  May take mucinex if coughing  If worse pain go to ER especially if short of breath  Steroid shot today  May start steroids twice a day if needed tomorrow or Thursday. If fever call us  If cant tolerate steroids would do motrin or ibuprofen with food. Work on stop smoking.

## 2023-08-28 ENCOUNTER — OFFICE VISIT (OUTPATIENT)
Dept: PRIMARY CARE CLINIC | Age: 73
End: 2023-08-28

## 2023-08-28 VITALS
OXYGEN SATURATION: 98 % | HEART RATE: 84 BPM | SYSTOLIC BLOOD PRESSURE: 128 MMHG | HEIGHT: 63 IN | DIASTOLIC BLOOD PRESSURE: 80 MMHG | TEMPERATURE: 97.1 F | WEIGHT: 144.4 LBS | BODY MASS INDEX: 25.59 KG/M2 | RESPIRATION RATE: 18 BRPM

## 2023-08-28 DIAGNOSIS — K42.9 UMBILICAL HERNIA WITHOUT OBSTRUCTION AND WITHOUT GANGRENE: ICD-10-CM

## 2023-08-28 DIAGNOSIS — K21.9 GASTROESOPHAGEAL REFLUX DISEASE, UNSPECIFIED WHETHER ESOPHAGITIS PRESENT: Primary | ICD-10-CM

## 2023-08-28 DIAGNOSIS — J44.9 CHRONIC OBSTRUCTIVE PULMONARY DISEASE, UNSPECIFIED COPD TYPE (HCC): ICD-10-CM

## 2023-08-28 RX ORDER — SUCRALFATE 1 G/1
1 TABLET ORAL 4 TIMES DAILY
Qty: 120 TABLET | Refills: 3 | Status: SHIPPED | OUTPATIENT
Start: 2023-08-28

## 2023-08-28 SDOH — ECONOMIC STABILITY: FOOD INSECURITY: WITHIN THE PAST 12 MONTHS, YOU WORRIED THAT YOUR FOOD WOULD RUN OUT BEFORE YOU GOT MONEY TO BUY MORE.: NEVER TRUE

## 2023-08-28 SDOH — ECONOMIC STABILITY: HOUSING INSECURITY
IN THE LAST 12 MONTHS, WAS THERE A TIME WHEN YOU DID NOT HAVE A STEADY PLACE TO SLEEP OR SLEPT IN A SHELTER (INCLUDING NOW)?: NO

## 2023-08-28 SDOH — ECONOMIC STABILITY: INCOME INSECURITY: HOW HARD IS IT FOR YOU TO PAY FOR THE VERY BASICS LIKE FOOD, HOUSING, MEDICAL CARE, AND HEATING?: NOT HARD AT ALL

## 2023-08-28 SDOH — ECONOMIC STABILITY: FOOD INSECURITY: WITHIN THE PAST 12 MONTHS, THE FOOD YOU BOUGHT JUST DIDN'T LAST AND YOU DIDN'T HAVE MONEY TO GET MORE.: NEVER TRUE

## 2023-08-31 ENCOUNTER — TELEPHONE (OUTPATIENT)
Dept: PRIMARY CARE CLINIC | Age: 73
End: 2023-08-31

## 2023-08-31 DIAGNOSIS — K21.9 GASTROESOPHAGEAL REFLUX DISEASE, UNSPECIFIED WHETHER ESOPHAGITIS PRESENT: Primary | ICD-10-CM

## 2023-08-31 NOTE — TELEPHONE ENCOUNTER
Hernia and kidney surgery will have to be separate as 2 different teams per patient    She would like referral to Dr. Greg Berger for her stomach issue she seen us for couple days ago.   Thank you

## 2023-09-01 ASSESSMENT — ENCOUNTER SYMPTOMS
RESPIRATORY NEGATIVE: 1
ABDOMINAL DISTENTION: 1

## 2023-09-01 NOTE — PROGRESS NOTES
SUBJECTIVE:    Juancho Nielsen is 71 y. o.female who comes in complaining of Hernia (Wants to go to a surgeon) and Gastroesophageal Reflux   . HPI:Kassy comes in today because she wants to referred to a surgeon for her worsening umbilical hernias. She denies severe pain nausea vomiting. No change in bowel or bladder function. She feels like it is getting worse. She is also being followed at [facility] for a mass extending off the lower pole of the right kidney. She also has COPD. She does smoke cigarettes still. CTAs in the past of the chest showed centrilobular emphysema. This is stable. She does not see a specialist and she really does not use an inhaler for this. She does have a history of GERD and this seems to be getting worse. She is already on Protonix 40 mg 1 a day but wonders what else she can do. She has reflux that gets up in the esophagus. She denies nausea or vomiting. Food seems to make it worse.       Allergies   Allergen Reactions    Colestipol Shortness Of Breath and Other (See Comments)     Heart races    Codeine      Blocks her kidneys     Milk (Cow) Diarrhea    Prednisone Other (See Comments)     Increased heart rate and insomnia    Aspirin Nausea And Vomiting     Makes her stomach bleed       Social History     Socioeconomic History    Marital status:      Spouse name: None    Number of children: 2    Years of education: None    Highest education level: None   Occupational History    Occupation: [employer]   Tobacco Use    Smoking status: Every Day     Packs/day: 0.25     Years: 20.00     Pack years: 5.00     Types: Cigarettes    Smokeless tobacco: Never    Tobacco comments:     Pt denies smoking but POA indicates pt smokes daily and refuses to tell medical personel   Vaping Use    Vaping Use: Never used   Substance and Sexual Activity    Alcohol use: No    Drug use: No    Sexual activity: Not Currently     Partners: Male   Social History Narrative     twice now

## 2023-09-07 RX ORDER — AMLODIPINE BESYLATE 5 MG/1
TABLET ORAL
Qty: 90 TABLET | Refills: 1 | Status: SHIPPED | OUTPATIENT
Start: 2023-09-07

## 2023-09-12 ENCOUNTER — OFFICE VISIT (OUTPATIENT)
Dept: GASTROENTEROLOGY | Age: 73
End: 2023-09-12
Payer: MEDICARE

## 2023-09-12 VITALS
HEART RATE: 69 BPM | OXYGEN SATURATION: 97 % | SYSTOLIC BLOOD PRESSURE: 130 MMHG | HEIGHT: 63 IN | DIASTOLIC BLOOD PRESSURE: 70 MMHG | BODY MASS INDEX: 25.34 KG/M2 | WEIGHT: 143 LBS

## 2023-09-12 DIAGNOSIS — R13.10 DYSPHAGIA, UNSPECIFIED TYPE: ICD-10-CM

## 2023-09-12 DIAGNOSIS — K21.9 GASTROESOPHAGEAL REFLUX DISEASE, UNSPECIFIED WHETHER ESOPHAGITIS PRESENT: Primary | ICD-10-CM

## 2023-09-12 PROCEDURE — 99214 OFFICE O/P EST MOD 30 MIN: CPT | Performed by: NURSE PRACTITIONER

## 2023-09-12 PROCEDURE — 3075F SYST BP GE 130 - 139MM HG: CPT | Performed by: NURSE PRACTITIONER

## 2023-09-12 PROCEDURE — 1090F PRES/ABSN URINE INCON ASSESS: CPT | Performed by: NURSE PRACTITIONER

## 2023-09-12 PROCEDURE — 1123F ACP DISCUSS/DSCN MKR DOCD: CPT | Performed by: NURSE PRACTITIONER

## 2023-09-12 PROCEDURE — G8427 DOCREV CUR MEDS BY ELIG CLIN: HCPCS | Performed by: NURSE PRACTITIONER

## 2023-09-12 PROCEDURE — 3078F DIAST BP <80 MM HG: CPT | Performed by: NURSE PRACTITIONER

## 2023-09-12 PROCEDURE — 4004F PT TOBACCO SCREEN RCVD TLK: CPT | Performed by: NURSE PRACTITIONER

## 2023-09-12 PROCEDURE — 3017F COLORECTAL CA SCREEN DOC REV: CPT | Performed by: NURSE PRACTITIONER

## 2023-09-12 PROCEDURE — G8399 PT W/DXA RESULTS DOCUMENT: HCPCS | Performed by: NURSE PRACTITIONER

## 2023-09-12 PROCEDURE — G8417 CALC BMI ABV UP PARAM F/U: HCPCS | Performed by: NURSE PRACTITIONER

## 2023-09-12 RX ORDER — PANTOPRAZOLE SODIUM 40 MG/1
40 TABLET, DELAYED RELEASE ORAL 2 TIMES DAILY
Qty: 30 TABLET | Refills: 3 | Status: SHIPPED | OUTPATIENT
Start: 2023-09-12

## 2023-09-12 ASSESSMENT — ENCOUNTER SYMPTOMS
BLOOD IN STOOL: 0
ABDOMINAL PAIN: 0
RECTAL PAIN: 0
ABDOMINAL DISTENTION: 0
COUGH: 0
DIARRHEA: 0
TROUBLE SWALLOWING: 1
VOMITING: 0
CHOKING: 0
NAUSEA: 0
ANAL BLEEDING: 0
SHORTNESS OF BREATH: 0
CONSTIPATION: 0

## 2023-09-12 NOTE — PROGRESS NOTES
Subjective:     Patient ID: Nell Mei is a 71 y.o. female  PCP: Kandice Hermosillo MD  Referring Provider: Kandice Hermosillo MD    HPI  Patient presents to the office today with the following complaints: Gastroesophageal Reflux      Patient seen in the office today with complaints of  increased acid reflux for the past several months and difficulty swallowing at times  as well. She is taking pantoprazole daily and carafate QID  Reports she continues to have terrible acid reflux and she is only eating one time per day due to the reflux. She has lost 5-6 pounds due to not being able to eat. Denies lower GI issues   Colonoscopy is up to date  Assessment:     1. Gastroesophageal reflux disease, unspecified whether esophagitis present  2. Dysphagia, unspecified type           Plan:   Schedule EGD  Nothing to eat or drink after midnight. No driving for 24 hours after procedure. Bring a  to procedure. No aspirin, NSAIDs, fish oil 5 days before procedure. I have discussed the benefits, alternatives, and risks (including bleeding, perforation and death)  for pursuing Endoscopy (EGD/Colonscopy/EUS/ERCP) with the patient and they are willing to continue. We also discussed the need for anesthesia, IV access, proper dietary changes, medication changes if necessary, and need for bowel prep (if ordered) prior to their Endoscopic procedure. They are aware they must have someone accompany them to their scheduled procedure to drive them home - they agree to the above and are willing to continue. Orders  No orders of the defined types were placed in this encounter.     Medications  Orders Placed This Encounter   Medications    pantoprazole (PROTONIX) 40 MG tablet     Sig: Take 1 tablet by mouth 2 times daily     Dispense:  30 tablet     Refill:  3         Patient History:     Past Medical History:   Diagnosis Date    Anxiety     Chronic back pain     Chronic kidney disease     COPD (chronic obstructive pulmonary

## 2023-10-02 ENCOUNTER — OFFICE VISIT (OUTPATIENT)
Dept: PRIMARY CARE CLINIC | Age: 73
End: 2023-10-02

## 2023-10-02 VITALS
OXYGEN SATURATION: 100 % | HEIGHT: 63 IN | BODY MASS INDEX: 25.34 KG/M2 | TEMPERATURE: 97.4 F | SYSTOLIC BLOOD PRESSURE: 130 MMHG | RESPIRATION RATE: 16 BRPM | DIASTOLIC BLOOD PRESSURE: 78 MMHG | WEIGHT: 143 LBS | HEART RATE: 71 BPM

## 2023-10-02 DIAGNOSIS — Z00.00 MEDICARE ANNUAL WELLNESS VISIT, SUBSEQUENT: Primary | ICD-10-CM

## 2023-10-02 DIAGNOSIS — Z72.0 TOBACCO USE: ICD-10-CM

## 2023-10-02 DIAGNOSIS — Z87.891 PERSONAL HISTORY OF TOBACCO USE: ICD-10-CM

## 2023-10-02 DIAGNOSIS — Z12.2 SCREENING FOR LUNG CANCER: ICD-10-CM

## 2023-10-02 DIAGNOSIS — Z23 NEED FOR INFLUENZA VACCINATION: ICD-10-CM

## 2023-10-02 DIAGNOSIS — I77.3 FIBROMUSCULAR DYSPLASIA (HCC): ICD-10-CM

## 2023-10-02 DIAGNOSIS — I10 ESSENTIAL HYPERTENSION: ICD-10-CM

## 2023-10-02 DIAGNOSIS — Z13.220 LIPID SCREENING: ICD-10-CM

## 2023-10-02 DIAGNOSIS — E78.00 HYPERCHOLESTEREMIA: ICD-10-CM

## 2023-10-02 DIAGNOSIS — J44.9 CHRONIC OBSTRUCTIVE PULMONARY DISEASE, UNSPECIFIED COPD TYPE (HCC): ICD-10-CM

## 2023-10-02 DIAGNOSIS — Z12.31 SCREENING MAMMOGRAM FOR BREAST CANCER: ICD-10-CM

## 2023-10-02 DIAGNOSIS — R13.10 DYSPHAGIA, UNSPECIFIED TYPE: ICD-10-CM

## 2023-10-02 DIAGNOSIS — E04.2 MULTINODULAR GOITER: ICD-10-CM

## 2023-10-02 LAB
ALBUMIN SERPL-MCNC: 4.1 G/DL (ref 3.5–5.2)
ALP SERPL-CCNC: 117 U/L (ref 35–104)
ALT SERPL-CCNC: 18 U/L (ref 5–33)
ANION GAP SERPL CALCULATED.3IONS-SCNC: 14 MMOL/L (ref 7–19)
AST SERPL-CCNC: 20 U/L (ref 5–32)
BILIRUB SERPL-MCNC: <0.2 MG/DL (ref 0.2–1.2)
BUN SERPL-MCNC: 12 MG/DL (ref 8–23)
CALCIUM SERPL-MCNC: 9.4 MG/DL (ref 8.8–10.2)
CHLORIDE SERPL-SCNC: 98 MMOL/L (ref 98–111)
CHOLEST SERPL-MCNC: 150 MG/DL (ref 160–199)
CO2 SERPL-SCNC: 26 MMOL/L (ref 22–29)
CREAT SERPL-MCNC: 1.2 MG/DL (ref 0.5–0.9)
ERYTHROCYTE [DISTWIDTH] IN BLOOD BY AUTOMATED COUNT: 16 % (ref 11.5–14.5)
GLUCOSE SERPL-MCNC: 92 MG/DL (ref 74–109)
HCT VFR BLD AUTO: 37.8 % (ref 37–47)
HDLC SERPL-MCNC: 48 MG/DL (ref 65–121)
HGB BLD-MCNC: 12.6 G/DL (ref 12–16)
LDLC SERPL CALC-MCNC: 82 MG/DL
MCH RBC QN AUTO: 30.4 PG (ref 27–31)
MCHC RBC AUTO-ENTMCNC: 33.3 G/DL (ref 33–37)
MCV RBC AUTO: 91.1 FL (ref 81–99)
PLATELET # BLD AUTO: 348 K/UL (ref 130–400)
PMV BLD AUTO: 9.4 FL (ref 9.4–12.3)
POTASSIUM SERPL-SCNC: 3.8 MMOL/L (ref 3.5–5)
PROT SERPL-MCNC: 6.6 G/DL (ref 6.6–8.7)
RBC # BLD AUTO: 4.15 M/UL (ref 4.2–5.4)
SODIUM SERPL-SCNC: 138 MMOL/L (ref 136–145)
TRIGL SERPL-MCNC: 98 MG/DL (ref 0–149)
TSH SERPL DL<=0.005 MIU/L-ACNC: 2.09 UIU/ML (ref 0.35–5.5)
WBC # BLD AUTO: 10.7 K/UL (ref 4.8–10.8)

## 2023-10-02 ASSESSMENT — LIFESTYLE VARIABLES
HOW MANY STANDARD DRINKS CONTAINING ALCOHOL DO YOU HAVE ON A TYPICAL DAY: PATIENT DOES NOT DRINK
HOW OFTEN DO YOU HAVE A DRINK CONTAINING ALCOHOL: NEVER

## 2023-10-02 ASSESSMENT — PATIENT HEALTH QUESTIONNAIRE - PHQ9
SUM OF ALL RESPONSES TO PHQ QUESTIONS 1-9: 0
SUM OF ALL RESPONSES TO PHQ9 QUESTIONS 1 & 2: 0
SUM OF ALL RESPONSES TO PHQ QUESTIONS 1-9: 0
1. LITTLE INTEREST OR PLEASURE IN DOING THINGS: 0
SUM OF ALL RESPONSES TO PHQ QUESTIONS 1-9: 0
2. FEELING DOWN, DEPRESSED OR HOPELESS: 0
SUM OF ALL RESPONSES TO PHQ QUESTIONS 1-9: 0

## 2023-10-02 NOTE — PROGRESS NOTES
Medicare Annual Wellness Visit    Rut Slater is here for Medicare AWV (Fasting today for any needed labs. Mammogram order for patient.  ) and Congestion (And left side of throat sore past 2 days. Pt states ongoing at times and will lose her voice. Pt states she seen ENT earlier this year.  )    Assessment & Plan   Medicare annual wellness visit, subsequent  Screening mammogram for breast cancer  -     BON DIGITAL SCREEN W OR WO CAD BILATERAL; Future  Essential hypertension  -     CBC  -     Comprehensive Metabolic Panel  -     indapamide (LOZOL) 1.25 MG tablet; Take 1 tablet by mouth every morning For high blood pressure, Disp-90 tablet, R-3Normal  Chronic obstructive pulmonary disease, unspecified COPD type ([address])  Assessment & Plan:  Patient is stable and monitors her oxygen and CO2 frequently. She is on 2 L of oxygen via nasal cannula at night and needs to continue this. She is not to smoke while she is on oxygen. This definitely does help her. Multinodular goiter  -     TSH with Reflex to FT4  -     US THYROID; Future  Lipid screening  Fibromuscular dysplasia ([address])  -     Dolores Sharp APRN, Vascular Surgery, [place]  Dysphagia, unspecified type  -     US THYROID; Future  Tobacco use  Screening for lung cancer  Personal history of tobacco use  -     FL VISIT TO DISCUSS LUNG CA SCREEN W LDCT  -     CT Lung Screen (Initial/Annual/Baseline); Future  Need for influenza vaccination  -     Influenza, FLUAD, (age 72 y+), IM, Preservative Free, 0.5 mL  Hypercholesteremia  -     Lipid Panel  -     simvastatin (ZOCOR) 40 MG tablet; Take 1 tablet by mouth nightly For high cholesterol, Disp-90 tablet, R-3Normal  She does have dysphagia. She has a history of multinodular goiter and that could have worsened but differential also includes esophageal spasms or esophageal stenosis. We are going to get an ultrasound of her thyroid.   We do not know the status of the thyroid lobe until we check a TSH

## 2023-10-04 ENCOUNTER — TELEPHONE (OUTPATIENT)
Dept: PRIMARY CARE CLINIC | Age: 73
End: 2023-10-04

## 2023-10-04 NOTE — TELEPHONE ENCOUNTER
----- Message from Mayuri Wolfe MD sent at 10/4/2023  7:46 AM CDT -----  Please call [facility] and make sure that it was Marlen Craig surgery that she saw for her abdominal hernias. She could not remember if it was Dr. José Luis Johns or Elyse Conn but I can go ahead and send her to [facility] and they can figure it out if that is where she wants to go.

## 2023-10-04 NOTE — TELEPHONE ENCOUNTER
PT remembers that it was [place].  Also he her note she needs you to put that she is using oxygen at night and it is on 2 due to she has trouble at night with breathing and coughing and [facility] [phone] [facility]

## 2023-10-07 DIAGNOSIS — I71.40 ABDOMINAL AORTIC ANEURYSM (AAA) WITHOUT RUPTURE (HCC): ICD-10-CM

## 2023-10-09 RX ORDER — FLUOXETINE HYDROCHLORIDE 20 MG/1
20 CAPSULE ORAL DAILY
Qty: 90 CAPSULE | Refills: 3 | Status: SHIPPED | OUTPATIENT
Start: 2023-10-09

## 2023-10-09 RX ORDER — BUSPIRONE HYDROCHLORIDE 5 MG/1
TABLET ORAL
Qty: 60 TABLET | Refills: 2 | Status: SHIPPED | OUTPATIENT
Start: 2023-10-09

## 2023-10-09 RX ORDER — DOXEPIN HYDROCHLORIDE 50 MG/1
CAPSULE ORAL
Qty: 30 CAPSULE | Refills: 11 | Status: SHIPPED | OUTPATIENT
Start: 2023-10-09

## 2023-10-15 RX ORDER — INDAPAMIDE 1.25 MG/1
1.25 TABLET ORAL EVERY MORNING
Qty: 90 TABLET | Refills: 3 | Status: SHIPPED | OUTPATIENT
Start: 2023-10-15

## 2023-10-15 RX ORDER — SIMVASTATIN 40 MG
40 TABLET ORAL NIGHTLY
Qty: 90 TABLET | Refills: 3 | Status: SHIPPED | OUTPATIENT
Start: 2023-10-15

## 2023-10-17 ENCOUNTER — OFFICE VISIT (OUTPATIENT)
Dept: SURGERY | Age: 73
End: 2023-10-17
Payer: MEDICARE

## 2023-10-17 VITALS
BODY MASS INDEX: 24.86 KG/M2 | OXYGEN SATURATION: 99 % | HEART RATE: 73 BPM | TEMPERATURE: 97.2 F | WEIGHT: 140.3 LBS | HEIGHT: 63 IN

## 2023-10-17 DIAGNOSIS — K43.2 INCISIONAL HERNIA, WITHOUT OBSTRUCTION OR GANGRENE: Primary | ICD-10-CM

## 2023-10-17 PROCEDURE — 3017F COLORECTAL CA SCREEN DOC REV: CPT | Performed by: SURGERY

## 2023-10-17 PROCEDURE — G8420 CALC BMI NORM PARAMETERS: HCPCS | Performed by: SURGERY

## 2023-10-17 PROCEDURE — G8427 DOCREV CUR MEDS BY ELIG CLIN: HCPCS | Performed by: SURGERY

## 2023-10-17 PROCEDURE — 4004F PT TOBACCO SCREEN RCVD TLK: CPT | Performed by: SURGERY

## 2023-10-17 PROCEDURE — 1123F ACP DISCUSS/DSCN MKR DOCD: CPT | Performed by: SURGERY

## 2023-10-17 PROCEDURE — G8399 PT W/DXA RESULTS DOCUMENT: HCPCS | Performed by: SURGERY

## 2023-10-17 PROCEDURE — 1090F PRES/ABSN URINE INCON ASSESS: CPT | Performed by: SURGERY

## 2023-10-17 PROCEDURE — 99213 OFFICE O/P EST LOW 20 MIN: CPT | Performed by: SURGERY

## 2023-10-17 PROCEDURE — G8484 FLU IMMUNIZE NO ADMIN: HCPCS | Performed by: SURGERY

## 2023-10-20 ENCOUNTER — TELEPHONE (OUTPATIENT)
Dept: CARDIOLOGY CLINIC | Age: 73
End: 2023-10-20

## 2023-10-20 NOTE — TELEPHONE ENCOUNTER
Date: 10-31-23    Cardiologist: Dr. Kenny Baumgarten     Procedure: Endoscopy     Surgeon: Dr. Axel Marx     Last Office Visit: 3-27-23    Reason for office visit and medical concerns addressed at this office visit: PAF, HTN, COPD, hyperlipidemia    Testing Performed and Date of Service:  EKG 6-27-22    Does the patient have a stent? If so, what type? Not in last year     Current Medications: lozol, zocor, prozac, sinequan, buspar, protonix, norvasc, carafate, toprol, plavix, antivert     Is the patient currently taking an anticoagulant? If so, what is the diagnosis the patient has been given to warrant the need for the anticoagulant?  Plavix     Additional Notes: Med hold on Plavix

## 2023-10-23 ENCOUNTER — TELEPHONE (OUTPATIENT)
Dept: GASTROENTEROLOGY | Age: 73
End: 2023-10-23

## 2023-10-23 NOTE — TELEPHONE ENCOUNTER
Patient: Juancho Nielsen    YOB: 1950      Clearance was received on October 23, 2023. for Endoscopy scheduled for: 10/31/23     Patient may discontinue the use of Plavix for 5-7  days prior to the procedure. IS Lovenox required:  No        PATIENT NOTIFIED ON:  10/23/23, left  w/info. PT called back and I notified her.        Clearance scanned into media

## 2023-10-27 ENCOUNTER — HOSPITAL ENCOUNTER (OUTPATIENT)
Dept: CT IMAGING | Age: 73
Discharge: HOME | End: 2023-10-27
Payer: MEDICARE

## 2023-10-27 DIAGNOSIS — K43.2 INCISIONAL HERNIA, WITHOUT OBSTRUCTION OR GANGRENE: ICD-10-CM

## 2023-10-27 DIAGNOSIS — Z87.891 PERSONAL HISTORY OF TOBACCO USE: ICD-10-CM

## 2023-10-27 PROCEDURE — 6360000004 HC RX CONTRAST MEDICATION: Performed by: SURGERY

## 2023-10-27 PROCEDURE — 71271 CT THORAX LUNG CANCER SCR C-: CPT

## 2023-10-27 PROCEDURE — 74178 CT ABD&PLV WO CNTR FLWD CNTR: CPT

## 2023-10-27 RX ADMIN — IOPAMIDOL 75 ML: 755 INJECTION, SOLUTION INTRAVENOUS at 12:38

## 2023-10-31 ENCOUNTER — ANESTHESIA (OUTPATIENT)
Dept: ENDOSCOPY | Age: 73
End: 2023-10-31
Payer: MEDICARE

## 2023-10-31 ENCOUNTER — HOSPITAL ENCOUNTER (OUTPATIENT)
Age: 73
Setting detail: OUTPATIENT SURGERY
Discharge: HOME | End: 2023-10-31
Attending: INTERNAL MEDICINE | Admitting: INTERNAL MEDICINE
Payer: MEDICARE

## 2023-10-31 ENCOUNTER — ANESTHESIA EVENT (OUTPATIENT)
Dept: ENDOSCOPY | Age: 73
End: 2023-10-31
Payer: MEDICARE

## 2023-10-31 VITALS
HEART RATE: 64 BPM | TEMPERATURE: 97.3 F | SYSTOLIC BLOOD PRESSURE: 110 MMHG | DIASTOLIC BLOOD PRESSURE: 58 MMHG | RESPIRATION RATE: 18 BRPM | OXYGEN SATURATION: 97 % | BODY MASS INDEX: 24.8 KG/M2 | WEIGHT: 140 LBS | HEIGHT: 63 IN

## 2023-10-31 DIAGNOSIS — R13.10 DYSPHAGIA, UNSPECIFIED TYPE: ICD-10-CM

## 2023-10-31 DIAGNOSIS — K21.9 GASTROESOPHAGEAL REFLUX DISEASE, UNSPECIFIED WHETHER ESOPHAGITIS PRESENT: ICD-10-CM

## 2023-10-31 PROCEDURE — 3700000000 HC ANESTHESIA ATTENDED CARE: Performed by: INTERNAL MEDICINE

## 2023-10-31 PROCEDURE — 88305 TISSUE EXAM BY PATHOLOGIST: CPT

## 2023-10-31 PROCEDURE — 7100000011 HC PHASE II RECOVERY - ADDTL 15 MIN: Performed by: INTERNAL MEDICINE

## 2023-10-31 PROCEDURE — 2709999900 HC NON-CHARGEABLE SUPPLY: Performed by: INTERNAL MEDICINE

## 2023-10-31 PROCEDURE — 88342 IMHCHEM/IMCYTCHM 1ST ANTB: CPT

## 2023-10-31 PROCEDURE — 7100000010 HC PHASE II RECOVERY - FIRST 15 MIN: Performed by: INTERNAL MEDICINE

## 2023-10-31 PROCEDURE — 3609012400 HC EGD TRANSORAL BIOPSY SINGLE/MULTIPLE: Performed by: INTERNAL MEDICINE

## 2023-10-31 PROCEDURE — 3700000001 HC ADD 15 MINUTES (ANESTHESIA): Performed by: INTERNAL MEDICINE

## 2023-10-31 PROCEDURE — 2580000003 HC RX 258: Performed by: INTERNAL MEDICINE

## 2023-10-31 PROCEDURE — 2500000003 HC RX 250 WO HCPCS: Performed by: NURSE ANESTHETIST, CERTIFIED REGISTERED

## 2023-10-31 PROCEDURE — 6360000002 HC RX W HCPCS: Performed by: NURSE ANESTHETIST, CERTIFIED REGISTERED

## 2023-10-31 PROCEDURE — 43239 EGD BIOPSY SINGLE/MULTIPLE: CPT | Performed by: INTERNAL MEDICINE

## 2023-10-31 RX ORDER — PROPOFOL 10 MG/ML
INJECTION, EMULSION INTRAVENOUS PRN
Status: DISCONTINUED | OUTPATIENT
Start: 2023-10-31 | End: 2023-10-31 | Stop reason: SDUPTHER

## 2023-10-31 RX ORDER — LIDOCAINE HYDROCHLORIDE 10 MG/ML
INJECTION, SOLUTION INFILTRATION; PERINEURAL PRN
Status: DISCONTINUED | OUTPATIENT
Start: 2023-10-31 | End: 2023-10-31 | Stop reason: SDUPTHER

## 2023-10-31 RX ORDER — SODIUM CHLORIDE, SODIUM LACTATE, POTASSIUM CHLORIDE, CALCIUM CHLORIDE 600; 310; 30; 20 MG/100ML; MG/100ML; MG/100ML; MG/100ML
INJECTION, SOLUTION INTRAVENOUS CONTINUOUS
Status: DISCONTINUED | OUTPATIENT
Start: 2023-10-31 | End: 2023-10-31 | Stop reason: HOSPADM

## 2023-10-31 RX ORDER — FENTANYL CITRATE 50 UG/ML
INJECTION, SOLUTION INTRAMUSCULAR; INTRAVENOUS PRN
Status: DISCONTINUED | OUTPATIENT
Start: 2023-10-31 | End: 2023-10-31 | Stop reason: SDUPTHER

## 2023-10-31 RX ADMIN — LIDOCAINE HYDROCHLORIDE 50 MG: 10 INJECTION, SOLUTION INFILTRATION; PERINEURAL at 07:58

## 2023-10-31 RX ADMIN — PROPOFOL 75 MG: 10 INJECTION, EMULSION INTRAVENOUS at 07:58

## 2023-10-31 RX ADMIN — SODIUM CHLORIDE, POTASSIUM CHLORIDE, SODIUM LACTATE AND CALCIUM CHLORIDE: 600; 310; 30; 20 INJECTION, SOLUTION INTRAVENOUS at 07:29

## 2023-10-31 RX ADMIN — FENTANYL CITRATE 50 MCG: 50 INJECTION INTRAMUSCULAR; INTRAVENOUS at 07:56

## 2023-10-31 ASSESSMENT — PAIN - FUNCTIONAL ASSESSMENT: PAIN_FUNCTIONAL_ASSESSMENT: 0-10

## 2023-10-31 NOTE — ANESTHESIA POSTPROCEDURE EVALUATION
Department of Anesthesiology  Postprocedure Note    Patient: Rob Chisholm  MRN: [id number]  YOB: 1950  Date of evaluation: 10/31/2023      Procedure Summary     Date: 10/31/23 Room / Location: [address]    Anesthesia Start: 8367 Anesthesia Stop: 0937    Procedure: EGD BIOPSY Diagnosis:       Gastroesophageal reflux disease, unspecified whether esophagitis present      Dysphagia, unspecified type      (Gastroesophageal reflux disease, unspecified whether esophagitis present [K21.9])      (Dysphagia, unspecified type [R13.10])    Surgeons: Bacilio Cardenas MD Responsible Provider: BRAYDON White Se, CRNA    Anesthesia Type: General, TIVA ASA Status: 3          Anesthesia Type: General, TIVA    Amie Phase I: Amie Score: 10    Amie Phase II:        Anesthesia Post Evaluation    Patient location during evaluation: bedside  Patient participation: complete - patient participated  Level of consciousness: responsive to verbal stimuli  Pain score: 0  Airway patency: patent  Nausea & Vomiting: no nausea and no vomiting  Complications: no  Cardiovascular status: hemodynamically stable  Respiratory status: acceptable  Hydration status: stable  Pain management: adequate

## 2023-10-31 NOTE — DISCHARGE INSTRUCTIONS
RECOMMENDATIONS:    1. Await path results  2. Continue PPI twice daily. 3.  I would continue Carafate as ordered. 4.  Add FDgard (available over the counter) to help with dyspepsia symptoms. Upper GI Endoscopy: What to Expect at [address] [place] [address] [place] had an upper GI endoscopy. Your doctor used a thin, lighted tube that bends to look at the inside of your esophagus, your stomach, and the first part of the small intestine, called the duodenum. How can you care for yourself at home? Activity   Rest as much as you need to after you go home. You should be able to go back to your usual activities the day after the test.  Due to anesthesia, no driving or operating equipment for 24 hours. Diet   Follow your doctor's directions for eating after the test.  Drink plenty of fluids (unless your doctor has told you not to). Medications   If you have a sore throat the day after the test, use an over-the-counter spray to numb your throat. When should you call for help? Call 911 anytime you think you may need emergency care. For example, call if:    You passed out (lost consciousness). You have trouble breathing. You pass maroon or bloody stools. Call your doctor now or seek immediate medical care if:    You have pain that does not get better after your take pain medicine. You have new or worse belly pain. You have blood in your stools. You are sick to your stomach and cannot keep fluids down. You have a fever. You cannot pass stools or gas. Watch closely for changes in your health, and be sure to contact your doctor if:    Your throat still hurts after a day or two. You do not get better as expected.

## 2023-10-31 NOTE — OP NOTE
Endoscopic Procedure Note    Patient: Fatemeh Batter: 1950  Med Rec#: [id number] Acc#: [id number]     Primary Care Provider Catia French MD  Referring Provider:     Endoscopist: Alejo Carter MD    Date of Procedure:  10/31/2023    Procedure:   1. EGD with biopsy    Indications:   1. Reflux, dyspepsia  2. Patient denies dysphagia symptoms to me today, on PPI twice daily recently    Anesthesia:  Sedation was administered by anesthesia who monitored the patient during the procedure. Estimated Blood Loss: minimal    Procedure:   After reviewing the patient's chart and obtaining informed consent, the patient was placed in the left lateral decubitus position. A forward-viewing Olympus endoscope was lubricated and inserted through the mouth into the oropharynx. Under direct visualization, the upper esophagus was intubated. The scope was advanced to the level of the third portion of duodenum. Scope was slowly withdrawn with careful inspection of the mucosal surfaces. The scope was retroflexed for inspection of the gastric fundus and incisura. Findings and maneuvers are listed in impression below. The patient tolerated the procedure well. The scope was removed. There were no immediate complications. Findings:   Esophagus: normal mucosal appearance. There is a small hiatal hernia. Stomach:  abnormal: mild mucosal changes suggestive of gastritis noted -  Gastric biopsies were taken from the antrum and body to rule out Helicobacter pylori infection. Duodenum: normal      IMPRESSION:  1. Gastritis - biopsied. RECOMMENDATIONS:    1. Await path results  2. Continue PPI twice daily. 3.  I would continue Carafate as ordered. 4.  Add FDgard (available over the counter) to help with dyspepsia symptoms. The results were discussed with the patient and family. A copy of the images obtained were given to the patient.      Kelin José MD  10/31/2023  8:06 AM

## 2023-10-31 NOTE — ANESTHESIA PRE PROCEDURE
26.0 01/16/2021 09:40 PM    TPV6QHG 20.3 01/16/2021 09:40 PM    BEART -1.8 01/16/2021 09:40 PM    F9KEHDVN 91.1 01/16/2021 09:40 PM        Type & Screen (If Applicable):  No results found for: \"LABABO\", \"LABRH\"    Anesthesia Evaluation  Patient summary reviewed and Nursing notes reviewed no history of anesthetic complications:   Airway: Mallampati: II  TM distance: >3 FB   Neck ROM: full  Mouth opening: < 3 FB   Dental: normal exam         Pulmonary:normal exam    (+) decreased breath sounds                           ROS comment: Quit smoking 2014   Cardiovascular:    (+) hypertension:,         Rhythm: regular  Rate: normal           Beta Blocker:  Not on Beta Blocker         Neuro/Psych:   (+) CVA:, TIA, headaches:, depression/anxiety             GI/Hepatic/Renal:   (+) renal disease: CRI, bowel prep,           Endo/Other:    (+) blood dyscrasia (plavix 8/26/19): anticoagulation therapy, arthritis: OA., .                 Abdominal:             Vascular:   + PVD, aortic or cerebral, . ROS comment: Open AAA repair 8/2018. Other Findings:             Anesthesia Plan      general and TIVA     ASA 3       Induction: intravenous. Anesthetic plan and risks discussed with patient. Plan discussed with CRNA. Pr refused to take dentures out for procedures, pt states they are glued in and she left them in last time. Pt aware of potential complications/ damage to dentures- and wishes to proceed, dental consent obtained.        BRAYDON White Se - CRNA   10/31/2023

## 2023-10-31 NOTE — H&P
Patient Name: Carisa Rapp  : 1950  MRN: [id number]  DATE: 10/31/23    Allergies: Allergies   Allergen Reactions    Colestipol Shortness Of Breath and Other (See Comments)     Heart races    Codeine      Blocks her kidneys     Milk (Cow) Diarrhea    Prednisone Other (See Comments)     Increased heart rate and insomnia    Aspirin Nausea And Vomiting     Makes her stomach bleed        ENDOSCOPY  History and Physical    Procedure:    [] Diagnostic Colonoscopy       [] Screening Colonoscopy  [x] EGD      [] ERCP      [] EUS       [] Other    [x] Previous office notes/History and Physical reviewed from the patients chart. Please see EMR for further details of HPI. I have examined the patient's status immediately prior to the procedure and:      Indications/HPI:    []Abdominal Pain   []Barretts  []Screening/Surveillance   []History of Polyps  []Dysphagia            [] +Cologard/DNA testing  []Abnormal Imaging              []EOE Hx              [] Family Hx of CRC/Polyps  []Anemia                            []Food Impaction       []Recent Poor Prep  []GI Bleed             []Lymphadenopathy  []History of Polyps  []Change in bowel habits [x]Heartburn/Reflux  []Cancer- GI/Lung  []Chest Pain - Non Cardiac []Heme (+) Stool []Ulcers  []Constipation  []Hemoptysis  []Incontinence    []Diarrhea  []Hypoxemia  []Rectal Bleed (BRBPR)  []Nausea/Vomiting   [] Varices  []Crohns/Colitis  []Pancreatic Cyst   [] Cirrhosis   []Pancreatitis    []Abnormal MRCP  []Elevated LFT [] Stent Removal, Previous ERCP  []Other:     Anesthesia:   [x] MAC [] Moderate Sedation   [] General   [] None     ROS: 12 pt Review of Symptoms was negative unless mentioned above    Medications:   Prior to Admission medications    Medication Sig Start Date End Date Taking?  Authorizing Provider   indapamide (LOZOL) 1.25 MG tablet Take 1 tablet by mouth every morning For high blood pressure 10/15/23   Alethea Petit MD   simvastatin (ZOCOR) 40 MG tablet

## 2023-11-10 ENCOUNTER — HOSPITAL ENCOUNTER (OUTPATIENT)
Dept: ULTRASOUND IMAGING | Age: 73
Discharge: HOME | End: 2023-11-10
Payer: MEDICARE

## 2023-11-10 ENCOUNTER — APPOINTMENT (OUTPATIENT)
Dept: CT IMAGING | Age: 73
End: 2023-11-10
Payer: MEDICARE

## 2023-11-10 ENCOUNTER — HOSPITAL ENCOUNTER (OUTPATIENT)
Dept: WOMENS IMAGING | Age: 73
Discharge: HOME | End: 2023-11-10
Attending: FAMILY MEDICINE
Payer: MEDICARE

## 2023-11-10 DIAGNOSIS — E04.2 MULTINODULAR GOITER: ICD-10-CM

## 2023-11-10 DIAGNOSIS — Z12.31 SCREENING MAMMOGRAM FOR BREAST CANCER: ICD-10-CM

## 2023-11-10 DIAGNOSIS — R13.10 DYSPHAGIA, UNSPECIFIED TYPE: ICD-10-CM

## 2023-11-10 PROCEDURE — 76536 US EXAM OF HEAD AND NECK: CPT

## 2023-11-10 PROCEDURE — 77063 BREAST TOMOSYNTHESIS BI: CPT

## 2023-11-13 ENCOUNTER — OFFICE VISIT (OUTPATIENT)
Dept: SURGERY | Age: 73
End: 2023-11-13
Payer: MEDICARE

## 2023-11-13 VITALS
OXYGEN SATURATION: 97 % | BODY MASS INDEX: 24.86 KG/M2 | HEART RATE: 64 BPM | WEIGHT: 140.3 LBS | TEMPERATURE: 97.3 F | HEIGHT: 63 IN

## 2023-11-13 DIAGNOSIS — N28.89 KIDNEY MASS: ICD-10-CM

## 2023-11-13 DIAGNOSIS — K43.2 INCISIONAL HERNIA, WITHOUT OBSTRUCTION OR GANGRENE: Primary | ICD-10-CM

## 2023-11-13 PROCEDURE — 1123F ACP DISCUSS/DSCN MKR DOCD: CPT | Performed by: SURGERY

## 2023-11-13 PROCEDURE — 99213 OFFICE O/P EST LOW 20 MIN: CPT | Performed by: SURGERY

## 2023-11-13 PROCEDURE — G8427 DOCREV CUR MEDS BY ELIG CLIN: HCPCS | Performed by: SURGERY

## 2023-11-13 PROCEDURE — 3017F COLORECTAL CA SCREEN DOC REV: CPT | Performed by: SURGERY

## 2023-11-13 PROCEDURE — 1090F PRES/ABSN URINE INCON ASSESS: CPT | Performed by: SURGERY

## 2023-11-13 PROCEDURE — G8484 FLU IMMUNIZE NO ADMIN: HCPCS | Performed by: SURGERY

## 2023-11-13 PROCEDURE — G8420 CALC BMI NORM PARAMETERS: HCPCS | Performed by: SURGERY

## 2023-11-13 PROCEDURE — G8399 PT W/DXA RESULTS DOCUMENT: HCPCS | Performed by: SURGERY

## 2023-11-13 PROCEDURE — 4004F PT TOBACCO SCREEN RCVD TLK: CPT | Performed by: SURGERY

## 2024-01-03 RX ORDER — BUSPIRONE HYDROCHLORIDE 5 MG/1
TABLET ORAL
Qty: 60 TABLET | Refills: 2 | Status: SHIPPED | OUTPATIENT
Start: 2024-01-03

## 2024-01-08 ENCOUNTER — OFFICE VISIT (OUTPATIENT)
Dept: CARDIOLOGY CLINIC | Age: 74
End: 2024-01-08
Payer: MEDICARE

## 2024-01-08 VITALS
SYSTOLIC BLOOD PRESSURE: 118 MMHG | HEIGHT: 63 IN | BODY MASS INDEX: 24.63 KG/M2 | HEART RATE: 69 BPM | WEIGHT: 139 LBS | DIASTOLIC BLOOD PRESSURE: 74 MMHG

## 2024-01-08 DIAGNOSIS — I48.0 PAF (PAROXYSMAL ATRIAL FIBRILLATION) (HCC): Primary | ICD-10-CM

## 2024-01-08 DIAGNOSIS — F17.211 CIGARETTE NICOTINE DEPENDENCE IN REMISSION: ICD-10-CM

## 2024-01-08 DIAGNOSIS — I65.23 BILATERAL CAROTID ARTERY STENOSIS: ICD-10-CM

## 2024-01-08 DIAGNOSIS — I10 ESSENTIAL HYPERTENSION: ICD-10-CM

## 2024-01-08 DIAGNOSIS — R06.02 SHORTNESS OF BREATH: ICD-10-CM

## 2024-01-08 PROCEDURE — 93000 ELECTROCARDIOGRAM COMPLETE: CPT | Performed by: CLINICAL NURSE SPECIALIST

## 2024-01-08 PROCEDURE — 1123F ACP DISCUSS/DSCN MKR DOCD: CPT | Performed by: CLINICAL NURSE SPECIALIST

## 2024-01-08 PROCEDURE — G8420 CALC BMI NORM PARAMETERS: HCPCS | Performed by: CLINICAL NURSE SPECIALIST

## 2024-01-08 PROCEDURE — G8399 PT W/DXA RESULTS DOCUMENT: HCPCS | Performed by: CLINICAL NURSE SPECIALIST

## 2024-01-08 PROCEDURE — 99214 OFFICE O/P EST MOD 30 MIN: CPT | Performed by: CLINICAL NURSE SPECIALIST

## 2024-01-08 PROCEDURE — 3017F COLORECTAL CA SCREEN DOC REV: CPT | Performed by: CLINICAL NURSE SPECIALIST

## 2024-01-08 PROCEDURE — G8427 DOCREV CUR MEDS BY ELIG CLIN: HCPCS | Performed by: CLINICAL NURSE SPECIALIST

## 2024-01-08 PROCEDURE — 3078F DIAST BP <80 MM HG: CPT | Performed by: CLINICAL NURSE SPECIALIST

## 2024-01-08 PROCEDURE — 4004F PT TOBACCO SCREEN RCVD TLK: CPT | Performed by: CLINICAL NURSE SPECIALIST

## 2024-01-08 PROCEDURE — G8484 FLU IMMUNIZE NO ADMIN: HCPCS | Performed by: CLINICAL NURSE SPECIALIST

## 2024-01-08 PROCEDURE — 1090F PRES/ABSN URINE INCON ASSESS: CPT | Performed by: CLINICAL NURSE SPECIALIST

## 2024-01-08 PROCEDURE — 3074F SYST BP LT 130 MM HG: CPT | Performed by: CLINICAL NURSE SPECIALIST

## 2024-01-08 PROCEDURE — 93242 EXT ECG>48HR<7D RECORDING: CPT | Performed by: CLINICAL NURSE SPECIALIST

## 2024-01-08 ASSESSMENT — ENCOUNTER SYMPTOMS
EYE REDNESS: 0
NAUSEA: 0
COUGH: 0
CHEST TIGHTNESS: 0
SHORTNESS OF BREATH: 1
FACIAL SWELLING: 0
ABDOMINAL PAIN: 0
VOMITING: 0
WHEEZING: 0

## 2024-01-08 NOTE — PROGRESS NOTES
Vascular: Carotid bruit (bilateral) present. No JVD.      Trachea: No tracheal deviation.   Cardiovascular:      Rate and Rhythm: Normal rate and regular rhythm.      Heart sounds: Normal heart sounds. No murmur heard.     No friction rub. No gallop.   Pulmonary:      Effort: Pulmonary effort is normal. No respiratory distress.      Breath sounds: Normal breath sounds. No wheezing or rales.   Abdominal:      Palpations: Abdomen is soft.      Tenderness: There is no abdominal tenderness.   Musculoskeletal:         General: No swelling or deformity.      Cervical back: Neck supple. No muscular tenderness.      Right lower leg: No edema.      Left lower leg: No edema.   Skin:     General: Skin is warm and dry.      Findings: No rash.   Neurological:      General: No focal deficit present.      Mental Status: She is alert and oriented to person, place, and time.      Cranial Nerves: No cranial nerve deficit.   Psychiatric:         Mood and Affect: Mood normal.         Behavior: Behavior normal.         Judgment: Judgment normal.       Data:  Echo 5/19  Summary   Normal LV size and systolic function. LV ejection fraction estimated at   60%.   Grade 1 diastolic dysfunction   Normal right ventricular size and systolic function.   Normal left atrial size   Aortic valve not well visualized. No significant stenosis or regurgitation   noted.   Mitral valve mildly thickened with normal mobility. 1+ mitral   regurgitation. No stenosis noted   Interatrial septum appears intact by color Doppler with no significant   intracardiac shunting noted by bubble study.     Zio Patch 2 week monitor 2/19/2021-  No atrial fib.  Patient triggered events with normal sinus rhythm    [id number] Score for Atrial Fibrillation Stroke Risk   Risk   Factors  Component Value   C CHF No 0   H HTN Yes 1   A2 Age >= 75 No,  (73 y.o.) 0   D DM No 0   S2 Prior Stroke/TIA No 0   V Vascular Disease Yes 1   A Age 65-74 Yes,  (73 y.o.) 1   Sc Sex female 1

## 2024-01-08 NOTE — PATIENT INSTRUCTIONS
Return in about 6 months (around 7/8/2024) for APRN.   Lexiscan Nuclear Stress Test  Zio Patch Monitor 1 week    [place] at the [facility] located on the first floor of [facility].   Enter through hospital main entrance and turn immediately to your left.    Patient's contact number:  [phone] (home)      Lexiscan Stress Test      Lexiscan (regadenoson injection) is a prescription drug given through an IV line that increases blood flow through the arteries of the heart during a cardiac nuclear stress test.     There are two parts to a Lexiscan stress test: the rest portion and the exercise portion. For the rest portion, a radioactive tracer is injected into your arm through the IV.  After 30 to 60 minutes, the process of imaging will begin.  A nuclear camera will be placed on your chest area and images are taken for the next 15 to 20 minutes.  For the exercise portion, a nurse will attach EKG electrodes to your chest to monitor your heart rate.  The drug Lexiscan is administered to simulate stress on the heart.  Your heart rhythm will then be monitored for the next few minutes. Your blood pressure will also be monitored throughout the exercise portion.  [place] through the exercise portion, a second round of radioactive tracer is injected into your body.  Your heart rate and EKG will be monitored for another few minutes after administering the drug.    Test Preparation:    Bring a list of your current medications.  Do not take any of your medications the morning of the test, but bring all morning medications with you as you will take them after the stress portion of the test is completed.    Do not eat Bananas 24 hours prior to test.    No caffeine 24 hours prior to the testing.  This includes: coffee, pop/soda, chocolate, cold medications, etc.  Any product that might contain caffeine.    No nicotine or alcohol 12 hours prior to your test.   Nothing to eat or drink 6-8

## 2024-01-24 ENCOUNTER — TELEPHONE (OUTPATIENT)
Dept: CARDIOLOGY CLINIC | Age: 74
End: 2024-01-24

## 2024-01-24 DIAGNOSIS — R06.02 SHORTNESS OF BREATH: ICD-10-CM

## 2024-01-24 DIAGNOSIS — I48.0 PAF (PAROXYSMAL ATRIAL FIBRILLATION) (HCC): Primary | ICD-10-CM

## 2024-01-24 DIAGNOSIS — I48.0 PAF (PAROXYSMAL ATRIAL FIBRILLATION) (HCC): ICD-10-CM

## 2024-01-24 PROCEDURE — 93244 EXT ECG>48HR<7D REV&INTERPJ: CPT | Performed by: CLINICAL NURSE SPECIALIST

## 2024-01-24 NOTE — TELEPHONE ENCOUNTER
Please let patient know recent heart monitor showed no evidence of atrial fibrillation and no other significant arrhythmias-  no change in treatment at this time

## 2024-02-02 RX ORDER — CLOPIDOGREL BISULFATE 75 MG/1
75 TABLET ORAL DAILY
Qty: 90 TABLET | Refills: 3 | Status: SHIPPED | OUTPATIENT
Start: 2024-02-02

## 2024-02-19 NOTE — PROGRESS NOTES
Kassy Kingsley (:  1950) is a 73 y.o. female,Established patient, here for evaluation of the following chief complaint(s):  No chief complaint on file.            SUBJECTIVE/OBJECTIVE:  Kassy has a history of peripheral vascular disease of the lower extremities.  She has had this for 1 - 5 years. Current treatment includes clopidogrel 75 mg po qd, ASA EC daily.  Kassy has not had new wounds. Recently, she reports claudication with distance only.  No wounds.  No rest pain.   She presents for follow up of carotid artery stenosis.  She has a known history of carotid artery stenosis for 1 - 5 years. Her current treatment includes clopidogrel 75 mg po qd, ASA EC daily.She denies a history of CVA.  She reports no TIA's, episodes of lateralizing weakness and episodes of amaurosis fugax.    She has a history of known renal artery stenosis for a duration of 1 - 5 years. She presents with stable hypertension. She is currently on 2 antihypertensive medications. Her current treatment includes ASA 81 mg po qd, clopidogrel 75 mg po qd.  She does have a history of a previous renal bypass. Risk factors for atherosclerosis include hyperlipidemia and hypertension. At present she reports symptoms of none.    Differential diagnosis for hypertension includes but is not limited to essential HTN aldosteronoma, pheochromocytoma, renal artery stenosis, aortic coarctation.     Lab Results   Component Value Date    BUN 12 10/02/2023     Lab Results   Component Value Date    CREATININE 1.2 (H) 10/02/2023           Kassy Kingsley is a 73 y.o. female with the following history as recorded in [facility]:  Patient Active Problem List    Diagnosis Date Noted    Incisional hernia, without obstruction or gangrene 10/06/2022    History of AAA (abdominal aortic aneurysm) repair     Nasal vestibulitis 2021    Chronic obstructive pulmonary disease (HCC) 2021    S/P excision of vocal cord nodule 2020    Gastroesophageal reflux

## 2024-02-22 ENCOUNTER — HOSPITAL ENCOUNTER (OUTPATIENT)
Dept: ULTRASOUND IMAGING | Age: 74
Discharge: HOME | End: 2024-02-22
Payer: MEDICARE

## 2024-02-22 DIAGNOSIS — I70.1 RENAL ARTERY STENOSIS (HCC): ICD-10-CM

## 2024-02-22 PROCEDURE — 93975 VASCULAR STUDY: CPT

## 2024-02-23 ENCOUNTER — HOSPITAL ENCOUNTER (OUTPATIENT)
Dept: VASCULAR LAB | Age: 74
Discharge: HOME | End: 2024-02-23
Payer: MEDICARE

## 2024-02-23 DIAGNOSIS — I65.23 BILATERAL CAROTID ARTERY STENOSIS: ICD-10-CM

## 2024-02-23 DIAGNOSIS — I70.213 ATHEROSCLER OF NATIVE ARTERY OF BOTH LEGS WITH INTERMIT CLAUDICATION (HCC): ICD-10-CM

## 2024-02-23 PROCEDURE — 93923 UPR/LXTR ART STDY 3+ LVLS: CPT

## 2024-02-23 PROCEDURE — 93880 EXTRACRANIAL BILAT STUDY: CPT

## 2024-02-26 ENCOUNTER — OFFICE VISIT (OUTPATIENT)
Dept: VASCULAR SURGERY | Age: 74
End: 2024-02-26
Payer: MEDICARE

## 2024-02-26 VITALS
OXYGEN SATURATION: 98 % | HEART RATE: 68 BPM | TEMPERATURE: 97 F | SYSTOLIC BLOOD PRESSURE: 112 MMHG | DIASTOLIC BLOOD PRESSURE: 65 MMHG

## 2024-02-26 DIAGNOSIS — I65.23 BILATERAL CAROTID ARTERY STENOSIS: Primary | ICD-10-CM

## 2024-02-26 DIAGNOSIS — I70.1 RENAL ARTERY STENOSIS (HCC): ICD-10-CM

## 2024-02-26 DIAGNOSIS — I70.213 ATHEROSCLER OF NATIVE ARTERY OF BOTH LEGS WITH INTERMIT CLAUDICATION (HCC): ICD-10-CM

## 2024-02-26 PROCEDURE — 99214 OFFICE O/P EST MOD 30 MIN: CPT | Performed by: NURSE PRACTITIONER

## 2024-02-26 PROCEDURE — 4004F PT TOBACCO SCREEN RCVD TLK: CPT | Performed by: NURSE PRACTITIONER

## 2024-02-26 PROCEDURE — G8399 PT W/DXA RESULTS DOCUMENT: HCPCS | Performed by: NURSE PRACTITIONER

## 2024-02-26 PROCEDURE — 1090F PRES/ABSN URINE INCON ASSESS: CPT | Performed by: NURSE PRACTITIONER

## 2024-02-26 PROCEDURE — 1123F ACP DISCUSS/DSCN MKR DOCD: CPT | Performed by: NURSE PRACTITIONER

## 2024-02-26 PROCEDURE — G8420 CALC BMI NORM PARAMETERS: HCPCS | Performed by: NURSE PRACTITIONER

## 2024-02-26 PROCEDURE — 3078F DIAST BP <80 MM HG: CPT | Performed by: NURSE PRACTITIONER

## 2024-02-26 PROCEDURE — 3074F SYST BP LT 130 MM HG: CPT | Performed by: NURSE PRACTITIONER

## 2024-02-26 PROCEDURE — 3017F COLORECTAL CA SCREEN DOC REV: CPT | Performed by: NURSE PRACTITIONER

## 2024-02-26 PROCEDURE — G8484 FLU IMMUNIZE NO ADMIN: HCPCS | Performed by: NURSE PRACTITIONER

## 2024-02-26 PROCEDURE — G8427 DOCREV CUR MEDS BY ELIG CLIN: HCPCS | Performed by: NURSE PRACTITIONER

## 2024-03-04 RX ORDER — AMLODIPINE BESYLATE 5 MG/1
TABLET ORAL
Qty: 90 TABLET | Refills: 1 | Status: SHIPPED | OUTPATIENT
Start: 2024-03-04

## 2024-04-02 RX ORDER — BUSPIRONE HYDROCHLORIDE 5 MG/1
TABLET ORAL
Qty: 60 TABLET | Refills: 2 | Status: SHIPPED | OUTPATIENT
Start: 2024-04-02

## 2024-06-01 RX ORDER — METOPROLOL SUCCINATE 25 MG/1
25 TABLET, EXTENDED RELEASE ORAL DAILY
Qty: 90 TABLET | Refills: 3 | Status: SHIPPED | OUTPATIENT
Start: 2024-06-01

## 2024-06-06 ENCOUNTER — TELEPHONE (OUTPATIENT)
Dept: PRIMARY CARE CLINIC | Age: 74
End: 2024-06-06

## 2024-06-06 NOTE — TELEPHONE ENCOUNTER
Patient called and states [facility] said it is time to renew her oxygen.  She states she does not see us until 8/2024.      Can office send renewal order to wear oxygen at night or is an appointment needed here before 8/2024 for the in office rest/walk test?      Thank you

## 2024-06-06 NOTE — TELEPHONE ENCOUNTER
I called and they state we need to see patient by 6/21/24.  Patient does Not have to be walked - only a note (face to face visit) stating her lung diagnosis and the need to continue her oxygen.      Please set up this patient prior to 6/21/24 with Dr. Petit.    Thank you

## 2024-06-12 ENCOUNTER — OFFICE VISIT (OUTPATIENT)
Dept: PRIMARY CARE CLINIC | Age: 74
End: 2024-06-12
Payer: MEDICARE

## 2024-06-12 DIAGNOSIS — J44.9 CHRONIC OBSTRUCTIVE PULMONARY DISEASE, UNSPECIFIED COPD TYPE (HCC): ICD-10-CM

## 2024-06-12 PROCEDURE — G8427 DOCREV CUR MEDS BY ELIG CLIN: HCPCS | Performed by: FAMILY MEDICINE

## 2024-06-12 PROCEDURE — 1090F PRES/ABSN URINE INCON ASSESS: CPT | Performed by: FAMILY MEDICINE

## 2024-06-12 PROCEDURE — G8399 PT W/DXA RESULTS DOCUMENT: HCPCS | Performed by: FAMILY MEDICINE

## 2024-06-12 PROCEDURE — 3017F COLORECTAL CA SCREEN DOC REV: CPT | Performed by: FAMILY MEDICINE

## 2024-06-12 PROCEDURE — 1123F ACP DISCUSS/DSCN MKR DOCD: CPT | Performed by: FAMILY MEDICINE

## 2024-06-12 PROCEDURE — 3023F SPIROM DOC REV: CPT | Performed by: FAMILY MEDICINE

## 2024-06-12 PROCEDURE — 4004F PT TOBACCO SCREEN RCVD TLK: CPT | Performed by: FAMILY MEDICINE

## 2024-06-12 PROCEDURE — G8420 CALC BMI NORM PARAMETERS: HCPCS | Performed by: FAMILY MEDICINE

## 2024-06-12 PROCEDURE — 99213 OFFICE O/P EST LOW 20 MIN: CPT | Performed by: FAMILY MEDICINE

## 2024-06-12 ASSESSMENT — PATIENT HEALTH QUESTIONNAIRE - PHQ9
SUM OF ALL RESPONSES TO PHQ9 QUESTIONS 1 & 2: 1
SUM OF ALL RESPONSES TO PHQ QUESTIONS 1-9: 1
2. FEELING DOWN, DEPRESSED OR HOPELESS: SEVERAL DAYS
1. LITTLE INTEREST OR PLEASURE IN DOING THINGS: NOT AT ALL

## 2024-06-12 ASSESSMENT — ENCOUNTER SYMPTOMS: RESPIRATORY NEGATIVE: 1

## 2024-06-12 NOTE — PROGRESS NOTES
Continue to wear her oxygen at night.  I feel that this is medically necessary.  Here in the office oxygen was good at rest but when we walked her around the office it dropped from 98% to 90% with just 3 laps.  I strongly encouraged her to quit smoking.  If she continues to smoke we may end up needing as needed daytime oxygen also.  Follow-up:  Return if symptoms worsen or fail to improve.    PATIENT INSTRUCTIONS:  Patient Instructions   We are committed to providing you with the best care possible.   In order to help us achieve these goals please remember to bring all medications, herbal products, and over the counter supplements with you to each visit.     If your provider has ordered testing for you, please be sure to follow up with our office if you have not received results within 7 days after the testing took place.     *If you receive a survey after visiting one of our offices, please take time to share your experience concerning your physician office visit. These surveys are confidential and no health information about you is shared.  We are eager to improve for you and we are counting on your feedback to help make that happen.           Wt Readings from Last 3 Encounters:   01/08/24 63 kg (139 lb)   11/13/23 63.6 kg (140 lb 4.8 oz)   10/31/23 63.5 kg (140 lb)            Quit Now [place] is a FREE online service available to [place] residents 15 years of age and over. When you become a member,it offers a free telephone service, so you can speak to a  in person, if you would prefer. Call the QuitLine at 9-898-QUITNOW or 1-539.909.3426.  special tools, a support team of coaches, research-based information, and a community of others trying to become tobacco free. Our expert coaches can talk to you about overcoming common barriers, such as dealing with stress, fighting cravings, coping with irritability, and controlling weight

## 2024-06-12 NOTE — PATIENT INSTRUCTIONS
We are committed to providing you with the best care possible.   In order to help us achieve these goals please remember to bring all medications, herbal products, and over the counter supplements with you to each visit.     If your provider has ordered testing for you, please be sure to follow up with our office if you have not received results within 7 days after the testing took place.     *If you receive a survey after visiting one of our offices, please take time to share your experience concerning your physician office visit. These surveys are confidential and no health information about you is shared.  We are eager to improve for you and we are counting on your feedback to help make that happen.           Wt Readings from Last 3 Encounters:   24 63 kg (139 lb)   23 63.6 kg (140 lb 4.8 oz)   10/31/23 63.5 kg (140 lb)            Quit Now [place] is a FREE online service available to [place] residents 15 years of age and over. When you become a member,it offers a free telephone service, so you can speak to a  in person, if you would prefer. Call the QuitLine at 7-422-QUITNOW or 1-153.942.9842.  special tools, a support team of coaches, research-based information, and a community of others trying to become tobacco free. Our expert coaches can talk to you about overcoming common barriers, such as dealing with stress, fighting cravings, coping with irritability, and controlling weight gain.    Http://www.cdc.gov/tobacco/campaign/tips/index.htm    Https://www.quitnow[place].org/    Nicotine is an addictive drug found in tobacco that causes changes in the brain - making people crave it more and more. When prolonged tobacco use is stopped, it can cause unpleasant withdrawal symptoms, including irritability and anxiety      BENEFITS OF STOPPING SMOKIN minutes after quitting  Your heart rate and blood pressure drop  12 hours after quitting  The carbon monoxide level in your blood drops to

## 2024-07-01 RX ORDER — BUSPIRONE HYDROCHLORIDE 5 MG/1
TABLET ORAL
Qty: 60 TABLET | Refills: 2 | Status: SHIPPED | OUTPATIENT
Start: 2024-07-01

## 2024-07-08 ENCOUNTER — OFFICE VISIT (OUTPATIENT)
Dept: CARDIOLOGY CLINIC | Age: 74
End: 2024-07-08
Payer: MEDICARE

## 2024-07-08 VITALS
HEIGHT: 63 IN | WEIGHT: 128 LBS | HEART RATE: 64 BPM | BODY MASS INDEX: 22.68 KG/M2 | DIASTOLIC BLOOD PRESSURE: 74 MMHG | OXYGEN SATURATION: 98 % | SYSTOLIC BLOOD PRESSURE: 124 MMHG

## 2024-07-08 DIAGNOSIS — I65.23 BILATERAL CAROTID ARTERY STENOSIS: ICD-10-CM

## 2024-07-08 DIAGNOSIS — J44.9 CHRONIC OBSTRUCTIVE PULMONARY DISEASE, UNSPECIFIED COPD TYPE (HCC): ICD-10-CM

## 2024-07-08 DIAGNOSIS — R06.02 SHORTNESS OF BREATH: ICD-10-CM

## 2024-07-08 DIAGNOSIS — I48.0 PAF (PAROXYSMAL ATRIAL FIBRILLATION) (HCC): Primary | ICD-10-CM

## 2024-07-08 DIAGNOSIS — F17.218 CIGARETTE NICOTINE DEPENDENCE WITH OTHER NICOTINE-INDUCED DISORDER: ICD-10-CM

## 2024-07-08 DIAGNOSIS — I10 ESSENTIAL HYPERTENSION: ICD-10-CM

## 2024-07-08 PROCEDURE — 99214 OFFICE O/P EST MOD 30 MIN: CPT | Performed by: CLINICAL NURSE SPECIALIST

## 2024-07-08 PROCEDURE — 3078F DIAST BP <80 MM HG: CPT | Performed by: CLINICAL NURSE SPECIALIST

## 2024-07-08 PROCEDURE — G8427 DOCREV CUR MEDS BY ELIG CLIN: HCPCS | Performed by: CLINICAL NURSE SPECIALIST

## 2024-07-08 PROCEDURE — G8399 PT W/DXA RESULTS DOCUMENT: HCPCS | Performed by: CLINICAL NURSE SPECIALIST

## 2024-07-08 PROCEDURE — 93000 ELECTROCARDIOGRAM COMPLETE: CPT | Performed by: CLINICAL NURSE SPECIALIST

## 2024-07-08 PROCEDURE — 3017F COLORECTAL CA SCREEN DOC REV: CPT | Performed by: CLINICAL NURSE SPECIALIST

## 2024-07-08 PROCEDURE — G8420 CALC BMI NORM PARAMETERS: HCPCS | Performed by: CLINICAL NURSE SPECIALIST

## 2024-07-08 PROCEDURE — 1123F ACP DISCUSS/DSCN MKR DOCD: CPT | Performed by: CLINICAL NURSE SPECIALIST

## 2024-07-08 PROCEDURE — 4004F PT TOBACCO SCREEN RCVD TLK: CPT | Performed by: CLINICAL NURSE SPECIALIST

## 2024-07-08 PROCEDURE — 1090F PRES/ABSN URINE INCON ASSESS: CPT | Performed by: CLINICAL NURSE SPECIALIST

## 2024-07-08 PROCEDURE — 3074F SYST BP LT 130 MM HG: CPT | Performed by: CLINICAL NURSE SPECIALIST

## 2024-07-08 PROCEDURE — 3023F SPIROM DOC REV: CPT | Performed by: CLINICAL NURSE SPECIALIST

## 2024-07-08 ASSESSMENT — ENCOUNTER SYMPTOMS
SHORTNESS OF BREATH: 1
FACIAL SWELLING: 0
WHEEZING: 0
CHEST TIGHTNESS: 0
COUGH: 0
VOMITING: 0
ABDOMINAL PAIN: 0
NAUSEA: 0
EYE REDNESS: 0

## 2024-07-08 NOTE — PATIENT INSTRUCTIONS
Return in about 6 months (around 1/8/2025) for APRN.     Wear oxygen more often throughout day as needed  Consider referral to pulmologist, maintenance inhaler

## 2024-07-08 NOTE — PROGRESS NOTES
[facility], [facility]  [address] [address]  Phone: [phone]  Fax: [phone]    OFFICE VISIT:  2024    Kassy Kingsley - : 1950    Reason For Visit:  Kassy is a 73 y.o. female who is here for Follow-up (No cardiac symptoms) and PAF (paroxysmal atrial fibrillation)       Diagnosis Orders   1. PAF (paroxysmal atrial fibrillation) (HCC)  EKG 12 lead      2. Essential hypertension  EKG 12 lead      3. Shortness of breath  EKG 12 lead      4. Chronic obstructive pulmonary disease, unspecified COPD type (HCC)        5. Bilateral carotid artery stenosis        6. Cigarette nicotine dependence with other nicotine-induced disorder                HPI  Patient is here for follow-up with a history of PAF (hx of GIB and presently not anticoagulated), TIA, hypertension, carotid stenosis, history of AAA and follows with vascular surgery.      She is chronically short of breath   She felt her shortness of breath was worseWith history of continued smoking and COPD.at her last office visit 6 months ago.  Zio patch heart monitor showed no recurrence of atrial fibrillation.  Lexiscan nuclear stress test showed no evidence of ischemia.     The shortness of breath persists.  She has not followed up with a pulmonologist in more than 5 years.  She takes no maintenance inhalers.  She states she does see a drop in her O2 sat with exertion.  She is trying to avoid using oxygen during the day, but does wear it at night.       She denies any associated chest pain or discomfort.   She denies palpitations or fast heart rates.          Carmen Petit MD is PCP.  Kassy JOCELYNE Kingsley has the following history as recorded in [facility]:    Patient Active Problem List    Diagnosis Date Noted    Incisional hernia, without obstruction or gangrene 10/06/2022    History of AAA (abdominal aortic aneurysm) repair     Nasal vestibulitis 2021    Chronic obstructive pulmonary disease (HCC)

## 2024-08-19 RX ORDER — PANTOPRAZOLE SODIUM 40 MG/1
40 TABLET, DELAYED RELEASE ORAL 2 TIMES DAILY
Qty: 30 TABLET | Refills: 3 | Status: SHIPPED | OUTPATIENT
Start: 2024-08-19

## 2024-08-19 NOTE — TELEPHONE ENCOUNTER
0819-2024 Called pt notified that the pharmacy has requested a refill on your current medication.  If regular medications are being prescribed our Providers prefers that patients have a yearly follow up apt.     Your last apt was on 09- with CT APRN     Apt scheduled for 09-     Routed to CT APRN

## 2024-08-30 RX ORDER — AMLODIPINE BESYLATE 5 MG/1
TABLET ORAL
Qty: 90 TABLET | Refills: 1 | Status: SHIPPED | OUTPATIENT
Start: 2024-08-30

## 2024-09-04 ENCOUNTER — OFFICE VISIT (OUTPATIENT)
Dept: GASTROENTEROLOGY | Age: 74
End: 2024-09-04
Payer: MEDICARE

## 2024-09-04 VITALS
HEIGHT: 63 IN | HEART RATE: 77 BPM | BODY MASS INDEX: 22.5 KG/M2 | OXYGEN SATURATION: 97 % | SYSTOLIC BLOOD PRESSURE: 120 MMHG | DIASTOLIC BLOOD PRESSURE: 60 MMHG | WEIGHT: 127 LBS

## 2024-09-04 DIAGNOSIS — K21.9 GASTROESOPHAGEAL REFLUX DISEASE, UNSPECIFIED WHETHER ESOPHAGITIS PRESENT: Primary | ICD-10-CM

## 2024-09-04 DIAGNOSIS — R13.10 DYSPHAGIA, UNSPECIFIED TYPE: ICD-10-CM

## 2024-09-04 PROCEDURE — 1090F PRES/ABSN URINE INCON ASSESS: CPT | Performed by: NURSE PRACTITIONER

## 2024-09-04 PROCEDURE — 4004F PT TOBACCO SCREEN RCVD TLK: CPT | Performed by: NURSE PRACTITIONER

## 2024-09-04 PROCEDURE — 3017F COLORECTAL CA SCREEN DOC REV: CPT | Performed by: NURSE PRACTITIONER

## 2024-09-04 PROCEDURE — 3074F SYST BP LT 130 MM HG: CPT | Performed by: NURSE PRACTITIONER

## 2024-09-04 PROCEDURE — 1123F ACP DISCUSS/DSCN MKR DOCD: CPT | Performed by: NURSE PRACTITIONER

## 2024-09-04 PROCEDURE — 99213 OFFICE O/P EST LOW 20 MIN: CPT | Performed by: NURSE PRACTITIONER

## 2024-09-04 PROCEDURE — G8420 CALC BMI NORM PARAMETERS: HCPCS | Performed by: NURSE PRACTITIONER

## 2024-09-04 PROCEDURE — 3078F DIAST BP <80 MM HG: CPT | Performed by: NURSE PRACTITIONER

## 2024-09-04 PROCEDURE — G8427 DOCREV CUR MEDS BY ELIG CLIN: HCPCS | Performed by: NURSE PRACTITIONER

## 2024-09-04 PROCEDURE — G8399 PT W/DXA RESULTS DOCUMENT: HCPCS | Performed by: NURSE PRACTITIONER

## 2024-09-04 RX ORDER — PANTOPRAZOLE SODIUM 40 MG/1
40 TABLET, DELAYED RELEASE ORAL 2 TIMES DAILY
Qty: 60 TABLET | Refills: 11 | Status: SHIPPED | OUTPATIENT
Start: 2024-09-04

## 2024-09-10 ASSESSMENT — ENCOUNTER SYMPTOMS
DIARRHEA: 0
VOMITING: 0
ABDOMINAL DISTENTION: 0
COUGH: 0
NAUSEA: 0
ABDOMINAL PAIN: 0
SHORTNESS OF BREATH: 0
ANAL BLEEDING: 0
TROUBLE SWALLOWING: 1
CONSTIPATION: 0
CHOKING: 0
RECTAL PAIN: 0
BLOOD IN STOOL: 0

## 2024-09-29 DIAGNOSIS — I71.40 ABDOMINAL AORTIC ANEURYSM (AAA) WITHOUT RUPTURE (HCC): ICD-10-CM

## 2024-09-29 DIAGNOSIS — I10 ESSENTIAL HYPERTENSION: ICD-10-CM

## 2024-09-30 RX ORDER — INDAPAMIDE 1.25 MG/1
1.25 TABLET ORAL EVERY MORNING
Qty: 90 TABLET | Refills: 3 | Status: SHIPPED | OUTPATIENT
Start: 2024-09-30 | End: 2024-10-03 | Stop reason: SDUPTHER

## 2024-09-30 RX ORDER — BUSPIRONE HYDROCHLORIDE 5 MG/1
TABLET ORAL
Qty: 60 TABLET | Refills: 2 | Status: SHIPPED | OUTPATIENT
Start: 2024-09-30 | End: 2024-10-03 | Stop reason: SDUPTHER

## 2024-09-30 RX ORDER — DOXEPIN HYDROCHLORIDE 50 MG/1
CAPSULE ORAL
Qty: 30 CAPSULE | Refills: 11 | Status: SHIPPED | OUTPATIENT
Start: 2024-09-30 | End: 2024-10-03 | Stop reason: SDUPTHER

## 2024-10-03 ENCOUNTER — OFFICE VISIT (OUTPATIENT)
Dept: PRIMARY CARE CLINIC | Age: 74
End: 2024-10-03
Payer: MEDICARE

## 2024-10-03 VITALS
TEMPERATURE: 96.9 F | BODY MASS INDEX: 22.57 KG/M2 | DIASTOLIC BLOOD PRESSURE: 72 MMHG | SYSTOLIC BLOOD PRESSURE: 126 MMHG | RESPIRATION RATE: 18 BRPM | HEIGHT: 63 IN | OXYGEN SATURATION: 98 % | WEIGHT: 127.4 LBS | HEART RATE: 81 BPM

## 2024-10-03 DIAGNOSIS — E78.00 HYPERCHOLESTEREMIA: ICD-10-CM

## 2024-10-03 DIAGNOSIS — F43.23 SITUATIONAL MIXED ANXIETY AND DEPRESSIVE DISORDER: ICD-10-CM

## 2024-10-03 DIAGNOSIS — Z23 NEED FOR INFLUENZA VACCINATION: ICD-10-CM

## 2024-10-03 DIAGNOSIS — I10 ESSENTIAL HYPERTENSION: ICD-10-CM

## 2024-10-03 DIAGNOSIS — Z00.00 MEDICARE ANNUAL WELLNESS VISIT, SUBSEQUENT: Primary | ICD-10-CM

## 2024-10-03 DIAGNOSIS — E87.6 HYPOKALEMIA: Primary | ICD-10-CM

## 2024-10-03 DIAGNOSIS — J44.9 CHRONIC OBSTRUCTIVE PULMONARY DISEASE, UNSPECIFIED COPD TYPE (HCC): ICD-10-CM

## 2024-10-03 LAB
ALBUMIN SERPL-MCNC: 4.3 G/DL (ref 3.5–5.2)
ALP SERPL-CCNC: 117 U/L (ref 35–104)
ALT SERPL-CCNC: 18 U/L (ref 5–33)
ANION GAP SERPL CALCULATED.3IONS-SCNC: 15 MMOL/L (ref 7–19)
AST SERPL-CCNC: 20 U/L (ref 5–32)
BILIRUB SERPL-MCNC: 0.3 MG/DL (ref 0.2–1.2)
BUN SERPL-MCNC: 12 MG/DL (ref 8–23)
CALCIUM SERPL-MCNC: 9.4 MG/DL (ref 8.8–10.2)
CHLORIDE SERPL-SCNC: 96 MMOL/L (ref 98–111)
CHOLEST SERPL-MCNC: 148 MG/DL (ref 0–199)
CO2 SERPL-SCNC: 25 MMOL/L (ref 22–29)
CREAT SERPL-MCNC: 1.1 MG/DL (ref 0.5–0.9)
ERYTHROCYTE [DISTWIDTH] IN BLOOD BY AUTOMATED COUNT: 14.3 % (ref 11.5–14.5)
GLUCOSE SERPL-MCNC: 80 MG/DL (ref 70–99)
HCT VFR BLD AUTO: 38.6 % (ref 37–47)
HDLC SERPL-MCNC: 41 MG/DL (ref 40–60)
HGB BLD-MCNC: 12.4 G/DL (ref 12–16)
LDLC SERPL CALC-MCNC: 88 MG/DL
MCH RBC QN AUTO: 31.3 PG (ref 27–31)
MCHC RBC AUTO-ENTMCNC: 32.1 G/DL (ref 33–37)
MCV RBC AUTO: 97.5 FL (ref 81–99)
PLATELET # BLD AUTO: 353 K/UL (ref 130–400)
PMV BLD AUTO: 10.1 FL (ref 9.4–12.3)
POTASSIUM SERPL-SCNC: 3.4 MMOL/L (ref 3.5–5)
PROT SERPL-MCNC: 6.7 G/DL (ref 6.4–8.3)
RBC # BLD AUTO: 3.96 M/UL (ref 4.2–5.4)
SODIUM SERPL-SCNC: 136 MMOL/L (ref 136–145)
TRIGL SERPL-MCNC: 97 MG/DL (ref 0–149)
WBC # BLD AUTO: 10.8 K/UL (ref 4.8–10.8)

## 2024-10-03 PROCEDURE — 1123F ACP DISCUSS/DSCN MKR DOCD: CPT | Performed by: FAMILY MEDICINE

## 2024-10-03 PROCEDURE — 3078F DIAST BP <80 MM HG: CPT | Performed by: FAMILY MEDICINE

## 2024-10-03 PROCEDURE — G8427 DOCREV CUR MEDS BY ELIG CLIN: HCPCS | Performed by: FAMILY MEDICINE

## 2024-10-03 PROCEDURE — G8482 FLU IMMUNIZE ORDER/ADMIN: HCPCS | Performed by: FAMILY MEDICINE

## 2024-10-03 PROCEDURE — 1090F PRES/ABSN URINE INCON ASSESS: CPT | Performed by: FAMILY MEDICINE

## 2024-10-03 PROCEDURE — 90653 IIV ADJUVANT VACCINE IM: CPT | Performed by: FAMILY MEDICINE

## 2024-10-03 PROCEDURE — G0439 PPPS, SUBSEQ VISIT: HCPCS | Performed by: FAMILY MEDICINE

## 2024-10-03 PROCEDURE — 3023F SPIROM DOC REV: CPT | Performed by: FAMILY MEDICINE

## 2024-10-03 PROCEDURE — G8399 PT W/DXA RESULTS DOCUMENT: HCPCS | Performed by: FAMILY MEDICINE

## 2024-10-03 PROCEDURE — G0008 ADMIN INFLUENZA VIRUS VAC: HCPCS | Performed by: FAMILY MEDICINE

## 2024-10-03 PROCEDURE — 4004F PT TOBACCO SCREEN RCVD TLK: CPT | Performed by: FAMILY MEDICINE

## 2024-10-03 PROCEDURE — 3074F SYST BP LT 130 MM HG: CPT | Performed by: FAMILY MEDICINE

## 2024-10-03 PROCEDURE — 3017F COLORECTAL CA SCREEN DOC REV: CPT | Performed by: FAMILY MEDICINE

## 2024-10-03 PROCEDURE — G8420 CALC BMI NORM PARAMETERS: HCPCS | Performed by: FAMILY MEDICINE

## 2024-10-03 PROCEDURE — 99214 OFFICE O/P EST MOD 30 MIN: CPT | Performed by: FAMILY MEDICINE

## 2024-10-03 RX ORDER — BUSPIRONE HYDROCHLORIDE 5 MG/1
TABLET ORAL
Qty: 180 TABLET | Refills: 3 | Status: SHIPPED | OUTPATIENT
Start: 2024-10-03

## 2024-10-03 RX ORDER — DOXEPIN HYDROCHLORIDE 50 MG/1
CAPSULE ORAL
Qty: 90 CAPSULE | Refills: 3 | Status: SHIPPED | OUTPATIENT
Start: 2024-10-03

## 2024-10-03 RX ORDER — INDAPAMIDE 1.25 MG/1
1.25 TABLET ORAL EVERY MORNING
Qty: 90 TABLET | Refills: 3 | Status: SHIPPED | OUTPATIENT
Start: 2024-10-03

## 2024-10-03 SDOH — ECONOMIC STABILITY: FOOD INSECURITY: WITHIN THE PAST 12 MONTHS, THE FOOD YOU BOUGHT JUST DIDN'T LAST AND YOU DIDN'T HAVE MONEY TO GET MORE.: NEVER TRUE

## 2024-10-03 SDOH — ECONOMIC STABILITY: FOOD INSECURITY: WITHIN THE PAST 12 MONTHS, YOU WORRIED THAT YOUR FOOD WOULD RUN OUT BEFORE YOU GOT MONEY TO BUY MORE.: NEVER TRUE

## 2024-10-03 SDOH — ECONOMIC STABILITY: INCOME INSECURITY: HOW HARD IS IT FOR YOU TO PAY FOR THE VERY BASICS LIKE FOOD, HOUSING, MEDICAL CARE, AND HEATING?: NOT HARD AT ALL

## 2024-10-03 ASSESSMENT — PATIENT HEALTH QUESTIONNAIRE - PHQ9
SUM OF ALL RESPONSES TO PHQ QUESTIONS 1-9: 1
SUM OF ALL RESPONSES TO PHQ9 QUESTIONS 1 & 2: 1
2. FEELING DOWN, DEPRESSED OR HOPELESS: SEVERAL DAYS
SUM OF ALL RESPONSES TO PHQ QUESTIONS 1-9: 1
1. LITTLE INTEREST OR PLEASURE IN DOING THINGS: NOT AT ALL

## 2024-10-03 NOTE — PROGRESS NOTES
SUBJECTIVE:   73 y.o. female for annual routine physical.  She is doing well.  She is not drinking but she is still smoking.  She needs refills on her medication.  She is caring for her  from whom she was  but never  and she moved him into her house because he has severe dementia.  She does have children from a previous marriage but they are busy and really cannot help.    She is complaining of a spot on her left elbow that has been there for 3 months and is itching.    She denies any chest pain or shortness of breath.  No change in bowel movements.  No bright red blood per rectum or dark tarry stools.  LMP: No LMP recorded (lmp unknown). Patient has had a hysterectomy.  Patient Active Problem List    Diagnosis Date Noted    Incisional hernia, without obstruction or gangrene 10/06/2022    History of AAA (abdominal aortic aneurysm) repair     Nasal vestibulitis 03/23/2021    Chronic obstructive pulmonary disease (HCC) 01/17/2021    S/P excision of vocal cord nodule 11/19/2020    Gastroesophageal reflux disease 10/01/2020    Laryngopharyngeal reflux (LPR) 10/01/2020    Multinodular goiter 10/01/2020    Osteoarthrosis 08/04/2020    Labyrinthitis 08/04/2020    Colon polyps 08/04/2020    Renal mass, right 01/07/2020    Irritable bowel syndrome with diarrhea 06/05/2019    Bilateral carotid artery stenosis 08/27/2018    Essential hypertension 08/22/2018    History of colon polyps 05/18/2016    Chronic heartburn 05/18/2016    Antiplatelet or antithrombotic long-term use 05/18/2016    History of colonic polyps 05/18/2016    Fibromuscular dysplasia (HCC) 06/24/2014    Irritable bowel syndrome 01/08/2013     Current Outpatient Medications   Medication Sig Dispense Refill    busPIRone (BUSPAR) 5 MG tablet TAKE 1 TABLET BY MOUTH TWICE DAILY FOR ANXIETY 180 tablet 3    doxepin (SINEQUAN) 50 MG capsule TAKE 1 CAPSULE BY MOUTH AT BEDTIME AS NEEDED FOR SLEEP 90 capsule 3    FLUoxetine (PROZAC) 20 MG capsule 
This encounter was performed under my, Carmen Petit MD’s, direct supervision, 10/3/2024.    
  Interventions:  Patient declined any further intervention or treatment                Objective   Vitals:    10/03/24 0843   BP: 126/72   Pulse: 81   Resp: 18   Temp: 96.9 °F (36.1 °C)   SpO2: 98%   Weight: 57.8 kg (127 lb 6.4 oz)   Height: 1.6 m (5' 3\")      Body mass index is 22.57 kg/m².                 Allergies   Allergen Reactions    Colestipol Shortness Of Breath and Other (See Comments)     Heart races    Codeine      Blocks her kidneys     Milk (Cow) Diarrhea    Prednisone Other (See Comments)     Increased heart rate and insomnia    Aspirin Nausea And Vomiting     Makes her stomach bleed     Prior to Visit Medications    Medication Sig Taking? Authorizing Provider   doxepin (SINEQUAN) 50 MG capsule TAKE 1 CAPSULE BY MOUTH AT BEDTIME AS NEEDED FOR SLEEP Yes Carmen Petit MD   FLUoxetine (PROZAC) 20 MG capsule TAKE 1 CAPSULE BY MOUTH DAILY Yes Carmen Petit MD   busPIRone (BUSPAR) 5 MG tablet TAKE 1 TABLET BY MOUTH TWICE DAILY FOR ANXIETY Yes Carmen Petit MD   indapamide (LOZOL) 1.25 MG tablet TAKE 1 TABLET BY MOUTH EVERY MORNING FOR HIGH BLOOD PRESSURE Yes Carmen Petit MD   pantoprazole (PROTONIX) 40 MG tablet Take 1 tablet by mouth 2 times daily Yes Gurdeep Thornton APRN   amLODIPine (NORVASC) 5 MG tablet TAKE 1 TABLET BY MOUTH EVERY NIGHT FOR BLOOD PRESSURE Yes Carmen Petit MD   metoprolol succinate (TOPROL XL) 25 MG extended release tablet TAKE 1 TABLET BY MOUTH DAILY Yes Serena Vazquez MD   clopidogrel (PLAVIX) 75 MG tablet TAKE 1 TABLET BY MOUTH DAILY Yes Theo Proctor MD   simvastatin (ZOCOR) 40 MG tablet Take 1 tablet by mouth nightly For high cholesterol Yes Carmen Petit MD   meclizine (ANTIVERT) 25 MG tablet TAKE ONE TABLET BY MOUTH THREE TIMES DAILY AS NEEDED FOR SEVERE DIZZINESS Yes Carmen Petit MD CareTeam (Including outside providers/suppliers regularly involved in providing care):   Patient Care Team:  Carmen Petit MD as PCP - General (Family

## 2024-10-03 NOTE — PATIENT INSTRUCTIONS
We are committed to providing you with the best care possible.   In order to help us achieve these goals please remember to bring all medications, herbal products, and over the counter supplements with you to each visit.     If your provider has ordered testing for you, please be sure to follow up with our office if you have not received results within 7 days after the testing took place.     *If you receive a survey after visiting one of our offices, please take time to share your experience concerning your physician office visit. These surveys are confidential and no health information about you is shared.  We are eager to improve for you and we are counting on your feedback to help make that happen. Quit Now [place] is a FREE online service available to [place] residents 15 years of age and over. When you become a member,it offers a free telephone service, so you can speak to a  in person, if you would prefer. Call the QuitLine at 5-418-QUITNOW or 1-248.574.5899.  special tools, a support team of coaches, research-based information, and a community of others trying to become tobacco free. Our expert coaches can talk to you about overcoming common barriers, such as dealing with stress, fighting cravings, coping with irritability, and controlling weight gain.    Http://www.cdc.gov/tobacco/campaign/tips/index.htm    Https://www.quitnow[place].org/    Nicotine is an addictive drug found in tobacco that causes changes in the brain - making people crave it more and more. When prolonged tobacco use is stopped, it can cause unpleasant withdrawal symptoms, including irritability and anxiety      BENEFITS OF STOPPING SMOKIN minutes after quitting  Your heart rate and blood pressure drop  12 hours after quitting  The carbon monoxide level in your blood drops to normal  2 weeks to 3 months after quitting  Your circulation improves and your lung function increases  1 to 9 months after quitting  Coughing and

## 2024-10-07 DIAGNOSIS — E87.6 LOW BLOOD POTASSIUM: Primary | ICD-10-CM

## 2024-10-23 ENCOUNTER — PROCEDURE VISIT (OUTPATIENT)
Dept: PRIMARY CARE CLINIC | Age: 74
End: 2024-10-23

## 2024-10-23 VITALS
HEART RATE: 69 BPM | DIASTOLIC BLOOD PRESSURE: 72 MMHG | OXYGEN SATURATION: 95 % | BODY MASS INDEX: 22.39 KG/M2 | SYSTOLIC BLOOD PRESSURE: 126 MMHG | RESPIRATION RATE: 18 BRPM | TEMPERATURE: 96.9 F | WEIGHT: 126.4 LBS | HEIGHT: 63 IN

## 2024-10-23 DIAGNOSIS — L98.9 CHANGING SKIN LESION: Primary | ICD-10-CM

## 2024-10-24 ASSESSMENT — ENCOUNTER SYMPTOMS: RESPIRATORY NEGATIVE: 1

## 2024-10-24 NOTE — PROGRESS NOTES
SUBJECTIVE:    Kassy Kingsley is 73 y.o.female who comes in complaining of mole removal   .       HPI: Kassy comes in today for removal of a skin lesion that we found on her left forearm when I saw her at her last visit.  She feels good today.  She denies any problems with local anesthetic agents.      Allergies   Allergen Reactions    Colestipol Shortness Of Breath and Other (See Comments)     Heart races    Codeine      Blocks her kidneys     Milk (Cow) Diarrhea    Prednisone Other (See Comments)     Increased heart rate and insomnia    Aspirin Nausea And Vomiting     Makes her stomach bleed       Social History     Socioeconomic History    Marital status:      Spouse name: None    Number of children: 2    Years of education: None    Highest education level: None   Occupational History    Occupation: [employer]   Tobacco Use    Smoking status: Every Day     Current packs/day: 0.50     Average packs/day: 0.5 packs/day for 41.8 years (20.9 ttl pk-yrs)     Types: Cigarettes     Start date: 1/1/1983    Smokeless tobacco: Never    Tobacco comments:     Pt denies smoking but POA indicates pt smokes daily and refuses to tell medical personal     3/23/21 notes from Robert Major states patient was a 1.5 ppd smoker from [id number]. = 22.5 pp year hx now smoking 1ppd since 2015. Updated for Lung Cancer scr/program   Vaping Use    Vaping status: Never Used   Substance and Sexual Activity    Alcohol use: No    Drug use: No    Sexual activity: Not Currently     Partners: Male   Social History Narrative     twice now     Retired  for [employer]    She has 2 sons and one daughter    Education high school    Oriental orthodox vinay none specified    Smoked in many years now quit    Denies alcohol consumption or substance abuse    Physically sedentary    Enjoys crocheting     Social Determinants of Health     Financial Resource Strain: Low Risk  (10/3/2024)    Overall Financial Resource Strain (CARDIA)

## 2024-10-29 RX ORDER — PANTOPRAZOLE SODIUM 40 MG/1
40 TABLET, DELAYED RELEASE ORAL 2 TIMES DAILY
Qty: 30 TABLET | OUTPATIENT
Start: 2024-10-29

## 2024-10-29 NOTE — TELEPHONE ENCOUNTER
10- Patient called stated that she got a 90 day rx for Pantoprazole but that the Pharm will not give her another rx.       Called patient advised that I'm not sure how she got the 90 day rx as that CECILIA BRYSON rxd on 09- a 1 month rx with 11 refills.  Patient should have enough refills for 1 year.  Will call pharmacy to see what is going on.         Called [facility] [phone]  Spoke with [facility] Pharmacist  He stated that the last rx was a 30 day for qty for 90 on 08-.  He stated that he rx on 09- would of been a refill to soon but it is on file. However she can get a rx now.        Called and notified patient that she currently has a rx on file and can  a rx now

## 2024-11-28 DIAGNOSIS — E78.00 HYPERCHOLESTEREMIA: ICD-10-CM

## 2024-11-29 RX ORDER — SIMVASTATIN 40 MG
TABLET ORAL
Qty: 90 TABLET | Refills: 3 | Status: SHIPPED | OUTPATIENT
Start: 2024-11-29

## 2024-12-30 ENCOUNTER — OFFICE VISIT (OUTPATIENT)
Dept: PRIMARY CARE CLINIC | Age: 74
End: 2024-12-30
Payer: MEDICARE

## 2024-12-30 VITALS
OXYGEN SATURATION: 98 % | SYSTOLIC BLOOD PRESSURE: 122 MMHG | TEMPERATURE: 96.9 F | HEIGHT: 63 IN | BODY MASS INDEX: 22.22 KG/M2 | HEART RATE: 72 BPM | DIASTOLIC BLOOD PRESSURE: 72 MMHG | WEIGHT: 125.4 LBS | RESPIRATION RATE: 18 BRPM

## 2024-12-30 DIAGNOSIS — N28.9 DECREASED RENAL FUNCTION: ICD-10-CM

## 2024-12-30 DIAGNOSIS — R73.09 ELEVATED GLUCOSE: ICD-10-CM

## 2024-12-30 DIAGNOSIS — R21 RASH AND NONSPECIFIC SKIN ERUPTION: Primary | ICD-10-CM

## 2024-12-30 LAB
ANION GAP SERPL CALCULATED.3IONS-SCNC: 13 MMOL/L (ref 7–19)
BUN SERPL-MCNC: 16 MG/DL (ref 8–23)
CALCIUM SERPL-MCNC: 9.8 MG/DL (ref 8.8–10.2)
CHLORIDE SERPL-SCNC: 97 MMOL/L (ref 98–111)
CO2 SERPL-SCNC: 27 MMOL/L (ref 22–29)
CREAT SERPL-MCNC: 1.1 MG/DL (ref 0.5–0.9)
GLUCOSE SERPL-MCNC: 91 MG/DL (ref 70–99)
HBA1C MFR BLD: 5.9 % (ref 4–5.6)
POTASSIUM SERPL-SCNC: 4 MMOL/L (ref 3.5–5)
SODIUM SERPL-SCNC: 137 MMOL/L (ref 136–145)

## 2024-12-30 PROCEDURE — G8427 DOCREV CUR MEDS BY ELIG CLIN: HCPCS | Performed by: FAMILY MEDICINE

## 2024-12-30 PROCEDURE — 1160F RVW MEDS BY RX/DR IN RCRD: CPT | Performed by: FAMILY MEDICINE

## 2024-12-30 PROCEDURE — 1123F ACP DISCUSS/DSCN MKR DOCD: CPT | Performed by: FAMILY MEDICINE

## 2024-12-30 PROCEDURE — 4004F PT TOBACCO SCREEN RCVD TLK: CPT | Performed by: FAMILY MEDICINE

## 2024-12-30 PROCEDURE — G8420 CALC BMI NORM PARAMETERS: HCPCS | Performed by: FAMILY MEDICINE

## 2024-12-30 PROCEDURE — 99213 OFFICE O/P EST LOW 20 MIN: CPT | Performed by: FAMILY MEDICINE

## 2024-12-30 PROCEDURE — 1159F MED LIST DOCD IN RCRD: CPT | Performed by: FAMILY MEDICINE

## 2024-12-30 PROCEDURE — 3078F DIAST BP <80 MM HG: CPT | Performed by: FAMILY MEDICINE

## 2024-12-30 PROCEDURE — G8399 PT W/DXA RESULTS DOCUMENT: HCPCS | Performed by: FAMILY MEDICINE

## 2024-12-30 PROCEDURE — 1090F PRES/ABSN URINE INCON ASSESS: CPT | Performed by: FAMILY MEDICINE

## 2024-12-30 PROCEDURE — 3074F SYST BP LT 130 MM HG: CPT | Performed by: FAMILY MEDICINE

## 2024-12-30 PROCEDURE — 3017F COLORECTAL CA SCREEN DOC REV: CPT | Performed by: FAMILY MEDICINE

## 2024-12-30 PROCEDURE — G8482 FLU IMMUNIZE ORDER/ADMIN: HCPCS | Performed by: FAMILY MEDICINE

## 2024-12-30 RX ORDER — CLOTRIMAZOLE AND BETAMETHASONE DIPROPIONATE 10; .64 MG/G; MG/G
CREAM TOPICAL
Qty: 15 G | Refills: 0 | Status: SHIPPED | OUTPATIENT
Start: 2024-12-30

## 2024-12-31 ASSESSMENT — ENCOUNTER SYMPTOMS
GASTROINTESTINAL NEGATIVE: 1
RESPIRATORY NEGATIVE: 1

## 2025-01-01 LAB — NUCLEAR IGG SER QL IA: NORMAL

## 2025-01-01 NOTE — PROGRESS NOTES
Lotrisone will help but we will try.  If it is not better in 2 to 3 weeks she is to call  Follow-up:  Return if symptoms worsen or fail to improve.    PATIENT INSTRUCTIONS:  Patient Instructions   We are committed to providing you with the best care possible.   In order to help us achieve these goals please remember to bring all medications, herbal products, and over the counter supplements with you to each visit.     If your provider has ordered testing for you, please be sure to follow up with our office if you have not received results within 7 days after the testing took place.     *If you receive a survey after visiting one of our offices, please take time to share your experience concerning your physician office visit. These surveys are confidential and no health information about you is shared.  We are eager to improve for you and we are counting on your feedback to help make that happen.     EMR Dragon/transcription disclaimer:  Much of this encounter note is electronic transcription/translation of spoken language to printed texts.  The electronic translation of spoken language may be erroneous, or at times, nonsensical words or phrases may beinadvertently transcribed.  Although I have reviewed the note for such errors, some may still exist.

## 2025-01-20 ENCOUNTER — OFFICE VISIT (OUTPATIENT)
Age: 75
End: 2025-01-20
Payer: MEDICARE

## 2025-01-20 VITALS
OXYGEN SATURATION: 98 % | WEIGHT: 124 LBS | HEART RATE: 70 BPM | HEIGHT: 63 IN | BODY MASS INDEX: 21.97 KG/M2 | SYSTOLIC BLOOD PRESSURE: 126 MMHG | DIASTOLIC BLOOD PRESSURE: 84 MMHG

## 2025-01-20 DIAGNOSIS — R12 HEARTBURN: ICD-10-CM

## 2025-01-20 DIAGNOSIS — F17.218 CIGARETTE NICOTINE DEPENDENCE WITH OTHER NICOTINE-INDUCED DISORDER: ICD-10-CM

## 2025-01-20 DIAGNOSIS — I65.23 BILATERAL CAROTID ARTERY STENOSIS: ICD-10-CM

## 2025-01-20 DIAGNOSIS — I48.0 PAROXYSMAL ATRIAL FIBRILLATION (HCC): Primary | ICD-10-CM

## 2025-01-20 DIAGNOSIS — I10 ESSENTIAL HYPERTENSION: ICD-10-CM

## 2025-01-20 PROCEDURE — 4004F PT TOBACCO SCREEN RCVD TLK: CPT | Performed by: CLINICAL NURSE SPECIALIST

## 2025-01-20 PROCEDURE — 1123F ACP DISCUSS/DSCN MKR DOCD: CPT | Performed by: CLINICAL NURSE SPECIALIST

## 2025-01-20 PROCEDURE — G8420 CALC BMI NORM PARAMETERS: HCPCS | Performed by: CLINICAL NURSE SPECIALIST

## 2025-01-20 PROCEDURE — 99214 OFFICE O/P EST MOD 30 MIN: CPT | Performed by: CLINICAL NURSE SPECIALIST

## 2025-01-20 PROCEDURE — G8399 PT W/DXA RESULTS DOCUMENT: HCPCS | Performed by: CLINICAL NURSE SPECIALIST

## 2025-01-20 PROCEDURE — G8427 DOCREV CUR MEDS BY ELIG CLIN: HCPCS | Performed by: CLINICAL NURSE SPECIALIST

## 2025-01-20 PROCEDURE — 1090F PRES/ABSN URINE INCON ASSESS: CPT | Performed by: CLINICAL NURSE SPECIALIST

## 2025-01-20 PROCEDURE — 3079F DIAST BP 80-89 MM HG: CPT | Performed by: CLINICAL NURSE SPECIALIST

## 2025-01-20 PROCEDURE — 3074F SYST BP LT 130 MM HG: CPT | Performed by: CLINICAL NURSE SPECIALIST

## 2025-01-20 ASSESSMENT — ENCOUNTER SYMPTOMS
ABDOMINAL PAIN: 0
VOMITING: 0
FACIAL SWELLING: 0
CHEST TIGHTNESS: 0
COUGH: 0
NAUSEA: 0
WHEEZING: 0
SHORTNESS OF BREATH: 1
EYE REDNESS: 0

## 2025-01-20 NOTE — PATIENT INSTRUCTIONS
Follow up with Gastroenterologist for heartburn    Wear oxygen more often throughout day as needed  Consider referral to pulmologist, maintenance inhaler    Quit smoking.  Call the [place] Tobacco Quit Line 7-564-QUIT-NOW

## 2025-01-20 NOTE — PROGRESS NOTES
tablet 1    metoprolol succinate (TOPROL XL) 25 MG extended release tablet TAKE 1 TABLET BY MOUTH DAILY 90 tablet 3    clopidogrel (PLAVIX) 75 MG tablet TAKE 1 TABLET BY MOUTH DAILY 90 tablet 3    meclizine (ANTIVERT) 25 MG tablet TAKE ONE TABLET BY MOUTH THREE TIMES DAILY AS NEEDED FOR SEVERE DIZZINESS 30 tablet 3     No current facility-administered medications for this visit.     Allergies: Colestipol, Codeine, Milk (cow), Prednisone, and Aspirin    Review of Systems  Review of Systems   Constitutional:  Positive for fatigue. Negative for activity change, diaphoresis, fever and unexpected weight change.   HENT:  Negative for facial swelling and nosebleeds.    Eyes:  Negative for redness and visual disturbance.   Respiratory:  Positive for shortness of breath (Chronic, worsening). Negative for cough, chest tightness and wheezing.    Cardiovascular:  Negative for chest pain, palpitations and leg swelling.   Gastrointestinal:  Negative for abdominal pain, nausea and vomiting.        Complains of chronic heartburn   Endocrine: Negative for cold intolerance and heat intolerance.   Genitourinary:  Negative for dysuria and hematuria.   Musculoskeletal:  Negative for arthralgias and myalgias.   Skin:  Negative for pallor and rash.   Neurological:  Negative for dizziness, seizures, syncope, weakness and light-headedness.   Hematological:  Does not bruise/bleed easily.   Psychiatric/Behavioral:  Negative for agitation. The patient is not nervous/anxious.        Objective  Vital Signs - /84   Pulse 70   Ht 1.6 m (5' 3\")   Wt 56.2 kg (124 lb)   LMP  (LMP Unknown)   SpO2 98%   BMI 21.97 kg/m²   Physical Exam  Vitals and nursing note reviewed.   Constitutional:       General: She is not in acute distress.     Appearance: She is well-developed. She is not diaphoretic.   HENT:      Head: Normocephalic and atraumatic.      Right Ear: Hearing and external ear normal.      Left Ear: Hearing and external ear normal.

## 2025-01-28 RX ORDER — CLOPIDOGREL BISULFATE 75 MG/1
75 TABLET ORAL DAILY
Qty: 90 TABLET | Refills: 3 | Status: SHIPPED | OUTPATIENT
Start: 2025-01-28

## 2025-03-03 RX ORDER — AMLODIPINE BESYLATE 5 MG/1
TABLET ORAL
Qty: 90 TABLET | Refills: 1 | Status: SHIPPED | OUTPATIENT
Start: 2025-03-03

## 2025-03-04 ENCOUNTER — HOSPITAL ENCOUNTER (OUTPATIENT)
Dept: VASCULAR LAB | Age: 75
Discharge: HOME | End: 2025-03-06
Payer: MEDICARE

## 2025-03-04 ENCOUNTER — OFFICE VISIT (OUTPATIENT)
Dept: VASCULAR SURGERY | Age: 75
End: 2025-03-04
Payer: MEDICARE

## 2025-03-04 ENCOUNTER — HOSPITAL ENCOUNTER (OUTPATIENT)
Dept: ULTRASOUND IMAGING | Age: 75
Discharge: HOME | End: 2025-03-04
Payer: MEDICARE

## 2025-03-04 VITALS
OXYGEN SATURATION: 97 % | HEART RATE: 76 BPM | WEIGHT: 124 LBS | RESPIRATION RATE: 18 BRPM | DIASTOLIC BLOOD PRESSURE: 55 MMHG | SYSTOLIC BLOOD PRESSURE: 107 MMHG | HEIGHT: 63 IN | TEMPERATURE: 97.9 F | BODY MASS INDEX: 21.97 KG/M2

## 2025-03-04 DIAGNOSIS — I65.23 BILATERAL CAROTID ARTERY STENOSIS: ICD-10-CM

## 2025-03-04 DIAGNOSIS — I70.1 RENAL ARTERY STENOSIS: ICD-10-CM

## 2025-03-04 DIAGNOSIS — I65.23 BILATERAL CAROTID ARTERY STENOSIS: Primary | ICD-10-CM

## 2025-03-04 DIAGNOSIS — I70.213 ATHEROSCLER OF NATIVE ARTERY OF BOTH LEGS WITH INTERMIT CLAUDICATION: ICD-10-CM

## 2025-03-04 PROCEDURE — 1123F ACP DISCUSS/DSCN MKR DOCD: CPT | Performed by: NURSE PRACTITIONER

## 2025-03-04 PROCEDURE — 3074F SYST BP LT 130 MM HG: CPT | Performed by: NURSE PRACTITIONER

## 2025-03-04 PROCEDURE — G8420 CALC BMI NORM PARAMETERS: HCPCS | Performed by: NURSE PRACTITIONER

## 2025-03-04 PROCEDURE — 93880 EXTRACRANIAL BILAT STUDY: CPT

## 2025-03-04 PROCEDURE — G8399 PT W/DXA RESULTS DOCUMENT: HCPCS | Performed by: NURSE PRACTITIONER

## 2025-03-04 PROCEDURE — 93923 UPR/LXTR ART STDY 3+ LVLS: CPT

## 2025-03-04 PROCEDURE — 4004F PT TOBACCO SCREEN RCVD TLK: CPT | Performed by: NURSE PRACTITIONER

## 2025-03-04 PROCEDURE — G8427 DOCREV CUR MEDS BY ELIG CLIN: HCPCS | Performed by: NURSE PRACTITIONER

## 2025-03-04 PROCEDURE — 1159F MED LIST DOCD IN RCRD: CPT | Performed by: NURSE PRACTITIONER

## 2025-03-04 PROCEDURE — 1090F PRES/ABSN URINE INCON ASSESS: CPT | Performed by: NURSE PRACTITIONER

## 2025-03-04 PROCEDURE — 3017F COLORECTAL CA SCREEN DOC REV: CPT | Performed by: NURSE PRACTITIONER

## 2025-03-04 PROCEDURE — 93975 VASCULAR STUDY: CPT

## 2025-03-04 PROCEDURE — 99214 OFFICE O/P EST MOD 30 MIN: CPT | Performed by: NURSE PRACTITIONER

## 2025-03-04 PROCEDURE — 3078F DIAST BP <80 MM HG: CPT | Performed by: NURSE PRACTITIONER
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
## 2025-03-07 LAB
ECHO BSA: 1.58 M2
ECHO BSA: 1.58 M2
VAS IMMEDIATE LEFT ABI: 1.08
VAS IMMEDIATE LEFT BRACHIAL BP: 117 MMHG
VAS IMMEDIATE LEFT DORSALIS PEDIS BP: 126 MMHG
VAS IMMEDIATE RIGHT ABI: 1.05
VAS IMMEDIATE RIGHT POST TIBIAL BP: 123 MMHG
VAS LEFT ABI: 1.02
VAS LEFT ARM BP: 125 MMHG
VAS LEFT ARM BP: 125 MMHG
VAS LEFT CALF PRESSURE: 148 MMHG
VAS LEFT CCA MID EDV: 22.6 CM/S
VAS LEFT CCA MID PSV: 78.1 CM/S
VAS LEFT CCA PROX EDV: 17.2 CM/S
VAS LEFT CCA PROX PSV: 85 CM/S
VAS LEFT DORSALIS PEDIS BP: 128 MMHG
VAS LEFT ECA EDV: 12.3 CM/S
VAS LEFT ECA PSV: 69.3 CM/S
VAS LEFT HIGH THIGH PRESSURE: 143 MMHG
VAS LEFT ICA DIST EDV: 25.8 CM/S
VAS LEFT ICA DIST PSV: 97.1 CM/S
VAS LEFT ICA MID EDV: 26.9 CM/S
VAS LEFT ICA MID PSV: 83.1 CM/S
VAS LEFT ICA PROX EDV: 15.8 CM/S
VAS LEFT ICA PROX PSV: 80.3 CM/S
VAS LEFT LOW THIGH PRESSURE: 142 MMHG
VAS LEFT PTA BP: 126 MMHG
VAS LEFT TBI: 0.46
VAS LEFT TOE PRESSURE: 57 MMHG
VAS LEFT VERTEBRAL EDV: 15.7 CM/S
VAS LEFT VERTEBRAL PSV: 43.5 CM/S
VAS RIGHT ABI: 1.06
VAS RIGHT ARM BP: 122 MMHG
VAS RIGHT ARM BP: 122 MMHG
VAS RIGHT CALF PRESSURE: 122 MMHG
VAS RIGHT CCA MID EDV: 14.4 CM/S
VAS RIGHT CCA MID PSV: 63.1 CM/S
VAS RIGHT CCA PROX EDV: 15.7 CM/S
VAS RIGHT CCA PROX PSV: 78.1 CM/S
VAS RIGHT DORSALIS PEDIS BP: 127 MMHG
VAS RIGHT ECA EDV: 24.2 CM/S
VAS RIGHT ECA PSV: 172 CM/S
VAS RIGHT HIGH THIGH PRESSURE: 154 MMHG
VAS RIGHT ICA DIST EDV: 31.3 CM/S
VAS RIGHT ICA DIST PSV: 85.6 CM/S
VAS RIGHT ICA MID EDV: 30.5 CM/S
VAS RIGHT ICA MID PSV: 78.1 CM/S
VAS RIGHT ICA PROX EDV: 13.7 CM/S
VAS RIGHT ICA PROX PSV: 48 CM/S
VAS RIGHT LOW THIGH PRESSURE: 142 MMHG
VAS RIGHT PTA BP: 133 MMHG
VAS RIGHT TBI: 0.39
VAS RIGHT TOE PRESSURE: 49 MMHG
VAS RIGHT VERTEBRAL EDV: 15.4 CM/S
VAS RIGHT VERTEBRAL PSV: 45.8 CM/S

## 2025-04-03 ENCOUNTER — RESULTS FOLLOW-UP (OUTPATIENT)
Dept: VASCULAR SURGERY | Age: 75
End: 2025-04-03

## 2025-06-02 RX ORDER — METOPROLOL SUCCINATE 25 MG/1
25 TABLET, EXTENDED RELEASE ORAL DAILY
Qty: 90 TABLET | Refills: 3 | Status: SHIPPED | OUTPATIENT
Start: 2025-06-02

## 2025-07-21 ENCOUNTER — OFFICE VISIT (OUTPATIENT)
Age: 75
End: 2025-07-21
Payer: MEDICARE

## 2025-07-21 VITALS
HEART RATE: 68 BPM | SYSTOLIC BLOOD PRESSURE: 122 MMHG | HEIGHT: 63 IN | BODY MASS INDEX: 22.5 KG/M2 | DIASTOLIC BLOOD PRESSURE: 66 MMHG | WEIGHT: 127 LBS

## 2025-07-21 DIAGNOSIS — I65.23 BILATERAL CAROTID ARTERY STENOSIS: ICD-10-CM

## 2025-07-21 DIAGNOSIS — Z87.19 HISTORY OF GI BLEED: ICD-10-CM

## 2025-07-21 DIAGNOSIS — I48.0 PAROXYSMAL ATRIAL FIBRILLATION (HCC): Primary | ICD-10-CM

## 2025-07-21 DIAGNOSIS — F17.218 CIGARETTE NICOTINE DEPENDENCE WITH OTHER NICOTINE-INDUCED DISORDER: ICD-10-CM

## 2025-07-21 DIAGNOSIS — R20.2 NUMBNESS AND TINGLING: ICD-10-CM

## 2025-07-21 DIAGNOSIS — I10 ESSENTIAL HYPERTENSION: ICD-10-CM

## 2025-07-21 DIAGNOSIS — R20.0 NUMBNESS AND TINGLING: ICD-10-CM

## 2025-07-21 PROCEDURE — 3074F SYST BP LT 130 MM HG: CPT | Performed by: CLINICAL NURSE SPECIALIST

## 2025-07-21 PROCEDURE — 93010 ELECTROCARDIOGRAM REPORT: CPT | Performed by: CLINICAL NURSE SPECIALIST

## 2025-07-21 PROCEDURE — 3078F DIAST BP <80 MM HG: CPT | Performed by: CLINICAL NURSE SPECIALIST

## 2025-07-21 PROCEDURE — 1159F MED LIST DOCD IN RCRD: CPT | Performed by: CLINICAL NURSE SPECIALIST

## 2025-07-21 PROCEDURE — 93005 ELECTROCARDIOGRAM TRACING: CPT | Performed by: CLINICAL NURSE SPECIALIST

## 2025-07-21 PROCEDURE — 1123F ACP DISCUSS/DSCN MKR DOCD: CPT | Performed by: CLINICAL NURSE SPECIALIST

## 2025-07-21 PROCEDURE — 99214 OFFICE O/P EST MOD 30 MIN: CPT | Performed by: CLINICAL NURSE SPECIALIST

## 2025-07-21 PROCEDURE — 1090F PRES/ABSN URINE INCON ASSESS: CPT | Performed by: CLINICAL NURSE SPECIALIST

## 2025-07-21 PROCEDURE — G8427 DOCREV CUR MEDS BY ELIG CLIN: HCPCS | Performed by: CLINICAL NURSE SPECIALIST

## 2025-07-21 PROCEDURE — G8420 CALC BMI NORM PARAMETERS: HCPCS | Performed by: CLINICAL NURSE SPECIALIST

## 2025-07-21 PROCEDURE — 4004F PT TOBACCO SCREEN RCVD TLK: CPT | Performed by: CLINICAL NURSE SPECIALIST

## 2025-07-21 PROCEDURE — 99406 BEHAV CHNG SMOKING 3-10 MIN: CPT | Performed by: CLINICAL NURSE SPECIALIST

## 2025-07-21 PROCEDURE — G8399 PT W/DXA RESULTS DOCUMENT: HCPCS | Performed by: CLINICAL NURSE SPECIALIST

## 2025-07-21 ASSESSMENT — ENCOUNTER SYMPTOMS
WHEEZING: 0
SHORTNESS OF BREATH: 1
VOMITING: 0
NAUSEA: 0
EYE REDNESS: 0
CHEST TIGHTNESS: 0
ABDOMINAL PAIN: 0
FACIAL SWELLING: 0

## 2025-07-21 NOTE — PROGRESS NOTES
[facility], [facility]  [address] [address], [place] [address]  Phone: [phone]  Fax: [phone]    OFFICE VISIT:  2025    Kassy Kingsley - : 1950    Reason For Visit:  Kassy is a 74 y.o. female who is here for Follow-up (Patient is having tingling on right side) and Paroxysmal atrial fibrillation       Diagnosis Orders   1. Paroxysmal atrial fibrillation (HCC)  EKG 12 lead      2. Essential hypertension  EKG 12 lead      3. Bilateral carotid artery stenosis  EKG 12 lead      4. Numbness and tingling  CT HEAD WO CONTRAST      5. Cigarette nicotine dependence with other nicotine-induced disorder        6. History of GI bleed                  HPI  Patient is here for follow-up with a history of PAF (hx of GIB and presently not anticoagulated), TIA, hypertension, carotid stenosis, history of AAA and follows with vascular surgery, COPD.      She is chronically short of breath  with history of continued smoking and COPD.  She wears oxygen at night.  She has not seen a pulmonologist in more than 5 years and takes no maintenance inhalers.  She paces her activities due to her breathing    Lexiscan nuclear stress test showed no evidence of ischemia       She denies any chest pain or discomfort.   She denies palpitations or fast heart rates, dizziness, lightheadedness or syncope    Her main complaint is episodes of numbness on the right side that have been occurring for the last 2 to 3 weeks.  She can have up to 10 episodes a day.  The numbness is in her face, arms, leg.  She denies that there is any associated weakness, facial drooping, slurred speech, confusion.  Symptoms generally passed within a few seconds    Numbness right side 2-3 x week    Carmen Petit MD is PCP.  Kassy Kingsley has the following history as [facility] in [facility]:    Patient Active Problem List    Diagnosis Date Noted    Incisional hernia, without obstruction or gangrene 10/06/2022    History of AAA

## 2025-07-21 NOTE — PATIENT INSTRUCTIONS
Return in about 6 months (around 1/21/2026) for APRN.     CT head  Consider Zio if CT negative    Quit smoking.  Call the [place] Tobacco Quit Line 1-451-QUIT-NOW

## 2025-07-22 ENCOUNTER — HOSPITAL ENCOUNTER (OUTPATIENT)
Dept: CT IMAGING | Age: 75
Discharge: HOME | End: 2025-07-22
Payer: MEDICARE

## 2025-07-22 DIAGNOSIS — I48.0 PAROXYSMAL ATRIAL FIBRILLATION (HCC): Primary | ICD-10-CM

## 2025-07-22 DIAGNOSIS — R20.2 NUMBNESS AND TINGLING: ICD-10-CM

## 2025-07-22 DIAGNOSIS — R20.0 NUMBNESS AND TINGLING: ICD-10-CM

## 2025-07-22 PROCEDURE — 70450 CT HEAD/BRAIN W/O DYE: CPT

## 2025-08-19 ENCOUNTER — TELEPHONE (OUTPATIENT)
Dept: CARDIOLOGY CLINIC | Age: 75
End: 2025-08-19

## 2025-08-19 DIAGNOSIS — I48.91 ATRIAL FIBRILLATION, UNSPECIFIED TYPE (HCC): Primary | ICD-10-CM

## 2025-08-19 DIAGNOSIS — I48.0 PAROXYSMAL ATRIAL FIBRILLATION (HCC): ICD-10-CM

## 2025-08-19 PROCEDURE — 93248 EXT ECG>7D<15D REV&INTERPJ: CPT | Performed by: CLINICAL NURSE SPECIALIST

## 2025-08-19 PROCEDURE — 93246 EXT ECG>7D<15D RECORDING: CPT | Performed by: CLINICAL NURSE SPECIALIST

## 2025-08-31 RX ORDER — AMLODIPINE BESYLATE 5 MG/1
TABLET ORAL
Qty: 90 TABLET | Refills: 1 | Status: SHIPPED | OUTPATIENT
Start: 2025-08-31

## (undated) DEVICE — SUTURE VCRL SZ 3-0 L18IN ABSRB UD W/O NDL POLYGLACTIN 910 J110T

## (undated) DEVICE — ENDO KIT,LOURDES HOSPITAL: Brand: MEDLINE INDUSTRIES, INC.

## (undated) DEVICE — SUTURE PERMAHAND SZ 2-0 L30IN NONABSORBABLE BLK SH L26MM C016D

## (undated) DEVICE — SUTURE VCRL SZ 0 L36IN ABSRB UD L36MM CT-1 1/2 CIR J946H

## (undated) DEVICE — COVER US PRB W15XL120CM W/ GEL RUBBERBAND TAPE STRP FLD GEN

## (undated) DEVICE — Device

## (undated) DEVICE — CLIP INT SM WIDE RED TI TRNSVRS GRV CHEVRON SHP W/ PRECIS

## (undated) DEVICE — PK TURNOVER RM ADV

## (undated) DEVICE — GLOVE SURG SZ 7 L12IN FNGR THK94MIL TRNSLUC YEL LTX HYDRGEL

## (undated) DEVICE — SPONGE LAP W18XL18IN WHT COT 4 PLY FLD STRUNG RADPQ DISP ST

## (undated) DEVICE — CONVERTORS STOCKINETTE: Brand: CONVERTORS

## (undated) DEVICE — SPONGE LAP ST XRAY 18X18

## (undated) DEVICE — 3M™ WARMING BLANKET, UPPER BODY, 10 PER CASE, 42268: Brand: BAIR HUGGER™

## (undated) DEVICE — FORCEPS BX L240CM JAW DIA2.4MM ORNG L CAP W/ NDL DISP RAD

## (undated) DEVICE — COVER,MAYO STAND,STERILE: Brand: MEDLINE

## (undated) DEVICE — CATH URETH 18FR PVC RND TIP 2

## (undated) DEVICE — GLV SURG BIOGEL M LTX PF 7 1/2

## (undated) DEVICE — DRESSING FOAM W4XL12IN SIL RECT ADH WTRPRF FLM BK W/ BORD

## (undated) DEVICE — TOWEL SURG W17XL24IN WHT COT RADPQ DISPOSABLE ST 4 PER PK

## (undated) DEVICE — BLANKET THER AD W24XL60IN FAB COVERING SUP SFT ULT THN LTWT

## (undated) DEVICE — SUTURE VCRL SZ 4-0 L18IN ABSRB UD VCRL POLYGLACTIN 910 COAT J109T

## (undated) DEVICE — SYR TB PRECISIONGLIDE 1CC 26G 3/8IN LF

## (undated) DEVICE — PLEDGET SURG W0.375XL0.062IN THK1.5MM WHT SFT PTFE RECT

## (undated) DEVICE — DISCONTINUED USE 393278 SYRINGE 10 ML HYPO W/O NDL LL TP PLSTC ST

## (undated) DEVICE — SUTURE PROL SZ 4-0 L36IN NONABSORBABLE BLU L26MM SH 1/2 CIR 8521H

## (undated) DEVICE — PROTECT TEETH A/

## (undated) DEVICE — E-Z CLEAN, NON-STICK, PTFE COATED, ELECTROSURGICAL BLADE ELECTRODE, 6.5 INCH (16.5 CM): Brand: MEGADYNE

## (undated) DEVICE — SOLUTION IV IRRIG POUR BRL 0.9% SODIUM CHL 2F7124

## (undated) DEVICE — DRSNG TELFA PAD NONADH STR 1S 3X8IN

## (undated) DEVICE — THREE QUARTER SHEET: Brand: CONVERTORS

## (undated) DEVICE — U-SHAPED STYLE, SILICONE (1 PER STERILE PKG)
Type: IMPLANTABLE DEVICE | Site: AORTA | Status: NON-FUNCTIONAL
Brand: SCANLAN® A/C LOCATOR® GRAFT MARKER

## (undated) DEVICE — SURGICAL PROCEDURE PACK VASC LOURDES HOSP

## (undated) DEVICE — SUTURE VCRL SZ 0 L18IN ABSRB UD POLYGLACTIN 910 BRAID TIE J912G

## (undated) DEVICE — SUTURE VCRL CTRL REL 2-0 CTX 18IN ABSRB BRAID UD J723D

## (undated) DEVICE — SUTURE PERMAHAND SZ 3-0 L18IN NONABSORBABLE BLK L26MM SH C013D

## (undated) DEVICE — FORCEPS BX L240CM JAW DIA2.8MM L CAP W/ NDL MIC MESH TOOTH

## (undated) DEVICE — CLIP INT M L GRN TI TRNSVRS GRV CHEVRON SHP W/ PRECIS TIP

## (undated) DEVICE — SUTURE VCRL SZ 2-0 L18IN ABSRB UD POLYGLACTIN 910 BRAID TIE J111T

## (undated) DEVICE — SUTURE PROL SZ 1 L60IN NONABSORBABLE BLU L65MM TP-1 3/8 CIR 8824G

## (undated) DEVICE — 3M™ IOBAN™ 2 ANTIMICROBIAL INCISE DRAPE 6651EZ: Brand: IOBAN™ 2

## (undated) DEVICE — SUTURE VCRL SZ 3-0 L18IN ABSRB UD L26MM SH 1/2 CIR J864D

## (undated) DEVICE — Z INACTIVE USE 2641838 CLIP INT L ORNG TI TRNSVRS GRV CHEVRON SHP W/ PRECIS TIP TO

## (undated) DEVICE — KT ANTI FOG W/FLD AND SPNG

## (undated) DEVICE — TUBING, SUCTION, 1/4" X 12', STRAIGHT: Brand: MEDLINE

## (undated) DEVICE — SUTURE PROL SZ 3-0 L48IN NONABSORBABLE BLU SH L26MM 1/2 CIR 8534H

## (undated) DEVICE — SUTURE PERMAHAND SZ 2-0 L18IN NONABSORBABLE BLK L26MM FS 685G

## (undated) DEVICE — SUTURE VCRL SZ 3-0 L27IN ABSRB UD L26MM SH 1/2 CIR J416H

## (undated) DEVICE — PACK,SET UP,NO DRAPES: Brand: MEDLINE

## (undated) DEVICE — KIT URIN CATHETER 16 FR 5 CC M INDWL SIL

## (undated) DEVICE — SUTURE NONABSORBABLE MONOFILAMENT 4-0 RB-1 36 IN BLU PROLENE 8557H

## (undated) DEVICE — SUTURE PDS II SZ 1 L54IN ABSRB VLT L65MM TP-1 1/2 CIR Z879G

## (undated) DEVICE — ACCESSORY TOWEL PACK: Brand: MEDLINE INDUSTRIES, INC.

## (undated) DEVICE — Z INACTIVE USE 2535480 CLIP LIG M BLU TI HRT SHP WIRE HORZ 180 PER BX

## (undated) DEVICE — SUTURE VCRL SZ 2-0 L36IN ABSRB UD L36MM CT-1 1/2 CIR J945H

## (undated) DEVICE — SUTURE PDS + SZ 1 L96IN ABSRB VLT L65MM TP-1 1/2 CIR PDP880G

## (undated) DEVICE — DISK-SHAPED STYLE, SILICONE (1 PER STERILE PKG)
Type: IMPLANTABLE DEVICE | Site: AORTA | Status: NON-FUNCTIONAL
Brand: SCANLAN® RADIOMARK® GRAFT MARKERS

## (undated) DEVICE — SUTURE NONABSORBABLE MONOFILAMENT 7-0 BV-1 1X24 IN PROLENE 8702H

## (undated) DEVICE — GAUZE,SPONGE,4"X4",16PLY,XRAY,STRL,LF: Brand: MEDLINE

## (undated) DEVICE — SUTURE PROL SZ 5-0 L36IN NONABSORBABLE BLU L17MM RB-1 1/2 8556H

## (undated) DEVICE — YANKAUER,BULB TIP WITH VENT: Brand: ARGYLE